# Patient Record
Sex: MALE | Race: WHITE
[De-identification: names, ages, dates, MRNs, and addresses within clinical notes are randomized per-mention and may not be internally consistent; named-entity substitution may affect disease eponyms.]

---

## 2018-02-15 ENCOUNTER — HOSPITAL ENCOUNTER (INPATIENT)
Dept: HOSPITAL 36 - GERO | Age: 78
LOS: 10 days | Discharge: NURSING, REHAB OR LONG-TERM CARE | DRG: 885 | End: 2018-02-25
Attending: PSYCHIATRY & NEUROLOGY | Admitting: PSYCHIATRY & NEUROLOGY
Payer: MEDICARE

## 2018-02-15 VITALS — SYSTOLIC BLOOD PRESSURE: 117 MMHG | DIASTOLIC BLOOD PRESSURE: 66 MMHG

## 2018-02-15 DIAGNOSIS — E87.0: ICD-10-CM

## 2018-02-15 DIAGNOSIS — F03.91: ICD-10-CM

## 2018-02-15 DIAGNOSIS — E11.65: ICD-10-CM

## 2018-02-15 DIAGNOSIS — Z79.4: ICD-10-CM

## 2018-02-15 DIAGNOSIS — F20.9: ICD-10-CM

## 2018-02-15 DIAGNOSIS — F29: Primary | ICD-10-CM

## 2018-02-15 DIAGNOSIS — R79.89: ICD-10-CM

## 2018-02-15 DIAGNOSIS — I10: ICD-10-CM

## 2018-02-15 PROCEDURE — Z7610: HCPCS

## 2018-02-16 LAB
ANION GAP SERPL CALC-SCNC: 13.5 MMOL/L (ref 7–16)
BASOPHILS # BLD AUTO: 0.1 TH/CUMM (ref 0–0.2)
BASOPHILS NFR BLD AUTO: 1.1 % (ref 0–2)
BUN SERPL-MCNC: 18 MG/DL (ref 7–25)
CALCIUM SERPL-MCNC: 9.3 MG/DL (ref 8.6–10.3)
CHLORIDE SERPL-SCNC: 102 MEQ/L (ref 98–107)
CHOLEST SERPL-MCNC: 144 MG/DL (ref ?–200)
CO2 SERPL-SCNC: 23.3 MEQ/L (ref 21–31)
CREAT SERPL-MCNC: 1 MG/DL (ref 0.7–1.3)
EOSINOPHIL # BLD AUTO: 0.3 TH/CMM (ref 0.1–0.4)
EOSINOPHIL NFR BLD AUTO: 4.5 % (ref 0–5)
ERYTHROCYTE [DISTWIDTH] IN BLOOD BY AUTOMATED COUNT: 15.4 % (ref 11.5–20)
GLUCOSE SERPL-MCNC: 224 MG/DL (ref 70–105)
HBA1C MFR BLD: 9.4 % (ref 4–6)
HCT VFR BLD CALC: 42.8 % (ref 41–60)
HDLC SERPL-MCNC: 46 MG/DL (ref 23–92)
HGB BLD-MCNC: 13.8 GM/DL (ref 12–16)
HGB BLD-MCNC: 15 G/DL (ref 4–35)
LYMPHOCYTE AB SER FC-ACNC: 1.5 TH/CMM (ref 1.5–3)
LYMPHOCYTES NFR BLD AUTO: 21.8 % (ref 20–50)
MCH RBC QN AUTO: 28.9 PG (ref 27–31)
MCHC RBC AUTO-ENTMCNC: 32.4 PG (ref 28–36)
MCV RBC AUTO: 89.4 FL (ref 80–99)
MONOCYTES # BLD AUTO: 0.5 TH/CMM (ref 0.3–1)
MONOCYTES NFR BLD AUTO: 7.7 % (ref 2–10)
NEUTROPHILS # BLD: 4.4 TH/CMM (ref 1.8–8)
NEUTROPHILS NFR BLD AUTO: 64.9 % (ref 40–80)
PLATELET # BLD: 223 TH/CMM (ref 150–400)
PMV BLD AUTO: 9.6 FL
POTASSIUM SERPL-SCNC: 3.8 MEQ/L (ref 3.5–5.1)
RBC # BLD AUTO: 4.78 MIL/CMM (ref 3.8–5.8)
SODIUM SERPL-SCNC: 135 MEQ/L (ref 136–145)
TRIGL SERPL-MCNC: 149 MG/DL (ref ?–150)
WBC # BLD AUTO: 6.8 TH/CMM (ref 4.8–10.8)

## 2018-02-16 RX ADMIN — INSULIN DETEMIR SCH UNITS: 100 INJECTION, SOLUTION SUBCUTANEOUS at 09:25

## 2018-02-16 RX ADMIN — INSULIN ASPART SCH UNITS: 100 INJECTION, SOLUTION INTRAVENOUS; SUBCUTANEOUS at 17:09

## 2018-02-16 RX ADMIN — INSULIN ASPART SCH UNITS: 100 INJECTION, SOLUTION INTRAVENOUS; SUBCUTANEOUS at 21:00

## 2018-02-16 RX ADMIN — INSULIN ASPART SCH: 100 INJECTION, SOLUTION INTRAVENOUS; SUBCUTANEOUS at 17:10

## 2018-02-16 NOTE — HISTORY AND PHYSICAL
History of Present Illness





- HPI


Chief Complaint: 





Psychosis


HPI: 





76 y/o male who was transferred from [facility] to [facility] for Psychosis. He was found in his backyard naked and confused. 

patient was BIBA after family notified EMS. Patient had no trauma noted. He 

stopped eating regularly per family. Patient was cooperative but restless. FBS 

was noted to be 426. Patient has a h/o DM on Lantus, h/o alcohol abuse and has 

history of BRYANT and dehydration.





PMH includes progressive dementia, schizophrenia, acute encephalopathy


Vital Signs: 





 Last Vital Signs











Temp  97.9 F   02/16/18 06:33


 


Pulse  90   02/16/18 06:33


 


Resp  19   02/16/18 06:33


 


BP  120/69   02/16/18 06:33


 


Pulse Ox  97   02/16/18 06:33














Past Medical History


Cardiovascular: Report: No Pertinent Hx


Pulmonary: Report: No Pertinent Hx


CNS: Report: Dementia


GI: Report: No Pertinent Hx


Psych: Report: Psychosis, Schizophrenia


Musculoskeletal: Report: No Pertinent Hx


Rheumatologic: Report: No pertinent Hx


Infectious Disease: Report: No Pertinent Hx


Renal/: Report: No Pertinent Hx


Endocrine: Report: Diabetes


Dermatology: Report: No Pertinent Hx





- Past Surgical History


Past Surgical History: No pertinent Hx





Social History


Smoke: No


Alcohol: Other (h/o of acute alcohol withdrawal)


Drugs: None


Lives: With Family





- Medications


Home Medications: 


Home Medication











 Medication  Instructions  Recorded  Type


 


Acetaminophen [Tylenol] 650 mg PO Q6HR PRN 02/15/18 History


 


Heparin Sodium [Heparin*] See Protocol SUBQ Q12HR 02/15/18 History


 


Insulin Glargine, Recombinan 15 unit SQ DAILY 02/15/18 History





[Lantus]   


 


Insulin Lispro [Humalog] See Protocol SUBQ ACHS 02/15/18 History


 


Lorazepam [Ativan] 0.5 mg PO Q6HR PRN 02/15/18 History


 


Zolpidem Tartrate [Ambien] 5 mg PO HS PRN 02/15/18 History














- Allergies


Allergies/Adverse Reactions: 


 Allergies











Allergy/AdvReac Type Severity Reaction Status Date / Time


 


No Known Allergies Allergy   Verified 02/15/18 21:28














Review of Systems





- Review of Systems


Constitutional: Report: No Significant


Eyes: Report: No Significant


ENT: Report: No Significant


Respiratory: Report: No Significant


Cardiovascular: Report: No Significant


Gastrointestinal: Report: No Significant


Genitourinary: Report: No Significant


Musculoskeletal: Report: No Significant


Skin: Report: No Significant


Neurological: Report: Other (psychosis)





Physical Exam





- Physical Exam


HEENT: Report: Ears Nose Throat within normal limits, Pharnyx within normal 

limits


Neck: Report: Within normal limits


Cardiovascular Systems: Report: +s1/s2 noted, Regular, Rate and Rhythm


Respiratory: Report: Breath Sounds are within normal limits


Abdomen: Report: Non-tender to palpation


Back: Report: Inspection of back is within normal limits.


Skin: Report: Color of skin is within normal limits


Neuro/Psych: Report: Mood affect is within normal limits, A+Ox3





- Lab Results


All Lab Results last 24 hours: 





 Laboratory Results - last 24 hr











  02/16/18 02/16/18





  06:10 06:10


 


WBC  6.8 


 


RBC  4.78 


 


Hgb  13.8 


 


Hct  42.8 


 


MCV  89.4 


 


MCH  28.9 


 


MCHC Differential  32.4 


 


RDW  15.4 


 


Plt Count  223 


 


MPV  9.6 


 


Neutrophils %  64.9 


 


Lymphocytes %  21.8 


 


Monocytes %  7.7 


 


Eosinophils %  4.5 


 


Basophils %  1.1 


 


Sodium   135 L


 


Potassium   3.8


 


Chloride   102


 


Carbon Dioxide   23.3


 


Anion Gap   13.5


 


BUN   18


 


Creatinine   1.0


 


Est GFR ( Amer)   TNP


 


Est GFR (Non-Af Amer)   TNP


 


BUN/Creatinine Ratio   18.0


 


Glucose   224 H


 


Calcium   9.3














- Assessment


Assessment: 





psychosis


dementia


schizophrenia


h/o encephalopathy


h/o alcohol withdrawal


diabetes mellitus type 2 on insulin








- Plan


Plan: 





admit to [facility]


will add TSH, Lipid profile, A1c to current labs.


accucheck ac and hs


low dose sliding scale.

## 2018-02-16 NOTE — PSYCHOSOCIAL EVALUATION
DATE OF SERVICE:  



PSYCHIATRIC INITIAL EVALUATION AND MENTAL STATUS EXAM



PATIENT'S AGE:  77.



SEX:  Male.



PHYSICIAN:  Leda Ramirez M.D., M.P.H.



CHIEF COMPLAINT:  Confusion and agitation.



HISTORY OF PRESENT ILLNESS:  The patient is a 77-year-old male who was found in

the backyard confused and naked.  The patient was brought into the hospital. 

The patient was agitated when he came into the hospital.  He is a Nauruan

speaking and I got help with translation from the staff.  The patient is

currently still confused.  He was talking to himself, laughing inappropriately. 

He was not able to tell me where he lives.  Also, when I asked him about if he

have any children, he also was not able to answer.  Also, when asked about his

birth date, he say [birth year], but he was not able to say the year.  Apparently, the

patient has been having dementia.  He also was clubbing his hand and he has been

talking to imaginary objects and also according to , who was not

making any sense with his answers.



PAST PSYCHIATRIC HISTORY:  Not known.



PAST MEDICAL HISTORY:  No known medical problems, apart from reviewing records

from [facility].  The patient was given Haldol and

also was given Pepcid and insulin.



SOCIAL HISTORY:  Not known at this time, but the patient was found in the

backyard naked prior to his admission.  No known history of alcohol or street

drugs.  Also, according to the report has not been eating and may be drinking

alcohol prior to his admission.  He also was hypertensive and tachycardic on the

Emergency Room in the other hospital.



ALLERGIES:  No known allergies.



MENTAL STATUS EXAMINATION:  The patient appears his stated age.  Anxious. 

Speaks Nauruan.  Rambling and _____ according to the  does not make

sense and disorganized and the patient is confused.  The patient did not answer

question regarding hallucinations or delusions, but patient is actively

hallucinating.  The patient did not answer questions regarding suicide or

homicide.  The patient is alert and disoriented to time, place, person and

situation.  Impaired immediate, recent and remote memories and he could not even

remember his birth date.  Poor insight.  Poor judgment.



ASSESSMENT:

PRIMARY DIAGNOSIS:  Unspecified psychosis.



SECONDARY DIAGNOSIS:  Dementia, moderate to severe, with psychotic features.



TREATMENT PLAN:  We will monitor the patient's behavior and condition closely. 

We will start the patient on Seroquel and we will adjust the dose.



ESTIMATED LENGTH OF STAY:  5-7 days.



THE PATIENT'S STRENGTHS AND WEAKNESSES:  The patient's strength is not clear at

this time.  Weakness is ineffective coping and his psychosis.



AFTER DISCHARGE PLAN:  Outpatient treatment and followup will continue as an

outpatient.



CRITERIA FOR DISCHARGE:  The patient will not be psychotic and stabilize

psychotropic medications.  Also, placement if needed.





DD: 02/16/2018 06:30

DT: 02/16/2018 22:25

[facility]# [id number]  [id number]

## 2018-02-17 RX ADMIN — INSULIN ASPART SCH UNITS: 100 INJECTION, SOLUTION INTRAVENOUS; SUBCUTANEOUS at 06:58

## 2018-02-17 RX ADMIN — INSULIN ASPART SCH UNITS: 100 INJECTION, SOLUTION INTRAVENOUS; SUBCUTANEOUS at 21:00

## 2018-02-17 RX ADMIN — INSULIN ASPART SCH: 100 INJECTION, SOLUTION INTRAVENOUS; SUBCUTANEOUS at 12:31

## 2018-02-17 RX ADMIN — INSULIN ASPART SCH: 100 INJECTION, SOLUTION INTRAVENOUS; SUBCUTANEOUS at 16:25

## 2018-02-17 RX ADMIN — INSULIN DETEMIR SCH: 100 INJECTION, SOLUTION SUBCUTANEOUS at 09:07

## 2018-02-17 NOTE — GENERAL PROGRESS NOTE
Subjective





- Review of Systems


Service Date: 02/17/18


Subjective: 





Awake, alert, afebrile. No acute distress





VS T97.8 P95 R20





Objective





- Results


Result Diagrams: 


 02/16/18 06:10





 02/16/18 06:10


Recent Labs: 


 Laboratory Last Values











WBC  6.8 Th/cmm (4.8-10.8)   02/16/18  06:10    


 


RBC  4.78 Mil/cmm (3.80-5.80)   02/16/18  06:10    


 


Hgb  13.8 gm/dL (12-16)   02/16/18  06:10    


 


Hct  42.8 % (41.0-60)   02/16/18  06:10    


 


MCV  89.4 fl (80-99)   02/16/18  06:10    


 


MCH  28.9 pg (27.0-31.0)   02/16/18  06:10    


 


MCHC Differential  32.4 pg (28.0-36.0)   02/16/18  06:10    


 


RDW  15.4 % (11.5-20.0)   02/16/18  06:10    


 


Plt Count  223 Th/cmm (150-400)   02/16/18  06:10    


 


MPV  9.6 fl  02/16/18  06:10    


 


Neutrophils %  64.9 % (40.0-80.0)   02/16/18  06:10    


 


Lymphocytes %  21.8 % (20.0-50.0)   02/16/18  06:10    


 


Monocytes %  7.7 % (2.0-10.0)   02/16/18  06:10    


 


Eosinophils %  4.5 % (0.0-5.0)   02/16/18  06:10    


 


Basophils %  1.1 % (0.0-2.0)   02/16/18  06:10    


 


Sodium  135 mEq/L (136-145)  L  02/16/18  06:10    


 


Potassium  3.8 mEq/L (3.5-5.1)   02/16/18  06:10    


 


Chloride  102 mEq/L ()   02/16/18  06:10    


 


Carbon Dioxide  23.3 mEq/L (21.0-31.0)   02/16/18  06:10    


 


Anion Gap  13.5  (7.0-16.0)   02/16/18  06:10    


 


BUN  18 mg/dL (7-25)   02/16/18  06:10    


 


Creatinine  1.0 mg/dL (0.7-1.3)   02/16/18  06:10    


 


Est GFR ( Amer)  TNP   02/16/18  06:10    


 


Est GFR (Non-Af Amer)  TNP   02/16/18  06:10    


 


BUN/Creatinine Ratio  18.0   02/16/18  06:10    


 


Glucose  224 mg/dL ()  H  02/16/18  06:10    


 


POC Glucose  253 MG/DL (70 - 105)  H  02/16/18  19:53    


 


Hemoglobin A1c %  9.4 % (4.0-6.0)  H  02/16/18  06:10    


 


Calcium  9.3 mg/dL (8.6-10.3)   02/16/18  06:10    


 


Triglycerides  149 mg/dL (<150)   02/16/18  06:10    


 


Cholesterol  144 mg/dL (<200)   02/16/18  06:10    


 


LDL Cholesterol Direct  93 mg/dL ()   02/16/18  06:10    


 


HDL Cholesterol  46 mg/dL (23-92)   02/16/18  06:10    


 


TSH  1.66 uIU/ml (0.34-5.60)   02/16/18  06:10    














- Physical Exam


Vitals and I&O: 


 Vital Signs











Temp  0 F   02/17/18 05:16


 


Pulse  95   02/16/18 21:22


 


Resp  20   02/16/18 21:22


 


BP  132/130   02/16/18 21:22


 


Pulse Ox  95   02/16/18 21:22








 Intake & Output











 02/16/18 02/16/18 02/17/18





 06:59 18:59 06:59


 


Intake Total 120 1200 360


 


Balance 120 1200 360


 


Weight (lbs) 71.033 kg  


 


Intake:   


 


  Oral 120 1200 360


 


Other:   


 


  # Voids 3  3


 


  # Bowel Movements  1 0











Active Medications: 


Current Medications





Acetaminophen (Tylenol)  650 mg PO Q6HR PRN


   PRN Reason: mild to moderate pain


   Stop: 04/16/18 22:18


   Last Admin: 02/16/18 17:08 Dose:  650 mg


Famotidine (Pepcid)  20 mg PO BID RENETTA


   Stop: 04/17/18 08:59


   Last Admin: 02/16/18 17:09 Dose:  20 mg


Heparin Sodium (Porcine) (Heparin)  5,000 units SUBQ Q12HR RENETTA


   PRN Reason: Protocol


   Stop: 04/17/18 08:59


   Last Admin: 02/16/18 21:00 Dose:  5,000 units


Insulin Aspart (Novolog Insulin Sliding Scale)  0 units SUBQ ACHS RENETTA


   PRN Reason: Protocol


   Stop: 04/17/18 11:29


   Last Admin: 02/16/18 21:00 Dose:  4 units


Insulin Detemir (Levemir Insulin)  15 units SUBQ DAILY R[facility]TA


   Stop: 04/17/18 08:59


   Last Admin: 02/16/18 09:25 Dose:  15 units


Lorazepam (Ativan)  0.5 mg PO Q6HR PRN; Protocol


   PRN Reason: agitation 


   Stop: 04/16/18 22:18


   Last Admin: 02/16/18 23:00 Dose:  0.5 mg


Magnesium Hydroxide (Milk Of Magnesia)  30 ml PO HS PRN


   PRN Reason: Constipation


Metformin HCl (Glucophage)  500 mg PO DAILY [facility]


   Stop: 04/18/18 08:59


Quetiapine Fumarate (Seroquel)  25 mg PO DAILY RENETTA


   PRN Reason: Protocol


   Stop: 04/17/18 08:59


   Last Admin: 02/16/18 09:26 Dose:  25 mg


Zolpidem Tartrate (Ambien)  5 mg PO HS PRN


   PRN Reason: insomnia


   Stop: 04/16/18 22:18








General: Alert, Oriented x3, No acute distress


HEENT: Atraumatic, PERRLA, EOMI


Neck: Supple


Cardiovascular: Regular rate, Normal S1, Normal S2


Lungs: Clear to auscultation


Abdomen: Bowel sounds, Soft





Assessment/Plan





- Assessment


Assessment: 





psychosis


dementia


schizophrenia


h/o encephalopathy


h/o alcohol withdrawal


diabetes mellitus type 2 on insulin








- Plan


Plan: 





admit to [facility]


will add TSH, Lipid profile, A1c to current labs.


accucheck ac and hs


low dose sliding scale.

## 2018-02-18 RX ADMIN — INSULIN ASPART SCH UNITS: 100 INJECTION, SOLUTION INTRAVENOUS; SUBCUTANEOUS at 21:52

## 2018-02-18 RX ADMIN — INSULIN DETEMIR SCH: 100 INJECTION, SOLUTION SUBCUTANEOUS at 12:40

## 2018-02-18 RX ADMIN — INSULIN ASPART SCH: 100 INJECTION, SOLUTION INTRAVENOUS; SUBCUTANEOUS at 16:09

## 2018-02-18 RX ADMIN — INSULIN ASPART SCH UNITS: 100 INJECTION, SOLUTION INTRAVENOUS; SUBCUTANEOUS at 06:56

## 2018-02-18 RX ADMIN — INSULIN ASPART SCH: 100 INJECTION, SOLUTION INTRAVENOUS; SUBCUTANEOUS at 12:40

## 2018-02-18 NOTE — PROGRESS NOTES
DATE:  



SUBJECTIVE:  The patient was seen, chart reviewed and discussed with staff.  The

patient seen in day room in a Ashley chair.  Attempts to speak with the patient

with a , but was unsuccessful.  The patient is actively

psychotic, laughing inappropriately and very disorganized.  He has ever been

compliant with his medications.



PLAN:  The patient continues to be actively psychotic, confused, unable to make

a safer realistic discharge so that he will require continued inpatient care

center treatment.  We will monitor patient on a daily basis for response to

medication and titrate meds as needed.





DD: 02/17/2018 20:17

DT: 02/18/2018 11:04

JOB# [id number]  [id number]

## 2018-02-18 NOTE — GENERAL PROGRESS NOTE
Subjective





- Review of Systems


Service Date: 18


Subjective: 





Awake, alert, afebrile. No acute distress





VS T97.7 P93 R18 /76





-->254-->210





Objective





- Results


Result Diagrams: 


 18 06:10





 18 06:10


Recent Labs: 


 Laboratory Last Values











WBC  6.8 Th/cmm (4.8-10.8)   18  06:10    


 


RBC  4.78 Mil/cmm (3.80-5.80)   18  06:10    


 


Hgb  13.8 gm/dL (12-16)   18  06:10    


 


Hct  42.8 % (41.0-60)   18  06:10    


 


MCV  89.4 fl (80-99)   18  06:10    


 


MCH  28.9 pg (27.0-31.0)   18  06:10    


 


MCHC Differential  32.4 pg (28.0-36.0)   18  06:10    


 


RDW  15.4 % (11.5-20.0)   18  06:10    


 


Plt Count  223 Th/cmm (150-400)   18  06:10    


 


MPV  9.6 fl  18  06:10    


 


Neutrophils %  64.9 % (40.0-80.0)   18  06:10    


 


Lymphocytes %  21.8 % (20.0-50.0)   18  06:10    


 


Monocytes %  7.7 % (2.0-10.0)   18  06:10    


 


Eosinophils %  4.5 % (0.0-5.0)   18  06:10    


 


Basophils %  1.1 % (0.0-2.0)   18  06:10    


 


Sodium  135 mEq/L (136-145)  L  18  06:10    


 


Potassium  3.8 mEq/L (3.5-5.1)   18  06:10    


 


Chloride  102 mEq/L ()   18  06:10    


 


Carbon Dioxide  23.3 mEq/L (21.0-31.0)   18  06:10    


 


Anion Gap  13.5  (7.0-16.0)   18  06:10    


 


BUN  18 mg/dL (7-25)   18  06:10    


 


Creatinine  1.0 mg/dL (0.7-1.3)   18  06:10    


 


Est GFR ( Amer)  TNP   18  06:10    


 


Est GFR (Non-Af Amer)  TNP   18  06:10    


 


BUN/Creatinine Ratio  18.0   18  06:10    


 


Glucose  224 mg/dL ()  H  18  06:10    


 


POC Glucose  210 MG/DL (70 - 105)  H  18  05:55    


 


Hemoglobin A1c %  9.4 % (4.0-6.0)  H  18  06:10    


 


Calcium  9.3 mg/dL (8.6-10.3)   18  06:10    


 


Triglycerides  149 mg/dL (<150)   18  06:10    


 


Cholesterol  144 mg/dL (<200)   18  06:10    


 


LDL Cholesterol Direct  93 mg/dL ()   18  06:10    


 


HDL Cholesterol  46 mg/dL (23-92)   18  06:10    


 


TSH  1.66 uIU/ml (0.34-5.60)   18  06:10    














- Physical Exam


Vitals and I&O: 


 Vital Signs











Temp  97.7 F   18 14:38


 


Pulse  93   18 14:38


 


Resp  18   18 14:38


 


BP  140/76   18 14:38


 


Pulse Ox  97   18 14:38








 Intake & Output











 18





 18:59 06:59 18:59


 


Intake Total 500 240 480


 


Output Total   1


 


Balance 500 240 479


 


Intake:   


 


  Oral 500 240 480


 


Output:   


 


  Urine   1


 


Other:   


 


  # Voids 2 1 1


 


  # Bowel Movements   1











Active Medications: 


Current Medications





Acetaminophen (Tylenol)  650 mg PO Q6HR PRN


   PRN Reason: mild to moderate pain


   Stop: 18 22:18


   Last Admin: 18 17:08 Dose:  650 mg


Famotidine (Pepcid)  20 mg PO BID RENETTA


   Stop: 18 08:59


   Last Admin: 18 16:09 Dose:  Not Given


Heparin Sodium (Porcine) (Heparin)  5,000 units SUBQ Q12HR RENETTA


   PRN Reason: Protocol


   Stop: 18 08:59


   Last Admin: 18 12:40 Dose:  Not Given


Insulin Aspart (Novolog Insulin Sliding Scale)  0 units SUBQ ACHS RENETTA


   PRN Reason: Protocol


   Stop: 18 11:29


   Last Admin: 18 16:09 Dose:  Not Given


Insulin Detemir (Levemir Insulin)  15 units SUBQ DAILY [facility]


   Stop: 18 08:59


   Last Admin: 18 12:40 Dose:  Not Given


Lorazepam (Ativan)  0.5 mg PO Q6HR PRN; Protocol


   PRN Reason: agitation 


   Stop: 18 22:18


   Last Admin: 18 20:54 Dose:  0.5 mg


Magnesium Hydroxide (Milk Of Magnesia)  30 ml PO HS PRN


   PRN Reason: Constipation


Metformin HCl (Glucophage)  500 mg PO DAILY [facility]


   Stop: 18 08:59


   Last Admin: 18 12:40 Dose:  Not Given


Quetiapine Fumarate (Seroquel)  25 mg PO DAILY RENETTA


   PRN Reason: Protocol


   Stop: 18 08:59


   Last Admin: 18 12:40 Dose:  Not Given


Zolpidem Tartrate (Ambien)  5 mg PO HS PRN


   PRN Reason: insomnia


   Stop: 18 22:18


   Last Admin: 18 20:55 Dose:  5 mg








General: Alert, Oriented x3, No acute distress


HEENT: Atraumatic, PERRLA, EOMI


Neck: Supple


Cardiovascular: Regular rate, Normal S1, Normal S2


Lungs: Clear to auscultation


Abdomen: Bowel sounds, Soft


Extremities: no Clubbing, no Cyanosis, no Edema





Assessment/Plan





- Assessment


Assessment: 





psychosis


dementia


schizophrenia


h/o encephalopathy


h/o alcohol withdrawal


diabetes mellitus type 2 on insulin elevated


Blood pressure elevated ...








- Plan


[facility]


will add TSH, Lipid profile, A1c to current labs.


accucheck ac and hs


low dose sliding scale.


add Clonidine 0.1mg PO q8


increase Levemir 20mg units SQ





Nutritional Asmnt/Malnutr-PDOC





- Dietary Evaluation


Malnutrition Findings (Please click <Entered> for more info): 








Nutritional Asmnt/Malnutrition                             Start:  18 10:

07


Text:                                                      Status: Complete    

  


Freq:                                                                          

  


 Document     18 10:07  ANNA  (Rec: 18 10:20  ANNA  NAPOLEON-

FNS1)


 Nutritional Asmnt/Malnutrition


     Patient General Information


      Nutritional Screening                      High Risk


      Diagnosis                                  Psychosis NOS


      Pertinent Medical Hx/Surgical Hx           Dementia, Schizophrenia, acute


                                                 encephalopathy, diabetes


      Subjective Information                     Patient was transferred from


                                                 [facility]. Per H&P,


                                                 FBS was 426. Patient is


                                                 primarily Italian speaking.


                                                 Patient was asleep in bed at


                                                 time of RD visit.


      Current Diet Order/ Nutrition Support      60gm CCHO


      Patient / S.O                              Not Indicated


      Pertinent Medications                      Pepcid, Novolog, Levemir, MOM,


                                                 Metformin


      Pertinent Labs                             Glucose 156-253 since


                                                 admission, HGA1C 9.4


     Nutritional Hx/Data


      Height                                     1.78 m


      Height (Calculated Centimeters)            177.8


      Current Weight (lbs)                       70.76 kg


      Weight (Calculated Kilograms)              70.8


      Weight (Calculated Grams)                  28430.4


      Ideal Body Weight                          166


      % Ideal Body Weight                        93


      Body Mass Index (BMI)                      22.4


      Recent Weight Change                       No


      Weight Status                              Approriate


     GI Symptoms


      GI Symptoms                                None


      Last BM                                    2/16 x 1


      Difficult in:                              None


      Food Allergies                             No


      Cultural/Ethnic/Taoist Belief           None noted


      Usual diet at home                         Unknown


      Skin Integrity/Comment:                    [place] 18, dryness, area of


                                                 concern, bruises


      Current %PO                                Good (%)


     Estimated Nutritional Goals


      BEE in Kcals:                              Using Current wt


      Calories/Kcals/Kg                          (based on CBW 70.8kg, 25-30


                                                 kcal/kg)


      Kcals Calculated                           ~3302-7540 kcal/day


      Protein:                                   Using Current wt


      Protein g/k gm/kg


      Protein Calculated                         ~70 gm/day


      Fluid: ml                                  ~3808-9718 ml/day (1 ml/kcal)


     Nutritional Problem


      1. Problem


       Problem                                   Altered nutrition related lab


                                                 values related to


       Etiology                                  uncontrolled hyperglycemia aeb


       Signs/Symptoms:                           Glucose 156-253 since


                                                 admission, HGA1C 9.4


     Intervention/Recommendation


      Comments                                   1. Continue 60 gm CCHO diet as


                                                 tolerated by patient.


                                                 2. MD to modify insulin


                                                 regimen for optimal glycemic


                                                 control.


     Expected Outcomes/Goals


      Expected Outcomes/Goals                    Oral intake >75% of meals,


                                                 nutrition related labs WNL,


                                                 weight stable


                                                 F/U LR

## 2018-02-18 NOTE — PROGRESS NOTES
DATE:  



SUBJECTIVE:  The patient seen, chart reviewed, and discussed with staff.  The

patient continues to be very irritated, confused, today, refusing to have any

solid foods as well as his medications.  He is extremely disorganized, unable to

answer questions in a coherent manner.



PLAN:  The patient continues to be actively psychotic.  It was felt that he is

unable to make realistic discharge plan and will need continued inpatient care. 

We will monitor patient on a daily basis for response to medication and titrate

medications as needed.





DD: 02/18/2018 15:12

DT: 02/18/2018 21:17

[facility]# [id number]  [id number]

## 2018-02-19 RX ADMIN — INSULIN ASPART SCH UNITS: 100 INJECTION, SOLUTION INTRAVENOUS; SUBCUTANEOUS at 22:00

## 2018-02-19 RX ADMIN — INSULIN DETEMIR SCH: 100 INJECTION, SOLUTION SUBCUTANEOUS at 12:26

## 2018-02-19 RX ADMIN — INSULIN ASPART SCH: 100 INJECTION, SOLUTION INTRAVENOUS; SUBCUTANEOUS at 12:27

## 2018-02-19 RX ADMIN — INSULIN ASPART SCH UNITS: 100 INJECTION, SOLUTION INTRAVENOUS; SUBCUTANEOUS at 07:01

## 2018-02-19 NOTE — PROGRESS NOTES
DATE:  02/19/2018



SUBJECTIVE:  Chart reviewed and the patient interviewed.  Also discussed the

patient's condition with the staff and reviewed records and labs.  The patient

is still extremely angry and is in irritable mood.  The patient also is quick to

respond and gets angry and agitated easily.  He also is still uncooperative with

the staff.  The patient refused to take his Accu-Chek  and takes the metformin. 

Also, he is getting aggressive while the staff trying to help him with his ADLs.

 He also is still suspicious and paranoid and in angry mood.



ASSESSMENT:  The patient is still aggressive and paranoid.



TREATMENT PLAN:  We will continue monitoring his behavior and his condition

closely.  Also, encouraged the patient to take his medications including his

metformin because his blood sugar on 02/15/2018 was 483.  Also, we will continue

to work on his irritability and anger and continue to followup closely.





DD: 02/19/2018 12:16

DT: 02/19/2018 23:10

[facility]# [id number]  [id number]

## 2018-02-19 NOTE — GENERAL PROGRESS NOTE
Subjective





- Review of Systems


Service Date: 18


Subjective: 





Awake, alert, afebrile. No acute distress





VS T97.8 P105 R18 /83





-->254-->210-->483-->263





Objective





- Results


Result Diagrams: 


 18 06:10





 18 06:10


Recent Labs: 


 Laboratory Last Values











WBC  6.8 Th/cmm (4.8-10.8)   18  06:10    


 


RBC  4.78 Mil/cmm (3.80-5.80)   18  06:10    


 


Hgb  13.8 gm/dL (12-16)   18  06:10    


 


Hct  42.8 % (41.0-60)   18  06:10    


 


MCV  89.4 fl (80-99)   18  06:10    


 


MCH  28.9 pg (27.0-31.0)   18  06:10    


 


MCHC Differential  32.4 pg (28.0-36.0)   18  06:10    


 


RDW  15.4 % (11.5-20.0)   18  06:10    


 


Plt Count  223 Th/cmm (150-400)   18  06:10    


 


MPV  9.6 fl  18  06:10    


 


Neutrophils %  64.9 % (40.0-80.0)   18  06:10    


 


Lymphocytes %  21.8 % (20.0-50.0)   18  06:10    


 


Monocytes %  7.7 % (2.0-10.0)   18  06:10    


 


Eosinophils %  4.5 % (0.0-5.0)   18  06:10    


 


Basophils %  1.1 % (0.0-2.0)   18  06:10    


 


Sodium  135 mEq/L (136-145)  L  18  06:10    


 


Potassium  3.8 mEq/L (3.5-5.1)   18  06:10    


 


Chloride  102 mEq/L ()   18  06:10    


 


Carbon Dioxide  23.3 mEq/L (21.0-31.0)   18  06:10    


 


Anion Gap  13.5  (7.0-16.0)   18  06:10    


 


BUN  18 mg/dL (7-25)   18  06:10    


 


Creatinine  1.0 mg/dL (0.7-1.3)   18  06:10    


 


Est GFR ( Amer)  TNP   18  06:10    


 


Est GFR (Non-Af Amer)  TNP   18  06:10    


 


BUN/Creatinine Ratio  18.0   18  06:10    


 


Glucose  483 mg/dL ()  H*  18  22:13    


 


POC Glucose  263 MG/DL (70 - 105)  H  18  06:43    


 


Hemoglobin A1c %  9.4 % (4.0-6.0)  H  18  06:10    


 


Calcium  9.3 mg/dL (8.6-10.3)   18  06:10    


 


Triglycerides  149 mg/dL (<150)   18  06:10    


 


Cholesterol  144 mg/dL (<200)   18  06:10    


 


LDL Cholesterol Direct  93 mg/dL ()   18  06:10    


 


HDL Cholesterol  46 mg/dL (23-92)   18  06:10    


 


TSH  1.66 uIU/ml (0.34-5.60)   18  06:10    














- Physical Exam


Vitals and I&O: 


 Vital Signs











Temp  97.8 F   18 06:32


 


Pulse  100   18 06:32


 


Resp  20   18 06:32


 


BP  150/83   18 06:32


 


Pulse Ox  96   18 06:32








 Intake & Output











 18





 18:59 06:59 18:59


 


Intake Total 480 240 


 


Output Total 1  


 


Balance 479 240 


 


Intake:   


 


  Oral 480 240 


 


Output:   


 


  Urine 1  


 


Other:   


 


  # Voids 1 1 


 


  # Bowel Movements 1 1 











Active Medications: 


Current Medications





Acetaminophen (Tylenol)  650 mg PO Q6HR PRN


   PRN Reason: mild to moderate pain


   Stop: 18 22:18


   Last Admin: 18 17:08 Dose:  650 mg


Famotidine (Pepcid)  20 mg PO BID [facility]


   Stop: 18 08:59


   Last Admin: 18 16:09 Dose:  Not Given


Heparin Sodium (Porcine) (Heparin)  5,000 units SUBQ Q12HR R[facility]ETTA


   PRN Reason: Protocol


   Stop: 18 08:59


   Last Admin: 18 21:51 Dose:  5,000 units


Insulin Aspart (Novolog Insulin Sliding Scale)  0 units SUBQ ACHS [facility]TA


   PRN Reason: Protocol


   Stop: 18 11:29


   Last Admin: 18 07:01 Dose:  6 units


Insulin Detemir (Levemir Insulin)  20 units SUBQ DAILY [facility]


   PRN Reason: Protocol


   Stop: 18 08:59


Lorazepam (Ativan)  0.5 mg PO Q6HR PRN; Protocol


   PRN Reason: agitation 


   Stop: 18 22:18


   Last Admin: 18 21:58 Dose:  0.5 mg


Magnesium Hydroxide (Milk Of Magnesia)  30 ml PO HS PRN


   PRN Reason: Constipation


Metformin HCl (Glucophage)  1,000 mg PO DAILY Counts include 234 beds at the [facility]


   Stop: 18 08:59


Metoprolol Succinate (Toprol Xl)  25 mg PO DAILY Counts include 234 beds at the [facility]


   Stop: 18 08:59


Quetiapine Fumarate (Seroquel)  25 mg PO DAILY [facility]


   PRN Reason: Protocol


   Stop: 18 08:59


   Last Admin: 18 12:40 Dose:  Not Given


Zolpidem Tartrate (Ambien)  5 mg PO HS PRN


   PRN Reason: insomnia


   Stop: 18 22:18


   Last Admin: 18 21:14 Dose:  5 mg








General: Alert, Oriented x3, No acute distress


HEENT: Atraumatic, PERRLA, EOMI


Neck: Supple


Cardiovascular: Regular rate, Normal S1, Normal S2


Lungs: Clear to auscultation


Abdomen: Bowel sounds, Soft


Extremities: no Clubbing, no Cyanosis, no Edema





Assessment/Plan





- Assessment


Assessment: 





psychosis


dementia


schizophrenia


h/o encephalopathy


h/o alcohol withdrawal


diabetes mellitus type 2 on insulin elevated


Blood pressure elevated ...








- Plan


Plan: 





admit to [facility]


will add TSH, Lipid profile, A1c to current labs.


accucheck ac and hs


low dose sliding scale.


add Clonidine 0.1mg PO q8


increase Levemir 20mg units SQ


increase metformin to 1000mg daily


add Toprol XL 25mg PO daily





Nutritional Asmnt/Malnutr-PDOC





- Dietary Evaluation


Malnutrition Findings (Please click <Entered> for more info): 








Nutritional Asmnt/Malnutrition                             Start:  18 10:

07


Text:                                                      Status: Complete    

  


Freq:                                                                          

  


 Document     18 10:07  ANNA  (Rec: 18 10:20  ANNA  NAPOLEON-

FNS1)


 Nutritional Asmnt/Malnutrition


     Patient General Information


      Nutritional Screening                      High Risk


      Diagnosis                                  Psychosis NOS


      Pertinent Medical Hx/Surgical Hx           Dementia, Schizophrenia, acute


                                                 encephalopathy, diabetes


      Subjective Information                     Patient was transferred from


                                                 [facility]. Per H&P,


                                                 FBS was 426. Patient is


                                                 primarily Filipino speaking.


                                                 Patient was asleep in bed at


                                                 time of RD visit.


      Current Diet Order/ Nutrition Support      60gm CCHO


      Patient / S.O                              Not Indicated


      Pertinent Medications                      Pepcid, Novolog, Levemir, MOM,


                                                 Metformin


      Pertinent Labs                             Glucose 156-253 since


                                                 admission, HGA1C 9.4


     Nutritional Hx/Data


      Height                                     1.78 m


      Height (Calculated Centimeters)            177.8


      Current Weight (lbs)                       70.76 kg


      Weight (Calculated Kilograms)              70.8


      Weight (Calculated Grams)                  68966.4


      Ideal Body Weight                          166


      % Ideal Body Weight                        93


      Body Mass Index (BMI)                      22.4


      Recent Weight Change                       No


      Weight Status                              Approriate


     GI Symptoms


      GI Symptoms                                None


      Last BM                                    2/16 x 1


      Difficult in:                              None


      Food Allergies                             No


      Cultural/Ethnic/Judaism Belief           None noted


      Usual diet at home                         Unknown


      Skin Integrity/Comment:                    [place] 18, dryness, area of


                                                 concern, bruises


      Current %PO                                Good (%)


     Estimated Nutritional Goals


      BEE in Kcals:                              Using Current wt


      Calories/Kcals/Kg                          (based on CBW 70.8kg, 25-30


                                                 kcal/kg)


      Kcals Calculated                           ~9282-5435 kcal/day


      Protein:                                   Using Current wt


      Protein g/k gm/kg


      Protein Calculated                         ~70 gm/day


      Fluid: ml                                  ~7545-1926 ml/day (1 ml/kcal)


     Nutritional Problem


      1. Problem


       Problem                                   Altered nutrition related lab


                                                 values related to


       Etiology                                  uncontrolled hyperglycemia aeb


       Signs/Symptoms:                           Glucose 156-253 since


                                                 admission, HGA1C 9.4


     Intervention/Recommendation


      Comments                                   1. Continue 60 gm CCHO diet as


                                                 tolerated by patient.


                                                 2. MD to modify insulin


                                                 regimen for optimal glycemic


                                                 control.


     Expected Outcomes/Goals


      Expected Outcomes/Goals                    Oral intake >75% of meals,


                                                 nutrition related labs WNL,


                                                 weight stable


                                                 F/U LR

## 2018-02-20 RX ADMIN — INSULIN ASPART SCH: 100 INJECTION, SOLUTION INTRAVENOUS; SUBCUTANEOUS at 09:20

## 2018-02-20 RX ADMIN — INSULIN DETEMIR SCH: 100 INJECTION, SOLUTION SUBCUTANEOUS at 09:20

## 2018-02-20 RX ADMIN — INSULIN ASPART SCH UNITS: 100 INJECTION, SOLUTION INTRAVENOUS; SUBCUTANEOUS at 06:58

## 2018-02-20 RX ADMIN — INSULIN ASPART SCH: 100 INJECTION, SOLUTION INTRAVENOUS; SUBCUTANEOUS at 21:03

## 2018-02-20 NOTE — GENERAL PROGRESS NOTE
Subjective





- Review of Systems


Service Date: 18


Subjective: 





Awake, alert, afebrile. Patient continues to be irritable and in angry mood 

towards staff which may be the cause of his elevated blood pressure. Patient is 

somewhat suspicious and paranoid. As a result his blood sugars continue to be 

elevated. No new vitals this morning or blood sugars since yesterday. 





VS T97.8 P105 R18 /83





-->254-->210-->483-->263





Objective





- Results


Result Diagrams: 


 18 06:10





 18 06:10


Recent Labs: 


 Laboratory Last Values











WBC  6.8 Th/cmm (4.8-10.8)   18  06:10    


 


RBC  4.78 Mil/cmm (3.80-5.80)   18  06:10    


 


Hgb  13.8 gm/dL (12-16)   18  06:10    


 


Hct  42.8 % (41.0-60)   18  06:10    


 


MCV  89.4 fl (80-99)   18  06:10    


 


MCH  28.9 pg (27.0-31.0)   18  06:10    


 


MCHC Differential  32.4 pg (28.0-36.0)   18  06:10    


 


RDW  15.4 % (11.5-20.0)   18  06:10    


 


Plt Count  223 Th/cmm (150-400)   18  06:10    


 


MPV  9.6 fl  18  06:10    


 


Neutrophils %  64.9 % (40.0-80.0)   18  06:10    


 


Lymphocytes %  21.8 % (20.0-50.0)   18  06:10    


 


Monocytes %  7.7 % (2.0-10.0)   18  06:10    


 


Eosinophils %  4.5 % (0.0-5.0)   18  06:10    


 


Basophils %  1.1 % (0.0-2.0)   18  06:10    


 


Sodium  135 mEq/L (136-145)  L  18  06:10    


 


Potassium  3.8 mEq/L (3.5-5.1)   18  06:10    


 


Chloride  102 mEq/L ()   18  06:10    


 


Carbon Dioxide  23.3 mEq/L (21.0-31.0)   18  06:10    


 


Anion Gap  13.5  (7.0-16.0)   18  06:10    


 


BUN  18 mg/dL (7-25)   18  06:10    


 


Creatinine  1.0 mg/dL (0.7-1.3)   18  06:10    


 


Est GFR ( Amer)  TNP   18  06:10    


 


Est GFR (Non-Af Amer)  TNP   18  06:10    


 


BUN/Creatinine Ratio  18.0   18  06:10    


 


Glucose  483 mg/dL ()  H*  18  22:13    


 


POC Glucose  263 MG/DL (70 - 105)  H  18  06:43    


 


Hemoglobin A1c %  9.4 % (4.0-6.0)  H  18  06:10    


 


Calcium  9.3 mg/dL (8.6-10.3)   18  06:10    


 


Triglycerides  149 mg/dL (<150)   18  06:10    


 


Cholesterol  144 mg/dL (<200)   18  06:10    


 


LDL Cholesterol Direct  93 mg/dL ()   18  06:10    


 


HDL Cholesterol  46 mg/dL (23-92)   18  06:10    


 


TSH  1.66 uIU/ml (0.34-5.60)   18  06:10    














- Physical Exam


Vitals and I&O: 


 Vital Signs











Temp  97.8 F   18 06:32


 


Pulse  100   18 06:32


 


Resp  20   18 06:32


 


BP  150/83   18 06:32


 


Pulse Ox  96   18 06:32








 Intake & Output











 18





 06:59 18:59 06:59


 


Intake Total 240 120 


 


Output Total  3 


 


Balance 240 117 


 


Intake:   


 


  Oral 240 120 


 


Output:   


 


  Urine  3 


 


Other:   


 


  # Voids 1  


 


  # Bowel Movements 1 1 











Active Medications: 


Current Medications





Acetaminophen (Tylenol)  650 mg PO Q6HR PRN


   PRN Reason: mild to moderate pain


   Stop: 18 22:18


   Last Admin: 18 17:08 Dose:  650 mg


Famotidine (Pepcid)  20 mg PO BID [facility]


   Stop: 18 08:59


   Last Admin: 18 12:26 Dose:  Not Given


Heparin Sodium (Porcine) (Heparin)  5,000 units SUBQ Q12HR RENETTA


   PRN Reason: Protocol


   Stop: 18 08:59


   Last Admin: 18 22:00 Dose:  5,000 units


Insulin Aspart (Novolog Insulin Sliding Scale)  0 units SUBQ ACHS RENETTA


   PRN Reason: Protocol


   Stop: 18 11:29


   Last Admin: 18 22:00 Dose:  10 units


Insulin Detemir (Levemir Insulin)  20 units SUBQ DAILY [facility]


   PRN Reason: Protocol


   Stop: 18 08:59


   Last Admin: 18 12:26 Dose:  Not Given


Lorazepam (Ativan)  0.5 mg PO Q6HR PRN; Protocol


   PRN Reason: agitation 


   Stop: 18 22:18


   Last Admin: 18 21:58 Dose:  0.5 mg


Magnesium Hydroxide (Milk Of Magnesia)  30 ml PO HS PRN


   PRN Reason: Constipation


Metformin HCl (Glucophage)  1,000 mg PO DAILY [facility]


   Stop: 18 08:59


   Last Admin: 18 12:27 Dose:  Not Given


Metoprolol Succinate (Toprol Xl)  25 mg PO DAILY [facility]


   Stop: 18 11:29


   Last Admin: 18 12:27 Dose:  Not Given


Quetiapine Fumarate (Seroquel)  25 mg PO DAILY RENETTA


   PRN Reason: Protocol


   Stop: 18 08:59


   Last Admin: 18 12:27 Dose:  Not Given


Zolpidem Tartrate (Ambien)  5 mg PO HS PRN


   PRN Reason: insomnia


   Stop: 18 22:18


   Last Admin: 18 21:14 Dose:  5 mg








General: Alert, Oriented x3, No acute distress


HEENT: Atraumatic, PERRLA, EOMI


Neck: Supple


Cardiovascular: Regular rate, Normal S1, Normal S2


Lungs: Clear to auscultation


Abdomen: Bowel sounds, Soft


Extremities: no Clubbing, no Cyanosis, no Edema





Assessment/Plan





- Assessment


Assessment: 





psychosis


dementia


schizophrenia


h/o encephalopathy


h/o alcohol withdrawal


diabetes mellitus type 2 on insulin elevated


Blood pressure elevated ...








- Plan


Plan: 





ad[facility] to [facility]


will add TSH, Lipid profile, A1c to current labs.


accucheck ac and hs


low dose sliding scale.


add Clonidine 0.1mg PO q8


increase Levemir 20mg units SQ


increase metformin to 1000mg daily


add Toprol XL 25mg PO daily





Nutritional Asmnt/Malnutr-PDOC





- Dietary Evaluation


Malnutrition Findings (Please click <Entered> for more info): 








Nutritional Asmnt/Malnutrition                             Start:  18 10:

07


Text:                                                      Status: Complete    

  


Freq:                                                                          

  


 Document     18 10:07  ANNA  (Rec: 18 10:20  MMGUERO BENDER-

FNS1)


 Nutritional Asmnt/Malnutrition


     Patient General Information


      Nutritional Screening                      High Risk


      Diagnosis                                  Psychosis NOS


      Pertinent Medical Hx/Surgical Hx           Dementia, Schizophrenia, acute


                                                 encephalopathy, diabetes


      Subjective Information                     Patient was transferred from


                                                 [facility]. Per H&P,


                                                 FBS was 426. Patient is


                                                 primarily Polish speaking.


                                                 Patient was asleep in bed at


                                                 time of RD visit.


      [facility]


      Patient / S.O                              Not Indicated


      Pertinent Medications                      Pepcid, Novolog, Levemir, MOM,


                                                 Metformin


      Pertinent Labs                             Glucose 156-253 since


                                                 admission, HGA1C 9.4


     Nutritional Hx/Data


      Height                                     1.78 m


      Height (Calculated Centimeters)            177.8


      Current Weight (lbs)                       70.76 kg


      Weight (Calculated Kilograms)              70.8


      Weight (Calculated Grams)                  62304.4


      Ideal Body Weight                          166


      % Ideal Body Weight                        93


      Body Mass Index (BMI)                      22.4


      Recent Weight Change                       No


      Weight Status                              Approriate


     GI Symptoms


      GI Symptoms                                None


      Last BM                                    2/16 x 1


      Difficult in:                              None


      Food Allergies                             No


      Cultural/Ethnic/Episcopal Belief           None noted


      Usual diet at home                         Unknown


      Skin Integrity/Comment:                    [place] 18, dryness, area of


                                                 concern, bruises


      Current %PO                                Good (%)


     Estimated Nutritional Goals


      BEE in Kcals:                              Using Current wt


      Calories/Kcals/Kg                          (based on CBW 70.8kg, 25-30


                                                 kcal/kg)


      Kcals Calculated                           ~9720-0618 kcal/day


      Protein:                                   Using Current wt


      Protein g/k gm/kg


      Protein Calculated                         ~70 gm/day


      Fluid: ml                                  ~6730-4615 ml/day (1 ml/kcal)


     Nutritional Problem


      1. Problem


       Problem                                   Altered nutrition related lab


                                                 values related to


       Etiology                                  uncontrolled hyperglycemia aeb


       Signs/Symptoms:                           Glucose 156-253 since


                                                 admission, HGA1C 9.4


     Intervention/Recommendation


      Comments                                   1. Continue 60 gm CCHO diet as


                                                 tolerated by patient.


                                                 2. MD to modify insulin


                                                 regimen for optimal glycemic


                                                 control.


     Expected Outcomes/Goals


      Expected Outcomes/Goals                    Oral intake >75% of meals,


                                                 nutrition related labs WNL,


                                                 weight stable


                                                 F/U LR

## 2018-02-20 NOTE — PROGRESS NOTES
DATE:  



SUBJECTIVE:  Chart reviewed and the patient interviewed.  Also discussed the

patient's condition with the staff and reviewed records and labs.  The patient

continued to be severely agitated and in angry and in irritable mood.  The

patient also is restless and he is suspicious and paranoid.  The patient also

has tried to throw a pillow towards myself and he is also throwing objects

towards staff.  The patient also is sexually inappropriate and grabbing female

staff during helping him with his ADLs.  Also, during the interview, the patient

seems to be preoccupied and rambling and unable to answer any of the questions

currently.



ASSESSMENT:  The patient is still psychotic.



TREATMENT PLAN:  Continue to monitor his behavior and his condition closely. 

Also, we will increase Seroquel to 75 mg 3 times a day and will continue to work

on his poor impulse control and his irritability.





DD: 02/20/2018 06:25

DT: 02/20/2018 07:06

JOB# [id number]  [id number]

## 2018-02-21 RX ADMIN — INSULIN ASPART SCH UNITS: 100 INJECTION, SOLUTION INTRAVENOUS; SUBCUTANEOUS at 17:22

## 2018-02-21 RX ADMIN — INSULIN ASPART SCH UNITS: 100 INJECTION, SOLUTION INTRAVENOUS; SUBCUTANEOUS at 11:27

## 2018-02-21 RX ADMIN — INSULIN DETEMIR SCH: 100 INJECTION, SOLUTION SUBCUTANEOUS at 09:26

## 2018-02-21 RX ADMIN — INSULIN ASPART SCH UNITS: 100 INJECTION, SOLUTION INTRAVENOUS; SUBCUTANEOUS at 21:57

## 2018-02-21 RX ADMIN — INSULIN ASPART SCH UNITS: 100 INJECTION, SOLUTION INTRAVENOUS; SUBCUTANEOUS at 06:55

## 2018-02-21 NOTE — PROGRESS NOTES
DATE:  02/21/2018



SUBJECTIVE:  Chart reviewed and the patient interviewed.  Also discussed the

patient's condition with the staff and reviewed records and labs.  The patient

continued to be confused and restless.  The patient also is resisting care.  He

also refuse Accu-Chek.  The patient also states he did have episodes of

irritability and anger for no apparent reason.



ASSESSMENT:  The patient is still agitated and aggressive.



TREATMENT PLAN:  Continue monitoring his behavior and his condition and continue

adjusting psychotropic medications.





DD: 02/21/2018 14:46

DT: 02/21/2018 18:34

JOB# [id number]  [id number]

## 2018-02-21 NOTE — GENERAL PROGRESS NOTE
Subjective





- Review of Systems


Service Date: 18


Subjective: 





Awake, alert, afebrile. Patient continues to be irritable and in angry mood 

towards staff which may be the cause of his elevated blood pressure. Patient is 

somewhat suspicious and paranoid. As a result his blood sugars continue to be 

elevated. No new vitals this morning or blood sugars since yesterday. 





VS T96.5 P112 R20 /80





-->254-->210-->483-->263-->205-->250





Objective





- Results


Result Diagrams: 


 18 06:10





 18 06:10


Recent Labs: 


 Laboratory Last Values











WBC  6.8 Th/cmm (4.8-10.8)   18  06:10    


 


RBC  4.78 Mil/cmm (3.80-5.80)   18  06:10    


 


Hgb  13.8 gm/dL (12-16)   18  06:10    


 


Hct  42.8 % (41.0-60)   18  06:10    


 


MCV  89.4 fl (80-99)   18  06:10    


 


MCH  28.9 pg (27.0-31.0)   18  06:10    


 


MCHC Differential  32.4 pg (28.0-36.0)   18  06:10    


 


RDW  15.4 % (11.5-20.0)   18  06:10    


 


Plt Count  223 Th/cmm (150-400)   18  06:10    


 


MPV  9.6 fl  18  06:10    


 


Neutrophils %  64.9 % (40.0-80.0)   18  06:10    


 


Lymphocytes %  21.8 % (20.0-50.0)   18  06:10    


 


Monocytes %  7.7 % (2.0-10.0)   18  06:10    


 


Eosinophils %  4.5 % (0.0-5.0)   18  06:10    


 


Basophils %  1.1 % (0.0-2.0)   18  06:10    


 


Sodium  135 mEq/L (136-145)  L  18  06:10    


 


Potassium  3.8 mEq/L (3.5-5.1)   18  06:10    


 


Chloride  102 mEq/L ()   18  06:10    


 


Carbon Dioxide  23.3 mEq/L (21.0-31.0)   18  06:10    


 


Anion Gap  13.5  (7.0-16.0)   18  06:10    


 


BUN  18 mg/dL (7-25)   18  06:10    


 


Creatinine  1.0 mg/dL (0.7-1.3)   18  06:10    


 


Est GFR ( Amer)  TNP   18  06:10    


 


Est GFR (Non-Af Amer)  TNP   18  06:10    


 


BUN/Creatinine Ratio  18.0   18  06:10    


 


Glucose  483 mg/dL ()  H*  18  22:13    


 


POC Glucose  250 MG/DL (70 - 105)  H  18  06:43    


 


Hemoglobin A1c %  9.4 % (4.0-6.0)  H  18  06:10    


 


Calcium  9.3 mg/dL (8.6-10.3)   18  06:10    


 


Triglycerides  149 mg/dL (<150)   18  06:10    


 


Cholesterol  144 mg/dL (<200)   18  06:10    


 


LDL Cholesterol Direct  93 mg/dL ()   18  06:10    


 


HDL Cholesterol  46 mg/dL (23-92)   18  06:10    


 


TSH  1.66 uIU/ml (0.34-5.60)   18  06:10    














- Physical Exam


Vitals and I&O: 


 Vital Signs











Temp  96.5 F   18 06:57


 


Pulse  112   18 06:57


 


Resp  20   18 06:57


 


BP  134/80   18 06:57


 


Pulse Ox  94   18 06:57








 Intake & Output











 18





 18:59 06:59 18:59


 


Intake Total  500 


 


Balance  500 


 


Intake:   


 


  Oral  500 


 


Other:   


 


  # Voids  2 


 


  # Bowel Movements  0 











Active Medications: 


Current Medications





Acetaminophen (Tylenol)  650 mg PO Q6HR PRN


   PRN Reason: mild to moderate pain


   Stop: 18 22:18


   Last Admin: 18 17:08 Dose:  650 mg


Famotidine (Pepcid)  20 mg PO BID [facility]


   Stop: 18 08:59


   Last Admin: 18 09:20 Dose:  Not Given


Heparin Sodium (Porcine) (Heparin)  5,000 units SUBQ Q12HR RENETTA


   PRN Reason: Protocol


   Stop: 18 08:59


   Last Admin: 18 21:03 Dose:  Not Given


Insulin Aspart (Novolog Insulin Sliding Scale)  0 units SUBQ ACHS RENETTA


   PRN Reason: Protocol


   Stop: 18 11:29


   Last Admin: 18 06:55 Dose:  4 units


Insulin Detemir (Levemir Insulin)  20 units SUBQ DAILY RENETTA


   PRN Reason: Protocol


   Stop: 18 08:59


   Last Admin: 18 09:20 Dose:  Not Given


Lorazepam (Ativan)  0.5 mg PO Q6HR PRN; Protocol


   PRN Reason: agitation 


   Stop: 18 22:18


   Last Admin: 18 21:58 Dose:  0.5 mg


Magnesium Hydroxide (Milk Of Magnesia)  30 ml PO HS PRN


   PRN Reason: Constipation


Metformin HCl (Glucophage)  1,000 mg PO DAILY [facility]


   Stop: 18 08:59


   Last Admin: 18 09:20 Dose:  Not Given


Metoprolol Succinate (Toprol Xl)  25 mg PO DAILY [facility]


   Stop: 18 11:29


   Last Admin: 18 09:20 Dose:  Not Given


Quetiapine Fumarate (Seroquel)  25 mg PO DAILY RENETTA


   PRN Reason: Protocol


   Stop: 18 08:59


   Last Admin: 18 09:21 Dose:  Not Given


Zolpidem Tartrate (Ambien)  5 mg PO HS PRN


   PRN Reason: insomnia


   Stop: 18 22:18


   Last Admin: 18 21:14 Dose:  5 mg








General: Alert, Oriented x3, No acute distress


HEENT: Atraumatic, PERRLA, EOMI


Neck: Supple


Cardiovascular: Regular rate, Normal S1, Normal S2


Lungs: Clear to auscultation


Abdomen: Bowel sounds, Soft


Extremities: no Clubbing, no Cyanosis, no Edema





Assessment/Plan





- Assessment


Assessment: 





psychosis


dementia


schizophrenia


h/o encephalopathy


h/o alcohol withdrawal


diabetes mellitus type 2 on insulin .. improved.


HTN ... improved








- Plan


[facility] 





[facility]


will add TSH, Lipid profile, A1c to current labs.


accucheck ac and hs


low dose sliding scale.


add Clonidine 0.1mg PO q8


increase Levemir 20mg units SQ


increase metformin to 1000mg daily


add Toprol XL 25mg PO daily





Nutritional Asmnt/Malnutr-PDOC





- Dietary Evaluation


Malnutrition Findings (Please click <Entered> for more info): 








Nutritional Asmnt/Malnutrition                             Start:  18 10:

07


Text:                                                      Status: Complete    

  


Freq:                                                                          

  


 Document     18 10:07  ANNA  (Rec: 18 10:20  ANNA BENDER-

FNS1)


 Nutritional Asmnt/Malnutrition


     Patient General Information


      Nutritional Screening                      High Risk


      Diagnosis                                  Psychosis NOS


      Pertinent Medical Hx/Surgical Hx           Dementia, Schizophrenia, acute


                                                 encephalopathy, diabetes


      Subjective Information                     Patient was transferred from


                                                 [facility]. Per H&P,


                                                 FBS was 426. Patient is


                                                 primarily Palau[place] speaking.


                                                 Patient was asleep in bed at


                                                 time of RD visit.


      [facility]      [facility]


      Patient / S.O                              Not Indicated


      Pertinent Medications                      Pepcid, Novolog, Levemir, MOM,


                                                 Metformin


      Pertinent Labs                             Glucose 156-253 since


                                                 admission, HGA1C 9.4


     Nutritional Hx/Data


      Height                                     1.78 m


      Height (Calculated Centimeters)            177.8


      Current Weight (lbs)                       70.76 kg


      Weight (Calculated Kilograms)              70.8


      Weight (Calculated Grams)                  36124.4


      Ideal Body Weight                          166


      % Ideal Body Weight                        93


      Body Mass Index (BMI)                      22.4


      Recent Weight Change                       No


      Weight Status                              Approriate


     GI Symptoms


      GI Symptoms                                None


      Last BM                                    2/16 x 1


      Difficult in:                              None


      Food Allergies                             No


      Cultural/Ethnic/Episcopalian Belief           None noted


      Usual diet at home                         Unknown


      Skin Integrity/Comment:                    [place] 18, dryness, area of


                                                 concern, bruises


      Current %PO                                Good (%)


     Estimated Nutritional Goals


      BEE in Kcals:                              Using Current wt


      Calories/Kcals/Kg                          (based on CBW 70.8kg, 25-30


                                                 kcal/kg)


      Kcals Calculated                           ~7658-1221 kcal/day


      Protein:                                   Using Current wt


      Protein g/k gm/kg


      Protein Calculated                         ~70 gm/day


      Fluid: ml                                  ~0706-3368 ml/day (1 ml/kcal)


     Nutritional Problem


      1. Problem


       Problem                                   Altered nutrition related lab


                                                 values related to


       Etiology                                  uncontrolled hyperglycemia aeb


       Signs/Symptoms:                           Glucose 156-253 since


                                                 admission, HGA1C 9.4


     Intervention/Recommendation


      Comments                                   1. Continue 60 gm CCHO diet as


                                                 tolerated by patient.


                                                 2. MD to modify insulin


                                                 regimen for optimal glycemic


                                                 control.


     Expected Outcomes/Goals


      Expected Outcomes/Goals                    Oral intake >75% of meals,


                                                 nutrition related labs WNL,


                                                 weight stable


                                                 F/U LR

## 2018-02-22 RX ADMIN — INSULIN ASPART SCH UNITS: 100 INJECTION, SOLUTION INTRAVENOUS; SUBCUTANEOUS at 17:27

## 2018-02-22 RX ADMIN — INSULIN ASPART SCH: 100 INJECTION, SOLUTION INTRAVENOUS; SUBCUTANEOUS at 21:47

## 2018-02-22 RX ADMIN — INSULIN ASPART SCH: 100 INJECTION, SOLUTION INTRAVENOUS; SUBCUTANEOUS at 12:18

## 2018-02-22 RX ADMIN — INSULIN ASPART SCH: 100 INJECTION, SOLUTION INTRAVENOUS; SUBCUTANEOUS at 06:33

## 2018-02-22 RX ADMIN — INSULIN DETEMIR SCH: 100 INJECTION, SOLUTION SUBCUTANEOUS at 10:04

## 2018-02-22 NOTE — PROGRESS NOTES
DATE:  02/22/2018



PSYCHIATRIC PROGRESS NOTE



SUBJECTIVE:  Chart reviewed and the patient interviewed.  Also discussed the

patient's condition with the staff and reviewed records and labs.  The patient

is still confused.  The patient also is still trying to take off his clothes. 

Also when I tried to talk him, he kept waving both hands and yelling "_____." 

He also is still unable to follow directions and he is still easily agitated. 

The patient also gets combative when staff tried to help with his ADLs.  Also is

still paranoid and in irritable mood.  Otherwise, the patient is compliant with

taking his medications with no side effects of medications.  Yesterday, the

patient refused to take his heparin, but he did take rest of medications.



ASSESSMENT:  The patient still can be dangerous to others and considered to be

gravely disabled.



TREATMENT PLAN:  Continue monitoring his behavior and continue adjusting

psychotropic medications and follow up closely.





DD: 02/22/2018 10:48

DT: 02/22/2018 21:55

[facility]# [id number]  [id number]

## 2018-02-22 NOTE — GENERAL PROGRESS NOTE
Subjective





- Review of Systems


Service Date: 18


Subjective: 





Awake, alert, afebrile. Patient BP has improved. Blood sugars improved. 


VS T97.8 P77 R20 /77





-->254-->210-->483-->263-->205-->250





Objective





- Results


Result Diagrams: 


 18 06:10





 18 06:10


Recent Labs: 


 Laboratory Last Values











WBC  6.8 Th/cmm (4.8-10.8)   18  06:10    


 


RBC  4.78 Mil/cmm (3.80-5.80)   18  06:10    


 


Hgb  13.8 gm/dL (12-16)   18  06:10    


 


Hct  42.8 % (41.0-60)   18  06:10    


 


MCV  89.4 fl (80-99)   18  06:10    


 


MCH  28.9 pg (27.0-31.0)   18  06:10    


 


MCHC Differential  32.4 pg (28.0-36.0)   18  06:10    


 


RDW  15.4 % (11.5-20.0)   18  06:10    


 


Plt Count  223 Th/cmm (150-400)   18  06:10    


 


MPV  9.6 fl  18  06:10    


 


Neutrophils %  64.9 % (40.0-80.0)   18  06:10    


 


Lymphocytes %  21.8 % (20.0-50.0)   18  06:10    


 


Monocytes %  7.7 % (2.0-10.0)   18  06:10    


 


Eosinophils %  4.5 % (0.0-5.0)   18  06:10    


 


Basophils %  1.1 % (0.0-2.0)   18  06:10    


 


Sodium  135 mEq/L (136-145)  L  18  06:10    


 


Potassium  3.8 mEq/L (3.5-5.1)   18  06:10    


 


Chloride  102 mEq/L ()   18  06:10    


 


Carbon Dioxide  23.3 mEq/L (21.0-31.0)   18  06:10    


 


Anion Gap  13.5  (7.0-16.0)   18  06:10    


 


BUN  18 mg/dL (7-25)   18  06:10    


 


Creatinine  1.0 mg/dL (0.7-1.3)   18  06:10    


 


Est GFR ( Amer)  TNP   18  06:10    


 


Est GFR (Non-Af Amer)  TNP   18  06:10    


 


BUN/Creatinine Ratio  18.0   18  06:10    


 


Glucose  483 mg/dL ()  H*  18  22:13    


 


POC Glucose  250 MG/DL (70 - 105)  H  18  06:43    


 


Hemoglobin A1c %  9.4 % (4.0-6.0)  H  18  06:10    


 


Calcium  9.3 mg/dL (8.6-10.3)   18  06:10    


 


Triglycerides  149 mg/dL (<150)   18  06:10    


 


Cholesterol  144 mg/dL (<200)   18  06:10    


 


LDL Cholesterol Direct  93 mg/dL ()   18  06:10    


 


HDL Cholesterol  46 mg/dL (23-92)   18  06:10    


 


TSH  1.66 uIU/ml (0.34-5.60)   18  06:10    














- Physical Exam


Vitals and I&O: 


 Vital Signs











Temp  97.8 F   18 06:51


 


Pulse  77   18 06:51


 


Resp  20   18 06:51


 


BP  131/77   18 06:51


 


Pulse Ox  98   18 06:51








 Intake & Output











 18





 18:59 06:59 18:59


 


Intake Total 240 120 


 


Balance 240 120 


 


Intake:   


 


  Oral 240 120 


 


Other:   


 


  # Voids 3 3 











Active Medications: 


Current Medications





Acetaminophen (Tylenol)  650 mg PO Q6HR PRN


   PRN Reason: mild to moderate pain


   Stop: 18 22:18


   Last Admin: 18 13:54 Dose:  650 mg


Famotidine (Pepcid)  20 mg PO BID RENETTA


   Stop: 04/17/18 08:59


   Last Admin: 18 17:23 Dose:  20 mg


Heparin Sodium (Porcine) (Heparin)  5,000 units SUBQ Q12HR RENETTA


   PRN Reason: Protocol


   Stop: 18 08:59


   Last Admin: 18 22:02 Dose:  Not Given


Insulin Aspart (Novolog Insulin Sliding Scale)  0 units SUBQ ACHS RENETTA


   PRN Reason: Protocol


   Stop: 18 11:29


   Last Admin: 18 06:33 Dose:  Not Given


Insulin Detemir (Levemir Insulin)  20 units SUBQ DAILY RENETTA


   PRN Reason: Protocol


   Stop: 18 08:59


   Last Admin: 18 09:26 Dose:  Not Given


Lorazepam (Ativan)  0.5 mg PO Q6HR PRN; Protocol


   PRN Reason: agitation 


   Stop: 18 22:18


   Last Admin: 18 13:55 Dose:  0.5 mg


Magnesium Hydroxide (Milk Of Magnesia)  30 ml PO HS PRN


   PRN Reason: Constipation


Metformin HCl (Glucophage)  1,000 mg PO DAILY RENETTA


   Stop: 18 08:59


   Last Admin: 18 08:33 Dose:  1,000 mg


Metoprolol Succinate (Toprol Xl)  25 mg PO DAILY RENETTA


   Stop: 18 11:29


   Last Admin: 18 08:32 Dose:  25 mg


Quetiapine Fumarate (Seroquel)  25 mg PO DAILY RENETTA


   PRN Reason: Protocol


   Stop: 18 08:59


   Last Admin: 18 08:33 Dose:  25 mg


Zolpidem Tartrate (Ambien)  5 mg PO HS PRN


   PRN Reason: insomnia


   Stop: 18 22:18


   Last Admin: 18 21:14 Dose:  5 mg








General: Alert, Oriented x3, No acute distress


HEENT: Atraumatic, PERRLA, EOMI


Neck: Supple


Cardiovascular: Regular rate, Normal S1, Normal S2


Lungs: Clear to auscultation


Abdomen: Bowel sounds, Soft


Extremities: no Clubbing, no Cyanosis, no Edema





Assessment/Plan





- Assessment


Assessment: 





psychosis


dementia


schizophrenia


h/o encephalopathy


h/o alcohol withdrawal


diabetes mellitus type 2 on insulin .. improved.


HTN ... improved








- Plan


Plan: 





admit to [facility]


will add TSH, Lipid profile, A1c to current labs.


accucheck ac and hs


low dose sliding scale.


add Clonidine 0.1mg PO q8


increase Levemir 20mg units SQ


increase metformin to 1000mg daily


add Toprol XL 25mg PO daily





Nutritional Asmnt/Malnutr-PDOC





- Dietary Evaluation


Malnutrition Findings (Please click <Entered> for more info): 








Nutritional Asmnt/Malnutrition                             Start:  18 10:

07


Text:                                                      Status: Complete    

  


Freq:                                                                          

  


 Document     18 10:07  MMGUERO  (Rec: 18 10:20  MMULMAGO BENDER-

FNS1)


 Nutritional Asmnt/Malnutrition


     Patient General Information


      Nutritional Screening                      High Risk


      Diagnosis                                  Psychosis NOS


      Pertinent Medical Hx/Surgical Hx           Dementia, Schizophrenia, acute


                                                 encephalopathy, diabetes


      Subjective Information                     Patient was transferred from


                                                 [facility]. Per H&P,


                                                 FBS was 426. Patient is


                                                 primarily Maori speaking.


                                                 Patient was asleep in bed at


                                                 time of RD visit.


      Current Diet Order/ Nutrition Support      60gm CCHO


      Patient / S.O                              Not Indicated


      Pertinent Medications                      Pepcid, Novolog, Levemir, MOM,


                                                 Metformin


      Pertinent Labs                             Glucose 156-253 since


                                                 admission, HGA1C 9.4


     Nutritional Hx/Data


      Height                                     1.78 m


      Height (Calculated Centimeters)            177.8


      Current Weight (lbs)                       70.76 kg


      Weight (Calculated Kilograms)              70.8


      Weight (Calculated Grams)                  23327.4


      Ideal Body Weight                          166


      % Ideal Body Weight                        93


      Body Mass Index (BMI)                      22.4


      Recent Weight Change                       No


      Weight Status                              Approriate


     GI Symptoms


      GI Symptoms                                None


      Last BM                                    2/16 x 1


      Difficult in:                              None


      Food Allergies                             No


      Cultural/Ethnic/Worship Belief           None noted


      Usual diet at home                         Unknown


      Skin Integrity/Comment:                    [place] 18, dryness, area of


                                                 concern, bruises


      Current %PO                                Good (%)


     Estimated Nutritional Goals


      BEE in Kcals:                              Using Current wt


      Calories/Kcals/Kg                          (based on CBW 70.8kg, 25-30


                                                 kcal/kg)


      Kcals Calculated                           ~2669-3745 kcal/day


      Protein:                                   Using Current wt


      Protein g/k gm/kg


      Protein Calculated                         ~70 gm/day


      Fluid: ml                                  ~3268-5729 ml/day (1 ml/kcal)


     Nutritional Problem


      1. Problem


       Problem                                   Altered nutrition related lab


                                                 values related to


       Etiology                                  uncontrolled hyperglycemia aeb


       Signs/Symptoms:                           Glucose 156-253 since


                                                 admission, HGA1C 9.4


     Intervention/Recommendation


      Comments                                   1. Continue 60 gm CCHO diet as


                                                 tolerated by patient.


                                                 2. MD to modify insulin


                                                 regimen for optimal glycemic


                                                 control.


     Expected Outcomes/Goals


      Expected Outcomes/Goals                    Oral intake >75% of meals,


                                                 nutrition related labs WNL,


                                                 weight stable


                                                 F/U LR

## 2018-02-23 RX ADMIN — INSULIN ASPART SCH: 100 INJECTION, SOLUTION INTRAVENOUS; SUBCUTANEOUS at 16:22

## 2018-02-23 RX ADMIN — INSULIN DETEMIR SCH: 100 INJECTION, SOLUTION SUBCUTANEOUS at 08:44

## 2018-02-23 RX ADMIN — INSULIN ASPART SCH: 100 INJECTION, SOLUTION INTRAVENOUS; SUBCUTANEOUS at 07:01

## 2018-02-23 RX ADMIN — INSULIN ASPART SCH: 100 INJECTION, SOLUTION INTRAVENOUS; SUBCUTANEOUS at 21:00

## 2018-02-23 RX ADMIN — INSULIN ASPART SCH: 100 INJECTION, SOLUTION INTRAVENOUS; SUBCUTANEOUS at 11:39

## 2018-02-23 RX ADMIN — OLANZAPINE SCH: 5 TABLET, ORALLY DISINTEGRATING ORAL at 08:41

## 2018-02-23 NOTE — PROGRESS NOTES
DATE:  



ADDENDUM



Because the patient is refusing medications we will try to give the patient

Zyprexa Zydis 5 mg twice a day and we will see if this will help the patient's

agitation and confusion, especially that he is also refusing to take insulin and

the family brought the rest of his medications at home including insulin and the

patient needs that because of his elevated blood sugar.





DD: 02/23/2018 07:12

DT: 02/23/2018 08:01

JOB# [id number]  [id number]

## 2018-02-23 NOTE — PROGRESS NOTES
DATE:  



SUBJECTIVE:  Chart reviewed and the patient interviewed.  Also, discussed the

patient's condition with the staff and reviewed records and labs.  The patient

is still in irritable mood.  The patient also is still pounding on the bed rails

and screaming and yelling "[email or web address]."  He also is restless and he is having

difficulty expressing himself or expressing his needs.  Also, is easily agitated

and unable to follow directions and mumbling in Polish all the time and yelling

and screaming.  Also, aggressive with the staff while helping him with his ADLs.



ASSESSMENT:  The patient is still agitated and is still psychotic.



TREATMENT PLAN:  Continue to monitor his behavior and his condition closely. 

Also, continue to work on his poor impulse control.  Also, the patient is

refusing to take medications and we will give the patient with IM if he gets

agitated.  Also, will work on his compliance with medications.



ESTIMATED LENGTH OF STAY:  4-6 days.



LABORATORY DATA:  No new labs available for review.





DD: 02/23/2018 07:07

DT: 02/23/2018 08:00

JOB# [id number]  [id number]

## 2018-02-23 NOTE — GENERAL PROGRESS NOTE
Subjective





- Review of Systems


Service Date: 18


Subjective: 





Awake, alert, afebrile. Patient refusing medications including insulin and as a 

result BP and Blood sugars are elevated this AM. 


VS T98.6 P118 R18 /96





-->254-->210-->483-->263-->205-->250-->384





Objective





- Results


Result Diagrams: 


 18 06:10





 18 06:10


Recent Labs: 


 Laboratory Last Values











WBC  6.8 Th/cmm (4.8-10.8)   18  06:10    


 


RBC  4.78 Mil/cmm (3.80-5.80)   18  06:10    


 


Hgb  13.8 gm/dL (12-16)   18  06:10    


 


Hct  42.8 % (41.0-60)   18  06:10    


 


MCV  89.4 fl (80-99)   18  06:10    


 


MCH  28.9 pg (27.0-31.0)   18  06:10    


 


MCHC Differential  32.4 pg (28.0-36.0)   18  06:10    


 


RDW  15.4 % (11.5-20.0)   18  06:10    


 


Plt Count  223 Th/cmm (150-400)   18  06:10    


 


MPV  9.6 fl  18  06:10    


 


Neutrophils %  64.9 % (40.0-80.0)   18  06:10    


 


Lymphocytes %  21.8 % (20.0-50.0)   18  06:10    


 


Monocytes %  7.7 % (2.0-10.0)   18  06:10    


 


Eosinophils %  4.5 % (0.0-5.0)   18  06:10    


 


Basophils %  1.1 % (0.0-2.0)   18  06:10    


 


Sodium  135 mEq/L (136-145)  L  18  06:10    


 


Potassium  3.8 mEq/L (3.5-5.1)   18  06:10    


 


Chloride  102 mEq/L ()   18  06:10    


 


Carbon Dioxide  23.3 mEq/L (21.0-31.0)   18  06:10    


 


Anion Gap  13.5  (7.0-16.0)   18  06:10    


 


BUN  18 mg/dL (7-25)   18  06:10    


 


Creatinine  1.0 mg/dL (0.7-1.3)   18  06:10    


 


Est GFR ( Amer)  TNP   18  06:10    


 


Est GFR (Non-Af Amer)  TNP   18  06:10    


 


BUN/Creatinine Ratio  18.0   18  06:10    


 


Glucose  483 mg/dL ()  H*  18  22:13    


 


POC Glucose  384 MG/DL (70 - 105)  H  18  17:15    


 


Hemoglobin A1c %  9.4 % (4.0-6.0)  H  18  06:10    


 


Calcium  9.3 mg/dL (8.6-10.3)   18  06:10    


 


Triglycerides  149 mg/dL (<150)   18  06:10    


 


Cholesterol  144 mg/dL (<200)   18  06:10    


 


LDL Cholesterol Direct  93 mg/dL ()   18  06:10    


 


HDL Cholesterol  46 mg/dL (23-92)   18  06:10    


 


TSH  1.66 uIU/ml (0.34-5.60)   18  06:10    














- Physical Exam


Vitals and I&O: 


 Vital Signs











Temp  98.6 F   18 15:49


 


Pulse  118   18 15:49


 


Resp  20   18 20:00


 


BP  158/96   18 15:49


 


Pulse Ox  99   18 15:49








 Intake & Output











 18





 18:59 06:59 18:59


 


Intake Total 240 250 


 


Balance 240 250 


 


Intake:   


 


  Oral 240 250 


 


Other:   


 


  # Voids 2 2 


 


  # Bowel Movements  0 











Active Medications: 


Current Medications





Acetaminophen (Tylenol)  650 mg PO Q6HR PRN


   PRN Reason: mild to moderate pain


   Stop: 18 22:18


   Last Admin: 18 13:54 Dose:  650 mg


Famotidine (Pepcid)  20 mg PO BID RENETTA


   Stop: 18 08:59


   Last Admin: 18 16:06 Dose:  20 mg


Heparin Sodium (Porcine) (Heparin)  5,000 units SUBQ Q12HR RENETTA


   PRN Reason: Protocol


   Stop: 18 08:59


   Last Admin: 18 21:50 Dose:  Not Given


Insulin Aspart (Novolog Insulin Sliding Scale)  0 units SUBQ ACHS RENETTA


   PRN Reason: Protocol


   Stop: 18 11:29


   Last Admin: 18 07:01 Dose:  Not Given


Insulin Detemir (Levemir Insulin)  20 units SUBQ DAILY RENETTA


   PRN Reason: Protocol


   Stop: 18 08:59


   Last Admin: 18 10:04 Dose:  Not Given


Lorazepam (Ativan)  0.5 mg PO Q6HR PRN; Protocol


   PRN Reason: agitation 


   Stop: 18 22:18


   Last Admin: 18 13:55 Dose:  0.5 mg


Magnesium Hydroxide (Milk Of Magnesia)  30 ml PO HS PRN


   PRN Reason: Constipation


Metformin HCl (Glucophage)  1,000 mg PO DAILY RENETTA


   Stop: 18 08:59


   Last Admin: 18 10:10 Dose:  Not Given


Metoprolol Succinate (Toprol Xl)  25 mg PO DAILY RENETTA


   Stop: 18 11:29


   Last Admin: 18 10:11 Dose:  Not Given


Olanzapine (Zyprexa Zydis)  5 mg PO DAILY RENETTA


   PRN Reason: Protocol


   Stop: 18 08:59


Quetiapine Fumarate (Seroquel)  25 mg PO DAILY RENETTA


   PRN Reason: Protocol


   Stop: 18 08:59


   Last Admin: 18 10:11 Dose:  Not Given


Zolpidem Tartrate (Ambien)  5 mg PO HS PRN


   PRN Reason: insomnia


   Stop: 18 22:18


   Last Admin: 18 21:14 Dose:  5 mg








General: Alert, Oriented x3, No acute distress


HEENT: Atraumatic, PERRLA, EOMI


Neck: Supple


Cardiovascular: Regular rate, Normal S1, Normal S2


Lungs: Clear to auscultation


Abdomen: Bowel sounds, Soft


Extremities: no Clubbing, no Cyanosis, no Edema





Assessment/Plan





- Assessment


Assessment: 





psychosis


dementia


schizophrenia


h/o encephalopathy


h/o alcohol withdrawal


diabetes mellitus type 2 on insulin .. elevated d/t noncompliance.


HTN ... elevated.








- Plan


Plan: 





[facility]


will add TSH, Lipid profile, A1c to current labs.


accucheck ac and hs


low dose sliding scale.


add Clonidine 0.1mg PO q8


increase Levemir 20mg units SQ


increase metformin to 1000mg daily


add Toprol XL 25mg PO daily





Nutritional Asmnt/Malnutr-PDOC





- Dietary Evaluation


Malnutrition Findings (Please click <Entered> for more info): 








Nutritional Asmnt/Malnutrition                             Start:  18 10:

07


Text:                                                      Status: Complete    

  


Freq:                                                                          

  


 Document     18 10:07  ANNA  (Rec: 18 10:20  ANNA BENDER-

FNS1)


 Nutritional Asmnt/Malnutrition


     Patient General Information


      Nutritional Screening                      High Risk


      Diagnosis                                  Psychosis NOS


      Pertinent Medical Hx/Surgical Hx           Dementia, Schizophrenia, acute


                                                 encephalopathy, diabetes


      Subjective Information                     Patient was transferred from


                                                 [facility]. Per H&P,


                                                 FBS was 426. Patient is


                                                 primarily Amharic speaking.


                                                 Patient was asleep in bed at


                                                 time of RD visit.


      [facility]/ Nutrition Support      [facility]


      Patient / S.O                              Not Indicated


      Pertinent Medications                      Pepcid, Novolog, Levemir, MOM,


                                                 Metformin


      Pertinent Labs                             Glucose 156-253 since


                                                 admission, HGA1C 9.4


     Nutritional Hx/Data


      Height                                     1.78 m


      Height (Calculated Centimeters)            177.8


      Current Weight (lbs)                       70.76 kg


      Weight (Calculated Kilograms)              70.8


      Weight (Calculated Grams)                  62632.4


      Ideal Body Weight                          166


      % Ideal Body Weight                        93


      Body Mass Index (BMI)                      22.4


      Recent Weight Change                       No


      Weight Status                              Approriate


     GI Symptoms


      GI Symptoms                                None


      Last BM                                    2/16 x 1


      Difficult in:                              None


      Food Allergies                             No


      Cultural/Ethnic/Voodoo Belief           None noted


      Usual diet at home                         Unknown


      Skin Integrity/Comment:                    G[place] 18, dryness, area of


                                                 concern, bruises


      Current %PO                                Good (%)


     Estimated Nutritional Goals


      BEE in Kcals:                              Using Current wt


      Calories/Kcals/Kg                          (based on CBW 70.8kg, 25-30


                                                 kcal/kg)


      Kcals Calculated                           ~8739-1296 kcal/day


      Protein:                                   Using Current wt


      Protein g/k gm/kg


      Protein Calculated                         ~70 gm/day


      Fluid: ml                                  ~0192-2699 ml/day (1 ml/kcal)


     Nutritional Problem


      1. Problem


       Problem                                   Altered nutrition related lab


                                                 values related to


       Etiology                                  uncontrolled hyperglycemia aeb


       Signs/Symptoms:                           Glucose 156-253 since


                                                 admission, HGA1C 9.4


     Intervention/Recommendation


      Comments                                   1. Continue 60 gm CCHO diet as


                                                 tolerated by patient.


                                                 2. MD to modify insulin


                                                 regimen for optimal glycemic


                                                 control.


     Expected Outcomes/Goals


      Expected Outcomes/Goals                    Oral intake >75% of meals,


                                                 nutrition related labs WNL,


                                                 weight stable


                                                 F/U LR

## 2018-02-24 RX ADMIN — INSULIN ASPART SCH: 100 INJECTION, SOLUTION INTRAVENOUS; SUBCUTANEOUS at 16:52

## 2018-02-24 RX ADMIN — OLANZAPINE SCH: 5 TABLET, ORALLY DISINTEGRATING ORAL at 09:45

## 2018-02-24 RX ADMIN — INSULIN ASPART SCH: 100 INJECTION, SOLUTION INTRAVENOUS; SUBCUTANEOUS at 08:00

## 2018-02-24 RX ADMIN — INSULIN DETEMIR SCH: 100 INJECTION, SOLUTION SUBCUTANEOUS at 09:45

## 2018-02-24 RX ADMIN — INSULIN ASPART SCH UNITS: 100 INJECTION, SOLUTION INTRAVENOUS; SUBCUTANEOUS at 21:30

## 2018-02-24 RX ADMIN — INSULIN ASPART SCH: 100 INJECTION, SOLUTION INTRAVENOUS; SUBCUTANEOUS at 11:37

## 2018-02-24 NOTE — GENERAL PROGRESS NOTE
Subjective





- Review of Systems


Service Date: 18


Subjective: 





Awake, alert, afebrile. Patient refusing medications including insulin and as a 

result BP and Blood sugars are elevated this AM. 


VS T98.6 P118 R18 /96





-->254-->210-->483-->263-->205-->250-->384





Objective





- Results


Result Diagrams: 


 18 06:10





 18 06:10


Recent Labs: 


 Laboratory Last Values











WBC  6.8 Th/cmm (4.8-10.8)   18  06:10    


 


RBC  4.78 Mil/cmm (3.80-5.80)   18  06:10    


 


Hgb  13.8 gm/dL (12-16)   18  06:10    


 


Hct  42.8 % (41.0-60)   18  06:10    


 


MCV  89.4 fl (80-99)   18  06:10    


 


MCH  28.9 pg (27.0-31.0)   18  06:10    


 


MCHC Differential  32.4 pg (28.0-36.0)   18  06:10    


 


RDW  15.4 % (11.5-20.0)   18  06:10    


 


Plt Count  223 Th/cmm (150-400)   18  06:10    


 


MPV  9.6 fl  18  06:10    


 


Neutrophils %  64.9 % (40.0-80.0)   18  06:10    


 


Lymphocytes %  21.8 % (20.0-50.0)   18  06:10    


 


Monocytes %  7.7 % (2.0-10.0)   18  06:10    


 


Eosinophils %  4.5 % (0.0-5.0)   18  06:10    


 


Basophils %  1.1 % (0.0-2.0)   18  06:10    


 


Sodium  135 mEq/L (136-145)  L  18  06:10    


 


Potassium  3.8 mEq/L (3.5-5.1)   18  06:10    


 


Chloride  102 mEq/L ()   18  06:10    


 


Carbon Dioxide  23.3 mEq/L (21.0-31.0)   18  06:10    


 


Anion Gap  13.5  (7.0-16.0)   18  06:10    


 


BUN  18 mg/dL (7-25)   18  06:10    


 


Creatinine  1.0 mg/dL (0.7-1.3)   18  06:10    


 


Est GFR ( Amer)  TNP   18  06:10    


 


Est GFR (Non-Af Amer)  TNP   18  06:10    


 


BUN/Creatinine Ratio  18.0   18  06:10    


 


Glucose  483 mg/dL ()  H*  18  22:13    


 


POC Glucose  384 MG/DL (70 - 105)  H  18  17:15    


 


Hemoglobin A1c %  9.4 % (4.0-6.0)  H  18  06:10    


 


Calcium  9.3 mg/dL (8.6-10.3)   18  06:10    


 


Triglycerides  149 mg/dL (<150)   18  06:10    


 


Cholesterol  144 mg/dL (<200)   18  06:10    


 


LDL Cholesterol Direct  93 mg/dL ()   18  06:10    


 


HDL Cholesterol  46 mg/dL (23-92)   18  06:10    


 


TSH  1.66 uIU/ml (0.34-5.60)   18  06:10    














- Physical Exam


Vitals and I&O: 


 Vital Signs











Temp  97.8 F   18 15:49


 


Pulse  115   18 15:49


 


Resp  18   18 15:49


 


BP  163/86   18 15:49


 


Pulse Ox  96   18 15:49








 Intake & Output











 18





 18:59 06:59 18:59


 


Intake Total 500  


 


Balance 500  


 


Intake:   


 


  Oral 500  


 


Other:   


 


  # Voids 3  











Active Medications: 


Current Medications





Acetaminophen (Tylenol)  650 mg PO Q6HR PRN


   PRN Reason: mild to moderate pain


   Stop: 18 22:18


   Last Admin: 18 13:54 Dose:  650 mg


Famotidine (Pepcid)  20 mg PO BID RENETTA


   Stop: 18 08:59


   Last Admin: 18 16:22 Dose:  Not Given


Heparin Sodium (Porcine) (Heparin)  5,000 units SUBQ Q12HR RENETTA


   PRN Reason: Protocol


   Stop: 18 08:59


   Last Admin: 18 21:04 Dose:  Not Given


Insulin Aspart (Novolog Insulin Sliding Scale)  0 units SUBQ ACHS RENETTA


   PRN Reason: Protocol


   Stop: 18 11:29


   Last Admin: 18 21:00 Dose:  Not Given


Insulin Detemir (Levemir Insulin)  20 units SUBQ DAILY RENETTA


   PRN Reason: Protocol


   Stop: 18 08:59


   Last Admin: 18 08:44 Dose:  Not Given


Lorazepam (Ativan)  0.5 mg PO Q6HR PRN; Protocol


   PRN Reason: agitation 


   Stop: 18 22:18


   Last Admin: 18 13:55 Dose:  0.5 mg


Magnesium Hydroxide (Milk Of Magnesia)  30 ml PO HS PRN


   PRN Reason: Constipation


Metformin HCl (Glucophage)  1,000 mg PO DAILY [facility]


   Stop: 18 08:59


   Last Admin: 18 08:41 Dose:  Not Given


Metoprolol Succinate (Toprol Xl)  25 mg PO DAILY [facility]


   Stop: 18 11:29


   Last Admin: 18 08:40 Dose:  Not Given


Olanzapine (Zyprexa Zydis)  5 mg PO DAILY RENETTA


   PRN Reason: Protocol


   Stop: 18 08:59


   Last Admin: 18 08:41 Dose:  Not Given


Quetiapine Fumarate (Seroquel)  25 mg PO DAILY RENETTA


   PRN Reason: Protocol


   Stop: 18 08:59


   Last Admin: 18 08:42 Dose:  Not Given


Zolpidem Tartrate (Ambien)  5 mg PO HS PRN


   PRN Reason: insomnia


   Stop: 18 22:18


   Last Admin: 18 21:14 Dose:  5 mg








General: Alert, Oriented x3, No acute distress


HEENT: Atraumatic, PERRLA, EOMI


Neck: Supple


Cardiovascular: Regular rate, Normal S1, Normal S2


Lungs: Clear to auscultation


Abdomen: Bowel sounds, Soft


Extremities: no Clubbing, no Cyanosis, no Edema





Assessment/Plan





- Assessment


Assessment: 





psychosis


dementia


schizophrenia


h/o encephalopathy


h/o alcohol withdrawal


diabetes mellitus type 2 on insulin .. elevated d/t noncompliance.


HTN ... elevated.








- Plan


Plan: 





[facility]


will add TSH, Lipid profile, A1c to current labs.


accucheck ac and hs


low dose sliding scale.


add Clonidine 0.1mg PO q8


increase Levemir 20mg units SQ


increase metformin to 1000mg daily


add Toprol XL 25mg PO daily





Nutritional Asmnt/Malnutr-PDOC





- Dietary Evaluation


Malnutrition Findings (Please click <Entered> for more info): 








Nutritional Asmnt/Malnutrition                             Start:  18 10:

07


Text:                                                      Status: Complete    

  


Freq:                                                                          

  


 Document     18 10:07  ANNA  (Rec: 18 10:20  ANNA BENDER-

FNS1)


 Nutritional Asmnt/Malnutrition


     Patient General Information


      Nutritional Screening                      High Risk


      Diagnosis                                  Psychosis NOS


      Pertinent Medical Hx/Surgical Hx           Dementia, Schizophrenia, acute


                                                 encephalopathy, diabetes


      Subjective Information                     Patient was transferred from


                                                 [facility]. Per H&P,


                                                 FBS was 426. Patient is


                                                 primarily Russian speaking.


                                                 Patient was asleep in bed at


                                                 time of RD visit.


      [facility]


      Patient / S.O                              Not Indicated


      Pertinent Medications                      Pepcid, Novolog, Levemir, MOM,


                                                 Metformin


      Pertinent Labs                             Glucose 156-253 since


                                                 admission, HGA1C 9.4


     Nutritional Hx/Data


      Height                                     1.78 m


      Height (Calculated Centimeters)            177.8


      Current Weight (lbs)                       70.76 kg


      Weight (Calculated Kilograms)              70.8


      Weight (Calculated Grams)                  07802.4


      Ideal Body Weight                          166


      % Ideal Body Weight                        93


      Body Mass Index (BMI)                      22.4


      Recent Weight Change                       No


      Weight Status                              Approriate


     GI Symptoms


      GI Symptoms                                None


      Last BM                                    2/16 x 1


      Difficult in:                              None


      Food Allergies                             No


      Cultural/Ethnic/Hindu Belief           None noted


      Usual diet at home                         Unknown


      Skin Integrity/Comment:                    G[place] 18, dryness, area of


                                                 concern, bruises


      Current %PO                                Good (%)


     Estimated Nutritional Goals


      BEE in Kcals:                              Using Current wt


      Calories/Kcals/Kg                          (based on CBW 70.8kg, 25-30


                                                 kcal/kg)


      Kcals Calculated                           ~7678-3638 kcal/day


      Protein:                                   Using Current wt


      Protein g/k gm/kg


      Protein Calculated                         ~70 gm/day


      Fluid: ml                                  ~8852-8368 ml/day (1 ml/kcal)


     Nutritional Problem


      1. Problem


       Problem                                   Altered nutrition related lab


                                                 values related to


       Etiology                                  uncontrolled hyperglycemia aeb


       Signs/Symptoms:                           Glucose 156-253 since


                                                 admission, HGA1C 9.4


     Intervention/Recommendation


      Comments                                   1. Continue 60 gm CCHO diet as


                                                 tolerated by patient.


                                                 2. MD to modify insulin


                                                 regimen for optimal glycemic


                                                 control.


     Expected Outcomes/Goals


      Expected Outcomes/Goals                    Oral intake >75% of meals,


                                                 nutrition related labs WNL,


                                                 weight stable


                                                 F/U LR

## 2018-02-24 NOTE — PROGRESS NOTES
DATE:  



The patient was seen and evaluated.  The patient's chart reviewed.



SUBJECTIVE:  He is a 77-year-old male who was initially brought in here for

disorganized and confused and disrobing himself.  Today on face-to-face

evaluation, the patient instead observed to be taken of his shirt, needing a lot

of redirection.  He is very confused, very disorganized and needing a lot of

redirection to maintain simple conversation.



MENTAL STATUS EXAMINATION:  Disorganized, disheveled.



ASSESSMENT AND PLAN:  The patient is a 77-year-old male who presents still very

disorganized, disheveled, needing a lot of redirection to maintain simple

redirections, to continue with the primary psychiatrist's treatment plan and

goals, which include quetiapine at 25 mg every day and also olanzapine 5 mg, it

is unclear why he is in too low dosages of antipsychotics.  We will discontinue

one of them and do one in the psychotic at this time to reduce the risk of med

interactions.





DD: 02/24/2018 14:31

DT: 02/24/2018 19:05

[facility]# [id number]  [id number]

## 2018-02-25 ENCOUNTER — HOSPITAL ENCOUNTER (INPATIENT)
Dept: HOSPITAL 36 - MSI | Age: 78
LOS: 19 days | Discharge: NURSING, REHAB OR LONG-TERM CARE | DRG: 579 | End: 2018-03-16
Attending: FAMILY MEDICINE | Admitting: FAMILY MEDICINE
Payer: MEDICARE

## 2018-02-25 VITALS — SYSTOLIC BLOOD PRESSURE: 147 MMHG | DIASTOLIC BLOOD PRESSURE: 99 MMHG

## 2018-02-25 DIAGNOSIS — E11.21: ICD-10-CM

## 2018-02-25 DIAGNOSIS — E87.5: ICD-10-CM

## 2018-02-25 DIAGNOSIS — N18.9: ICD-10-CM

## 2018-02-25 DIAGNOSIS — Z79.899: ICD-10-CM

## 2018-02-25 DIAGNOSIS — E11.65: ICD-10-CM

## 2018-02-25 DIAGNOSIS — B37.49: ICD-10-CM

## 2018-02-25 DIAGNOSIS — N17.9: ICD-10-CM

## 2018-02-25 DIAGNOSIS — E86.0: ICD-10-CM

## 2018-02-25 DIAGNOSIS — Z23: ICD-10-CM

## 2018-02-25 DIAGNOSIS — E87.0: ICD-10-CM

## 2018-02-25 DIAGNOSIS — B95.61: ICD-10-CM

## 2018-02-25 DIAGNOSIS — E11.22: ICD-10-CM

## 2018-02-25 DIAGNOSIS — L89.154: Primary | ICD-10-CM

## 2018-02-25 DIAGNOSIS — F32.3: ICD-10-CM

## 2018-02-25 DIAGNOSIS — F41.9: ICD-10-CM

## 2018-02-25 DIAGNOSIS — Z79.4: ICD-10-CM

## 2018-02-25 DIAGNOSIS — R31.9: ICD-10-CM

## 2018-02-25 DIAGNOSIS — M10.9: ICD-10-CM

## 2018-02-25 DIAGNOSIS — N61.1: ICD-10-CM

## 2018-02-25 DIAGNOSIS — F32.9: ICD-10-CM

## 2018-02-25 DIAGNOSIS — F03.90: ICD-10-CM

## 2018-02-25 DIAGNOSIS — I12.9: ICD-10-CM

## 2018-02-25 LAB
ALBUMIN SERPL-MCNC: 4.2 GM/DL (ref 4.2–5.5)
ALBUMIN/GLOB SERPL: 0.9 {RATIO} (ref 1–1.8)
ALP SERPL-CCNC: 88 U/L (ref 34–104)
ALT SERPL-CCNC: 21 U/L (ref 7–52)
ANION GAP SERPL CALC-SCNC: 15.9 MMOL/L (ref 7–16)
AST SERPL-CCNC: 14 U/L (ref 13–39)
BASOPHILS # BLD AUTO: 0 TH/CUMM (ref 0–0.2)
BASOPHILS NFR BLD AUTO: 0.1 % (ref 0–2)
BILIRUB SERPL-MCNC: 0.6 MG/DL (ref 0.3–1)
BUN SERPL-MCNC: 44 MG/DL (ref 7–25)
CALCIUM SERPL-MCNC: 10.9 MG/DL (ref 8.6–10.3)
CHLORIDE SERPL-SCNC: 119 MEQ/L (ref 98–107)
CO2 SERPL-SCNC: 29.9 MEQ/L (ref 21–31)
CREAT SERPL-MCNC: 1.5 MG/DL (ref 0.7–1.3)
EOSINOPHIL # BLD AUTO: 0.1 TH/CMM (ref 0.1–0.4)
EOSINOPHIL NFR BLD AUTO: 0.7 % (ref 0–5)
ERYTHROCYTE [DISTWIDTH] IN BLOOD BY AUTOMATED COUNT: 16 % (ref 11.5–20)
GLOBULIN SER-MCNC: 4.5 GM/DL
GLUCOSE SERPL-MCNC: 69 MG/DL (ref 70–105)
HCT VFR BLD CALC: 54.7 % (ref 41–60)
HGB BLD-MCNC: 17.5 GM/DL (ref 12–16)
LYMPHOCYTE AB SER FC-ACNC: 1.3 TH/CMM (ref 1.5–3)
LYMPHOCYTES NFR BLD AUTO: 14.2 % (ref 20–50)
MCH RBC QN AUTO: 29 PG (ref 27–31)
MCHC RBC AUTO-ENTMCNC: 32 PG (ref 28–36)
MCV RBC AUTO: 90.6 FL (ref 80–99)
MONOCYTES # BLD AUTO: 0.4 TH/CMM (ref 0.3–1)
MONOCYTES NFR BLD AUTO: 4.5 % (ref 2–10)
NEUTROPHILS # BLD: 7.5 TH/CMM (ref 1.8–8)
NEUTROPHILS NFR BLD AUTO: 80.5 % (ref 40–80)
PLATELET # BLD: 291 TH/CMM (ref 150–400)
PMV BLD AUTO: 9.4 FL
POTASSIUM SERPL-SCNC: 3.8 MEQ/L (ref 3.5–5.1)
RBC # BLD AUTO: 6.03 MIL/CMM (ref 3.8–5.8)
SODIUM SERPL-SCNC: 161 MEQ/L (ref 136–145)
URATE SERPL-MCNC: 12.4 MG/DL (ref 4.4–7.6)
WBC # BLD AUTO: 9.3 TH/CMM (ref 4.8–10.8)

## 2018-02-25 PROCEDURE — Z7610: HCPCS

## 2018-02-25 PROCEDURE — 3E0234Z INTRODUCTION OF SERUM, TOXOID AND VACCINE INTO MUSCLE, PERCUTANEOUS APPROACH: ICD-10-PCS | Performed by: FAMILY MEDICINE

## 2018-02-25 PROCEDURE — V2790 AMNIOTIC MEMBRANE: HCPCS

## 2018-02-25 PROCEDURE — X7704: HCPCS

## 2018-02-25 RX ADMIN — INSULIN ASPART SCH UNITS: 100 INJECTION, SOLUTION INTRAVENOUS; SUBCUTANEOUS at 18:23

## 2018-02-25 RX ADMIN — INSULIN ASPART SCH: 100 INJECTION, SOLUTION INTRAVENOUS; SUBCUTANEOUS at 11:55

## 2018-02-25 RX ADMIN — INSULIN ASPART SCH UNITS: 100 INJECTION, SOLUTION INTRAVENOUS; SUBCUTANEOUS at 06:32

## 2018-02-25 RX ADMIN — INSULIN ASPART SCH UNITS: 100 INJECTION, SOLUTION INTRAVENOUS; SUBCUTANEOUS at 21:23

## 2018-02-25 RX ADMIN — OLANZAPINE SCH MG: 5 TABLET, ORALLY DISINTEGRATING ORAL at 11:35

## 2018-02-25 RX ADMIN — INSULIN DETEMIR SCH: 100 INJECTION, SOLUTION SUBCUTANEOUS at 10:46

## 2018-02-25 NOTE — HISTORY AND PHYSICAL
History of Present Illness





- HPI


Chief Complaint: 





Hypernatremia, dehydration, hyperglycemia, poor oral intake


HPI: 





76 y/o male who was transferred from [facility] to [facility] for severe depression. Was transferred to [facility] from 

[facility] for poor oral intake, hypernatremia, dehydration and hyperglycemia. 

Patient has a history of Diabetes mellitus on Lantus. He also has a h/o alcohol 

abuse, and history of BRYANT ad dehydration, progressive dementia, schizophrenia, 

and acute encephalopathy. Patient was noted to have Na+ 161, BUN/Cr 44/1.5.


Blood sugar has been fluctuating from 205->250->384->475-432 for the past few 

days. Patient has been refusing oral medication and Lantus and poor oral 

intake. 





Past Medical History


Cardiovascular: Report: No Pertinent Hx


Pulmonary: Report: No Pertinent Hx


CNS: Report: Dementia


GI: Report: No Pertinent Hx


Psych: Report: Depression, Psychosis, Schizophrenia


Musculoskeletal: Report: No Pertinent Hx


Rheumatologic: Report: No pertinent Hx


Infectious Disease: Report: No Pertinent Hx


Renal/: Report: Chronic Renal Insuff


Endocrine: Report: Diabetes


Dermatology: Report: No Pertinent Hx





- Past Surgical History


Past Surgical History: No pertinent Hx





Family Medical History





- Family Member


  ** Mother


History Unknown: Yes





Social History


Smoke: No


Alcohol: Other (h/o of acute alcohol withdrawal)


Drugs: None


Lives: With Family





- Medications


Home Medications: 


Home Medication











 Medication  Instructions  Recorded  Type


 


RX: Acetaminophen [Tylenol] 650 mg PO Q6HR PRN  tab 02/25/18 Rx


 


RX: Famotidine [Pepcid] 20 mg PO BID  tab 02/25/18 Rx


 


RX: Heparin Sodium [Heparin*] 5,000 units SUBQ Q12HR  vial 02/25/18 Rx


 


RX: Insulin Aspart Sliding Scale See Protocol SUBQ ACHS  unit 02/25/18 Rx





[NovoLOG INSULIN SLIDING SCALE]   


 


RX: Insulin Detemir [Levemir 20 units SUBQ DAILY  vial 02/25/18 Rx





Insulin]   


 


RX: Lorazepam [Ativan] 0.5 mg PO Q6HR PRN  tab 02/25/18 Rx


 


RX: Magnesium Hydroxide [Milk of 30 ml PO HS PRN  udc 02/25/18 Rx





Magnesia]   


 


RX: Metoprolol Succinate [Toprol 25 mg PO DAILY  ter 02/25/18 Rx





Xl]   


 


RX: OLANZapine ODT [ZyPREXA ZYDIS] 5 mg PO DAILY  odt 02/25/18 Rx


 


RX: Zolpidem Tartrate [Ambien] 5 mg PO HS PRN  tab 02/25/18 Rx


 


RX: cloNIDine HCl [Catapres] 0.1 mg PO Q8H PRN  tab 02/25/18 Rx


 


RX: metFORMIN [Glucophage] 1,000 mg PO DAILY  tab 02/25/18 Rx














- Allergies


Allergies/Adverse Reactions: 


 Allergies











Allergy/AdvReac Type Severity Reaction Status Date / Time


 


No Known Allergies Allergy   Verified 02/15/18 21:28














Review of Systems





- Review of Systems


Constitutional: Report: Weakness, Malaise


Eyes: Report: No Significant


ENT: Report: No Significant


Respiratory: Report: No Significant


Cardiovascular: Report: No Significant


Gastrointestinal: Report: No Significant


Genitourinary: Report: No Significant


Musculoskeletal: Report: No Significant


Skin: Report: No Significant


Neurological: Report: Confusion





Physical Exam





- Physical Exam


HEENT: Report: Ears Nose Throat within normal limits, Pharnyx within normal 

limits


Neck: Report: Within normal limits


Cardiovascular Systems: Report: +s1/s2 noted, Regular, Rate and Rhythm


Respiratory: Report: Breath Sounds are within normal limits, Clear to 

Auscultation of lung fields


Abdomen: Report: Non-tender to palpation


Back: Report: Inspection of back is within normal limits.


Skin: Report: Other (presence of sacral decubitus ulcer)





- Assessment


Assessment: 





sacral decubitus ulcer


dehydration


hypernatremia


hyperglycemia


dementia


schizophrenia


depression/anxiety


acute renal insufficiency








- Plan


Plan: 





started on gentle fluid hydration


repeat CBC,CMP tomorrow


Nephrology consult with Dr. Bajwa


ID consult with Dr. Srinivas Restrepo


Gen Surgical consult w/ Dr. Jones


start Rocephin 1gm IV daily

## 2018-02-25 NOTE — CONSULTATION
DATE OF CONSULTATION:  02/25/2018



CONSULTING PHYSICIAN:  Dr. Mcnamara.



REFERRING PHYSICIAN:  Rui Hills D.O.



REASON FOR CONSULTATION:  Aggression.



CHIEF COMPLAINT:  "_____ get out of here."



HISTORY OF PRESENT ILLNESS:  The patient is a 77-year-old male who was recently

transferred from [facility] for severe dehydration,

hyperglycemia.  He was recently treated for depression exacerbated to be

aggressive behavior and delirium coming from noted as above.  Today on

face-to-face evaluation, presents on upper restraints, easily irritable,

extremely agitated according to the _____ "leave me alone" and he started

jumping from one topic to another.  He continues to hallucinate.



CURRENT MEDICAL PROBLEMS:  Include hyponatremia, dehydration, hyperglycemia.



CURRENT PSYCHIATRIC HISTORY:  History of depression and dementia.



LEGAL HISTORY:  None.



FAMILY PSYCHIATRIC HISTORY:  None.



LABS:  Reviewed.



ALLERGIES TO MEDICATIONS:  NKDA.



HOME MEDICATIONS:  Include acetaminophen, famotidine, heparin, lorazepam as

needed, olanzapine 5 mg p.o. every day, Ambien 5 mg as needed.  Currently on

metformin.



PHYSICAL EXAMINATION:  Irritable, easily agitated, delirium, fluctuation in

mental status.  Poor insight, judgment and impulse control.  Unable to assess

thought process, thought content, due to the patient's psychotic symptoms.



ASSESSMENT AND PLAN:  The patient with history of dementia with underlying

delirium, coming from dehydration.  At this time, recommend to continue to trend

the underlying medical illness to continue reaching his baseline mental state. 

In the meantime in regards to addressing the patient's aggressive behavior and

agitation, may consider continuing the olanzapine at 5 mg twice a day as

tolerated by p.o.  If he refuses p.o., may consider IM injections

intermittently.  As he becomes aggressive and agitated, may start.  Also can

validate his emotions at least continued basis to reduce the risk of the

delirium.  Will obtain more collateral baseline information.



PRIMARY DIAGNOSIS:  History of unspecified psychosis.



SECONDARY DIAGNOSIS:  Delirium.



MEDICAL DIAGNOSIS:  As noted above.



PLAN:

1.  Continue with under supervision.

2.  The patient may need further treatment psychiatrically once medically

stable.

3.  We will continue monitoring, evaluating in the meantime.

4.  Recommend to continue with olanzapine at 5 mg twice a day to target the

patient's severe aggression behavior, coming from the delirium and also with a

comorbid dementia that has been exacerbated by the underlying delirium at this

point.

5.  We will continue to validate his emotions.



Thank you for consultation.  We will continue to follow along side.





DD: 02/25/2018 17:01

DT: 02/25/2018 20:29

JOB# [id number]  [id number]

## 2018-02-25 NOTE — GENERAL PROGRESS NOTE
Subjective





- Review of Systems


Service Date: 18


Subjective: 





Awake, alert, afebrile. Patient refusing medications including insulin and as a 

result BP and Blood sugars are elevated this AM. Patient having boughts of 

increased Blood sugars last night. Poor PO intake. Patient noted to have 

infected wound. 





VS T98.6 P118 R18 /96...





-->254-->210-->483-->263-->205-->250-->384





Objective





- Results


Result Diagrams: 


 18 07:00





 18 07:00


Recent Labs: 


 Laboratory Last Values











WBC  9.3 Th/cmm (4.8-10.8)  D 18  07:00    


 


RBC  6.03 Mil/cmm (3.80-5.80)  H  18  07:00    


 


Hgb  17.5 gm/dL (12-16)  D 18  07:00    


 


Hct  54.7 % (41.0-60)  D 18  07:00    


 


MCV  90.6 fl (80-99)   18  07:00    


 


MCH  29.0 pg (27.0-31.0)   18  07:00    


 


MCHC Differential  32.0 pg (28.0-36.0)   18  07:00    


 


RDW  16.0 % (11.5-20.0)   18  07:00    


 


Plt Count  291 Th/cmm (150-400)  D 18  07:00    


 


MPV  9.4 fl  18  07:00    


 


Neutrophils %  80.5 % (40.0-80.0)  H  18  07:00    


 


Lymphocytes %  14.2 % (20.0-50.0)  L  18  07:00    


 


Monocytes %  4.5 % (2.0-10.0)   18  07:00    


 


Eosinophils %  0.7 % (0.0-5.0)   18  07:00    


 


Basophils %  0.1 % (0.0-2.0)   18  07:00    


 


Sodium  161 mEq/L (136-145)  H* D 18  07:00    


 


Potassium  3.8 mEq/L (3.5-5.1)   18  07:00    


 


Chloride  119 mEq/L ()  H  18  07:00    


 


Carbon Dioxide  29.9 mEq/L (21.0-31.0)   18  07:00    


 


Anion Gap  15.9  (7.0-16.0)   18  07:00    


 


BUN  44 mg/dL (7-25)  H  18  07:00    


 


Creatinine  1.5 mg/dL (0.7-1.3)  H  18  07:00    


 


Est GFR ( Amer)  TNP   18  07:00    


 


Est GFR (Non-Af Amer)  TNP   18  07:00    


 


BUN/Creatinine Ratio  29.3   18  07:00    


 


Glucose  69 mg/dL ()  L D 18  07:00    


 


POC Glucose  260 MG/DL (70 - 105)  H  18  06:05    


 


Hemoglobin A1c %  9.4 % (4.0-6.0)  H  18  06:10    


 


Uric Acid  12.4 mg/dL (4.4-7.6)  H  18  07:00    


 


Calcium  10.9 mg/dL (8.6-10.3)  H  18  07:00    


 


Total Bilirubin  0.6 mg/dL (0.3-1.0)   18  07:00    


 


AST  14 U/L (13-39)   18  07:00    


 


ALT  21 U/L (7-52)   18  07:00    


 


Alkaline Phosphatase  88 U/L ()   18  07:00    


 


Total Protein  8.7 gm/dL (6.0-8.3)  H  18  07:00    


 


Albumin  4.2 gm/dL (4.2-5.5)   18  07:00    


 


Globulin  4.5 gm/dL  18  07:00    


 


Albumin/Globulin Ratio  0.9  (1.0-1.8)  L  18  07:00    


 


Triglycerides  149 mg/dL (<150)   18  06:10    


 


Cholesterol  144 mg/dL (<200)   18  06:10    


 


LDL Cholesterol Direct  93 mg/dL ()   18  06:10    


 


HDL Cholesterol  46 mg/dL (23-92)   18  06:10    


 


TSH  1.66 uIU/ml (0.34-5.60)   18  06:10    














- Physical Exam


Vitals and I&O: 


 Vital Signs











Temp  98.0 F   18 16:22


 


Pulse  120   18 16:22


 


Resp  18   18 20:00


 


BP  159/119   18 16:22


 


Pulse Ox  97   18 16:22








 Intake & Output











 18





 18:59 06:59 18:59


 


Intake Total 500  


 


Balance 500  


 


Intake:   


 


  Oral 500  


 


Other:   


 


  # Voids 3  











Active Medications: 


Current Medications





Acetaminophen (Tylenol)  650 mg PO Q6HR PRN


   PRN Reason: mild to moderate pain


   Stop: 18 22:18


   Last Admin: 18 21:30 Dose:  650 mg


Famotidine (Pepcid)  20 mg PO BID [facility]


   Stop: 18 08:59


   Last Admin: 18 09:45 Dose:  Not Given


Heparin Sodium (Porcine) (Heparin)  5,000 units SUBQ Q12HR [facility]


   PRN Reason: Protocol


   Stop: 18 08:59


   Last Admin: 18 21:30 Dose:  5,000 units


Potassium Chloride/Dextrose/Sod Cl (D5-0.45ns W/20 Meq Kcl)  1,000 mls @ 50 mls/

hr IV .Q20H [facility]


   Stop: 18 10:14


Insulin Aspart (Novolog Insulin Sliding Scale)  0 units SUBQ ACHS [facility]


   PRN Reason: Protocol


   Stop: 18 11:29


   Last Admin: 18 06:32 Dose:  6 units


Insulin Detemir (Levemir Insulin)  20 units SUBQ DAILY [facility]


   PRN Reason: Protocol


   Stop: 18 08:59


   Last Admin: 18 09:45 Dose:  Not Given


Lorazepam (Ativan)  0.5 mg PO Q6HR PRN; Protocol


   PRN Reason: agitation 


   Stop: 18 22:18


   Last Admin: 18 15:14 Dose:  0.5 mg


Magnesium Hydroxide (Milk Of Magnesia)  30 ml PO HS PRN


   PRN Reason: Constipation


Metformin HCl (Glucophage)  1,000 mg PO DAILY [facility]


   Stop: 18 08:59


   Last Admin: 18 09:45 Dose:  Not Given


Metoprolol Succinate (Toprol Xl)  25 mg PO DAILY [facility]


   Stop: 18 11:29


   Last Admin: 18 09:45 Dose:  Not Given


Olanzapine (Zyprexa Zydis)  5 mg PO DAILY [facility]


   PRN Reason: Protocol


   Stop: 18 08:59


   Last Admin: 18 09:45 Dose:  Not Given


Zolpidem Tartrate (Ambien)  5 mg PO HS PRN


   PRN Reason: insomnia


   Stop: 18 22:18


   Last Admin: 18 21:14 Dose:  5 mg








General: Alert, Oriented x3, No acute distress


HEENT: Atraumatic, PERRLA, EOMI


Neck: Supple


Cardiovascular: Regular rate, Normal S1, Normal S2


Lungs: Clear to auscultation


Abdomen: Bowel sounds, Soft


Extremities: no Clubbing, no Cyanosis, no Edema





Assessment/Plan





- Assessment


Assessment: 





psychosis


dementia


schizophrenia


h/o encephalopathy


h/o alcohol withdrawal


diabetes mellitus type 2 on insulin .. elevated d/t noncompliance.


HTN ... elevated.


prerenal azotemia


hypernatremia


poor oral intake


hyperglycemia





- Plan


Plan: 





admit to [facility]


will add TSH, Lipid profile, A1c to current labs.


accucheck ac and hs


low dose sliding scale.


add Clonidine 0.1mg PO q8


increase Levemir 20mg units SQ


increase metformin to 1000mg daily


add Toprol XL 25mg PO daily


Started D5 1/2NS w/ 20meq KCl@50cc/hr


to be transferred to med surg for closure observation and treatment of 

hyperglycemia and dehydration. 





Nutritional Asmnt/Malnutr-PDOC





- Dietary Evaluation


Malnutrition Findings (Please click <Entered> for more info): 








Nutritional Asmnt/Malnutrition                             Start:  18 10:

07


Text:                                                      Status: Complete    

  


Freq:                                                                          

  


 Document     18 10:07  MMGUERO  (Rec: 18 10:20  MMGUERO BENDER-

FNS1)


 Nutritional Asmnt/Malnutrition


     Patient General Information


      Nutritional Screening                      High Risk


      Diagnosis                                  Psychosis NOS


      Pertinent Medical Hx/Surgical Hx           Dementia, Schizophrenia, acute


                                                 encephalopathy, diabetes


      Subjective Information                     Patient was transferred from


                                                 [facility]. Per H&P,


                                                 FBS was 426. Patient is


                                                 primarily Thai speaking.


                                                 Patient was asleep in bed at


                                                 time of RD visit.


      Current Diet Order/ Nutrition Support      60gm CCHO


      Patient / S.O                              Not Indicated


      Pertinent Medications                      Pepcid, Novolog, Levemir, MOM,


                                                 Metformin


      Pertinent Labs                             Glucose 156-253 since


                                                 admission, HGA1C 9.4


     Nutritional Hx/Data


      Height                                     1.78 m


      Height (Calculated Centimeters)            177.8


      Current Weight (lbs)                       70.76 kg


      Weight (Calculated Kilograms)              70.8


      Weight (Calculated Grams)                  11910.4


      Ideal Body Weight                          166


      % Ideal Body Weight                        93


      Body Mass Index (BMI)                      22.4


      Recent Weight Change                       No


      Weight Status                              Approriate


     GI Symptoms


      GI Symptoms                                None


      Last BM                                    2/16 x 1


      Difficult in:                              None


      Food Allergies                             No


      Cultural/Ethnic/Catholic Belief           None noted


      Usual diet at home                         Unknown


      Skin Integrity/Comment:                    [place] 18, dryness, area of


                                                 concern, bruises


      Current %PO                                Good (%)


     Estimated Nutritional Goals


      BEE in Kcals:                              Using Current wt


      Calories/Kcals/Kg                          (based on CBW 70.8kg, 25-30


                                                 kcal/kg)


      Kcals Calculated                           ~8198-9799 kcal/day


      Protein:                                   Using Current wt


      Protein g/k gm/kg


      Protein Calculated                         ~70 gm/day


      Fluid: ml                                  ~9372-1621 ml/day (1 ml/kcal)


     Nutritional Problem


      1. Problem


       Problem                                   Altered nutrition related lab


                                                 values related to


       Etiology                                  uncontrolled hyperglycemia aeb


       Signs/Symptoms:                           Glucose 156-253 since


                                                 admission, HGA1C 9.4


     Intervention/Recommendation


      Comments                                   1. Continue 60 gm CCHO diet as


                                                 tolerated by patient.


                                                 2. MD to modify insulin


                                                 regimen for optimal glycemic


                                                 control.


     Expected Outcomes/Goals


      Expected Outcomes/Goals                    Oral intake >75% of meals,


                                                 nutrition related labs WNL,


                                                 weight stable


                                                 F/U LR

## 2018-02-26 LAB
ALBUMIN SERPL-MCNC: 3.9 GM/DL (ref 4.2–5.5)
ALBUMIN/GLOB SERPL: 0.9 {RATIO} (ref 1–1.8)
ALP SERPL-CCNC: 86 U/L (ref 34–104)
ALT SERPL-CCNC: 19 U/L (ref 7–52)
ANION GAP SERPL CALC-SCNC: 21.3 MMOL/L (ref 7–16)
AST SERPL-CCNC: 16 U/L (ref 13–39)
BASOPHILS # BLD AUTO: 0 TH/CUMM (ref 0–0.2)
BASOPHILS NFR BLD AUTO: 0.3 % (ref 0–2)
BILIRUB SERPL-MCNC: 0.8 MG/DL (ref 0.3–1)
BUN SERPL-MCNC: 56 MG/DL (ref 7–25)
CALCIUM SERPL-MCNC: 10.3 MG/DL (ref 8.6–10.3)
CHLORIDE SERPL-SCNC: 115 MEQ/L (ref 98–107)
CO2 SERPL-SCNC: 25 MEQ/L (ref 21–31)
CREAT SERPL-MCNC: 2.2 MG/DL (ref 0.7–1.3)
EOSINOPHIL # BLD AUTO: 0 TH/CMM (ref 0.1–0.4)
EOSINOPHIL NFR BLD AUTO: 0.4 % (ref 0–5)
ERYTHROCYTE [DISTWIDTH] IN BLOOD BY AUTOMATED COUNT: 16.8 % (ref 11.5–20)
GLOBULIN SER-MCNC: 4.2 GM/DL
GLUCOSE SERPL-MCNC: 215 MG/DL (ref 70–105)
HCT VFR BLD CALC: 52.6 % (ref 41–60)
HGB BLD-MCNC: 17 GM/DL (ref 12–16)
LYMPHOCYTE AB SER FC-ACNC: 1.9 TH/CMM (ref 1.5–3)
LYMPHOCYTES NFR BLD AUTO: 20.5 % (ref 20–50)
MCH RBC QN AUTO: 29.4 PG (ref 27–31)
MCHC RBC AUTO-ENTMCNC: 32.4 PG (ref 28–36)
MCV RBC AUTO: 90.8 FL (ref 80–99)
MONOCYTES # BLD AUTO: 0.5 TH/CMM (ref 0.3–1)
MONOCYTES NFR BLD AUTO: 5 % (ref 2–10)
NEUTROPHILS # BLD: 6.7 TH/CMM (ref 1.8–8)
NEUTROPHILS NFR BLD AUTO: 73.8 % (ref 40–80)
PLATELET # BLD: 237 TH/CMM (ref 150–400)
PMV BLD AUTO: 9.9 FL
POTASSIUM SERPL-SCNC: 4.3 MEQ/L (ref 3.5–5.1)
RBC # BLD AUTO: 5.79 MIL/CMM (ref 3.8–5.8)
SODIUM SERPL-SCNC: 157 MEQ/L (ref 136–145)
WBC # BLD AUTO: 9.1 TH/CMM (ref 4.8–10.8)

## 2018-02-26 RX ADMIN — INSULIN ASPART SCH: 100 INJECTION, SOLUTION INTRAVENOUS; SUBCUTANEOUS at 07:30

## 2018-02-26 RX ADMIN — CASTOR OIL AND BALSAM, PERU SCH APPL: 788; 87 OINTMENT TOPICAL at 17:52

## 2018-02-26 RX ADMIN — INSULIN ASPART SCH UNITS: 100 INJECTION, SOLUTION INTRAVENOUS; SUBCUTANEOUS at 17:51

## 2018-02-26 RX ADMIN — INSULIN ASPART SCH UNITS: 100 INJECTION, SOLUTION INTRAVENOUS; SUBCUTANEOUS at 16:39

## 2018-02-26 RX ADMIN — INSULIN ASPART SCH UNITS: 100 INJECTION, SOLUTION INTRAVENOUS; SUBCUTANEOUS at 12:54

## 2018-02-26 RX ADMIN — INSULIN ASPART SCH: 100 INJECTION, SOLUTION INTRAVENOUS; SUBCUTANEOUS at 12:56

## 2018-02-26 RX ADMIN — INSULIN ASPART SCH: 100 INJECTION, SOLUTION INTRAVENOUS; SUBCUTANEOUS at 20:39

## 2018-02-26 NOTE — CONSULTATION
DATE OF CONSULTATION:     



SURGICAL CONSULT



REFERRING PHYSICIAN:  Dr. Hills.



REASON FOR CONSULTATION:  Sacral decubitus ulcer.



Thank you for referring this patient to me.



HISTORY OF PRESENT ILLNESS:  This is a 77-year-old male with dementia, admitted

with aggressive behavior.  The patient has had some restraints.  The family was

called over the phone and the daughter speaks English and informed me that the

ulcer has been getting worse for the last 1 week.  He has had 3 strokes, last

one being in 2014 with weakness of one side, but apparently he was able to

ambulate.



He has in addition gout, diabetes mellitus and hypertension.  His diabetes is 
under

poor control.



LABORATORY STUDIES:  Showed the CBC to be within normal limits.  The BUN is high

at 56, creatinine of 2.2.  Admission, blood sugar was 256.



PHYSICAL EXAMINATION:

GENERAL:  Shows a large ulcer about 10 cm across in the sacrum, foul smelling

with drainage.  The patient is incontinent.



RECOMMENDATIONS:  For excisional debridement with wound VAC application.  If the

patient continues to have incontinence, might need to do diverting colostomy in

the future.





DD: 02/26/2018 09:12

DT: 02/26/2018 09:45

JOB# [id number]  [id number]

NATA

## 2018-02-26 NOTE — CONSULTATION
DATE OF CONSULTATION:  02/26/2018



REASON FOR CONSULTATION:  Worsening kidney function, electrolyte imbalance, and

fluid management.



HISTORY OF PRESENT ILLNESS:  This is a 77-year-old  male with past

medical history of chronic kidney disease, who was transferred from to [facility]

to [facility] due to dehydration.



The patient was admitted at [facility] because of psychosis and dementia. 

However, his oral intake started to decline.



Labs drawn revealed a sodium of 161 with a BUN/creatinine of 44/1.5.  Blood

sugar were persistently elevated.  Thus, he was then transferred to [facility].



He was initiated immediately on IV fluids upon transfer.



There was no history of nausea and vomiting as well as diarrhea.



PAST MEDICAL HISTORY:

1.  Psychosis.

2.  Depression.

3.  Chronic kidney disease.

4.  Type 2 diabetes mellitus.

5.  Gout.



CURRENT MEDICATIONS:  Allopurinol, ceftriaxone, clonidine, Pepcid, lorazepam,

magnesium hydroxide, metoprolol, olanzapine, zolpidem.



ALLERGIES:  No known drug allergies.



SOCIAL AND FAMILY HISTORY:  I was not able to obtain directly from the patient

because of his dementia and confusion.



REVIEW OF SYSTEMS:  Again, I was not able to decipher directly from the patient

because of the mentioned reasons.



PHYSICAL EXAMINATION:

GENERAL:  The patient is awake and verbal, but quite confused, disoriented.

VITAL SIGNS:  Temperature 97.6, pulse 78, and blood pressure 145/89.

SKIN:  Poor turgor, warm.  No rash, no jaundice appreciated.

HEENT:  Head normocephalic, atraumatic.  Eyes:  Extraocular muscles are intact. 

Pupils equal, round, reactive to light and accommodates.  Anicteric sclerae. 

Pale conjunctivae.  Nose, midline nasal septum.  Mouth:  Dry mucosa, poor

dentition.

NECK:  Supple, no adenopathy, no thyromegaly, no bruits.  Trachea palpated in

the midline.

CHEST AND CARDIOVASCULAR:  S1, S2.  No rub, murmur, nor gallop appreciated. 

Point of maximal impulse fifth intercostal space, left midclavicular line.  No

abdominal or femoral bruits appreciated.

LUNGS:  Equal expansion.  No use of accessory muscles.  No supraclavicular

retractions.  Decreased breath sounds, few rhonchi, but no rales nor wheezes

appreciated.

ABDOMEN:  Mildly globular, soft.  Positive for bowel sounds.  No bruits either

diastolic or systolic.

RECTAL:  Lax sphincter tone.

GENITOURINARY:  Normal appearing male genitalia.

MUSCULOSKELETAL:  No effusions present in his joints with adequate range of

motion.

EXTREMITIES:  No evidence of any edema, cyanosis, nor clubbing with palpable

femoral, but unable to fully appreciate popliteal and dorsalis pedis pulses.

NEUROLOGIC:  The patient is awake, verbal; however, due to his confusion and

dementia, was not able to follow my neuro commands, so I was not able to pursue

further my neuro exam.



LABORATORY DATA:  Did reveal sodium 157, potassium 4.3, chloride 115,

bicarbonate 25, BUN 56, creatinine 2.2, glucose 364, albumin 3.1.  White count

9.6, hemoglobin 7, hematocrit 52.6, platelets 237, and polys 73.8%.



IMPRESSION:

1.  Acute kidney injury on chronic kidney disease.



Chronic kidney disease is likely due to diabetic nephropathy because of

longstanding history of diabetes.  He may have some underlying hypertensive

nephrosclerosis.



Acute kidney injury is initially prerenal in nature due to severe decrease in

his oral intake.  This was supported by poor skin turgor and dry oral mucosa. 

His prerenal eventually progressed to acute tubular injury.

2.  Stage IV sacral decubitus ulcer.

3.  Severe dehydration.

4.  Essential hypertension with chronic kidney disease.

5.  Type 2 diabetes mellitus with chronic kidney disease.

6.  Psychosis.

7.  Depression.

8.  Gout.



PLAN:

1.  Continue with IV fluids.

2.  Renal ultrasound.

3.  Urinalysis.

4.  Urine sodium, eosinophils, and creatinine.

5.  Renal ultrasound.

6.  Electrolytes, hemoglobin A1c, TSH.

7.  Increase metoprolol due to elevated blood pressure.





DD: 02/26/2018 15:27

DT: 02/26/2018 20:24

JOB# [id number]  [id number]

## 2018-02-26 NOTE — GENERAL PROGRESS NOTE
Subjective





- Review of Systems


Service Date: 02/16/18


Events since last encounter: 





consult dictated


called family, daughter speaks English


informed consent discussed about debridement of ulcer and wound vac application





Objective





- Results


Result Diagrams: 


 02/26/18 06:05





 02/26/18 06:05


Recent Labs: 


 Laboratory Last Values











WBC  9.1 Th/cmm (4.8-10.8)   02/26/18  06:05    


 


RBC  5.79 Mil/cmm (3.80-5.80)   02/26/18  06:05    


 


Hgb  17.0 gm/dL (12-16)   02/26/18  06:05    


 


Hct  52.6 % (41.0-60)   02/26/18  06:05    


 


MCV  90.8 fl (80-99)   02/26/18  06:05    


 


MCH  29.4 pg (27.0-31.0)   02/26/18  06:05    


 


MCHC Differential  32.4 pg (28.0-36.0)   02/26/18  06:05    


 


RDW  16.8 % (11.5-20.0)   02/26/18  06:05    


 


Plt Count  237 Th/cmm (150-400)   02/26/18  06:05    


 


MPV  9.9 fl  02/26/18  06:05    


 


Neutrophils %  73.8 % (40.0-80.0)   02/26/18  06:05    


 


Lymphocytes %  20.5 % (20.0-50.0)   02/26/18  06:05    


 


Monocytes %  5.0 % (2.0-10.0)   02/26/18  06:05    


 


Eosinophils %  0.4 % (0.0-5.0)   02/26/18  06:05    


 


Basophils %  0.3 % (0.0-2.0)   02/26/18  06:05    


 


Sodium  157 mEq/L (136-145)  H  02/26/18  06:05    


 


Potassium  4.3 mEq/L (3.5-5.1)   02/26/18  06:05    


 


Chloride  115 mEq/L ()  H  02/26/18  06:05    


 


Carbon Dioxide  25.0 mEq/L (21.0-31.0)   02/26/18  06:05    


 


Anion Gap  21.3  (7.0-16.0)  H  02/26/18  06:05    


 


BUN  56 mg/dL (7-25)  H  02/26/18  06:05    


 


Creatinine  2.2 mg/dL (0.7-1.3)  H  02/26/18  06:05    


 


Est GFR ( Amer)  TNP   02/26/18  06:05    


 


Est GFR (Non-Af Amer)  TNP   02/26/18  06:05    


 


BUN/Creatinine Ratio  25.5   02/26/18  06:05    


 


Glucose  215 mg/dL ()  H D 02/26/18  06:05    


 


POC Glucose  197 MG/DL (70 - 105)  H  02/26/18  05:35    


 


Calcium  10.3 mg/dL (8.6-10.3)   02/26/18  06:05    


 


Total Bilirubin  0.8 mg/dL (0.3-1.0)   02/26/18  06:05    


 


AST  16 U/L (13-39)   02/26/18  06:05    


 


ALT  19 U/L (7-52)   02/26/18  06:05    


 


Alkaline Phosphatase  86 U/L ()   02/26/18  06:05    


 


Total Protein  8.1 gm/dL (6.0-8.3)   02/26/18  06:05    


 


Albumin  3.9 gm/dL (4.2-5.5)  L  02/26/18  06:05    


 


Globulin  4.2 gm/dL  02/26/18  06:05    


 


Albumin/Globulin Ratio  0.9  (1.0-1.8)  L  02/26/18  06:05    














- Physical Exam


Vitals and I&O: 


 Vital Signs











Temp  97.6 F   02/26/18 05:00


 


Pulse  110   02/26/18 05:00


 


Resp  18   02/26/18 05:00


 


BP  145/89   02/26/18 05:00


 


Pulse Ox  96   02/26/18 05:00








 Intake & Output











 02/25/18 02/26/18 02/26/18





 18:59 06:59 18:59


 


Intake Total 150 390 


 


Balance 150 390 


 


Weight (lbs) 71.441 kg 70.76 kg 


 


Intake:   


 


  Intake, IV Amount  190 


 


    Sodium Chloride 0.45% 1,  140 





    000 ml @ 75 mls/hr IV .   





    [id number] [facility] Rx#:[id number]   


 


    cefTRIAXone 1 gm In  50 





    Sodium Chloride 0.9% 50   





    ml @ 100 mls/hr IV Q24HR   





    [facility] Rx#:[id number]   


 


  Oral 150 200 


 


Other:   


 


  # Voids 1 3 


 


  # Bowel Movements 0 0 











Active Medications: 


Current Medications





Acetaminophen (Tylenol)  650 mg PO Q6HR PRN


   PRN Reason: mild to moderate pain


   Stop: 04/26/18 19:27


Allopurinol (Zyloprim)  100 mg PO DAILY [facility]


   Stop: 04/27/18 08:59


Famotidine (Pepcid)  20 mg PO BID [facility]


   Stop: 04/27/18 08:59


Heparin Sodium (Porcine) (Heparin)  5,000 units SUBQ Q12HR [facility]


   PRN Reason: Protocol


   Stop: 04/26/18 20:59


   Last Admin: 02/25/18 21:20 Dose:  5,000 units


Ceftriaxone Sodium 1 gm/ (Sodium Chloride)  50 mls @ 100 mls/hr IV Q24HR [facility]


   Stop: 04/26/18 16:44


   Last Infusion: 02/25/18 20:16 Dose:  Infused


Sodium Chloride (Nacl 0.45%)  1,000 mls @ 75 mls/hr IV .[id number] [facility]


   Stop: 04/26/18 16:59


   Last Infusion: 02/25/18 20:15 Dose:  75 mls/hr


Insulin Aspart (Novolog Insulin Sliding Scale)  0 units SUBQ ACHS [facility]


   PRN Reason: Protocol


   Stop: 04/26/18 16:29


   Last Admin: 02/25/18 21:23 Dose:  2 units


Insulin Aspart (Novolog Insulin Sliding Scale)  0 units SUBQ ACHS [facility]


   PRN Reason: Protocol


   Stop: 04/26/18 20:59


Lorazepam (Ativan)  0.5 mg PO Q6HR PRN; Protocol


   PRN Reason: agitation 


   Stop: 04/26/18 19:27


Magnesium Hydroxide (Milk Of Magnesia)  30 ml PO HS PRN


   PRN Reason: Constipation


   Stop: 04/26/18 19:27


Metoprolol Succinate (Toprol Xl)  25 mg PO DAILY [facility]


   Stop: 04/27/18 08:59


Olanzapine (Zyprexa Zydis)  5 mg PO DAILY [facility]


   PRN Reason: Protocol


   Stop: 04/27/18 08:59


Zolpidem Tartrate (Ambien)  5 mg PO HS PRN


   PRN Reason: insomnia


   Stop: 04/26/18 19:27








General: Alert, Oriented x3


HEENT: Atraumatic, PERRLA


Neck: Supple


Cardiovascular: Regular rate, Normal S1, Normal S2


Lungs: Clear to auscultation


Abdomen: Bowel sounds, Soft


Extremities: no Clubbing, no Cyanosis, no Edema

## 2018-02-26 NOTE — GENERAL PROGRESS NOTE
Subjective





- Review of Systems


Service Date: 02/26/18


Subjective: 





Patient was seen and examined. Oral Intake has improved. Appears more awake and 

alert today. 





Objective





- Results


Result Diagrams: 


 02/26/18 06:05





 02/26/18 06:05


Recent Labs: 


 Laboratory Last Values











WBC  9.1 Th/cmm (4.8-10.8)   02/26/18  06:05    


 


RBC  5.79 Mil/cmm (3.80-5.80)   02/26/18  06:05    


 


Hgb  17.0 gm/dL (12-16)   02/26/18  06:05    


 


Hct  52.6 % (41.0-60)   02/26/18  06:05    


 


MCV  90.8 fl (80-99)   02/26/18  06:05    


 


MCH  29.4 pg (27.0-31.0)   02/26/18  06:05    


 


MCHC Differential  32.4 pg (28.0-36.0)   02/26/18  06:05    


 


RDW  16.8 % (11.5-20.0)   02/26/18  06:05    


 


Plt Count  237 Th/cmm (150-400)   02/26/18  06:05    


 


MPV  9.9 fl  02/26/18  06:05    


 


Neutrophils %  73.8 % (40.0-80.0)   02/26/18  06:05    


 


Lymphocytes %  20.5 % (20.0-50.0)   02/26/18  06:05    


 


Monocytes %  5.0 % (2.0-10.0)   02/26/18  06:05    


 


Eosinophils %  0.4 % (0.0-5.0)   02/26/18  06:05    


 


Basophils %  0.3 % (0.0-2.0)   02/26/18  06:05    


 


Sodium  157 mEq/L (136-145)  H  02/26/18  06:05    


 


Potassium  4.3 mEq/L (3.5-5.1)   02/26/18  06:05    


 


Chloride  115 mEq/L ()  H  02/26/18  06:05    


 


Carbon Dioxide  25.0 mEq/L (21.0-31.0)   02/26/18  06:05    


 


Anion Gap  21.3  (7.0-16.0)  H  02/26/18  06:05    


 


BUN  56 mg/dL (7-25)  H  02/26/18  06:05    


 


Creatinine  2.2 mg/dL (0.7-1.3)  H  02/26/18  06:05    


 


Est GFR ( Amer)  TNP   02/26/18  06:05    


 


Est GFR (Non-Af Amer)  TNP   02/26/18  06:05    


 


BUN/Creatinine Ratio  25.5   02/26/18  06:05    


 


Glucose  215 mg/dL ()  H D 02/26/18  06:05    


 


POC Glucose  197 MG/DL (70 - 105)  H  02/26/18  05:35    


 


Calcium  10.3 mg/dL (8.6-10.3)   02/26/18  06:05    


 


Total Bilirubin  0.8 mg/dL (0.3-1.0)   02/26/18  06:05    


 


AST  16 U/L (13-39)   02/26/18  06:05    


 


ALT  19 U/L (7-52)   02/26/18  06:05    


 


Alkaline Phosphatase  86 U/L ()   02/26/18  06:05    


 


Total Protein  8.1 gm/dL (6.0-8.3)   02/26/18  06:05    


 


Albumin  3.9 gm/dL (4.2-5.5)  L  02/26/18  06:05    


 


Globulin  4.2 gm/dL  02/26/18  06:05    


 


Albumin/Globulin Ratio  0.9  (1.0-1.8)  L  02/26/18  06:05    














- Physical Exam


Vitals and I&O: 


 Vital Signs











Temp  97.6 F   02/26/18 05:00


 


Pulse  110   02/26/18 05:00


 


Resp  18   02/26/18 05:00


 


BP  145/89   02/26/18 05:00


 


Pulse Ox  96   02/26/18 05:00








 Intake & Output











 02/25/18 02/26/18 02/26/18





 18:59 06:59 18:59


 


Intake Total 150 390 


 


Balance 150 390 


 


Weight (lbs) 71.441 kg 70.76 kg 


 


Intake:   


 


  Intake, IV Amount  190 


 


    Sodium Chloride 0.45% 1,  140 





    000 ml @ 75 mls/hr IV .   





    [id number] RENETTA Rx#:[id number]   


 


    cefTRIAXone 1 gm In  50 





    Sodium Chloride 0.9% 50   





    ml @ 100 mls/hr IV Q24HR   





    RENETTA Rx#:[id number]   


 


  Oral 150 200 


 


Other:   


 


  # Voids 1 3 


 


  # Bowel Movements 0 0 











Active Medications: 


Current Medications





Acetaminophen (Tylenol)  650 mg PO Q6HR PRN


   PRN Reason: mild to moderate pain


   Stop: 04/26/18 19:27


Allopurinol (Zyloprim)  100 mg PO DAILY [facility]


   Stop: 04/27/18 08:59


Famotidine (Pepcid)  20 mg PO BID [facility]


   Stop: 04/27/18 08:59


Heparin Sodium (Porcine) (Heparin)  5,000 units SUBQ Q12HR RENETTA


   PRN Reason: Protocol


   Stop: 04/26/18 20:59


   Last Admin: 02/25/18 21:20 Dose:  5,000 units


Ceftriaxone Sodium 1 gm/ (Sodium Chloride)  50 mls @ 100 mls/hr IV Q24HR [facility]


   Stop: 04/26/18 16:44


   Last Infusion: 02/25/18 20:16 Dose:  Infused


Sodium Chloride (Nacl 0.45%)  1,000 mls @ 75 mls/hr IV .[id number] [facility]


   Stop: 04/26/18 16:59


   Last Infusion: 02/25/18 20:15 Dose:  75 mls/hr


Insulin Aspart (Novolog Insulin Sliding Scale)  0 units SUBQ ACHS RENETTA


   PRN Reason: Protocol


   Stop: 04/26/18 16:29


   Last Admin: 02/25/18 21:23 Dose:  2 units


Insulin Aspart (Novolog Insulin Sliding Scale)  0 units SUBQ ACHS RENETTA


   PRN Reason: Protocol


   Stop: 04/26/18 20:59


Lorazepam (Ativan)  0.5 mg PO Q6HR PRN; Protocol


   PRN Reason: agitation 


   Stop: 04/26/18 19:27


Magnesium Hydroxide (Milk Of Magnesia)  30 ml PO HS PRN


   PRN Reason: Constipation


   Stop: 04/26/18 19:27


Metoprolol Succinate (Toprol Xl)  25 mg PO DAILY [facility]


   Stop: 04/27/18 08:59


Olanzapine (Zyprexa Zydis)  5 mg PO DAILY RENETTA


   PRN Reason: Protocol


   Stop: 04/27/18 08:59


Zolpidem Tartrate (Ambien)  5 mg PO HS PRN


   PRN Reason: insomnia


   Stop: 04/26/18 19:27








General: Alert, Oriented x3


HEENT: Atraumatic, PERRLA


Neck: Supple


Cardiovascular: Regular rate, Normal S1, Normal S2


Lungs: Clear to auscultation


Abdomen: Bowel sounds, Soft


Extremities: no Clubbing, no Cyanosis, no Edema





Assessment/Plan





- Assessment


Assessment: 





hypernatremia


prerenal azotemia


BRYANT with chronic renal failure


depression/anxiety


gout


dementia








- Plan


Plan: 





will order repeat cmp,cbc


renal consult -- Dr. Bajwa





sacral decubitus ... will order Gen Surgical consult -- / Karen for possible 

wound debridement





Gout ... continue allopurinol 100mg PO daily

## 2018-02-27 LAB
ALBUMIN SERPL-MCNC: 3.7 GM/DL (ref 4.2–5.5)
ALBUMIN/GLOB SERPL: 0.9 {RATIO} (ref 1–1.8)
ALP SERPL-CCNC: 84 U/L (ref 34–104)
ALT SERPL-CCNC: 17 U/L (ref 7–52)
ANION GAP SERPL CALC-SCNC: 13.8 MMOL/L (ref 7–16)
AST SERPL-CCNC: 15 U/L (ref 13–39)
BASOPHILS # BLD AUTO: 0 TH/CUMM (ref 0–0.2)
BASOPHILS NFR BLD AUTO: 0.1 % (ref 0–2)
BILIRUB SERPL-MCNC: 0.9 MG/DL (ref 0.3–1)
BUN SERPL-MCNC: 56 MG/DL (ref 7–25)
CALCIUM SERPL-MCNC: 9.9 MG/DL (ref 8.6–10.3)
CHLORIDE SERPL-SCNC: 117 MEQ/L (ref 98–107)
CO2 SERPL-SCNC: 25.8 MEQ/L (ref 21–31)
CREAT SERPL-MCNC: 1.8 MG/DL (ref 0.7–1.3)
EOSINOPHIL # BLD AUTO: 0.1 TH/CMM (ref 0.1–0.4)
EOSINOPHIL NFR BLD AUTO: 1.6 % (ref 0–5)
ERYTHROCYTE [DISTWIDTH] IN BLOOD BY AUTOMATED COUNT: 17.2 % (ref 11.5–20)
GLOBULIN SER-MCNC: 4 GM/DL
GLUCOSE SERPL-MCNC: 262 MG/DL (ref 70–105)
HBA1C MFR BLD: 10.5 % (ref 4–6)
HCT VFR BLD CALC: 47.8 % (ref 41–60)
HGB BLD-MCNC: 15.6 GM/DL (ref 12–16)
HGB BLD-MCNC: 17 G/DL (ref 4–35)
INR PPP: 1.01 (ref 0.5–1.4)
LYMPHOCYTE AB SER FC-ACNC: 2 TH/CMM (ref 1.5–3)
LYMPHOCYTES NFR BLD AUTO: 25.3 % (ref 20–50)
MAGNESIUM SERPL-MCNC: 2.9 MG/DL (ref 1.9–2.7)
MCH RBC QN AUTO: 29.3 PG (ref 27–31)
MCHC RBC AUTO-ENTMCNC: 32.7 PG (ref 28–36)
MCV RBC AUTO: 89.6 FL (ref 80–99)
MONOCYTES # BLD AUTO: 0.4 TH/CMM (ref 0.3–1)
MONOCYTES NFR BLD AUTO: 5.4 % (ref 2–10)
NEUTROPHILS # BLD: 5.5 TH/CMM (ref 1.8–8)
NEUTROPHILS NFR BLD AUTO: 67.6 % (ref 40–80)
PHOSPHATE SERPL-MCNC: 4.1 MG/DL (ref 2.5–5)
PLATELET # BLD: 198 TH/CMM (ref 150–400)
PMV BLD AUTO: 9.7 FL
POTASSIUM SERPL-SCNC: 4.6 MEQ/L (ref 3.5–5.1)
PROTHROMBIN TIME: 10.5 SECONDS (ref 9.5–11.5)
RBC # BLD AUTO: 5.33 MIL/CMM (ref 3.8–5.8)
SODIUM SERPL-SCNC: 152 MEQ/L (ref 136–145)
URATE SERPL-MCNC: 11.9 MG/DL (ref 4.4–7.6)
WBC # BLD AUTO: 8 TH/CMM (ref 4.8–10.8)

## 2018-02-27 PROCEDURE — 0KBN0ZZ EXCISION OF RIGHT HIP MUSCLE, OPEN APPROACH: ICD-10-PCS

## 2018-02-27 RX ADMIN — INSULIN ASPART SCH UNITS: 100 INJECTION, SOLUTION INTRAVENOUS; SUBCUTANEOUS at 20:38

## 2018-02-27 RX ADMIN — CASTOR OIL AND BALSAM, PERU SCH APPL: 788; 87 OINTMENT TOPICAL at 10:00

## 2018-02-27 RX ADMIN — INSULIN ASPART SCH UNITS: 100 INJECTION, SOLUTION INTRAVENOUS; SUBCUTANEOUS at 13:58

## 2018-02-27 RX ADMIN — INSULIN ASPART SCH UNITS: 100 INJECTION, SOLUTION INTRAVENOUS; SUBCUTANEOUS at 18:23

## 2018-02-27 RX ADMIN — INSULIN ASPART SCH: 100 INJECTION, SOLUTION INTRAVENOUS; SUBCUTANEOUS at 06:32

## 2018-02-27 NOTE — OPERATIVE REPORT
DATE OF SURGERY:  02/27/2018



PREOPERATIVE DIAGNOSES:

1.  Stage 4 sacral decubitus ulcer.

2.  Diabetes mellitus with poor control.

3.  Dementia.



POSTOPERATIVE DIAGNOSIS:  Stage 3 sacral decubitus ulcer.



OPERATION DONE:  Excisional debridement, application of wound VAC.



SURGEON:  Norma Jones MD.



ANESTHESIA:  MAC.



ANESTHESIOLOGIST:  XAVIER Waller.



ESTIMATED BLOOD LOSS:  5 mL.



OPERATIVE FINDINGS:  Sacral decubitus ulcer, stage 3, not clinically involving

the bone.



DESCRIPTION OF PROCEDURE:  The patient was given IV sedation.



He was placed in the left lateral decubitus position.  The area was prepped with

Betadine and draped.  A 1% lidocaine was used to infiltrate the area around the

ulcer, which measured 7 x 10 cm.  Therefore the ulcer; however, did not involve

the bone down to subcutaneous tissues and muscle.  It was sharply debrided with

a knife and cautery was used for hemostasis.



Venelex was applied, silver foam and then the wound VAC.  The patient tolerated

the procedure well.





DD: 02/27/2018 08:32

DT: 02/27/2018 09:01

JOB# [id number]  [id number]

## 2018-02-27 NOTE — DIAGNOSTIC IMAGING REPORT
Portable chest x-ray

Time: 0432 hours



History: Preop



Allowing for portable technique the heart size is normal. No focal

pulmonary parenchymal processes. No hilar or mediastinal abnormalities.



Impression: No acute abnormalities.

## 2018-02-27 NOTE — PROGRESS NOTES
DATE:  02/26/2018



Covering for Dr. Ramirez.



Case was discussed with staff of the patient, reviewed records.  This is a

77-year-old male who transferred to Med/Surg from the [facility].  He was

dehydrated, hyperglycemic, treated for depression, aggressive behavior,

delirium, and coming from his medical condition.  The patient today is

cooperative, calm, and responding well to redirection.  He is sleeping well and

eating well.  He was asking the staff to hug him.  He is compliant with the

medication with no side effects, no sedation, no nausea, and no extrapyramidal

symptoms.  He is on Zyprexa 5 mg at bedtime.



Thank you very much for allowing me to participate in the care of this most

interesting gentleman.





DD: 02/26/2018 12:41

DT: 02/27/2018 00:14

JOB# [id number]  [id number]

## 2018-02-27 NOTE — CONSULTATION
Consult Note





- Consult Note


Service Date: 02/27/18


Referring Physician: Rui Hills


Consult Note: 


PHYSICIAN Consultation Note:





Date of Admission: 02/25/18





Purpose of Consultation: Infected sacral wound.





Chief Complaint: 


Patient DIDI AYON was admitted to [facility] I 

with DEHYDRATION.





History of Present Illness:


77 mL with a past medical history of diabetes mellitus type 2, dementia, 

depression, psychosis, schizophrenia initially admitted to geropsychiatric unit 

for severe depression.  As he had very poor oral intake, hypernatremia,  

dehydration, hyperglycemia, he was transferred to acute care unit.  Patient was 

also found to have cyclical breast ulcer stage III.  It was debrided today by 

Dr. Jones.  The wound was badly infected.  Infectious disease consultation was 

called for antibiotic management.





Past Medical History: diabetes mellitus type 2, dementia, depression, psychosis

, schizophrenia





Diagnoses





TYPE 2 DIABETES MELLITUS WITH HYPERGLYCEMIA (02/25/18)


DEHYDRATION (02/25/18)


HYPEROSMOLALITY AND HYPERNATREMIA (02/25/18)


UNSPECIFIED DEMENTIA WITHOUT BEHAVIORAL DISTURBANCE (02/25/18)


SCHIZOPHRENIA, UNSPECIFIED (02/25/18)


UNSP PSYCHOSIS NOT DUE TO A SUBSTANCE OR KNOWN PHYSIOL COND (02/25/18)


DISORIENTATION, UNSPECIFIED (02/25/18)





Allergies











Allergy/AdvReac Type Severity Reaction Status Date / Time


 


No Known Allergies Allergy   Verified 02/15/18 21:28








 Vital Signs











Temp  98.4 F   02/27/18 04:00


 


Pulse  91   02/27/18 10:00


 


Resp  18   02/27/18 04:00


 


BP  137/87   02/27/18 10:00


 


Pulse Ox  99   02/27/18 04:00








 Intake & Output











 02/26/18 02/27/18 02/27/18





 18:59 06:59 18:59


 


Intake Total 1210  


 


Balance 1210  


 


Weight (lbs) 70.76 kg 70.76 kg 


 


Intake:   


 


  Intake, IV Amount 860  


 


    Sodium Chloride 0.45% 1, 860  





    000 ml @ 75 mls/hr IV .   





    [id number] RENETTA Rx#:[id number]   


 


  Oral 350  


 


Other:   


 


  # Voids 3 4 


 


  # Bowel Movements 1 0 








 Laboratory Results - last 24 hr











  02/26/18 02/26/18 02/26/18





  12:39 16:30 20:33


 


WBC   


 


RBC   


 


Hgb   


 


Hct   


 


MCV   


 


MCH   


 


MCHC Differential   


 


RDW   


 


Plt Count   


 


MPV   


 


Neutrophils %   


 


Lymphocytes %   


 


Monocytes %   


 


Eosinophils %   


 


Basophils %   


 


PT   


 


INR   


 


PTT (Actin FS)   


 


Sodium   


 


Potassium   


 


Chloride   


 


Carbon Dioxide   


 


Anion Gap   


 


BUN   


 


Creatinine   


 


Est GFR ( Amer)   


 


Est GFR (Non-Af Amer)   


 


BUN/Creatinine Ratio   


 


Glucose   


 


POC Glucose  364 H  235 H  180 H


 


Uric Acid   


 


Calcium   


 


Phosphorus   


 


Magnesium   


 


Total Bilirubin   


 


AST   


 


ALT   


 


Alkaline Phosphatase   


 


Total Protein   


 


Albumin   


 


Globulin   


 


Albumin/Globulin Ratio   


 


TSH   














  02/27/18 02/27/18 02/27/18





  05:57 06:00 06:00


 


WBC   8.0 


 


RBC   5.33 


 


Hgb   15.6 


 


Hct   47.8 


 


MCV   89.6 


 


MCH   29.3 


 


MCHC Differential   32.7 


 


RDW   17.2 


 


Plt Count   198 


 


MPV   9.7 


 


Neutrophils %   67.6 


 


Lymphocytes %   25.3 


 


Monocytes %   5.4 


 


Eosinophils %   1.6 


 


Basophils %   0.1 


 


PT   


 


INR   


 


PTT (Actin FS)   


 


Sodium    152 H


 


Potassium    4.6


 


Chloride    117 H


 


Carbon Dioxide    25.8


 


Anion Gap    13.8


 


BUN    56 H


 


Creatinine    1.8 H


 


Est GFR ( Amer)    TNP


 


Est GFR (Non-Af Amer)    TNP


 


BUN/Creatinine Ratio    31.1


 


Glucose    262 H


 


POC Glucose  246 H  


 


Uric Acid    11.9 H


 


Calcium    9.9


 


Phosphorus    4.1


 


Magnesium    2.9 H


 


Total Bilirubin    0.9


 


AST    15


 


ALT    17


 


Alkaline Phosphatase    84


 


Total Protein    7.7


 


Albumin    3.7 L


 


Globulin    4.0


 


Albumin/Globulin Ratio    0.9 L


 


TSH   














  02/27/18 02/27/18





  06:00 06:00


 


WBC  


 


RBC  


 


Hgb  


 


Hct  


 


MCV  


 


MCH  


 


MCHC Differential  


 


RDW  


 


Plt Count  


 


MPV  


 


Neutrophils %  


 


Lymphocytes %  


 


Monocytes %  


 


Eosinophils %  


 


Basophils %  


 


PT   10.5


 


INR   1.01


 


PTT (Actin FS)   25.3 L


 


Sodium  


 


Potassium  


 


Chloride  


 


Carbon Dioxide  


 


Anion Gap  


 


BUN  


 


Creatinine  


 


Est GFR ( Amer)  


 


Est GFR (Non-Af Amer)  


 


BUN/Creatinine Ratio  


 


Glucose  


 


POC Glucose  


 


Uric Acid  


 


Calcium  


 


Phosphorus  


 


Magnesium  


 


Total Bilirubin  


 


AST  


 


ALT  


 


Alkaline Phosphatase  


 


Total Protein  


 


Albumin  


 


Globulin  


 


Albumin/Globulin Ratio  


 


TSH  0.28 L 








Home Medication











 Medication  Instructions  Recorded  Type


 


Acetaminophen [Tylenol] 650 mg PO Q6HR PRN  tab 02/25/18 Rx


 


Famotidine [Pepcid] 20 mg PO BID  tab 02/25/18 Rx


 


Heparin Sodium [Heparin*] 5,000 units SUBQ Q12HR  vial 02/25/18 Rx


 


Insulin Aspart Sliding Scale See Protocol SUBQ ACHS  unit 02/25/18 Rx





[NovoLOG INSULIN SLIDING SCALE]   


 


Insulin Detemir [Levemir Insulin] 20 units SUBQ DAILY  vial 02/25/18 Rx


 


Lorazepam [Ativan] 0.5 mg PO Q6HR PRN  tab 02/25/18 Rx


 


Magnesium Hydroxide [Milk of 30 ml PO HS PRN  udc 02/25/18 Rx





Magnesia]   


 


Metoprolol Succinate [Toprol Xl] 25 mg PO DAILY  ter 02/25/18 Rx


 


OLANZapine ODT [ZyPREXA ZYDIS] 5 mg PO DAILY  odt 02/25/18 Rx


 


Zolpidem Tartrate [Ambien] 5 mg PO HS PRN  tab 02/25/18 Rx


 


cloNIDine HCl [Catapres] 0.1 mg PO Q8H PRN  tab 02/25/18 Rx


 


metFORMIN [Glucophage] 1,000 mg PO DAILY  tab 02/25/18 Rx








Current Medications











Generic Name Dose Route Start Last Admin





  Trade Name Freq  PRN Reason Stop Dose Admin


 


Acetaminophen  650 mg  02/25/18 19:28  





  Tylenol  PO  04/26/18 19:27  





  Q6HR PRN   





  mild to moderate pain   


 


Allopurinol  100 mg  02/26/18 09:00  02/27/18 10:00





  Zyloprim  PO  04/27/18 08:59  Not Given





  DAILY RENETTA   


 


Castor Oil/Canadian Balsam/Trypsin  1 appl  02/26/18 16:00  02/27/18 10:00





  Venelex  TP  04/27/18 15:59  1 appl





  DAILY RENETTA   Administration


 


Famotidine  20 mg  02/26/18 09:00  02/27/18 10:00





  Pepcid  PO  04/27/18 08:59  Not Given





  BID RENETTA   


 


Heparin Sodium (Porcine)  5,000 units  02/25/18 21:00  02/26/18 20:34





  Heparin  SUBQ  04/26/18 20:59  5,000 units





  Q12HR RENETTA   Administration





  Protocol   


 


Ceftriaxone Sodium 1 gm/  50 mls @ 100 mls/hr  02/25/18 16:45  02/26/18 16:24





  Sodium Chloride  IV  04/26/18 16:44  100 mls/hr





  Q24HR RENETTA   Administration


 


Insulin Aspart  0 units  02/25/18 21:00  02/27/18 06:32





  Novolog Insulin Sliding Scale  SUBQ  04/26/18 20:59  Not Given





  [facility] RENETTA   





  Protocol   


 


Lorazepam  0.5 mg  02/25/18 19:28  





  Ativan  PO  04/26/18 19:27  





  Q6HR PRN   





  agitation    





  Protocol   


 


Magnesium Hydroxide  30 ml  02/25/18 19:28  





  Milk Of Magnesia  PO  04/26/18 19:27  





  HS PRN   





  Constipation   


 


Metoprolol Succinate  25 mg  02/26/18 17:00  02/27/18 10:00





  Toprol Xl  PO  04/27/18 16:59  Not Given





  BID [facility]   


 


Miscellaneous  1 ea  02/26/18 10:34  





  Clinical Monitoring    04/27/18 10:33  





  PRN PRN   





  RENAL   


 


Olanzapine  5 mg  02/27/18 17:00  





  Zyprexa Zydis  PO  04/28/18 16:59  





  BID RENETTA   





  Protocol   


 


Zolpidem Tartrate  5 mg  02/25/18 19:28  





  Ambien  PO  04/26/18 19:27  





  HS PRN   





  insomnia   








Review of Systems:


A 12 point ROS was reviewed with the pertinent positive and negatives noted in 

the HPI.


Conscious stop: No fever, no chills.  No drowsiness.  Patient is bedridden.


HEENT: Patient denies any headache, diplopia, photophobia also felt.


Respiratory system: Patient denies any cough or shortness of breath.


CVS: Patient denies any chest pain or palpitation.


GI: Patient initially nausea, vomiting, diarrhea, abdominal pain, constipation.


: Patient denies any dysuria, hematuria.


Skin: Patient has large decubitus ulcer in the sacrococcygeal area.


CNS: Denies any headache dizziness or focal








Social History


Lives at nursing facility.


Smoking Status                   Never smoker


Drug Use                         No


Alcohol Use                      No





Family Medical History





Unknown.





Physical Exam:





General: Well-nourished.  Well-nourished.





HEENT:  Head: Normocephalic, atraumatic.  Oral cavity: Moist pink tongue.  Eyes

: The pupils PERRLA.  EOMI.  No pallor.  No icterus.





Neck: Supple, no JVD no use of accessory neck muscles.





Cardio: S1 and S2 within normal limits regular rhythm.  





Respiratory: Vesicular sounds, no crackles no wheezing.





Abdominal: Soft, Nontender, nondistended, bowel sounds present.





Genital/Urinary: Deferred.





Extremities: No cyanosis, no clubbing, no edema.





Neurological: Alert, awake,.  Communicates well.





Skin: Patient has a stage III decubitus ulcer in sacral area 





Assessment: 


1.  Sacral stage III decubitus ulcer, infected.


2.  Status post I&D.


3.  Diabetes mellitus type .


4.  Dementia.  


5.   Depression.


6.  psychosis.  


7.  Schizophrenia





Plan: 





Continue wound care.


Continue Rocephin for a few more days.





Thank you Dr. Hills, for involving me in taking care of this patient





Signed,





Srinivas Restrepo M.D.


02/27/[id number]

## 2018-02-27 NOTE — GENERAL PROGRESS NOTE
Subjective





- Review of Systems


Service Date: 18


Subjective: 





awake, verbal, still confused





Objective





- Results


Result Diagrams: 


 18 06:00





 18 06:00


Recent Labs: 


 Laboratory Last Values











WBC  8.0 Th/cmm (4.8-10.8)   18  06:00    


 


RBC  5.33 Mil/cmm (3.80-5.80)   18  06:00    


 


Hgb  15.6 gm/dL (12-16)   18  06:00    


 


Hct  47.8 % (41.0-60)   18  06:00    


 


MCV  89.6 fl (80-99)   18  06:00    


 


MCH  29.3 pg (27.0-31.0)   18  06:00    


 


MCHC Differential  32.7 pg (28.0-36.0)   18  06:00    


 


RDW  17.2 % (11.5-20.0)   18  06:00    


 


Plt Count  198 Th/cmm (150-400)   18  06:00    


 


MPV  9.7 fl  18  06:00    


 


Neutrophils %  67.6 % (40.0-80.0)   18  06:00    


 


Lymphocytes %  25.3 % (20.0-50.0)   18  06:00    


 


Monocytes %  5.4 % (2.0-10.0)   18  06:00    


 


Eosinophils %  1.6 % (0.0-5.0)   18  06:00    


 


Basophils %  0.1 % (0.0-2.0)   18  06:00    


 


PT  10.5 SECONDS (9.5-11.5)   18  06:00    


 


INR  1.01  (0.5-1.4)   18  06:00    


 


PTT (Actin FS)  25.3 SECONDS (26.0-38.0)  L  18  06:00    


 


Sodium  152 mEq/L (136-145)  H  18  06:00    


 


Potassium  4.6 mEq/L (3.5-5.1)   18  06:00    


 


Chloride  117 mEq/L ()  H  18  06:00    


 


Carbon Dioxide  25.8 mEq/L (21.0-31.0)   18  06:00    


 


Anion Gap  13.8  (7.0-16.0)   18  06:00    


 


BUN  56 mg/dL (7-25)  H  18  06:00    


 


Creatinine  1.8 mg/dL (0.7-1.3)  H  18  06:00    


 


Est GFR ( Amer)  TNP   18  06:00    


 


Est GFR (Non-Af Amer)  TNP   18  06:00    


 


BUN/Creatinine Ratio  31.1   18  06:00    


 


Glucose  262 mg/dL ()  H  18  06:00    


 


POC Glucose  236 MG/DL (70 - 105)  H  18  13:31    


 


Uric Acid  11.9 mg/dL (4.4-7.6)  H  18  06:00    


 


Calcium  9.9 mg/dL (8.6-10.3)   18  06:00    


 


Phosphorus  4.1 mg/dL (2.5-5.0)   18  06:00    


 


Magnesium  2.9 mg/dL (1.9-2.7)  H  18  06:00    


 


Total Bilirubin  0.9 mg/dL (0.3-1.0)   18  06:00    


 


AST  15 U/L (13-39)   18  06:00    


 


ALT  17 U/L (7-52)   18  06:00    


 


Alkaline Phosphatase  84 U/L ()   18  06:00    


 


Total Protein  7.7 gm/dL (6.0-8.3)   18  06:00    


 


Albumin  3.7 gm/dL (4.2-5.5)  L  18  06:00    


 


Globulin  4.0 gm/dL  18  06:00    


 


Albumin/Globulin Ratio  0.9  (1.0-1.8)  L  18  06:00    


 


TSH  0.28 uIU/ml (0.34-5.60)  L  18  06:00    














- Physical Exam


Vitals and I&O: 


 Vital Signs











Temp  98.2 F   18 16:00


 


Pulse  99   18 18:23


 


Resp  20   18 16:00


 


BP  141/83   18 18:23


 


Pulse Ox  97   18 16:00








 Intake & Output











 18





 18:59 06:59 18:59


 


Intake Total 1260  720


 


Output Total   0


 


Balance 1260  720


 


Weight (lbs) 70.76 kg 70.76 kg 70.76 kg


 


Intake:   


 


  Intake, IV Amount 910  


 


    Sodium Chloride 0.45% 1, 860  





    000 ml @ 75 mls/hr IV .   





    [id number] [facility] Rx#:[id number]   


 


    cefTRIAXone 1 gm In 50  





    Sodium Chloride 0.9% 50   





    ml @ 100 mls/hr IV Q24HR   





    [facility] Rx#:[id number]   


 


  Oral 350  720


 


Output:   


 


  Stool   0


 


Other:   


 


  # Voids 3 4 4


 


  # Bowel Movements 1 0 0











Active Medications: 


Current Medications





Acetaminophen (Tylenol)  650 mg PO Q6HR PRN


   PRN Reason: mild to moderate pain


   Stop: 18 19:27


Allopurinol (Zyloprim)  100 mg PO DAILY [facility]


   Stop: 18 08:59


   Last Admin: 18 10:00 Dose:  Not Given


Castor Oil/[place]ian Balsam/Trypsin (Venelex)  1 appl TP DAILY [facility]


   Stop: 18 15:59


   Last Admin: 18 10:00 Dose:  1 appl


Famotidine (Pepcid)  20 mg PO BID [facility]


   Stop: 18 08:59


   Last Admin: 18 18:22 Dose:  20 mg


Heparin Sodium (Porcine) (Heparin)  5,000 units SUBQ Q12HR [facility]


   PRN Reason: Protocol


   Stop: 18 20:59


   Last Admin: 18 07:50 Dose:  Not Given


Ceftriaxone Sodium 1 gm/ (Sodium Chloride)  50 mls @ 100 mls/hr IV Q24HR [facility]


   Stop: 18 16:44


   Last Admin: 18 17:45 Dose:  100 mls/hr


Insulin Aspart (Novolog Insulin Sliding Scale)  0 units SUBQ ACHS [facility]


   PRN Reason: Protocol


   Stop: 18 20:59


   Last Admin: 18 18:23 Dose:  6 units


Lorazepam (Ativan)  0.5 mg PO Q6HR PRN; Protocol


   PRN Reason: agitation 


   Stop: 18 19:27


Magnesium Hydroxide (Milk Of Magnesia)  30 ml PO HS PRN


   PRN Reason: Constipation


   Stop: 18 19:27


Metoprolol Succinate (Toprol Xl)  25 mg PO BID [facility]


   Stop: 18 16:59


   Last Admin: 18 18:23 Dose:  25 mg


Miscellaneous (Clinical Monitoring)  1 ea MC PRN PRN


   PRN Reason: RENAL


   Stop: 18 10:33


Olanzapine (Zyprexa Zydis)  5 mg PO BID [facility]


   PRN Reason: Protocol


   Stop: 18 16:59


Zolpidem Tartrate (Ambien)  5 mg PO HS PRN


   PRN Reason: insomnia


   Stop: 18 19:27








General: Alert, Oriented x3


HEENT: Atraumatic, PERRLA


Neck: Supple


Cardiovascular: Regular rate, Normal S1, Normal S2


Lungs: Clear to auscultation


Abdomen: Bowel sounds, Soft


Extremities: no Clubbing, no Cyanosis, no Edema


Neurological: Sensation intact


Skin: Rash


Psych/Mental Status: Mood NL





- Procedures


Procedures: 


 Procedures











Procedure Code Date


 


KEV MUSC/FASCIA 20 SQ CM/< 07489 18


 


EXCISION OF RIGHT HIP MUSCLE, OPEN APPROACH [id number]














Assessment/Plan





- Assessment


Assessment: 





BRYANT on CKD


Stage 4 decub ulcer


Severe dehydration


Ess HTN


Type 2 DM


Psychosis








- Plan


Plan: 


Lab - Result Diagrams





 18 06:00 





 18 06:00 





Current Medications





Acetaminophen (Tylenol)  650 mg PO Q6HR PRN


   PRN Reason: mild to moderate pain


   Stop: 18 19:27


Allopurinol (Zyloprim)  100 mg PO DAILY [facility]


   Stop: 18 08:59


   Last Admin: 18 10:00 Dose:  Not Given


Castor Oil/Albanian Balsam/Trypsin (Venelex)  1 appl TP DAILY [facility]


   Stop: 18 15:59


   Last Admin: 18 10:00 Dose:  1 appl


Famotidine (Pepcid)  20 mg PO BID [facility]


   Stop: 18 08:59


   Last Admin: 18 18:22 Dose:  20 mg


Heparin Sodium (Porcine) (Heparin)  5,000 units SUBQ Q12HR [facility]


   PRN Reason: Protocol


   Stop: 18 20:59


   Last Admin: 18 07:50 Dose:  Not Given


Ceftriaxone Sodium 1 gm/ (Sodium Chloride)  50 mls @ 100 mls/hr IV Q24HR [facility]


   Stop: 18 16:44


   Last Admin: 18 17:45 Dose:  100 mls/hr


Insulin Aspart (Novolog Insulin Sliding Scale)  0 units SUBQ ACHS [facility]


   PRN Reason: Protocol


   Stop: 18 20:59


   Last Admin: 18 18:23 Dose:  6 units


Lorazepam (Ativan)  0.5 mg PO Q6HR PRN; Protocol


   PRN Reason: agitation 


   Stop: 18 19:27


Magnesium Hydroxide (Milk Of Magnesia)  30 ml PO HS PRN


   PRN Reason: Constipation


   Stop: 18 19:27


Metoprolol Succinate (Toprol Xl)  25 mg PO BID [facility]


   Stop: 18 16:59


   Last Admin: 18 18:23 Dose:  25 mg


Miscellaneous (Clinical Monitoring)  1 ea MC PRN PRN


   PRN Reason: RENAL


   Stop: 18 10:33


Olanzapine (Zyprexa Zydis)  5 mg PO BID [facility]


   PRN Reason: Protocol


   Stop: 18 16:59


Zolpidem Tartrate (Ambien)  5 mg PO HS PRN


   PRN Reason: insomnia


   Stop: 18 19:27





Lab - Result Diagrams





 18 06:00 





 18 06:00 





Na down to 152


Kidney fnc gradually improving


agree w/ Allopurinol


f/u electrolytes


continue hydration





Nutritional Asmnt/Malnutr-PDOC





- Dietary Evaluation


Malnutrition Findings (Please click <Entered> for more info): 








Nutritional Asmnt/Malnutrition                             Start:  18 17:

10


Text:                                                      Status: Complete    

  


Freq:                                                                          

  


 Document     18 17:13  MAGEN  (Rec: 18 17:25  MAGEN  [email or web address])


 Nutritional Asmnt/Malnutrition


     Patient General Information


      Nutritional Screening                      High Risk


                                                 Consult


      Diagnosis                                  pressure ulcer, hyperglycemia,


                                                 dehydration


      Pertinent Medical Hx/Surgical Hx           DM, dementia, depression,


                                                 psychosis, schizophrenia,


                                                 chronic renal insuff


      Subjective Information                     Consult received for


                                                 unstageble pressure ulcer on , high BG on . Per


                                                 records, PO intake 50-75%. Pt


                                                 was on NPO today for surgery-


                                                 excisional debridement,


                                                 application of wound VAC


      Current Diet Order/ Nutrition Support      pureed, CCHO 60gm


      Pertinent Medications                      novolog


      Pertinent Labs                              Na 152, K 4.6, BUN 56, Cr


                                                 1.8, Glucose 262, -246


                                                 , Mg 2.9


     Nutritional Hx/Data


      Height                                     1.8 m


      Height (Calculated Centimeters)            180.3


      Current Weight (lbs)                       70.76 kg


      Weight (Calculated Kilograms)              70.8


      Weight (Calculated Grams)                  77932.4


      Ideal Body Weight                          172


      % Ideal Body Weight                        91


      Body Mass Index (BMI)                      21.7


      Weight Status                              Approriate


     GI Symptoms


      GI Symptoms                                None


      Last BM                                    


      Difficult in:                              None


      Skin Integrity/Comment:                    scar to right upper back,


                                                 decubitus ulcer to buttocks


      Current %PO                                Fair (50-74%)


     Estimated Nutritional Goals


      BEE in Kcals:                              Using Current wt


      Calories/Kcals/Kg                          27-32


      Kcals Calculated                           5920-3409


      Protein:                                   Using Current wt


      Protein g/k-1.2


      Protein Calculated                         71-85


      Fluid: ml                                  1917-2130ml (1ml/kcal)


     Nutritional Problem


      2. Problem


       Problem                                   altered nutrition related lab


                                                 values


       Etiology                                  hx of DM


       Signs/Symptoms:                           Glucose 262, -246


      1. Problem


       Problem                                   increased nutrition needs (


                                                 calorie and protein)


       Etiology                                  increased metabolic demand for


                                                 wound healing


       Signs/Symptoms:                           decubitus ulcer and surgery


     Intervention/Recommendation


      Comments                                   1. Continue with current diet


                                                 as ordered. If PO intake low,


                                                 will consider nutrition


                                                 supplements


                                                 2. MD to consider vit C and


                                                 zinc for wound healing


                                                 3. Monitor PO intake, wt, labs


                                                 and skin integrity


                                                 4. F/U as moderate risk in 3-5


                                                 days, 3/2-3/4, PO check 3/2


     Expected Outcomes/Goals


      Expected Outcomes/Goals                    1. PO intake to meet at least


                                                 75% of nutritional needs.


                                                 2. Wt stability, skin to


                                                 remain intact, labs to


                                                 approach WNL.

## 2018-02-27 NOTE — GENERAL PROGRESS NOTE
Subjective





- Review of Systems


Service Date: 02/27/18


Subjective: 





Patient was seen and examined. Oral Intake has improved. Appears more awake and 

alert today. more awake, more alert. For possible debridement of sacral 

decubitus ulcer. 





Objective





- Results


Result Diagrams: 


 02/27/18 06:00





 02/27/18 06:00


Recent Labs: 


 Laboratory Last Values











WBC  8.0 Th/cmm (4.8-10.8)   02/27/18  06:00    


 


RBC  5.33 Mil/cmm (3.80-5.80)   02/27/18  06:00    


 


Hgb  15.6 gm/dL (12-16)   02/27/18  06:00    


 


Hct  47.8 % (41.0-60)   02/27/18  06:00    


 


MCV  89.6 fl (80-99)   02/27/18  06:00    


 


MCH  29.3 pg (27.0-31.0)   02/27/18  06:00    


 


MCHC Differential  32.7 pg (28.0-36.0)   02/27/18  06:00    


 


RDW  17.2 % (11.5-20.0)   02/27/18  06:00    


 


Plt Count  198 Th/cmm (150-400)   02/27/18  06:00    


 


MPV  9.7 fl  02/27/18  06:00    


 


Neutrophils %  67.6 % (40.0-80.0)   02/27/18  06:00    


 


Lymphocytes %  25.3 % (20.0-50.0)   02/27/18  06:00    


 


Monocytes %  5.4 % (2.0-10.0)   02/27/18  06:00    


 


Eosinophils %  1.6 % (0.0-5.0)   02/27/18  06:00    


 


Basophils %  0.1 % (0.0-2.0)   02/27/18  06:00    


 


PT  10.5 SECONDS (9.5-11.5)   02/27/18  06:00    


 


INR  1.01  (0.5-1.4)   02/27/18  06:00    


 


PTT (Actin FS)  25.3 SECONDS (26.0-38.0)  L  02/27/18  06:00    


 


Sodium  152 mEq/L (136-145)  H  02/27/18  06:00    


 


Potassium  4.6 mEq/L (3.5-5.1)   02/27/18  06:00    


 


Chloride  117 mEq/L ()  H  02/27/18  06:00    


 


Carbon Dioxide  25.8 mEq/L (21.0-31.0)   02/27/18  06:00    


 


Anion Gap  13.8  (7.0-16.0)   02/27/18  06:00    


 


BUN  56 mg/dL (7-25)  H  02/27/18  06:00    


 


Creatinine  1.8 mg/dL (0.7-1.3)  H  02/27/18  06:00    


 


Est GFR ( Amer)  TNP   02/27/18  06:00    


 


Est GFR (Non-Af Amer)  TNP   02/27/18  06:00    


 


BUN/Creatinine Ratio  31.1   02/27/18  06:00    


 


Glucose  262 mg/dL ()  H  02/27/18  06:00    


 


POC Glucose  246 MG/DL (70 - 105)  H  02/27/18  05:57    


 


Uric Acid  11.9 mg/dL (4.4-7.6)  H  02/27/18  06:00    


 


Calcium  9.9 mg/dL (8.6-10.3)   02/27/18  06:00    


 


Phosphorus  4.1 mg/dL (2.5-5.0)   02/27/18  06:00    


 


Magnesium  2.9 mg/dL (1.9-2.7)  H  02/27/18  06:00    


 


Total Bilirubin  0.9 mg/dL (0.3-1.0)   02/27/18  06:00    


 


AST  15 U/L (13-39)   02/27/18  06:00    


 


ALT  17 U/L (7-52)   02/27/18  06:00    


 


Alkaline Phosphatase  84 U/L ()   02/27/18  06:00    


 


Total Protein  7.7 gm/dL (6.0-8.3)   02/27/18  06:00    


 


Albumin  3.7 gm/dL (4.2-5.5)  L  02/27/18  06:00    


 


Globulin  4.0 gm/dL  02/27/18  06:00    


 


Albumin/Globulin Ratio  0.9  (1.0-1.8)  L  02/27/18  06:00    


 


TSH  0.28 uIU/ml (0.34-5.60)  L  02/27/18  06:00    














- Physical Exam


Vitals and I&O: 


 Vital Signs











Temp  98.4 F   02/27/18 04:00


 


Pulse  89   02/27/18 04:00


 


Resp  18   02/27/18 04:00


 


BP  132/82   02/27/18 04:00


 


Pulse Ox  99   02/27/18 04:00








 Intake & Output











 02/26/18 02/27/18 02/27/18





 18:59 06:59 18:59


 


Intake Total 1210  


 


Balance 1210  


 


Weight (lbs) 70.76 kg 70.76 kg 


 


Intake:   


 


  Intake, IV Amount 860  


 


    Sodium Chloride 0.45% 1, 860  





    000 ml @ 75 mls/hr IV .   





    [id number][id number]   


 


  Oral 350  


 


Other:   


 


  # Voids 3 4 


 


  # Bowel Movements 1 0 











Active Medications: 


Current Medications





Acetaminophen (Tylenol)  650 mg PO Q6HR PRN


   PRN Reason: mild to moderate pain


   Stop: 04/26/18 19:27


Allopurinol (Zyloprim)  100 mg PO DAILY [facility]


   Stop: 04/27/18 08:59


   Last Admin: 02/26/18 10:24 Dose:  100 mg


Castor Oil/[place] Balsam/Trypsin (Venelex)  1 appl TP DAILY [facility]


   Stop: 04/27/18 15:59


   Last Admin: 02/26/18 17:52 Dose:  1 appl


Famotidine (Pepcid)  20 mg PO BID [facility]


   Stop: 04/27/18 08:59


   Last Admin: 02/26/18 17:52 Dose:  20 mg


Heparin Sodium (Porcine) (Heparin)  5,000 units SUBQ Q12HR [facility]


   PRN Reason: Protocol


   Stop: 04/26/18 20:59


   Last Admin: 02/26/18 20:34 Dose:  5,000 units


Ceftriaxone Sodium 1 gm/ (Sodium Chloride)  50 mls @ 100 mls/hr IV Q24HR [facility]


   Stop: 04/26/18 16:44


   Last Admin: 02/26/18 16:24 Dose:  100 mls/hr


Sodium Chloride (Nacl 0.45%)  1,000 mls @ 75 mls/hr IV [id number] [facility]


   Stop: 04/26/18 16:59


   Last Admin: 02/27/18 00:19 Dose:  75 mls/hr


Insulin Aspart (Novolog Insulin Sliding Scale)  0 units SUBQ ACHS [facility]


   PRN Reason: Protocol


   Stop: 04/26/18 20:59


   Last Admin: 02/27/18 06:32 Dose:  Not Given


Lorazepam (Ativan)  0.5 mg PO Q6HR PRN; Protocol


   PRN Reason: agitation 


   Stop: 04/26/18 19:27


Magnesium Hydroxide (Milk Of Magnesia)  30 ml PO HS PRN


   PRN Reason: Constipation


   Stop: 04/26/18 19:27


Metoprolol Succinate (Toprol Xl)  25 mg PO BID [facility]


   Stop: 04/27/18 16:59


   Last Admin: 02/26/18 17:53 Dose:  25 mg


Miscellaneous (Clinical Monitoring)  1 ea MC PRN PRN


   PRN Reason: RENAL


   Stop: 04/27/18 10:33


Olanzapine (Zyprexa Zydis)  5 mg PO DAILY [facility]


   PRN Reason: Protocol


   Stop: 04/27/18 08:59


Zolpidem Tartrate (Ambien)  5 mg PO HS PRN


   PRN Reason: insomnia


   Stop: 04/26/18 19:27








General: Alert, Oriented x3


HEENT: Atraumatic, PERRLA


Neck: Supple


Cardiovascular: Regular rate, Normal S1, Normal S2


Lungs: Clear to auscultation


Abdomen: Bowel sounds, Soft


Extremities: no Clubbing, no Cyanosis, no Edema





Assessment/Plan





- Assessment


Assessment: 





hypernatremia


prerenal azotemia


hyperglycemia


depression/anxiety


gout


dementia


schizophrenia











- Plan


Plan: 





will order repeat cmp,cbc tomorrow AM





renal consult -- Dr. Bajwa





sacral decubitus ... will order Gen Surgical consult -- Dr. Jones for possible 

wound debridement





Gout ... continue allopurinol 100mg PO daily

## 2018-02-27 NOTE — PROGRESS NOTES
DATE:  02/27/2018



Covering for Dr. Ramirez.



SUBJECTIVE:  Case was discussed with staff of the patient and reviewed records. 

The patient has been having episodes of kicking.  He continues to be

unpredictable, impulsive, needing redirection, labile.  He is sleeping well,

eating well.  No side effects with the medication, no sedation, no nausea, no

extrapyramidal symptoms.  He is compliant with the medication with no side

effects, no sedation, no nausea, and no extrapyramidal symptoms.  The patient is

on Zyprexa 5 mg daily, I will be increasing the dose to 5 mg twice a day and so

far no side effects, no sedation, no nausea, no extrapyramidal symptoms and we

will continue to work with the patient in group therapy, milieu therapy, adjust

medication as needed.



Thank you very much for allowing me to participate in the care of this most

interesting gentleman.





DD: 02/27/2018 11:00

DT: 02/27/2018 21:38

JOB# [id number]  [id number]

## 2018-02-27 NOTE — DIAGNOSTIC IMAGING REPORT
Renal ultrasound



HISTORY: Acute renal failure.



COMPARISON: None



Technique: Sonography of the kidneys and urinary bladder was performed

in multiple planes.



FINDINGS:



The right kidney measures 9.2 x 5.2 x 4.1 cm demonstrating a sonolucent

lesion of the superior pole measuring 3.3 x 3.2 cm.  No hydronephrosis. 

The left kidney measures 10.2 x 5.4 cm.  No evidence of focal lesions or

hydronephrosis.



Enlarged prostate gland is seen in mass effect upon the base of the

urinary bladder.  The prostate gland measures 3.5 x 4.8 cm.  Mild

urinary bladder wall prominence is noted.  Note, Patient was

incontinent.



IMPRESSION:



No evidence of hydronephrosis.



Right 3.3 x 3.2 cm cyst.



Mildly prominent prostate gland with mass effect upon the base of the

urinary bladder.  Please correlate with clinical findings and PSA levels



Mild urinary bladder wall thickening.  Infectious or inflammatory

processes cannot be excluded.

## 2018-02-28 LAB
ANION GAP SERPL CALC-SCNC: 13.6 MMOL/L (ref 7–16)
APPEARANCE UR: (no result)
BACTERIA #/AREA URNS HPF: (no result) /HPF
BASOPHILS # BLD AUTO: 0 TH/CUMM (ref 0–0.2)
BASOPHILS NFR BLD AUTO: 0.3 % (ref 0–2)
BILIRUB UR QL CFM: NEGATIVE
BILIRUB UR-MCNC: (no result) MG/DL
BUN SERPL-MCNC: 41 MG/DL (ref 7–25)
CALCIUM SERPL-MCNC: 9.3 MG/DL (ref 8.6–10.3)
CHLORIDE SERPL-SCNC: 115 MEQ/L (ref 98–107)
CO2 SERPL-SCNC: 25.4 MEQ/L (ref 21–31)
COLOR UR: (no result)
CREAT SERPL-MCNC: 1.4 MG/DL (ref 0.7–1.3)
EOSINOPHIL # BLD AUTO: 0.1 TH/CMM (ref 0.1–0.4)
EOSINOPHIL # BLD MANUAL: (no result) 10*3/UL
EOSINOPHIL BLD QL WRIGHT STN: (no result)
EOSINOPHIL NFR BLD AUTO: 1.7 % (ref 0–5)
EPI CELLS URNS QL MICRO: (no result) /LPF
ERYTHROCYTE [DISTWIDTH] IN BLOOD BY AUTOMATED COUNT: 16 % (ref 11.5–20)
GLUCOSE SERPL-MCNC: 203 MG/DL (ref 70–105)
GLUCOSE UR STRIP-MCNC: >=1000 MG/DL
HCT VFR BLD CALC: 45.1 % (ref 41–60)
HGB BLD-MCNC: 14.6 GM/DL (ref 12–16)
KETONES UR STRIP-MCNC: (no result) MG/DL
LEUKOCYTE ESTERASE UR-ACNC: (no result)
LYMPHOCYTE AB SER FC-ACNC: 1.2 TH/CMM (ref 1.5–3)
LYMPHOCYTES NFR BLD AUTO: 14.8 % (ref 20–50)
MCH RBC QN AUTO: 29 PG (ref 27–31)
MCHC RBC AUTO-ENTMCNC: 32.3 PG (ref 28–36)
MCV RBC AUTO: 89.7 FL (ref 80–99)
MICRO URNS: YES
MONOCYTES # BLD AUTO: 0.4 TH/CMM (ref 0.3–1)
MONOCYTES NFR BLD AUTO: 4.8 % (ref 2–10)
NEUTROPHILS # BLD: 6.7 TH/CMM (ref 1.8–8)
NEUTROPHILS NFR BLD AUTO: 78.4 % (ref 40–80)
NITRITE UR QL STRIP: NEGATIVE
PH UR STRIP: 5.5 [PH] (ref 4.6–8)
PLATELET # BLD: 192 TH/CMM (ref 150–400)
PMV BLD AUTO: 10.1 FL
POTASSIUM SERPL-SCNC: 4 MEQ/L (ref 3.5–5.1)
PROT UR STRIP-MCNC: 100 MG/DL
RBC # BLD AUTO: 5.03 MIL/CMM (ref 3.8–5.8)
RBC # UR STRIP: (no result) /UL
RBC #/AREA URNS HPF: >100 /HPF (ref 0–5)
SODIUM SERPL-SCNC: 150 MEQ/L (ref 136–145)
SP GR UR STRIP: 1.02 (ref 1–1.03)
URINALYSIS COMPLETE PNL UR: (no result)
UROBILINOGEN UR STRIP-ACNC: 0.2 E.U./DL (ref 0.2–1)
WBC # BLD AUTO: 8.4 TH/CMM (ref 4.8–10.8)
WBC #/AREA URNS HPF: >100 /HPF (ref 0–5)

## 2018-02-28 RX ADMIN — INSULIN ASPART SCH UNITS: 100 INJECTION, SOLUTION INTRAVENOUS; SUBCUTANEOUS at 12:42

## 2018-02-28 RX ADMIN — CASTOR OIL AND BALSAM, PERU SCH APPL: 788; 87 OINTMENT TOPICAL at 09:09

## 2018-02-28 RX ADMIN — INSULIN DETEMIR SCH UNITS: 100 INJECTION, SOLUTION SUBCUTANEOUS at 08:12

## 2018-02-28 RX ADMIN — INSULIN ASPART SCH: 100 INJECTION, SOLUTION INTRAVENOUS; SUBCUTANEOUS at 20:47

## 2018-02-28 RX ADMIN — LEVOFLOXACIN SCH MLS/HR: 5 INJECTION INTRAVENOUS at 12:46

## 2018-02-28 RX ADMIN — INSULIN ASPART SCH UNITS: 100 INJECTION, SOLUTION INTRAVENOUS; SUBCUTANEOUS at 08:14

## 2018-02-28 RX ADMIN — INSULIN ASPART SCH: 100 INJECTION, SOLUTION INTRAVENOUS; SUBCUTANEOUS at 18:21

## 2018-02-28 NOTE — GENERAL PROGRESS NOTE
Subjective





- Review of Systems


Service Date: 18


Subjective: 





awake, verbal, still confused





Objective





- Results


Result Diagrams: 


 18 06:15





 18 06:15


Recent Labs: 


 Laboratory Last Values











WBC  8.4 Th/cmm (4.8-10.8)   18  06:15    


 


RBC  5.03 Mil/cmm (3.80-5.80)   18  06:15    


 


Hgb  14.6 gm/dL (12-16)   18  06:15    


 


Hct  45.1 % (41.0-60)   18  06:15    


 


MCV  89.7 fl (80-99)   18  06:15    


 


MCH  29.0 pg (27.0-31.0)   18  06:15    


 


MCHC Differential  32.3 pg (28.0-36.0)   18  06:15    


 


RDW  16.0 % (11.5-20.0)   18  06:15    


 


Plt Count  192 Th/cmm (150-400)   18  06:15    


 


MPV  10.1 fl  18  06:15    


 


Neutrophils %  78.4 % (40.0-80.0)   18  06:15    


 


Lymphocytes %  14.8 % (20.0-50.0)  L  18  06:15    


 


Monocytes %  4.8 % (2.0-10.0)   18  06:15    


 


Eosinophils %  1.7 % (0.0-5.0)   18  06:15    


 


Basophils %  0.3 % (0.0-2.0)   18  06:15    


 


Eos Smear Source  URINE   18  03:00    


 


Eos Smear Total Cells  FEW EOSINOPHILS SEEN  (NONE SEEN)   18  03:00    


 


PT  10.5 SECONDS (9.5-11.5)   18  06:00    


 


INR  1.01  (0.5-1.4)   18  06:00    


 


PTT (Actin FS)  25.3 SECONDS (26.0-38.0)  L  18  06:00    


 


Sodium  150 mEq/L (136-145)  H  18  06:15    


 


Potassium  4.0 mEq/L (3.5-5.1)   18  06:15    


 


Chloride  115 mEq/L ()  H  18  06:15    


 


Carbon Dioxide  25.4 mEq/L (21.0-31.0)   18  06:15    


 


Anion Gap  13.6  (7.0-16.0)   18  06:15    


 


BUN  41 mg/dL (7-25)  H  18  06:15    


 


Creatinine  1.4 mg/dL (0.7-1.3)  H  18  06:15    


 


Est GFR ( Amer)  TNP   18  06:15    


 


Est GFR (Non-Af Amer)  TNP   18  06:15    


 


BUN/Creatinine Ratio  29.3   18  06:15    


 


Glucose  203 mg/dL ()  H  18  06:15    


 


POC Glucose  260 MG/DL (70 - 105)  H  18  12:01    


 


Hemoglobin A1c %  10.5 % (4.0-6.0)  H  18  05:26    


 


Uric Acid  11.9 mg/dL (4.4-7.6)  H  18  06:00    


 


Calcium  9.3 mg/dL (8.6-10.3)   18  06:15    


 


Phosphorus  4.1 mg/dL (2.5-5.0)   18  06:00    


 


Magnesium  2.9 mg/dL (1.9-2.7)  H  18  06:00    


 


Total Bilirubin  0.9 mg/dL (0.3-1.0)   18  06:00    


 


AST  15 U/L (13-39)   18  06:00    


 


ALT  17 U/L (7-52)   18  06:00    


 


Alkaline Phosphatase  84 U/L ()   18  06:00    


 


Total Protein  7.7 gm/dL (6.0-8.3)   18  06:00    


 


Albumin  3.7 gm/dL (4.2-5.5)  L  18  06:00    


 


Globulin  4.0 gm/dL  18  06:00    


 


Albumin/Globulin Ratio  0.9  (1.0-1.8)  L  18  06:00    


 


TSH  0.28 uIU/ml (0.34-5.60)  L  18  06:00    


 


Urine Source  CATH   18  03:00    


 


Urine Color  BROWN   18  03:00    


 


Urine Clarity  CLOUDY  (CLEAR)   18  03:00    


 


Urine pH  5.5  (4.6 - 8.0)   18  03:00    


 


Ur Specific Gravity  1.020  (1.005-1.030)   18  03:00    


 


Urine Protein  100 mg/dL (NEGATIVE)  H  18  03:00    


 


Urine Glucose (UA)  >=1000 mg/dL (NEGATIVE)  H  18  03:00    


 


Urine Ketones  TRACE mg/dL (NEGATIVE)   18  03:00    


 


Urine Blood  LARGE  (NEGATIVE)  H  18  03:00    


 


Urine Nitrate  NEGATIVE  (NEGATIVE)   18  03:00    


 


Urine Bilirubin  SMALL  (NEGATIVE)  H  18  03:00    


 


Urine Ictotest  NEGATIVE  (NEGATIVE)   18  03:00    


 


Urine Urobilinogen  0.2 E.U./dL (0.2 - 1.0)   18  03:00    


 


Ur Leukocyte Esterase  TRACE  (NEGATIVE)  H  18  03:00    


 


Urine RBC  >100 /hpf (0-5)  H  18  03:00    


 


Urine WBC  >100 /hpf (0-5)  H  18  03:00    


 


Ur Epithelial Cells  OCCASIONAL /lpf (FEW)   18  03:00    


 


Urine Bacteria  MODERATE /hpf (NONE SEEN)  H  18  03:00    


 


Ur Random Sodium  39 mmol/L  18  03:00    


 


Urine Creatinine  136.0 mg/dl (39.0-259.0)   18  03:00    














- Physical Exam


Vitals and I&O: 


 Vital Signs











Temp  98.2 F   18 04:00


 


Pulse  86   18 09:06


 


Resp  18   18 04:00


 


BP  163/96   18 09:06


 


Pulse Ox  99   18 04:00








 Intake & Output











 18





 18:59 06:59 18:59


 


Intake Total 720 150 480


 


Output Total 0 0 300


 


Balance 720 150 180


 


Weight (lbs) 70.76 kg 70.76 kg 78.018 kg


 


Intake:   


 


  Intake, IV Amount  50 


 


    cefTRIAXone 1 gm In  50 





    Sodium Chloride 0.9% 50   





    ml @ 100 mls/hr IV Q24HR   





    [id number]   


 


  Oral 720 100 480


 


Output:   


 


  Urine   300


 


  Stool 0 0 


 


Other:   


 


  # Voids 4 1 3


 


  # Bowel Movements 0 0 1











Active Medications: 


Current Medications





Acetaminophen (Tylenol)  650 mg PO Q6HR PRN


   PRN Reason: mild to moderate pain


   Stop: 18 19:27


   Last Admin: 18 22:43 Dose:  650 mg


Allopurinol (Zyloprim)  100 mg PO DAILY RENETTA


   Stop: 18 08:59


   Last Admin: 18 09:06 Dose:  100 mg


Castor Oil/[place]ian Balsam/Trypsin (Venelex)  1 appl TP DAILY [facility]


   Stop: 18 15:59


   Last Admin: 18 09:09 Dose:  1 appl


Famotidine (Pepcid)  20 mg PO BID RENETTA


   Stop: 18 08:59


   Last Admin: 18 09:08 Dose:  20 mg


Heparin Sodium (Porcine) (Heparin)  5,000 units SUBQ Q12HR RENETTA


   PRN Reason: Protocol


   Stop: 18 20:59


   Last Admin: 18 09:09 Dose:  5,000 units


Levofloxacin (Levaquin Pb)  500 mg in 100 mls @ 100 mls/hr IV Q24HR R[facility]ETTA


   Stop: 18 05:59


   Last Admin: 18 12:46 Dose:  100 mls/hr


Insulin Aspart (Novolog Insulin Sliding Scale)  0 units SUBQ ACHS RENETTA


   PRN Reason: Protocol


   Stop: 18 20:59


   Last Admin: 18 12:42 Dose:  6 units


Insulin Detemir (Levemir Insulin)  10 units SUBQ DAILY RENETTA


   PRN Reason: Protocol


   Stop: 18 08:59


   Last Admin: 18 08:12 Dose:  10 units


Lorazepam (Ativan)  0.5 mg PO Q6HR PRN; Protocol


   PRN Reason: agitation 


   Stop: 18 19:27


Magnesium Hydroxide (Milk Of Magnesia)  30 ml PO HS PRN


   PRN Reason: Constipation


   Stop: 18 19:27


Metoprolol Succinate (Toprol Xl)  25 mg PO BID RENETTA


   Stop: 18 16:59


   Last Admin: 18 09:06 Dose:  25 mg


Miscellaneous (Clinical Monitoring)  1 ea MC PRN PRN


   PRN Reason: RENAL


   Stop: 18 10:33


Olanzapine (Zyprexa Zydis)  5 mg PO BID RENETTA


   PRN Reason: Protocol


   Stop: 18 16:59


Zolpidem Tartrate (Ambien)  5 mg PO HS PRN


   PRN Reason: insomnia


   Stop: 18 19:27


   Last Admin: 18 00:10 Dose:  5 mg








General: Alert, Oriented x3


HEENT: Atraumatic, PERRLA


Neck: Supple


Cardiovascular: Regular rate, Normal S1, Normal S2


Lungs: Clear to auscultation


Abdomen: Bowel sounds, Soft


Extremities: no Clubbing, no Cyanosis, no Edema


Neurological: Sensation intact


Skin: Rash


Psych/Mental Status: Mood NL





- Procedures


Procedures: 


 Procedures











Procedure Code Date


 


KEV MUSC/FASCIA 20 SQ CM/< 22414 18


 


EXCISION OF RIGHT HIP MUSCLE, OPEN APPROACH [id number]














Assessment/Plan





- Assessment


Assessment: 





BRYANT on CKD


Stage 4 decub ulcer


Severe dehydration


Ess HTN


Type 2 DM


Psychosis








- Plan


Plan: 


Lab - Result Diagrams





 18 06:00 





 18 06:00 





Current Medications





Acetaminophen (Tylenol)  650 mg PO Q6HR PRN


   PRN Reason: mild to moderate pain


   Stop: 18 19:27


Allopurinol (Zyloprim)  100 mg PO DAILY RENETTA


   Stop: 18 08:59


   Last Admin: 18 10:00 Dose:  Not Given


Castor Oil/[place]ian Balsam/Trypsin (Venelex)  1 appl TP DAILY [facility]


   Stop: 18 15:59


   Last Admin: 18 10:00 Dose:  1 appl


Famotidine (Pepcid)  20 mg PO BID RENETTA


   Stop: 18 08:59


   Last Admin: 18 18:22 Dose:  20 mg


Heparin Sodium (Porcine) (Heparin)  5,000 units SUBQ Q12HR RENETTA


   PRN Reason: Protocol


   Stop: 18 20:59


   Last Admin: 18 07:50 Dose:  Not Given


Ceftriaxone Sodium 1 gm/ (Sodium Chloride)  50 mls @ 100 mls/hr IV Q24HR RENETTA


   Stop: 18 16:44


   Last Admin: 18 17:45 Dose:  100 mls/hr


Insulin Aspart (Novolog Insulin Sliding Scale)  0 units SUBQ ACHS RENETTA


   PRN Reason: Protocol


   Stop: 18 20:59


   Last Admin: 18 18:23 Dose:  6 units


Lorazepam (Ativan)  0.5 mg PO Q6HR PRN; Protocol


   PRN Reason: agitation 


   Stop: 18 19:27


Magnesium Hydroxide (Milk Of Magnesia)  30 ml PO HS PRN


   PRN Reason: Constipation


   Stop: 18 19:27


Metoprolol Succinate (Toprol Xl)  25 mg PO BID RENETTA


   Stop: 18 16:59


   Last Admin: 18 18:23 Dose:  25 mg


Miscellaneous (Clinical Monitoring)  1 ea MC PRN PRN


   PRN Reason: RENAL


   Stop: 18 10:33


Olanzapine (Zyprexa Zydis)  5 mg PO BID RENETTA


   PRN Reason: Protocol


   Stop: 18 16:59


Zolpidem Tartrate (Ambien)  5 mg PO HS PRN


   PRN Reason: insomnia


   Stop: 18 19:27








Lab - Result Diagrams





 18 06:15 





 18 06:15 

















Na down to 150


Kidney fnc gradually improving


agree w/ Allopurinol


f/u electrolytes, agree w/ Levaquin


continue hydration





Nutritional Asmnt/Malnutr-PDOC





- Dietary Evaluation


Malnutrition Findings (Please click <Entered> for more info): 








Nutritional Asmnt/Malnutrition                             Start:  18 17:

10


Text:                                                      Status: Complete    

  


Freq:                                                                          

  


 Document     18 17:13  MAGEN  (Rec: 18 17:25  MAGEN  [id number])


 Nutritional Asmnt/Malnutrition


     Patient General Information


      Nutritional Screening                      High Risk


                                                 Consult


      Diagnosis                                  pressure ulcer, hyperglycemia,


                                                 dehydration


      Pertinent Medical Hx/Surgical Hx           DM, dementia, depression,


                                                 psychosis, schizophrenia,


                                                 chronic renal insuff


      Subjective Information                     Consult received for


                                                 unstageble pressure ulcer on , high BG on . Per


                                                 records, PO intake 50-75%. Pt


                                                 was on NPO today for surgery-


                                                 excisional debridement,


                                                 application of wound VAC


      Current Diet Order/ Nutrition Support      pureed, CCHO 60gm


      Pertinent Medications                      novolog


      Pertinent Labs                              Na 152, K 4.6, BUN 56, Cr


                                                 1.8, Glucose 262, -246


                                                 , Mg 2.9


     Nutritional Hx/Data


      Height                                     1.8 m


      Height (Calculated Centimeters)            180.3


      Current Weight (lbs)                       70.76 kg


      Weight (Calculated Kilograms)              70.8


      Weight (Calculated Grams)                  43647.4


      Ideal Body Weight                          172


      % Ideal Body Weight                        91


      Body Mass Index (BMI)                      21.7


      Weight Status                              Approriate


     GI Symptoms


      GI Symptoms                                None


      Last BM                                    


      Difficult in:                              None


      Skin Integrity/Comment:                    scar to right upper back,


                                                 decubitus ulcer to buttocks


      Current %PO                                Fair (50-74%)


     Estimated Nutritional Goals


      BEE in Kcals:                              Using Current wt


      Calories/Kcals/Kg                          27-32


      Kcals Calculated                           2147-9714


      Protein:                                   Using Current wt


      Protein g/k-1.2


      Protein Calculated                         71-85


      Fluid: ml                                  1917-2130ml (1ml/kcal)


     Nutritional Problem


      2. Problem


       Problem                                   altered nutrition related lab


                                                 values


       Etiology                                  hx of DM


       Signs/Symptoms:                           Glucose 262, -246


      1. Problem


       Problem                                   increased nutrition needs (


                                                 calorie and protein)


       Etiology                                  increased metabolic demand for


                                                 wound healing


       Signs/Symptoms:                           decubitus ulcer and surgery


     Intervention/Recommendation


      Comments                                   1. Continue with current diet


                                                 as ordered. If PO intake low,


                                                 will consider nutrition


                                                 supplements


                                                 2. MD to consider vit C and


                                                 zinc for wound healing


                                                 3. Monitor PO intake, wt, labs


                                                 and skin integrity


                                                 4. F/U as moderate risk in 3-5


                                                 days, 3/2-3, PO check 3/2


     Expected Outcomes/Goals


      Expected Outcomes/Goals                    1. PO intake to meet at least


                                                 75% of nutritional needs.


                                                 2. Wt stability, skin to


                                                 remain intact, labs to


                                                 approach WNL.

## 2018-02-28 NOTE — PROGRESS NOTES
DATE:  



SUBJECTIVE:  Chart reviewed and the patient interviewed.  Also discussed the

patient's condition with the staff and reviewed records and labs.  The patient

is still having episodes of agitation and irritability, but seems to be less

than before.  Also, still at times gets agitated, but easier to redirect him. 

The patient is compliant with taking his medications and no side effects of

medications.



ASSESSMENT:  The patient is still agitated, but less than before.



TREATMENT PLAN:  Continue current medications.  Also, continue to work on his

poor impulse control and followup.





DD: 02/28/2018 09:52

DT: 02/28/2018 20:14

JOB# [id number]  [id number]

## 2018-02-28 NOTE — GENERAL PROGRESS NOTE
Subjective





- Review of Systems


Service Date: 18


Subjective: 





Patient was seen and examined. Oral Intake has improved. Appears more awake and 

alert today. more awake, more alert. s/p wound debridement of sacral decubitus 

ulcer. UA+. 





Objective





- Results


Result Diagrams: 


 18 06:15





 18 06:15


Recent Labs: 


 Laboratory Last Values











WBC  8.0 Th/cmm (4.8-10.8)   18  06:00    


 


RBC  5.33 Mil/cmm (3.80-5.80)   18  06:00    


 


Hgb  15.6 gm/dL (12-16)   18  06:00    


 


Hct  47.8 % (41.0-60)   18  06:00    


 


MCV  89.6 fl (80-99)   18  06:00    


 


MCH  29.3 pg (27.0-31.0)   18  06:00    


 


MCHC Differential  32.7 pg (28.0-36.0)   18  06:00    


 


RDW  17.2 % (11.5-20.0)   18  06:00    


 


Plt Count  198 Th/cmm (150-400)   18  06:00    


 


MPV  9.7 fl  18  06:00    


 


Neutrophils %  67.6 % (40.0-80.0)   18  06:00    


 


Lymphocytes %  25.3 % (20.0-50.0)   18  06:00    


 


Monocytes %  5.4 % (2.0-10.0)   18  06:00    


 


Eosinophils %  1.6 % (0.0-5.0)   18  06:00    


 


Basophils %  0.1 % (0.0-2.0)   18  06:00    


 


PT  10.5 SECONDS (9.5-11.5)   18  06:00    


 


INR  1.01  (0.5-1.4)   18  06:00    


 


PTT (Actin FS)  25.3 SECONDS (26.0-38.0)  L  18  06:00    


 


Sodium  152 mEq/L (136-145)  H  18  06:00    


 


Potassium  4.6 mEq/L (3.5-5.1)   18  06:00    


 


Chloride  117 mEq/L ()  H  18  06:00    


 


Carbon Dioxide  25.8 mEq/L (21.0-31.0)   18  06:00    


 


Anion Gap  13.8  (7.0-16.0)   18  06:00    


 


BUN  56 mg/dL (7-25)  H  18  06:00    


 


Creatinine  1.8 mg/dL (0.7-1.3)  H  18  06:00    


 


Est GFR ( Amer)  TNP   18  06:00    


 


Est GFR (Non-Af Amer)  TNP   18  06:00    


 


BUN/Creatinine Ratio  31.1   18  06:00    


 


Glucose  262 mg/dL ()  H  18  06:00    


 


POC Glucose  293 MG/DL (70 - 105)  H  18  20:16    


 


Hemoglobin A1c %  10.5 % (4.0-6.0)  H  18  05:26    


 


Uric Acid  11.9 mg/dL (4.4-7.6)  H  18  06:00    


 


Calcium  9.9 mg/dL (8.6-10.3)   18  06:00    


 


Phosphorus  4.1 mg/dL (2.5-5.0)   18  06:00    


 


Magnesium  2.9 mg/dL (1.9-2.7)  H  18  06:00    


 


Total Bilirubin  0.9 mg/dL (0.3-1.0)   18  06:00    


 


AST  15 U/L (13-39)   18  06:00    


 


ALT  17 U/L (7-52)   18  06:00    


 


Alkaline Phosphatase  84 U/L ()   18  06:00    


 


Total Protein  7.7 gm/dL (6.0-8.3)   18  06:00    


 


Albumin  3.7 gm/dL (4.2-5.5)  L  18  06:00    


 


Globulin  4.0 gm/dL  18  06:00    


 


Albumin/Globulin Ratio  0.9  (1.0-1.8)  L  18  06:00    


 


TSH  0.28 uIU/ml (0.34-5.60)  L  18  06:00    


 


Urine Source  CATH   18  03:00    


 


Urine Color  BROWN   18  03:00    


 


Urine Clarity  CLOUDY  (CLEAR)   18  03:00    


 


Urine pH  5.5  (4.6 - 8.0)   18  03:00    


 


Ur Specific Gravity  1.020  (1.005-1.030)   18  03:00    


 


Urine Protein  100 mg/dL (NEGATIVE)  H  18  03:00    


 


Urine Glucose (UA)  >=1000 mg/dL (NEGATIVE)  H  18  03:00    


 


Urine Ketones  TRACE mg/dL (NEGATIVE)   18  03:00    


 


Urine Blood  LARGE  (NEGATIVE)  H  18  03:00    


 


Urine Nitrate  NEGATIVE  (NEGATIVE)   18  03:00    


 


Urine Bilirubin  SMALL  (NEGATIVE)  H  18  03:00    


 


Urine Ictotest  NEGATIVE  (NEGATIVE)   18  03:00    


 


Urine Urobilinogen  0.2 E.U./dL (0.2 - 1.0)   18  03:00    


 


Ur Leukocyte Esterase  TRACE  (NEGATIVE)  H  18  03:00    


 


Urine RBC  >100 /hpf (0-5)  H  18  03:00    


 


Urine WBC  >100 /hpf (0-5)  H  18  03:00    


 


Ur Epithelial Cells  OCCASIONAL /lpf (FEW)   18  03:00    


 


Urine Bacteria  MODERATE /hpf (NONE SEEN)  H  18  03:00    














- Physical Exam


Vitals and I&O: 


 Vital Signs











Temp  98.2 F   18 04:00


 


Pulse  93   18 04:00


 


Resp  18   18 04:00


 


BP  130/83   18 04:00


 


Pulse Ox  99   18 04:00








 Intake & Output











 18





 06:59 18:59 06:59


 


Intake Total  720 150


 


Output Total  0 0


 


Balance  720 150


 


Weight (lbs) 70.76 kg 70.76 kg 70.76 kg


 


Intake:   


 


  Intake, IV Amount   50


 


    cefTRIAXone 1 gm In   50





    Sodium Chloride 0.9% 50   





    ml @ 100 mls/hr IV Q24HR   





    [facility] Rx#:[id number]   


 


  Oral  720 100


 


Output:   


 


  Stool  0 0


 


Other:   


 


  # Voids 4 4 1


 


  # Bowel Movements 0 0 0











Active Medications: 


Current Medications





Acetaminophen (Tylenol)  650 mg PO Q6HR PRN


   PRN Reason: mild to moderate pain


   Stop: 18 19:27


   Last Admin: 18 22:43 Dose:  650 mg


Allopurinol (Zyloprim)  100 mg PO DAILY [facility]


   Stop: 18 08:59


   Last Admin: 18 10:00 Dose:  Not Given


Castor Oil/Bahamian Balsam/Trypsin (Venelex)  1 appl TP DAILY [facility]


   Stop: 18 15:59


   Last Admin: 18 10:00 Dose:  1 appl


Famotidine (Pepcid)  20 mg PO BID [facility]


   Stop: 18 08:59


   Last Admin: 18 18:22 Dose:  20 mg


Heparin Sodium (Porcine) (Heparin)  5,000 units SUBQ Q12HR [facility]


   PRN Reason: Protocol


   Stop: 18 20:59


   Last Admin: 18 20:37 Dose:  5,000 units


Insulin Aspart (Novolog Insulin Sliding Scale)  0 units SUBQ ACHS [facility]


   PRN Reason: Protocol


   Stop: 18 20:59


   Last Admin: 18 20:38 Dose:  6 units


Lorazepam (Ativan)  0.5 mg PO Q6HR PRN; Protocol


   PRN Reason: agitation 


   Stop: 18 19:27


Magnesium Hydroxide (Milk Of Magnesia)  30 ml PO HS PRN


   PRN Reason: Constipation


   Stop: 18 19:27


Metoprolol Succinate (Toprol Xl)  25 mg PO BID [facility]


   Stop: 18 16:59


   Last Admin: 18 18:23 Dose:  25 mg


Miscellaneous (Clinical Monitoring)  1 ea MC PRN PRN


   PRN Reason: RENAL


   Stop: 18 10:33


Olanzapine (Zyprexa Zydis)  5 mg PO BID [facility]


   PRN Reason: Protocol


   Stop: 18 16:59


Zolpidem Tartrate (Ambien)  5 mg PO HS PRN


   PRN Reason: insomnia


   Stop: 18 19:27


   Last Admin: 18 00:10 Dose:  5 mg








General: Alert, Oriented x3


HEENT: Atraumatic, PERRLA


Neck: Supple


Cardiovascular: Regular rate, Normal S1, Normal S2


Lungs: Clear to auscultation


Abdomen: Bowel sounds, Soft


Extremities: no Clubbing, no Cyanosis, no Edema


Neurological: Sensation intact


Skin: Rash


Psych/Mental Status: Mood NL





- Procedures


Procedures: 


 Procedures











Procedure Code Date


 


KEV MUSC/FASCIA 20 SQ CM/< 47350 18


 


EXCISION OF RIGHT HIP MUSCLE, OPEN APPROACH [id number] 18














Assessment/Plan





- Assessment


Assessment: 





hypernatremia


prerenal azotemia


hyperglycemia


depression/anxiety


gout


dementia


schizophrenia


S/P wound debridement of sacral decubitus


UTI


discharge planning tomorrow.








- Plan


Plan: 





will order repeat cmp,cbc tomorrow AM





renal consult -- Dr. Bajwa





sacral decubitus ... will order Gen Surgical consult -- Dr. Jones for possible 

wound debridement





Gout ... continue allopurinol 100mg PO daily














Nutritional Asmnt/Malnutr-PDOC





- Dietary Evaluation


Malnutrition Findings (Please click <Entered> for more info): 








Nutritional Asmnt/Malnutrition                             Start:  18 17:

10


Text:                                                      Status: Complete    

  


Freq:                                                                          

  


 Document     18 17:13  [id number]  (Rec: 18 17:25  LCHENG  NAPOLEON-[email or web address])


 Nutritional Asmnt/Malnutrition


     Patient General Information


      Nutritional Screening                      High Risk


                                                 Consult


      Diagnosis                                  pressure ulcer, hyperglycemia,


                                                 dehydration


      Pertinent Medical Hx/Surgical Hx           DM, dementia, depression,


                                                 psychosis, schizophrenia,


                                                 chronic renal insuff


      Subjective Information                     Consult received for


                                                 unstageble pressure ulcer on , high BG on . Per


                                                 records, PO intake 50-75%. Pt


                                                 was on NPO today for surgery-


                                                 excisional debridement,


                                                 application of wound VAC


      Current Diet Order/ Nutrition Support      pureed, CCHO 60gm


      Pertinent Medications                      novolog


      Pertinent Labs                              Na 152, K 4.6, BUN 56, Cr


                                                 1.8, Glucose 262, -246


                                                 , Mg 2.9


     Nutritional Hx/Data


      Height                                     1.8 m


      Height (Calculated Centimeters)            180.3


      Current Weight (lbs)                       70.76 kg


      Weight (Calculated Kilograms)              70.8


      Weight (Calculated Grams)                  77071.4


      Ideal Body Weight                          172


      % Ideal Body Weight                        91


      Body Mass Index (BMI)                      21.7


      Weight Status                              Approriate


     GI Symptoms


      GI Symptoms                                None


      Last BM                                    


      Difficult in:                              None


      Skin Integrity/Comment:                    scar to right upper back,


                                                 decubitus ulcer to buttocks


      Current %PO                                Fair (50-74%)


     Estimated Nutritional Goals


      BEE in Kcals:                              Using Current wt


      Calories/Kcals/Kg                          27-32


      Kcals Calculated                           8254-9436


      Protein:                                   Using Current wt


      Protein g/k-1.2


      Protein Calculated                         71-85


      Fluid: ml                                  -ml (1ml/kcal)


     Nutritional Problem


      2. Problem


       Problem                                   altered nutrition related lab


                                                 values


       Etiology                                  hx of DM


       Signs/Symptoms:                           Glucose 262, -246


      1. Problem


       Problem                                   increased nutrition needs (


                                                 calorie and protein)


       Etiology                                  increased metabolic demand for


                                                 wound healing


       Signs/Symptoms:                           decubitus ulcer and surgery


     Intervention/Recommendation


      Comments                                   1. Continue with current diet


                                                 as ordered. If PO intake low,


                                                 will consider nutrition


                                                 supplements


                                                 2. MD to consider vit C and


                                                 zinc for wound healing


                                                 3. Monitor PO intake, wt, labs


                                                 and skin integrity


                                                 4. F/U as moderate risk in 3-5


                                                 days, 3/2-3/4, PO check 3/2


     Expected Outcomes/Goals


      Expected Outcomes/Goals                    1. PO intake to meet at least


                                                 75% of nutritional needs.


                                                 2. Wt stability, skin to


                                                 remain intact, labs to


                                                 approach WNL.

## 2018-02-28 NOTE — GENERAL PROGRESS NOTE
Subjective





- Review of Systems


Service Date: 18


Events since last encounter: 





labs noted


may DC and continue wound vac





Objective





- Results


Result Diagrams: 


 18 06:15





 18 06:15


Recent Labs: 


 Laboratory Last Values











WBC  8.4 Th/cmm (4.8-10.8)   18  06:15    


 


RBC  5.03 Mil/cmm (3.80-5.80)   18  06:15    


 


Hgb  14.6 gm/dL (12-16)   18  06:15    


 


Hct  45.1 % (41.0-60)   18  06:15    


 


MCV  89.7 fl (80-99)   18  06:15    


 


MCH  29.0 pg (27.0-31.0)   18  06:15    


 


MCHC Differential  32.3 pg (28.0-36.0)   18  06:15    


 


RDW  16.0 % (11.5-20.0)   18  06:15    


 


Plt Count  192 Th/cmm (150-400)   18  06:15    


 


MPV  10.1 fl  18  06:15    


 


Neutrophils %  78.4 % (40.0-80.0)   18  06:15    


 


Lymphocytes %  14.8 % (20.0-50.0)  L  18  06:15    


 


Monocytes %  4.8 % (2.0-10.0)   18  06:15    


 


Eosinophils %  1.7 % (0.0-5.0)   18  06:15    


 


Basophils %  0.3 % (0.0-2.0)   18  06:15    


 


Eos Smear Source  URINE   18  03:00    


 


Eos Smear Total Cells  FEW EOSINOPHILS SEEN  (NONE SEEN)   18  03:00    


 


PT  10.5 SECONDS (9.5-11.5)   18  06:00    


 


INR  1.01  (0.5-1.4)   18  06:00    


 


PTT (Actin FS)  25.3 SECONDS (26.0-38.0)  L  18  06:00    


 


Sodium  150 mEq/L (136-145)  H  18  06:15    


 


Potassium  4.0 mEq/L (3.5-5.1)   18  06:15    


 


Chloride  115 mEq/L ()  H  18  06:15    


 


Carbon Dioxide  25.4 mEq/L (21.0-31.0)   18  06:15    


 


Anion Gap  13.6  (7.0-16.0)   18  06:15    


 


BUN  41 mg/dL (7-25)  H  18  06:15    


 


Creatinine  1.4 mg/dL (0.7-1.3)  H  18  06:15    


 


Est GFR ( Amer)  TNP   18  06:15    


 


Est GFR (Non-Af Amer)  TNP   18  06:15    


 


BUN/Creatinine Ratio  29.3   18  06:15    


 


Glucose  203 mg/dL ()  H  18  06:15    


 


POC Glucose  203 MG/DL (70 - 105)  H  18  06:17    


 


Hemoglobin A1c %  10.5 % (4.0-6.0)  H  18  05:26    


 


Uric Acid  11.9 mg/dL (4.4-7.6)  H  18  06:00    


 


Calcium  9.3 mg/dL (8.6-10.3)   18  06:15    


 


Phosphorus  4.1 mg/dL (2.5-5.0)   18  06:00    


 


Magnesium  2.9 mg/dL (1.9-2.7)  H  18  06:00    


 


Total Bilirubin  0.9 mg/dL (0.3-1.0)   18  06:00    


 


AST  15 U/L (13-39)   18  06:00    


 


ALT  17 U/L (7-52)   18  06:00    


 


Alkaline Phosphatase  84 U/L ()   18  06:00    


 


Total Protein  7.7 gm/dL (6.0-8.3)   18  06:00    


 


Albumin  3.7 gm/dL (4.2-5.5)  L  18  06:00    


 


Globulin  4.0 gm/dL  18  06:00    


 


Albumin/Globulin Ratio  0.9  (1.0-1.8)  L  18  06:00    


 


TSH  0.28 uIU/ml (0.34-5.60)  L  18  06:00    


 


Urine Source  CATH   18  03:00    


 


Urine Color  BROWN   18  03:00    


 


Urine Clarity  CLOUDY  (CLEAR)   18  03:00    


 


Urine pH  5.5  (4.6 - 8.0)   18  03:00    


 


Ur Specific Gravity  1.020  (1.005-1.030)   18  03:00    


 


Urine Protein  100 mg/dL (NEGATIVE)  H  18  03:00    


 


Urine Glucose (UA)  >=1000 mg/dL (NEGATIVE)  H  18  03:00    


 


Urine Ketones  TRACE mg/dL (NEGATIVE)   18  03:00    


 


Urine Blood  LARGE  (NEGATIVE)  H  18  03:00    


 


Urine Nitrate  NEGATIVE  (NEGATIVE)   18  03:00    


 


Urine Bilirubin  SMALL  (NEGATIVE)  H  18  03:00    


 


Urine Ictotest  NEGATIVE  (NEGATIVE)   18  03:00    


 


Urine Urobilinogen  0.2 E.U./dL (0.2 - 1.0)   18  03:00    


 


Ur Leukocyte Esterase  TRACE  (NEGATIVE)  H  18  03:00    


 


Urine RBC  >100 /hpf (0-5)  H  18  03:00    


 


Urine WBC  >100 /hpf (0-5)  H  18  03:00    


 


Ur Epithelial Cells  OCCASIONAL /lpf (FEW)   18  03:00    


 


Urine Bacteria  MODERATE /hpf (NONE SEEN)  H  18  03:00    


 


Ur Random Sodium  39 mmol/L  18  03:00    


 


Urine Creatinine  136.0 mg/dl (39.0-259.0)   18  03:00    














- Physical Exam


Vitals and I&O: 


 Vital Signs











Temp  98.2 F   18 04:00


 


Pulse  86   18 09:06


 


Resp  18   18 04:00


 


BP  163/96   18 09:06


 


Pulse Ox  99   18 04:00








 Intake & Output











 18





 18:59 06:59 18:59


 


Intake Total 720 150 480


 


Output Total 0 0 300


 


Balance 720 150 180


 


Weight (lbs) 70.76 kg 70.76 kg 78.018 kg


 


Intake:   


 


  Intake, IV Amount  50 


 


    cefTRIAXone 1 gm In  50 





    Sodium Chloride 0.9% 50   





    ml @ 100 mls/hr IV Q24HR   





    [facility] Rx#:[id number]   


 


  Oral 720 100 480


 


Output:   


 


  Urine   300


 


  Stool 0 0 


 


Other:   


 


  # Voids 4 1 3


 


  # Bowel Movements 0 0 1











Active Medications: 


Current Medications





Acetaminophen (Tylenol)  650 mg PO Q6HR PRN


   PRN Reason: mild to moderate pain


   Stop: 18 19:27


   Last Admin: 18 22:43 Dose:  650 mg


Allopurinol (Zyloprim)  100 mg PO DAILY [facility]


   Stop: 18 08:59


   Last Admin: 18 09:06 Dose:  100 mg


Castor Oil/Malagasy Balsam/Trypsin (Venelex)  1 appl TP DAILY [facility]


   Stop: 18 15:59


   Last Admin: 18 09:09 Dose:  1 appl


Famotidine (Pepcid)  20 mg PO BID [facility]


   Stop: 18 08:59


   Last Admin: 18 09:08 Dose:  20 mg


Heparin Sodium (Porcine) (Heparin)  5,000 units SUBQ Q12HR [facility]


   PRN Reason: Protocol


   Stop: 18 20:59


   Last Admin: 18 09:09 Dose:  5,000 units


Levofloxacin (Levaquin Pb)  500 mg in 100 mls @ 100 mls/hr IV Q24HR [facility]


   Stop: 18 05:59


Insulin Aspart (Novolog Insulin Sliding Scale)  0 units SUBQ ACHS [facility]


   PRN Reason: Protocol


   Stop: 18 20:59


   Last Admin: 18 08:14 Dose:  4 units


Insulin Detemir (Levemir Insulin)  10 units SUBQ DAILY [facility]


   PRN Reason: Protocol


   Stop: 18 08:59


   Last Admin: 18 08:12 Dose:  10 units


Lorazepam (Ativan)  0.5 mg PO Q6HR PRN; Protocol


   PRN Reason: agitation 


   Stop: 18 19:27


Magnesium Hydroxide (Milk Of Magnesia)  30 ml PO HS PRN


   PRN Reason: Constipation


   Stop: 18 19:27


Metoprolol Succinate (Toprol Xl)  25 mg PO BID [facility]


   Stop: 18 16:59


   Last Admin: 18 09:06 Dose:  25 mg


Miscellaneous (Clinical Monitoring)  1 ea MC PRN PRN


   PRN Reason: RENAL


   Stop: 18 10:33


Olanzapine (Zyprexa Zydis)  5 mg PO BID [facility]


   PRN Reason: Protocol


   Stop: 18 16:59


Zolpidem Tartrate (Ambien)  5 mg PO HS PRN


   PRN Reason: insomnia


   Stop: 18 19:27


   Last Admin: 18 00:10 Dose:  5 mg








General: Alert, Oriented x3


HEENT: Atraumatic, PERRLA


Neck: Supple


Cardiovascular: Regular rate, Normal S1, Normal S2


Lungs: Clear to auscultation


Abdomen: Bowel sounds, Soft


Extremities: no Clubbing, no Cyanosis, no Edema


Neurological: Sensation intact


Skin: Rash


Psych/Mental Status: Mood NL





- Procedures


Procedures: 


 Procedures











Procedure Code Date


 


KEV MUSC/FASCIA 20 SQ CM/< 07968 18


 


EXCISION OF RIGHT HIP MUSCLE, OPEN APPROACH [id number] 18














Nutritional Asmnt/Malnutr-PDOC





- Dietary Evaluation


Malnutrition Findings (Please click <Entered> for more info): 








Nutritional Asmnt/Malnutrition                             Start:  18 17:

10


Text:                                                      Status: Complete    

  


Freq:                                                                          

  


 Document     18 17:13  LCHENG  (Rec: 18 17:25  HENG  NAPOLEON-FNS1)


 Nutritional Asmnt/Malnutrition


     Patient General Information


      Nutritional Screening                      High Risk


                                                 Consult


      Diagnosis                                  pressure ulcer, hyperglycemia,


                                                 dehydration


      Pertinent Medical Hx/Surgical Hx           DM, dementia, depression,


                                                 psychosis, schizophrenia,


                                                 chronic renal insuff


      Subjective Information                     Consult received for


                                                 unstageble pressure ulcer on , high BG on . Per


                                                 records, PO intake 50-75%. Pt


                                                 was on NPO today for surgery-


                                                 excisional debridement,


                                                 application of wound VAC


      Current Diet Order/ Nutrition Support      pureed, CCHO 60gm


      Pertinent Medications                      novolog


      Pertinent Labs                              Na 152, K 4.6, BUN 56, Cr


                                                 1.8, Glucose 262, -246


                                                 , Mg 2.9


     Nutritional Hx/Data


      Height                                     1.8 m


      Height (Calculated Centimeters)            180.3


      Current Weight (lbs)                       70.76 kg


      Weight (Calculated Kilograms)              70.8


      Weight (Calculated Grams)                  95355.4


      Ideal Body Weight                          172


      % Ideal Body Weight                        91


      Body Mass Index (BMI)                      21.7


      Weight Status                              Approriate


     GI Symptoms


      GI Symptoms                                None


      Last BM                                    


      Difficult in:                              None


      Skin Integrity/Comment:                    scar to right upper back,


                                                 decubitus ulcer to buttocks


      Current %PO                                Fair (50-74%)


     Estimated Nutritional Goals


      BEE in Kcals:                              Using Current wt


      Calories/Kcals/Kg                          27-32


      Kcals Calculated                           6351-7849


      Protein:                                   Using Current wt


      Protein g/k-1.2


      Protein Calculated                         71-85


      Fluid: ml                                  -ml (1ml/kcal)


     Nutritional Problem


      2. Problem


       Problem                                   altered nutrition related lab


                                                 values


       Etiology                                  hx of DM


       Signs/Symptoms:                           Glucose 262, -246


      1. Problem


       Problem                                   increased nutrition needs (


                                                 calorie and protein)


       Etiology                                  increased metabolic demand for


                                                 wound healing


       Signs/Symptoms:                           decubitus ulcer and surgery


     Intervention/Recommendation


      Comments                                   1. Continue with current diet


                                                 as ordered. If PO intake low,


                                                 will consider nutrition


                                                 supplements


                                                 2. MD to consider vit C and


                                                 zinc for wound healing


                                                 3. Monitor PO intake, wt, labs


                                                 and skin integrity


                                                 4. F/U as moderate risk in 3-5


                                                 days, 3/2-3/4, PO check 3/2


     Expected Outcomes/Goals


      Expected Outcomes/Goals                    1. PO intake to meet at least


                                                 75% of nutritional needs.


                                                 2. Wt stability, skin to


                                                 remain intact, labs to


                                                 approach WNL.

## 2018-02-28 NOTE — INFECTIOUS DISEASE PROG NOTE
Infectious Disease Subjective





- Review of Systems


Service Date: 18


Subjective: 





no new change.





Infectious Disease Objective





- Results


Result Diagrams: 


 18 06:15





 18 06:15


Recent Labs: 


 Laboratory Last Values











WBC  8.4 Th/cmm (4.8-10.8)   18  06:15    


 


RBC  5.03 Mil/cmm (3.80-5.80)   18  06:15    


 


Hgb  14.6 gm/dL (12-16)   18  06:15    


 


Hct  45.1 % (41.0-60)   18  06:15    


 


MCV  89.7 fl (80-99)   18  06:15    


 


MCH  29.0 pg (27.0-31.0)   18  06:15    


 


MCHC Differential  32.3 pg (28.0-36.0)   18  06:15    


 


RDW  16.0 % (11.5-20.0)   18  06:15    


 


Plt Count  192 Th/cmm (150-400)   18  06:15    


 


MPV  10.1 fl  18  06:15    


 


Neutrophils %  78.4 % (40.0-80.0)   18  06:15    


 


Lymphocytes %  14.8 % (20.0-50.0)  L  18  06:15    


 


Monocytes %  4.8 % (2.0-10.0)   18  06:15    


 


Eosinophils %  1.7 % (0.0-5.0)   18  06:15    


 


Basophils %  0.3 % (0.0-2.0)   18  06:15    


 


Eos Smear Source  URINE   18  03:00    


 


Eos Smear Total Cells  FEW EOSINOPHILS SEEN  (NONE SEEN)   18  03:00    


 


PT  10.5 SECONDS (9.5-11.5)   18  06:00    


 


INR  1.01  (0.5-1.4)   18  06:00    


 


PTT (Actin FS)  25.3 SECONDS (26.0-38.0)  L  18  06:00    


 


Sodium  150 mEq/L (136-145)  H  18  06:15    


 


Potassium  4.0 mEq/L (3.5-5.1)   18  06:15    


 


Chloride  115 mEq/L ()  H  18  06:15    


 


Carbon Dioxide  25.4 mEq/L (21.0-31.0)   18  06:15    


 


Anion Gap  13.6  (7.0-16.0)   18  06:15    


 


BUN  41 mg/dL (7-25)  H  18  06:15    


 


Creatinine  1.4 mg/dL (0.7-1.3)  H  18  06:15    


 


Est GFR ( Amer)  TNP   18  06:15    


 


Est GFR (Non-Af Amer)  TNP   18  06:15    


 


BUN/Creatinine Ratio  29.3   18  06:15    


 


Glucose  203 mg/dL ()  H  18  06:15    


 


POC Glucose  260 MG/DL (70 - 105)  H  18  12:01    


 


Hemoglobin A1c %  10.5 % (4.0-6.0)  H  18  05:26    


 


Uric Acid  11.9 mg/dL (4.4-7.6)  H  18  06:00    


 


Calcium  9.3 mg/dL (8.6-10.3)   18  06:15    


 


Phosphorus  4.1 mg/dL (2.5-5.0)   18  06:00    


 


Magnesium  2.9 mg/dL (1.9-2.7)  H  18  06:00    


 


Total Bilirubin  0.9 mg/dL (0.3-1.0)   18  06:00    


 


AST  15 U/L (13-39)   18  06:00    


 


ALT  17 U/L (7-52)   18  06:00    


 


Alkaline Phosphatase  84 U/L ()   18  06:00    


 


Total Protein  7.7 gm/dL (6.0-8.3)   18  06:00    


 


Albumin  3.7 gm/dL (4.2-5.5)  L  18  06:00    


 


Globulin  4.0 gm/dL  18  06:00    


 


Albumin/Globulin Ratio  0.9  (1.0-1.8)  L  18  06:00    


 


TSH  0.28 uIU/ml (0.34-5.60)  L  18  06:00    


 


Urine Source  CATH   18  03:00    


 


Urine Color  BROWN   18  03:00    


 


Urine Clarity  CLOUDY  (CLEAR)   18  03:00    


 


Urine pH  5.5  (4.6 - 8.0)   18  03:00    


 


Ur Specific Gravity  1.020  (1.005-1.030)   18  03:00    


 


Urine Protein  100 mg/dL (NEGATIVE)  H  18  03:00    


 


Urine Glucose (UA)  >=1000 mg/dL (NEGATIVE)  H  18  03:00    


 


Urine Ketones  TRACE mg/dL (NEGATIVE)   18  03:00    


 


Urine Blood  LARGE  (NEGATIVE)  H  18  03:00    


 


Urine Nitrate  NEGATIVE  (NEGATIVE)   18  03:00    


 


Urine Bilirubin  SMALL  (NEGATIVE)  H  18  03:00    


 


Urine Ictotest  NEGATIVE  (NEGATIVE)   18  03:00    


 


Urine Urobilinogen  0.2 E.U./dL (0.2 - 1.0)   18  03:00    


 


Ur Leukocyte Esterase  TRACE  (NEGATIVE)  H  18  03:00    


 


Urine RBC  >100 /hpf (0-5)  H  18  03:00    


 


Urine WBC  >100 /hpf (0-5)  H  18  03:00    


 


Ur Epithelial Cells  OCCASIONAL /lpf (FEW)   18  03:00    


 


Urine Bacteria  MODERATE /hpf (NONE SEEN)  H  18  03:00    


 


Ur Random Sodium  39 mmol/L  18  03:00    


 


Urine Creatinine  136.0 mg/dl (39.0-259.0)   18  03:00    














- Physical Exam


Vitals and I&O: 


 Vital Signs











Temp  98.2 F   18 04:00


 


Pulse  86   18 09:06


 


Resp  18   18 04:00


 


BP  163/96   18 09:06


 


Pulse Ox  99   18 04:00








 Intake & Output











 18





 18:59 06:59 18:59


 


Intake Total 720 150 480


 


Output Total 0 0 300


 


Balance 720 150 180


 


Weight (lbs) 70.76 kg 70.76 kg 78.018 kg


 


Intake:   


 


  Intake, IV Amount  50 


 


    cefTRIAXone 1 gm In  50 





    Sodium Chloride 0.9% 50   





    ml @ 100 mls/hr IV Q24HR   





    [facility] Rx#:[id number]   


 


  Oral 720 100 480


 


Output:   


 


  Urine   300


 


  Stool 0 0 


 


Other:   


 


  # Voids 4 1 3


 


  # Bowel Movements 0 0 1











Active Medications: 


Current Medications





Acetaminophen (Tylenol)  650 mg PO Q6HR PRN


   PRN Reason: mild to moderate pain


   Stop: 18 19:27


   Last Admin: 18 22:43 Dose:  650 mg


Allopurinol (Zyloprim)  100 mg PO DAILY RENETTA


   Stop: 18 08:59


   Last Admin: 18 09:06 Dose:  100 mg


Castor Oil/[place] Balsam/Trypsin (Venelex)  1 appl TP DAILY RENETTA


   Stop: 18 15:59


   Last Admin: 18 09:09 Dose:  1 appl


Famotidine (Pepcid)  20 mg PO BID RENETTA


   Stop: 18 08:59


   Last Admin: 18 09:08 Dose:  20 mg


Heparin Sodium (Porcine) (Heparin)  5,000 units SUBQ Q12HR RENETTA


   PRN Reason: Protocol


   Stop: 18 20:59


   Last Admin: 18 09:09 Dose:  5,000 units


Levofloxacin (Levaquin Pb)  500 mg in 100 mls @ 100 mls/hr IV Q24HR RENETTA


   Stop: 18 05:59


   Last Admin: 18 12:46 Dose:  100 mls/hr


Insulin Aspart (Novolog Insulin Sliding Scale)  0 units SUBQ ACHS RENETTA


   PRN Reason: Protocol


   Stop: 18 20:59


   Last Admin: 18 12:42 Dose:  6 units


Insulin Detemir (Levemir Insulin)  10 units SUBQ DAILY RENETTA


   PRN Reason: Protocol


   Stop: 18 08:59


   Last Admin: 18 08:12 Dose:  10 units


Lorazepam (Ativan)  0.5 mg PO Q6HR PRN; Protocol


   PRN Reason: agitation 


   Stop: 18 19:27


Magnesium Hydroxide (Milk Of Magnesia)  30 ml PO HS PRN


   PRN Reason: Constipation


   Stop: 18 19:27


Metoprolol Succinate (Toprol Xl)  25 mg PO BID RENETTA


   Stop: 18 16:59


   Last Admin: 18 09:06 Dose:  25 mg


Miscellaneous (Clinical Monitoring)  1 ea MC PRN PRN


   PRN Reason: RENAL


   Stop: 18 10:33


Olanzapine (Zyprexa Zydis)  5 mg PO BID RENETTA


   PRN Reason: Protocol


   Stop: 18 16:59


Zolpidem Tartrate (Ambien)  5 mg PO HS PRN


   PRN Reason: insomnia


   Stop: 18 19:27


   Last Admin: 18 00:10 Dose:  5 mg








General: no acute distress, well developed, well nourished


HEENT: atraumatic, normocephalic, PERRLA


Neck: supple, no thyromegaly


Cardiovascular: S1S2, regular


Lungs: clear to auscultation bilaterally, clear to percussion


Abdomen: soft, catheter (burgos: hematuria.), no tender, no distended


Extremities: no cyanosis, no clubbing, no edema


Neurological: awake, alert


Skin: other (sacral; stage 3 ulcer.)





- Procedures


Procedures: 


 Procedures











Procedure Code Date


 


KEV MUSC/FASCIA 20 SQ CM/< 80379 18


 


EXCISION OF RIGHT HIP MUSCLE, OPEN APPROACH [id number] 18














Infectious Disease Assmt/Plan





- Assessment


Assessment: 


1.  Sacral stage III decubitus ulcer, infected.


2.  Status post I&D.


3.  Diabetes mellitus type .


4.  Dementia.  


5.   Depression.


6.  psychosis.  


7.  Schizophrenia.


8. Hematuria.














- Plan


Plan: 





CPM. antibiotics, rocephin.


wound care.





Nutritional Asmnt/Malnutr-PDOC





- Dietary Evaluation


Malnutrition Findings (Please click <Entered> for more info): 








Nutritional Asmnt/Malnutrition                             Start:  18 17:

10


Text:                                                      Status: Complete    

  


Freq:                                                                          

  


 Document     18 17:13  LCHENG  (Rec: 18 17:25  LCHENG  [id number])


 Nutritional Asmnt/Malnutrition


     Patient General Information


      Nutritional Screening                      High Risk


                                                 Consult


      Diagnosis                                  pressure ulcer, hyperglycemia,


                                                 dehydration


      Pertinent Medical Hx/Surgical Hx           DM, dementia, depression,


                                                 psychosis, schizophrenia,


                                                 chronic renal insuff


      Subjective Information                     Consult received for


                                                 unstageble pressure ulcer on , high BG on . Per


                                                 records, PO intake 50-75%. Pt


                                                 was on NPO today for surgery-


                                                 excisional debridement,


                                                 application of wound VAC


      Current Diet Order/ Nutrition Support      pureed, CCHO 60gm


      Pertinent Medications                      novolog


      Pertinent Labs                              Na 152, K 4.6, BUN 56, Cr


                                                 1.8, Glucose 262, -246


                                                 , Mg 2.9


     Nutritional Hx/Data


      Height                                     1.8 m


      Height (Calculated Centimeters)            180.3


      Current Weight (lbs)                       70.76 kg


      Weight (Calculated Kilograms)              70.8


      Weight (Calculated Grams)                  09795.4


      Ideal Body Weight                          172


      % Ideal Body Weight                        91


      Body Mass Index (BMI)                      21.7


      Weight Status                              Approriate


     GI Symptoms


      GI Symptoms                                None


      Last BM                                    


      Difficult in:                              None


      Skin Integrity/Comment:                    scar to right upper back,


                                                 decubitus ulcer to buttocks


      Current %PO                                Fair (50-74%)


     Estimated Nutritional Goals


      BEE in Kcals:                              Using Current wt


      Calories/Kcals/Kg                          27-32


      Kcals Calculated                           4085-7781


      Protein:                                   Using Current wt


      Protein g/k-1.2


      Protein Calculated                         71-85


      Fluid: ml                                  1917-2130ml (1ml/kcal)


     Nutritional Problem


      2. Problem


       Problem                                   altered nutrition related lab


                                                 values


       Etiology                                  hx of DM


       Signs/Symptoms:                           Glucose 262, -246


      1. Problem


       Problem                                   increased nutrition needs (


                                                 calorie and protein)


       Etiology                                  increased metabolic demand for


                                                 wound healing


       Signs/Symptoms:                           decubitus ulcer and surgery


     Intervention/Recommendation


      Comments                                   1. Continue with current diet


                                                 as ordered. If PO intake low,


                                                 will consider nutrition


                                                 supplements


                                                 2. MD to consider vit C and


                                                 zinc for wound healing


                                                 3. Monitor PO intake, wt, labs


                                                 and skin integrity


                                                 4. F/U as moderate risk in 3-5


                                                 days, 32-3/, PO check 3/2


     Expected Outcomes/Goals


      Expected Outcomes/Goals                    1. PO intake to meet at least


                                                 75% of nutritional needs.


                                                 2. Wt stability, skin to


                                                 remain intact, labs to


                                                 approach WNL.

## 2018-03-01 LAB
ALBUMIN SERPL-MCNC: 3 GM/DL (ref 4.2–5.5)
ALBUMIN/GLOB SERPL: 0.9 {RATIO} (ref 1–1.8)
ALP SERPL-CCNC: 81 U/L (ref 34–104)
ALT SERPL-CCNC: 13 U/L (ref 7–52)
ANION GAP SERPL CALC-SCNC: 7.6 MMOL/L (ref 7–16)
AST SERPL-CCNC: 17 U/L (ref 13–39)
BASOPHILS # BLD AUTO: 0.1 TH/CUMM (ref 0–0.2)
BASOPHILS NFR BLD AUTO: 1.2 % (ref 0–2)
BILIRUB SERPL-MCNC: 0.6 MG/DL (ref 0.3–1)
BUN SERPL-MCNC: 27 MG/DL (ref 7–25)
CALCIUM SERPL-MCNC: 9.1 MG/DL (ref 8.6–10.3)
CHLORIDE SERPL-SCNC: 113 MEQ/L (ref 98–107)
CO2 SERPL-SCNC: 28.4 MEQ/L (ref 21–31)
CREAT SERPL-MCNC: 1.3 MG/DL (ref 0.7–1.3)
EOSINOPHIL # BLD AUTO: 0.1 TH/CMM (ref 0.1–0.4)
EOSINOPHIL NFR BLD AUTO: 1.5 % (ref 0–5)
ERYTHROCYTE [DISTWIDTH] IN BLOOD BY AUTOMATED COUNT: 16.2 % (ref 11.5–20)
GLOBULIN SER-MCNC: 3.4 GM/DL
GLUCOSE SERPL-MCNC: 298 MG/DL (ref 70–105)
HCT VFR BLD CALC: 42.8 % (ref 41–60)
HGB BLD-MCNC: 13.7 GM/DL (ref 12–16)
LYMPHOCYTE AB SER FC-ACNC: 1.3 TH/CMM (ref 1.5–3)
LYMPHOCYTES NFR BLD AUTO: 19.2 % (ref 20–50)
MCH RBC QN AUTO: 28.9 PG (ref 27–31)
MCHC RBC AUTO-ENTMCNC: 32 PG (ref 28–36)
MCV RBC AUTO: 90.5 FL (ref 80–99)
MONOCYTES # BLD AUTO: 0.4 TH/CMM (ref 0.3–1)
MONOCYTES NFR BLD AUTO: 5.9 % (ref 2–10)
NEUTROPHILS # BLD: 4.8 TH/CMM (ref 1.8–8)
NEUTROPHILS NFR BLD AUTO: 72.2 % (ref 40–80)
PLATELET # BLD: 162 TH/CMM (ref 150–400)
PMV BLD AUTO: 9.7 FL
POTASSIUM SERPL-SCNC: 4 MEQ/L (ref 3.5–5.1)
RBC # BLD AUTO: 4.73 MIL/CMM (ref 3.8–5.8)
SODIUM SERPL-SCNC: 145 MEQ/L (ref 136–145)
WBC # BLD AUTO: 6.7 TH/CMM (ref 4.8–10.8)

## 2018-03-01 RX ADMIN — INSULIN ASPART SCH UNITS: 100 INJECTION, SOLUTION INTRAVENOUS; SUBCUTANEOUS at 12:46

## 2018-03-01 RX ADMIN — LEVOFLOXACIN SCH MLS/HR: 5 INJECTION INTRAVENOUS at 05:03

## 2018-03-01 RX ADMIN — INSULIN ASPART SCH UNITS: 100 INJECTION, SOLUTION INTRAVENOUS; SUBCUTANEOUS at 18:40

## 2018-03-01 RX ADMIN — OLANZAPINE SCH: 5 TABLET, ORALLY DISINTEGRATING ORAL at 09:56

## 2018-03-01 RX ADMIN — CASTOR OIL AND BALSAM, PERU SCH: 788; 87 OINTMENT TOPICAL at 09:55

## 2018-03-01 RX ADMIN — INSULIN ASPART SCH UNITS: 100 INJECTION, SOLUTION INTRAVENOUS; SUBCUTANEOUS at 08:13

## 2018-03-01 RX ADMIN — INSULIN ASPART SCH: 100 INJECTION, SOLUTION INTRAVENOUS; SUBCUTANEOUS at 22:23

## 2018-03-01 RX ADMIN — OLANZAPINE SCH: 5 TABLET, ORALLY DISINTEGRATING ORAL at 18:53

## 2018-03-01 RX ADMIN — INSULIN DETEMIR SCH: 100 INJECTION, SOLUTION SUBCUTANEOUS at 09:56

## 2018-03-01 NOTE — GENERAL PROGRESS NOTE
Subjective





- Review of Systems


Service Date: 18


Events since last encounter: 





labs noted


minimal woundd vac drainage





Objective





- Results


Result Diagrams: 


 18 09:11





 18 09:11


Recent Labs: 


 Laboratory Last Values











WBC  6.7 Th/cmm (4.8-10.8)  D 18  09:11    


 


RBC  4.73 Mil/cmm (3.80-5.80)   18  09:11    


 


Hgb  13.7 gm/dL (12-16)   18  09:11    


 


Hct  42.8 % (41.0-60)   18  09:11    


 


MCV  90.5 fl (80-99)   18  09:11    


 


MCH  28.9 pg (27.0-31.0)   18  09:11    


 


MCHC Differential  32.0 pg (28.0-36.0)   18  09:11    


 


RDW  16.2 % (11.5-20.0)   18  09:11    


 


Plt Count  162 Th/cmm (150-400)   18  09:11    


 


MPV  9.7 fl  18  09:11    


 


Neutrophils %  72.2 % (40.0-80.0)   18  09:11    


 


Lymphocytes %  19.2 % (20.0-50.0)  L  18  09:11    


 


Monocytes %  5.9 % (2.0-10.0)   18  09:11    


 


Eosinophils %  1.5 % (0.0-5.0)   18  09:11    


 


Basophils %  1.2 % (0.0-2.0)   18  09:11    


 


Eos Smear Source  URINE   18  03:00    


 


Eos Smear Total Cells  FEW EOSINOPHILS SEEN  (NONE SEEN)   18  03:00    


 


PT  10.5 SECONDS (9.5-11.5)   18  06:00    


 


INR  1.01  (0.5-1.4)   18  06:00    


 


PTT (Actin FS)  25.3 SECONDS (26.0-38.0)  L  18  06:00    


 


Sodium  145 mEq/L (136-145)   18  09:11    


 


Potassium  4.0 mEq/L (3.5-5.1)   18  09:11    


 


Chloride  113 mEq/L ()  H  18  09:11    


 


Carbon Dioxide  28.4 mEq/L (21.0-31.0)   18  09:11    


 


Anion Gap  7.6  (7.0-16.0)   18  09:11    


 


BUN  27 mg/dL (7-25)  H  18  09:11    


 


Creatinine  1.3 mg/dL (0.7-1.3)   18  09:11    


 


Est GFR ( Amer)  TNP   18  09:11    


 


Est GFR (Non-Af Amer)  TNP   18  09:11    


 


BUN/Creatinine Ratio  20.8   18  09:11    


 


Glucose  298 mg/dL ()  H  18  09:11    


 


POC Glucose  269 MG/DL (70 - 105)  H  18  05:18    


 


Hemoglobin A1c %  10.5 % (4.0-6.0)  H  18  05:26    


 


Uric Acid  11.9 mg/dL (4.4-7.6)  H  18  06:00    


 


Calcium  9.1 mg/dL (8.6-10.3)   18  09:11    


 


Phosphorus  4.1 mg/dL (2.5-5.0)   18  06:00    


 


Magnesium  2.9 mg/dL (1.9-2.7)  H  18  06:00    


 


Total Bilirubin  0.6 mg/dL (0.3-1.0)   18  09:11    


 


AST  17 U/L (13-39)   18  09:11    


 


ALT  13 U/L (7-52)   18  09:11    


 


Alkaline Phosphatase  81 U/L ()   18  09:11    


 


Total Protein  6.4 gm/dL (6.0-8.3)   18  09:11    


 


Albumin  3.0 gm/dL (4.2-5.5)  L  18  09:11    


 


Globulin  3.4 gm/dL  18  09:11    


 


Albumin/Globulin Ratio  0.9  (1.0-1.8)  L  18  09:11    


 


TSH  0.28 uIU/ml (0.34-5.60)  L  18  06:00    


 


Urine Source  CATH   18  03:00    


 


Urine Color  BROWN   18  03:00    


 


Urine Clarity  CLOUDY  (CLEAR)   18  03:00    


 


Urine pH  5.5  (4.6 - 8.0)   18  03:00    


 


Ur Specific Gravity  1.020  (1.005-1.030)   18  03:00    


 


Urine Protein  100 mg/dL (NEGATIVE)  H  18  03:00    


 


Urine Glucose (UA)  >=1000 mg/dL (NEGATIVE)  H  18  03:00    


 


Urine Ketones  TRACE mg/dL (NEGATIVE)   18  03:00    


 


Urine Blood  LARGE  (NEGATIVE)  H  18  03:00    


 


Urine Nitrate  NEGATIVE  (NEGATIVE)   18  03:00    


 


Urine Bilirubin  SMALL  (NEGATIVE)  H  18  03:00    


 


Urine Ictotest  NEGATIVE  (NEGATIVE)   18  03:00    


 


Urine Urobilinogen  0.2 E.U./dL (0.2 - 1.0)   18  03:00    


 


Ur Leukocyte Esterase  TRACE  (NEGATIVE)  H  18  03:00    


 


Urine RBC  >100 /hpf (0-5)  H  18  03:00    


 


Urine WBC  >100 /hpf (0-5)  H  18  03:00    


 


Ur Epithelial Cells  OCCASIONAL /lpf (FEW)   18  03:00    


 


Urine Bacteria  MODERATE /hpf (NONE SEEN)  H  18  03:00    


 


Ur Random Sodium  39 mmol/L  18  03:00    


 


Urine Creatinine  136.0 mg/dl (39.0-259.0)   18  03:00    














- Physical Exam


Vitals and I&O: 


 Vital Signs











Temp  97.4 F   18 04:00


 


Pulse  97   18 04:00


 


Resp  18   18 04:00


 


BP  147/85   18 04:00


 


Pulse Ox  97   18 04:00








 Intake & Output











 18





 18:59 06:59 18:59


 


Intake Total 1080 580 


 


Output Total 925  


 


Balance 155 580 


 


Weight (lbs) 78.018 kg 78.018 kg 


 


Intake:   


 


  Intake, IV Amount 100 100 


 


    Levofloxacin 500mg/100mL 100 100 





    500 mg In 100 ml @ 100   





    mls/hr IV Q24HR [facility] Rx#:   





    [id number]   


 


  Oral 980 480 


 


Output:   


 


  Urine 900  


 


  Other 25  


 


Other:   


 


  # Voids 3 4 


 


  # Bowel Movements 1  











Active Medications: 


Current Medications





Acetaminophen (Tylenol)  650 mg PO Q6HR PRN


   PRN Reason: mild to moderate pain


   Stop: 18 19:27


   Last Admin: 18 03:51 Dose:  650 mg


Acetaminophen/Hydrocodone Bitart (Norco 5mg/325mg)  1 tab PO Q6H PRN


   PRN Reason: Pain (Moderate)


   Stop: 18 08:21


Allopurinol (Zyloprim)  100 mg PO DAILY [facility]


   Stop: 18 08:59


   Last Admin: 18 09:55 Dose:  Not Given


Castor Oil/Kittitian Balsam/Trypsin (Venelex)  1 appl TP DAILY [facility]


   Stop: 18 15:59


   Last Admin: 18 09:55 Dose:  Not Given


Clonazepam (Klonopin)  1 mg PO BID [facility]


   PRN Reason: Protocol


   Stop: 18 08:59


   Last Admin: 18 09:55 Dose:  Not Given


Famotidine (Pepcid)  20 mg PO BID [facility]


   Stop: 18 08:59


   Last Admin: 18 09:55 Dose:  Not Given


Heparin Sodium (Porcine) (Heparin)  5,000 units SUBQ Q12HR [facility]


   PRN Reason: Protocol


   Stop: 18 20:59


   Last Admin: 18 09:56 Dose:  Not Given


Levofloxacin (Levaquin Pb)  500 mg in 100 mls @ 100 mls/hr IV Q24HR [facility]


   Stop: 18 05:59


   Last Infusion: 18 06:03 Dose:  Infused


Insulin Aspart (Novolog Insulin Sliding Scale)  0 units SUBQ ACHS [facility]


   PRN Reason: Protocol


   Stop: 18 20:59


   Last Admin: 18 08:13 Dose:  6 units


Insulin Detemir (Levemir Insulin)  10 units SUBQ DAILY [facility]


   PRN Reason: Protocol


   Stop: 18 08:59


   Last Admin: 18 09:56 Dose:  Not Given


Lorazepam (Ativan)  0.5 mg PO Q6HR PRN; Protocol


   PRN Reason: agitation 


   Stop: 18 19:27


   Last Admin: 18 05:15 Dose:  0.5 mg


Magnesium Hydroxide (Milk Of Magnesia)  30 ml PO HS PRN


   PRN Reason: Constipation


   Stop: 18 19:27


Metoprolol Succinate (Toprol Xl)  25 mg PO BID [facility]


   Stop: 18 16:59


   Last Admin: 18 09:56 Dose:  Not Given


Miscellaneous (Clinical Monitoring)  1 ea MC PRN PRN


   PRN Reason: RENAL


   Stop: 18 10:33


Olanzapine (Zyprexa Zydis)  10 mg PO BID [facility]


   PRN Reason: Protocol


   Stop: 18 06:50


   Last Admin: 18 09:56 Dose:  Not Given


Zolpidem Tartrate (Ambien)  5 mg PO HS PRN


   PRN Reason: insomnia


   Stop: 18 19:27


   Last Admin: 18 00:10 Dose:  5 mg








General: Alert, Oriented x3


HEENT: Atraumatic, PERRLA


Neck: Supple


Cardiovascular: Regular rate, Normal S1, Normal S2


Lungs: Clear to auscultation


Abdomen: Bowel sounds, Soft


Extremities: no Clubbing, no Cyanosis, no Edema


Neurological: Sensation intact


Skin: Rash


Psych/Mental Status: Mood NL





- Procedures


Procedures: 


 Procedures











Procedure Code Date


 


KEV MUSC/FASCIA 20 SQ CM/< 70664 18


 


EXCISION OF RIGHT HIP MUSCLE, OPEN APPROACH [id number] 18














Nutritional Asmnt/Malnutr-PDOC





- Dietary Evaluation


Malnutrition Findings (Please click <Entered> for more info): 








Nutritional Asmnt/Malnutrition                             Start:  18 17:

10


Text:                                                      Status: Complete    

  


Freq:                                                                          

  


 Document     18 17:13  [id number]  (Rec: 18 17:25  LCHENG  [id number])


 Nutritional Asmnt/Malnutrition


     Patient General Information


      Nutritional Screening                      High Risk


                                                 Consult


      Diagnosis                                  pressure ulcer, hyperglycemia,


                                                 dehydration


      Pertinent Medical Hx/Surgical Hx           DM, dementia, depression,


                                                 psychosis, schizophrenia,


                                                 chronic renal insuff


      Subjective Information                     Consult received for


                                                 unstageble pressure ulcer on , high BG on . Per


                                                 records, PO intake 50-75%. Pt


                                                 was on NPO today for surgery-


                                                 excisional debridement,


                                                 application of wound VAC


      Current Diet Order/ Nutrition Support      pureed, CCHO 60gm


      Pertinent Medications                      novolog


      Pertinent Labs                              Na 152, K 4.6, BUN 56, Cr


                                                 1.8, Glucose 262, -246


                                                 , Mg 2.9


     Nutritional Hx/Data


      Height                                     1.8 m


      Height (Calculated Centimeters)            180.3


      Current Weight (lbs)                       70.76 kg


      Weight (Calculated Kilograms)              70.8


      Weight (Calculated Grams)                  47357.4


      Ideal Body Weight                          172


      % Ideal Body Weight                        91


      Body Mass Index (BMI)                      21.7


      Weight Status                              Approriate


     GI Symptoms


      GI Symptoms                                None


      Last BM                                    


      Difficult in:                              None


      Skin Integrity/Comment:                    scar to right upper back,


                                                 decubitus ulcer to buttocks


      Current %PO                                Fair (50-74%)


     Estimated Nutritional Goals


      BEE in Kcals:                              Using Current wt


      Calories/Kcals/Kg                          27-32


      Kcals Calculated                           9112-7097


      Protein:                                   Using Current wt


      Protein g/k-1.2


      Protein Calculated                         71-85


      Fluid: ml                                  1917-2130ml (1ml/kcal)


     Nutritional Problem


      2. Problem


       Problem                                   altered nutrition related lab


                                                 values


       Etiology                                  hx of DM


       Signs/Symptoms:                           Glucose 262, -246


      1. Problem


       Problem                                   increased nutrition needs (


                                                 calorie and protein)


       Etiology                                  increased metabolic demand for


                                                 wound healing


       Signs/Symptoms:                           decubitus ulcer and surgery


     Intervention/Recommendation


      Comments                                   1. Continue with current diet


                                                 as ordered. If PO intake low,


                                                 will consider nutrition


                                                 supplements


                                                 2. MD to consider vit C and


                                                 zinc for wound healing


                                                 3. Monitor PO intake, wt, labs


                                                 and skin integrity


                                                 4. F/U as moderate risk in 3-5


                                                 days, 32-3/4, PO check 3/2


     Expected Outcomes/Goals


      Expected Outcomes/Goals                    1. PO intake to meet at least


                                                 75% of nutritional needs.


                                                 2. Wt stability, skin to


                                                 remain intact, labs to


                                                 approach WNL.

## 2018-03-01 NOTE — PROGRESS NOTES
DATE:  



SUBJECTIVE:  Chart reviewed and the patient interviewed.  Also, discussed the

patient condition and reviewed records and labs.  The patient is still extremely

agitated and restless.  The patient also is still unable to follow any

directions and he has been extremely agitated.  The patient also still has

severe mood swings and he is still aggressive with the staff, especially when

they try to help him with his ADLs.  Otherwise, the patient is taking his

medications, but with prompt from staff.



ASSESSMENT:  The patient is still severely agitated and is still in irritable

mood.



TREATMENT PLAN:  We will continue monitoring his behavior and his condition

closely.  Also, staff asked if the patient can be on 1:1 observation.  We will

do so.  Also will add Klonopin in a dose of 1 mg twice a day and we will

continue to follow up closely.





DD: 03/01/2018 06:52

DT: 03/01/2018 10:59

[facility]# [id number]  [id number]

## 2018-03-01 NOTE — GENERAL PROGRESS NOTE
Subjective





- Review of Systems


Service Date: 18


Subjective: 





awake, verbal, still confused





Objective





- Results


Result Diagrams: 


 18 09:11





 18 09:11


Recent Labs: 


 Laboratory Last Values











WBC  6.7 Th/cmm (4.8-10.8)  D 18  09:11    


 


RBC  4.73 Mil/cmm (3.80-5.80)   18  09:11    


 


Hgb  13.7 gm/dL (12-16)   18  09:11    


 


Hct  42.8 % (41.0-60)   18  09:11    


 


MCV  90.5 fl (80-99)   18  09:11    


 


MCH  28.9 pg (27.0-31.0)   18  09:11    


 


MCHC Differential  32.0 pg (28.0-36.0)   18  09:11    


 


RDW  16.2 % (11.5-20.0)   18  09:11    


 


Plt Count  162 Th/cmm (150-400)   18  09:11    


 


MPV  9.7 fl  18  09:11    


 


Neutrophils %  72.2 % (40.0-80.0)   18  09:11    


 


Lymphocytes %  19.2 % (20.0-50.0)  L  18  09:11    


 


Monocytes %  5.9 % (2.0-10.0)   18  09:11    


 


Eosinophils %  1.5 % (0.0-5.0)   18  09:11    


 


Basophils %  1.2 % (0.0-2.0)   18  09:11    


 


Eos Smear Source  URINE   18  03:00    


 


Eos Smear Total Cells  FEW EOSINOPHILS SEEN  (NONE SEEN)   18  03:00    


 


PT  10.5 SECONDS (9.5-11.5)   18  06:00    


 


INR  1.01  (0.5-1.4)   18  06:00    


 


PTT (Actin FS)  25.3 SECONDS (26.0-38.0)  L  18  06:00    


 


Sodium  145 mEq/L (136-145)   18  09:11    


 


Potassium  4.0 mEq/L (3.5-5.1)   18  09:11    


 


Chloride  113 mEq/L ()  H  18  09:11    


 


Carbon Dioxide  28.4 mEq/L (21.0-31.0)   18  09:11    


 


Anion Gap  7.6  (7.0-16.0)   18  09:11    


 


BUN  27 mg/dL (7-25)  H  18  09:11    


 


Creatinine  1.3 mg/dL (0.7-1.3)   18  09:11    


 


Est GFR ( Amer)  TNP   18  09:11    


 


Est GFR (Non-Af Amer)  TNP   18  09:11    


 


BUN/Creatinine Ratio  20.8   18  09:11    


 


Glucose  298 mg/dL ()  H  18  09:11    


 


POC Glucose  306 MG/DL (70 - 105)  H  18  18:35    


 


Hemoglobin A1c %  10.5 % (4.0-6.0)  H  18  05:26    


 


Uric Acid  11.9 mg/dL (4.4-7.6)  H  18  06:00    


 


Calcium  9.1 mg/dL (8.6-10.3)   18  09:11    


 


Phosphorus  4.1 mg/dL (2.5-5.0)   18  06:00    


 


Magnesium  2.9 mg/dL (1.9-2.7)  H  18  06:00    


 


Total Bilirubin  0.6 mg/dL (0.3-1.0)   18  09:11    


 


AST  17 U/L (13-39)   18  09:11    


 


ALT  13 U/L (7-52)   18  09:11    


 


Alkaline Phosphatase  81 U/L ()   18  09:11    


 


Total Protein  6.4 gm/dL (6.0-8.3)   18  09:11    


 


Albumin  3.0 gm/dL (4.2-5.5)  L  18  09:11    


 


Globulin  3.4 gm/dL  18  09:11    


 


Albumin/Globulin Ratio  0.9  (1.0-1.8)  L  18  09:11    


 


TSH  0.28 uIU/ml (0.34-5.60)  L  18  06:00    


 


Urine Source  CATH   18  03:00    


 


Urine Color  BROWN   18  03:00    


 


Urine Clarity  CLOUDY  (CLEAR)   18  03:00    


 


Urine pH  5.5  (4.6 - 8.0)   18  03:00    


 


Ur Specific Gravity  1.020  (1.005-1.030)   18  03:00    


 


Urine Protein  100 mg/dL (NEGATIVE)  H  18  03:00    


 


Urine Glucose (UA)  >=1000 mg/dL (NEGATIVE)  H  18  03:00    


 


Urine Ketones  TRACE mg/dL (NEGATIVE)   18  03:00    


 


Urine Blood  LARGE  (NEGATIVE)  H  18  03:00    


 


Urine Nitrate  NEGATIVE  (NEGATIVE)   18  03:00    


 


Urine Bilirubin  SMALL  (NEGATIVE)  H  18  03:00    


 


Urine Ictotest  NEGATIVE  (NEGATIVE)   18  03:00    


 


Urine Urobilinogen  0.2 E.U./dL (0.2 - 1.0)   18  03:00    


 


Ur Leukocyte Esterase  TRACE  (NEGATIVE)  H  18  03:00    


 


Urine RBC  >100 /hpf (0-5)  H  18  03:00    


 


Urine WBC  >100 /hpf (0-5)  H  18  03:00    


 


Ur Epithelial Cells  OCCASIONAL /lpf (FEW)   18  03:00    


 


Urine Bacteria  MODERATE /hpf (NONE SEEN)  H  18  03:00    


 


Ur Random Sodium  39 mmol/L  18  03:00    


 


Urine Creatinine  136.0 mg/dl (39.0-259.0)   18  03:00    














- Physical Exam


Vitals and I&O: 


 Vital Signs











Temp  97.4 F   18 08:00


 


Pulse  97   18 08:00


 


Resp  18   18 08:00


 


BP  157/89   18 08:00


 


Pulse Ox  98   18 08:00








 Intake & Output











 18





 06:59 18:59 06:59


 


Intake Total 580 500 


 


Output Total  400 


 


Balance 580 100 


 


Weight (lbs) 78.018 kg 78.018 kg 


 


Intake:   


 


  Intake, IV Amount 100  


 


    Levofloxacin 500mg/100mL 100  





    500 mg In 100 ml @ 100   





    mls/hr IV Q24HR [facility] Rx#:   





    [id number]   


 


  Oral 480 500 


 


Output:   


 


  Urine  400 


 


Other:   


 


  # Voids 4  


 


  # Bowel Movements  1 











Active Medications: 


Current Medications





Acetaminophen (Tylenol)  650 mg PO Q6HR PRN


   PRN Reason: mild to moderate pain


   Stop: 18 19:27


   Last Admin: 18 03:51 Dose:  650 mg


Acetaminophen/Hydrocodone Bitart (Norco 5mg/325mg)  1 tab PO Q6H PRN


   PRN Reason: Pain (Moderate)


   Stop: 18 08:21


Allopurinol (Zyloprim)  100 mg PO DAILY [facility]


   Stop: 18 08:59


   Last Admin: 18 09:55 Dose:  Not Given


Castor Oil/Canadian Balsam/Trypsin (Venelex)  1 appl TP DAILY [facility]


   Stop: 18 15:59


   Last Admin: 18 09:55 Dose:  Not Given


Clonazepam (Klonopin)  1 mg PO BID [facility]


   PRN Reason: Protocol


   Stop: 18 08:59


   Last Admin: 18 18:53 Dose:  Not Given


Famotidine (Pepcid)  20 mg PO BID [facility]


   Stop: 18 08:59


   Last Admin: 18 18:53 Dose:  Not Given


Heparin Sodium (Porcine) (Heparin)  5,000 units SUBQ Q12HR [facility]


   PRN Reason: Protocol


   Stop: 18 20:59


   Last Admin: 18 09:56 Dose:  Not Given


Levofloxacin (Levaquin Pb)  500 mg in 100 mls @ 100 mls/hr IV Q24HR [facility]


   Stop: 18 05:59


   Last Infusion: 18 06:03 Dose:  Infused


Insulin Aspart (Novolog Insulin Sliding Scale)  0 units SUBQ ACHS [facility]


   PRN Reason: Protocol


   Stop: 18 20:59


   Last Admin: 18 18:40 Dose:  8 units


Insulin Detemir (Levemir Insulin)  10 units SUBQ DAILY [facility]


   PRN Reason: Protocol


   Stop: 18 08:59


   Last Admin: 18 09:56 Dose:  Not Given


Lorazepam (Ativan)  0.5 mg PO Q6HR PRN; Protocol


   PRN Reason: agitation 


   Stop: 18 19:27


   Last Admin: 18 05:15 Dose:  0.5 mg


Magnesium Hydroxide (Milk Of Magnesia)  30 ml PO HS PRN


   PRN Reason: Constipation


   Stop: 18 19:27


Metoprolol Succinate (Toprol Xl)  25 mg PO BID [facility]


   Stop: 18 16:59


   Last Admin: 18 18:53 Dose:  Not Given


Miscellaneous (Clinical Monitoring)  1 ea MC PRN PRN


   PRN Reason: RENAL


   Stop: 18 10:33


Olanzapine (Zyprexa Zydis)  10 mg PO BID [facility]


   PRN Reason: Protocol


   Stop: 18 06:50


   Last Admin: 18 18:53 Dose:  Not Given


Zolpidem Tartrate (Ambien)  5 mg PO HS PRN


   PRN Reason: insomnia


   Stop: 18 19:27


   Last Admin: 18 00:10 Dose:  5 mg








General: Alert, Oriented x3


HEENT: Atraumatic, PERRLA


Neck: Supple


Cardiovascular: Regular rate, Normal S1, Normal S2


Lungs: Clear to auscultation


Abdomen: Bowel sounds, Soft


Extremities: no Clubbing, no Cyanosis, no Edema


Neurological: Sensation intact


Skin: Rash


Psych/Mental Status: Mood NL





- Procedures


Procedures: 


 Procedures











Procedure Code Date


 


KEV MUSC/FASCIA 20 SQ CM/< 73507 18


 


EXCISION OF RIGHT HIP MUSCLE, OPEN APPROACH [id number]














Assessment/Plan





- Assessment


Assessment: 





BRYANT on CKD


Stage 4 decub ulcer


Severe dehydration


Ess HTN


Type 2 DM


Psychosis








- Plan


Plan: 


Lab - Result Diagrams





 18 06:00 





 18 06:00 





Current Medications





Acetaminophen (Tylenol)  650 mg PO Q6HR PRN


   PRN Reason: mild to moderate pain


   Stop: 18 19:27


Allopurinol (Zyloprim)  100 mg PO DAILY [facility]


   Stop: 18 08:59


   Last Admin: 18 10:00 Dose:  Not Given


Castor Oil/Canadian Balsam/Trypsin (Venelex)  1 appl TP DAILY [facility]


   Stop: 18 15:59


   Last Admin: 18 10:00 Dose:  1 appl


Famotidine (Pepcid)  20 mg PO BID [facility]


   Stop: 18 08:59


   Last Admin: 18 18:22 Dose:  20 mg


Heparin Sodium (Porcine) (Heparin)  5,000 units SUBQ Q12HR [facility]


   PRN Reason: Protocol


   Stop: 18 20:59


   Last Admin: 18 07:50 Dose:  Not Given


Ceftriaxone Sodium 1 gm/ (Sodium Chloride)  50 mls @ 100 mls/hr IV Q24HR [facility]


   Stop: 18 16:44


   Last Admin: 18 17:45 Dose:  100 mls/hr


Insulin Aspart (Novolog Insulin Sliding Scale)  0 units SUBQ ACHS [facility]


   PRN Reason: Protocol


   Stop: 18 20:59


   Last Admin: 18 18:23 Dose:  6 units


Lorazepam (Ativan)  0.5 mg PO Q6HR PRN; Protocol


   PRN Reason: agitation 


   Stop: 18 19:27


Magnesium Hydroxide (Milk Of Magnesia)  30 ml PO HS PRN


   PRN Reason: Constipation


   Stop: 18 19:27


Metoprolol Succinate (Toprol Xl)  25 mg PO BID [facility]


   Stop: 18 16:59


   Last Admin: 18 18:23 Dose:  25 mg


Miscellaneous (Clinical Monitoring)  1 ea MC PRN PRN


   PRN Reason: RENAL


   Stop: 18 10:33


Olanzapine (Zyprexa Zydis)  5 mg PO BID [facility]


   PRN Reason: Protocol


   Stop: 18 16:59


Zolpidem Tartrate (Ambien)  5 mg PO HS PRN


   PRN Reason: insomnia


   Stop: 18 19:27








Lab - Result Diagrams





 18 09:11 





 18 09:11 


























Na down to 145


Kidney fnc gradually improving


agree w/ Allopurinol


f/u electrolytes, agree w/ Levaquin


continue hydration


BS still OOC


increase Detemir





Nutritional Asmnt/Malnutr-PDOC





- Dietary Evaluation


Malnutrition Findings (Please click <Entered> for more info): 








Nutritional Asmnt/Malnutrition                             Start:  18 17:

10


Text:                                                      Status: Complete    

  


Freq:                                                                          

  


 Document     18 17:13  [id number]  (Rec: 18 17:25  LCHENG  [id number])


 Nutritional Asmnt/Malnutrition


     Patient General Information


      Nutritional Screening                      High Risk


                                                 Consult


      Diagnosis                                  pressure ulcer, hyperglycemia,


                                                 dehydration


      Pertinent Medical Hx/Surgical Hx           DM, dementia, depression,


                                                 psychosis, schizophrenia,


                                                 chronic renal insuff


      Subjective Information                     Consult received for


                                                 unstageble pressure ulcer on , high BG on . Per


                                                 records, PO intake 50-75%. Pt


                                                 was on NPO today for surgery-


                                                 excisional debridement,


                                                 application of wound VAC


      Current Diet Order/ Nutrition Support      pureed, CCHO 60gm


      Pertinent Medications                      novolog


      Pertinent Labs                              Na 152, K 4.6, BUN 56, Cr


                                                 1.8, Glucose 262, -246


                                                 , Mg 2.9


     Nutritional Hx/Data


      Height                                     1.8 m


      Height (Calculated Centimeters)            180.3


      Current Weight (lbs)                       70.76 kg


      Weight (Calculated Kilograms)              70.8


      Weight (Calculated Grams)                  08529.4


      Ideal Body Weight                          172


      % Ideal Body Weight                        91


      Body Mass Index (BMI)                      21.7


      Weight Status                              Approriate


     GI Symptoms


      GI Symptoms                                None


      Last BM                                    


      Difficult in:                              None


      Skin Integrity/Comment:                    scar to right upper back,


                                                 decubitus ulcer to buttocks


      Current %PO                                Fair (50-74%)


     Estimated Nutritional Goals


      BEE in Kcals:                              Using Current wt


      Calories/Kcals/Kg                          27-32


      Kcals Calculated                           2343-1801


      Protein:                                   Using Current wt


      Protein g/k-1.2


      Protein Calculated                         71-85


      Fluid: ml                                  1917-2130ml (1ml/kcal)


     Nutritional Problem


      2. Problem


       Problem                                   altered nutrition related lab


                                                 values


       Etiology                                  hx of DM


       Signs/Symptoms:                           Glucose 262, -246


      1. Problem


       Problem                                   increased nutrition needs (


                                                 calorie and protein)


       Etiology                                  increased metabolic demand for


                                                 wound healing


       Signs/Symptoms:                           decubitus ulcer and surgery


     Intervention/Recommendation


      Comments                                   1. Continue with current diet


                                                 as ordered. If PO intake low,


                                                 will consider nutrition


                                                 supplements


                                                 2. MD to consider vit C and


                                                 zinc for wound healing


                                                 3. Monitor PO intake, wt, labs


                                                 and skin integrity


                                                 4. F/U as moderate risk in 3-5


                                                 days, 3/2-3/4, PO check 3/2


     Expected Outcomes/Goals


      Expected Outcomes/Goals                    1. PO intake to meet at least


                                                 75% of nutritional needs.


                                                 2. Wt stability, skin to


                                                 remain intact, labs to


                                                 approach WNL.

## 2018-03-01 NOTE — GENERAL PROGRESS NOTE
Subjective





- Review of Systems


Service Date: 18


Subjective: 





Patient was seen and examined. Oral Intake has improved. agitated this morning. 

Patient recently underwent Wound debridement of sacral decubitus. 





Objective





- Results


Result Diagrams: 


 18 09:11





 18 09:11


Recent Labs: 


 Laboratory Last Values











WBC  8.4 Th/cmm (4.8-10.8)   18  06:15    


 


RBC  5.03 Mil/cmm (3.80-5.80)   18  06:15    


 


Hgb  14.6 gm/dL (12-16)   18  06:15    


 


Hct  45.1 % (41.0-60)   18  06:15    


 


MCV  89.7 fl (80-99)   18  06:15    


 


MCH  29.0 pg (27.0-31.0)   18  06:15    


 


MCHC Differential  32.3 pg (28.0-36.0)   18  06:15    


 


RDW  16.0 % (11.5-20.0)   18  06:15    


 


Plt Count  192 Th/cmm (150-400)   18  06:15    


 


MPV  10.1 fl  18  06:15    


 


Neutrophils %  78.4 % (40.0-80.0)   18  06:15    


 


Lymphocytes %  14.8 % (20.0-50.0)  L  18  06:15    


 


Monocytes %  4.8 % (2.0-10.0)   18  06:15    


 


Eosinophils %  1.7 % (0.0-5.0)   18  06:15    


 


Basophils %  0.3 % (0.0-2.0)   18  06:15    


 


Eos Smear Source  URINE   18  03:00    


 


Eos Smear Total Cells  FEW EOSINOPHILS SEEN  (NONE SEEN)   18  03:00    


 


PT  10.5 SECONDS (9.5-11.5)   18  06:00    


 


INR  1.01  (0.5-1.4)   18  06:00    


 


PTT (Actin FS)  25.3 SECONDS (26.0-38.0)  L  18  06:00    


 


Sodium  150 mEq/L (136-145)  H  18  06:15    


 


Potassium  4.0 mEq/L (3.5-5.1)   18  06:15    


 


Chloride  115 mEq/L ()  H  18  06:15    


 


Carbon Dioxide  25.4 mEq/L (21.0-31.0)   18  06:15    


 


Anion Gap  13.6  (7.0-16.0)   18  06:15    


 


BUN  41 mg/dL (7-25)  H  18  06:15    


 


Creatinine  1.4 mg/dL (0.7-1.3)  H  18  06:15    


 


Est GFR ( Amer)  TNP   18  06:15    


 


Est GFR (Non-Af Amer)  TNP   18  06:15    


 


BUN/Creatinine Ratio  29.3   18  06:15    


 


Glucose  203 mg/dL ()  H  18  06:15    


 


POC Glucose  269 MG/DL (70 - 105)  H  18  05:18    


 


Hemoglobin A1c %  10.5 % (4.0-6.0)  H  18  05:26    


 


Uric Acid  11.9 mg/dL (4.4-7.6)  H  18  06:00    


 


Calcium  9.3 mg/dL (8.6-10.3)   18  06:15    


 


Phosphorus  4.1 mg/dL (2.5-5.0)   18  06:00    


 


Magnesium  2.9 mg/dL (1.9-2.7)  H  18  06:00    


 


Total Bilirubin  0.9 mg/dL (0.3-1.0)   18  06:00    


 


AST  15 U/L (13-39)   18  06:00    


 


ALT  17 U/L (7-52)   18  06:00    


 


Alkaline Phosphatase  84 U/L ()   18  06:00    


 


Total Protein  7.7 gm/dL (6.0-8.3)   18  06:00    


 


Albumin  3.7 gm/dL (4.2-5.5)  L  18  06:00    


 


Globulin  4.0 gm/dL  18  06:00    


 


Albumin/Globulin Ratio  0.9  (1.0-1.8)  L  18  06:00    


 


TSH  0.28 uIU/ml (0.34-5.60)  L  18  06:00    


 


Urine Source  CATH   18  03:00    


 


Urine Color  BROWN   18  03:00    


 


Urine Clarity  CLOUDY  (CLEAR)   18  03:00    


 


Urine pH  5.5  (4.6 - 8.0)   18  03:00    


 


Ur Specific Gravity  1.020  (1.005-1.030)   18  03:00    


 


Urine Protein  100 mg/dL (NEGATIVE)  H  18  03:00    


 


Urine Glucose (UA)  >=1000 mg/dL (NEGATIVE)  H  18  03:00    


 


Urine Ketones  TRACE mg/dL (NEGATIVE)   18  03:00    


 


Urine Blood  LARGE  (NEGATIVE)  H  18  03:00    


 


Urine Nitrate  NEGATIVE  (NEGATIVE)   18  03:00    


 


Urine Bilirubin  SMALL  (NEGATIVE)  H  18  03:00    


 


Urine Ictotest  NEGATIVE  (NEGATIVE)   18  03:00    


 


Urine Urobilinogen  0.2 E.U./dL (0.2 - 1.0)   18  03:00    


 


Ur Leukocyte Esterase  TRACE  (NEGATIVE)  H  18  03:00    


 


Urine RBC  >100 /hpf (0-5)  H  18  03:00    


 


Urine WBC  >100 /hpf (0-5)  H  18  03:00    


 


Ur Epithelial Cells  OCCASIONAL /lpf (FEW)   18  03:00    


 


Urine Bacteria  MODERATE /hpf (NONE SEEN)  H  18  03:00    


 


Ur Random Sodium  39 mmol/L  18  03:00    


 


Urine Creatinine  136.0 mg/dl (39.0-259.0)   18  03:00    














- Physical Exam


Vitals and I&O: 


 Vital Signs











Temp  97.4 F   18 04:00


 


Pulse  97   18 04:00


 


Resp  18   18 04:00


 


BP  147/85   18 04:00


 


Pulse Ox  97   18 04:00








 Intake & Output











 02/18





 18:59 06:59 18:59


 


Intake Total 1080 580 


 


Output Total 925  


 


Balance 155 580 


 


Weight (lbs) 78.018 kg 78.018 kg 


 


Intake:   


 


  Intake, IV Amount 100 100 


 


    Levofloxacin 500mg/100mL 100 100 





    500 mg In 100 ml @ 100   





    mls/hr IV Q24HR [facility] Rx#:   





    [id number]   


 


  Oral 980 480 


 


Output:   


 


  Urine 900  


 


  Other 25  


 


Other:   


 


  # Voids 3 4 


 


  # Bowel Movements 1  











Active Medications: 


Current Medications





Acetaminophen (Tylenol)  650 mg PO Q6HR PRN


   PRN Reason: mild to moderate pain


   Stop: 18 19:27


   Last Admin: 18 03:51 Dose:  650 mg


Acetaminophen/Hydrocodone Bitart (Norco 5mg/325mg)  1 tab PO Q6H PRN


   PRN Reason: Pain (Moderate)


   Stop: 18 08:21


Allopurinol (Zyloprim)  100 mg PO DAILY [facility]


   Stop: 18 08:59


   Last Admin: 18 09:06 Dose:  100 mg


Castor Oil/Sen[place]e Balsam/Trypsin (Venelex)  1 appl TP DAILY [facility]


   Stop: 18 15:59


   Last Admin: 18 09:09 Dose:  1 appl


Clonazepam (Klonopin)  1 mg PO BID [facility]


   PRN Reason: Protocol


   Stop: 18 08:59


Famotidine (Pepcid)  20 mg PO BID [facility]


   Stop: 18 08:59


   Last Admin: 18 17:33 Dose:  20 mg


Heparin Sodium (Porcine) (Heparin)  5,000 units SUBQ Q12HR [facility]


   PRN Reason: Protocol


   Stop: 18 20:59


   Last Admin: 18 20:46 Dose:  5,000 units


Levofloxacin (Levaquin Pb)  500 mg in 100 mls @ 100 mls/hr IV Q24HR [facility]


   Stop: 18 05:59


   Last Infusion: 18 06:03 Dose:  Infused


Insulin Aspart (Novolog Insulin Sliding Scale)  0 units SUBQ ACHS [facility]


   PRN Reason: Protocol


   Stop: 18 20:59


   Last Admin: 18 08:13 Dose:  6 units


Insulin Detemir (Levemir Insulin)  10 units SUBQ DAILY [facility]


   PRN Reason: Protocol


   Stop: 18 08:59


   Last Admin: 18 08:12 Dose:  10 units


Lorazepam (Ativan)  0.5 mg PO Q6HR PRN; Protocol


   PRN Reason: agitation 


   Stop: 18 19:27


   Last Admin: 18 05:15 Dose:  0.5 mg


Magnesium Hydroxide (Milk Of Magnesia)  30 ml PO HS PRN


   PRN Reason: Constipation


   Stop: 18 19:27


Metoprolol Succinate (Toprol Xl)  25 mg PO BID [facility]


   Stop: 18 16:59


   Last Admin: 18 17:33 Dose:  25 mg


Miscellaneous (Clinical Monitoring)  1 ea MC PRN PRN


   PRN Reason: RENAL


   Stop: 18 10:33


Olanzapine (Zyprexa Zydis)  10 mg PO BID [facility]


   PRN Reason: Protocol


   Stop: 18 06:50


Zolpidem Tartrate (Ambien)  5 mg PO HS PRN


   PRN Reason: insomnia


   Stop: 18 19:27


   Last Admin: 18 00:10 Dose:  5 mg








General: Alert, Oriented x3


HEENT: Atraumatic, PERRLA


Neck: Supple


Cardiovascular: Regular rate, Normal S1, Normal S2


Lungs: Clear to auscultation


Abdomen: Bowel sounds, Soft


Extremities: no Clubbing, no Cyanosis, no Edema


Neurological: Sensation intact


Skin: Rash


Psych/Mental Status: Mood NL





- Procedures


Procedures: 


 Procedures











Procedure Code Date


 


KEV MUSC/FASCIA 20 SQ CM/< 12349 18


 


EXCISION OF RIGHT HIP MUSCLE, OPEN APPROACH [id number]














Assessment/Plan





- Assessment


Assessment: 





hypernatremia


prerenal azotemia


hyperglycemia


depression/anxiety


gout


dementia


schizophrenia


S/P wound debridement of sacral decubitus


UTI


discharge planning.





- Plan


Plan: 





will order repeat cmp,cbc tomorrow AM





renal consult -- Dr. Bajwa





sacral decubitus ... will order Gen Surgical consult -- Dr. Jones for possible 

wound debridement





Gout ... continue allopurinol 100mg PO daily





continue wound vac. 





continue hydrocodone PO for pain control. 





Nutritional Asmnt/Malnutr-PDOC





- Dietary Evaluation


Malnutrition Findings (Please click <Entered> for more info): 








Nutritional Asmnt/Malnutrition                             Start:  18 17:

10


Text:                                                      Status: Complete    

  


Freq:                                                                          

  


 Document     18 17:13  MAGEN  (Rec: 18 17:25  MAGEN  [id number])


 Nutritional Asmnt/Malnutrition


     Patient General Information


      Nutritional Screening                      High Risk


                                                 Consult


      Diagnosis                                  pressure ulcer, hyperglycemia,


                                                 dehydration


      Pertinent Medical Hx/Surgical Hx           DM, dementia, depression,


                                                 psychosis, schizophrenia,


                                                 chronic renal insuff


      Subjective Information                     Consult received for


                                                 unstageble pressure ulcer on , high BG on . Per


                                                 records, PO intake 50-75%. Pt


                                                 was on NPO today for surgery-


                                                 excisional debridement,


                                                 application of wound VAC


      Current Diet Order/ Nutrition Support      pureed, CCHO 60gm


      Pertinent Medications                      novolog


      Pertinent Labs                              Na 152, K 4.6, BUN 56, Cr


                                                 1.8, Glucose 262, -246


                                                 , Mg 2.9


     Nutritional Hx/Data


      Height                                     1.8 m


      Height (Calculated Centimeters)            180.3


      Current Weight (lbs)                       70.76 kg


      Weight (Calculated Kilograms)              70.8


      Weight (Calculated Grams)                  47449.4


      Ideal Body Weight                          172


      % Ideal Body Weight                        91


      Body Mass Index (BMI)                      21.7


      Weight Status                              Approriate


     GI Symptoms


      GI Symptoms                                None


      Last BM                                    


      Difficult in:                              None


      Skin Integrity/Comment:                    scar to right upper back,


                                                 decubitus ulcer to buttocks


      Current %PO                                Fair (50-74%)


     Estimated Nutritional Goals


      BEE in Kcals:                              Using Current wt


      Calories/Kcals/Kg                          27-32


      Kcals Calculated                           2903-7894


      Protein:                                   Using Current wt


      Protein g/k-1.2


      Protein Calculated                         71-85


      Fluid: ml                                  1917-2130ml (1ml/kcal)


     Nutritional Problem


      2. Problem


       Problem                                   altered nutrition related lab


                                                 values


       Etiology                                  hx of DM


       Signs/Symptoms:                           Glucose 262, -246


      1. Problem


       Problem                                   increased nutrition needs (


                                                 calorie and protein)


       Etiology                                  increased metabolic demand for


                                                 wound healing


       Signs/Symptoms:                           decubitus ulcer and surgery


     Intervention/Recommendation


      Comments                                   1. Continue with current diet


                                                 as ordered. If PO intake low,


                                                 will consider nutrition


                                                 supplements


                                                 2. MD to consider vit C and


                                                 zinc for wound healing


                                                 3. Monitor PO intake, wt, labs


                                                 and skin integrity


                                                 4. F/U as moderate risk in 3-5


                                                 days, 3/2-3/4, PO check 3/2


     Expected Outcomes/Goals


      Expected Outcomes/Goals                    1. PO intake to meet at least


                                                 75% of nutritional needs.


                                                 2. Wt stability, skin to


                                                 remain intact, labs to


                                                 approach WNL.

## 2018-03-02 LAB
ANION GAP SERPL CALC-SCNC: 9.1 MMOL/L (ref 7–16)
BUN SERPL-MCNC: 21 MG/DL (ref 7–25)
CALCIUM SERPL-MCNC: 9.2 MG/DL (ref 8.6–10.3)
CHLORIDE SERPL-SCNC: 114 MEQ/L (ref 98–107)
CO2 SERPL-SCNC: 28.1 MEQ/L (ref 21–31)
CREAT SERPL-MCNC: 1.2 MG/DL (ref 0.7–1.3)
GLUCOSE SERPL-MCNC: 293 MG/DL (ref 70–105)
POTASSIUM SERPL-SCNC: 5.2 MEQ/L (ref 3.5–5.1)
SODIUM SERPL-SCNC: 146 MEQ/L (ref 136–145)

## 2018-03-02 RX ADMIN — INSULIN ASPART SCH UNITS: 100 INJECTION, SOLUTION INTRAVENOUS; SUBCUTANEOUS at 18:39

## 2018-03-02 RX ADMIN — INSULIN ASPART SCH UNITS: 100 INJECTION, SOLUTION INTRAVENOUS; SUBCUTANEOUS at 20:19

## 2018-03-02 RX ADMIN — CASTOR OIL AND BALSAM, PERU SCH APPL: 788; 87 OINTMENT TOPICAL at 08:32

## 2018-03-02 RX ADMIN — INSULIN ASPART SCH UNITS: 100 INJECTION, SOLUTION INTRAVENOUS; SUBCUTANEOUS at 08:33

## 2018-03-02 RX ADMIN — OLANZAPINE SCH MG: 5 TABLET, ORALLY DISINTEGRATING ORAL at 16:45

## 2018-03-02 RX ADMIN — LEVOFLOXACIN SCH MLS/HR: 5 INJECTION INTRAVENOUS at 05:46

## 2018-03-02 RX ADMIN — OLANZAPINE SCH MG: 5 TABLET, ORALLY DISINTEGRATING ORAL at 08:26

## 2018-03-02 RX ADMIN — INSULIN ASPART SCH UNITS: 100 INJECTION, SOLUTION INTRAVENOUS; SUBCUTANEOUS at 12:37

## 2018-03-02 NOTE — GENERAL PROGRESS NOTE
Subjective





- Review of Systems


Service Date: 18


Subjective: 





awake, verbal, still confused





Objective





- Results


Result Diagrams: 


 18 09:11





 18 06:06


Recent Labs: 


 Laboratory Last Values











WBC  6.7 Th/cmm (4.8-10.8)  D 18  09:11    


 


RBC  4.73 Mil/cmm (3.80-5.80)   18  09:11    


 


Hgb  13.7 gm/dL (12-16)   18  09:11    


 


Hct  42.8 % (41.0-60)   18  09:11    


 


MCV  90.5 fl (80-99)   18  09:11    


 


MCH  28.9 pg (27.0-31.0)   18  09:11    


 


MCHC Differential  32.0 pg (28.0-36.0)   18  09:11    


 


RDW  16.2 % (11.5-20.0)   18  09:11    


 


Plt Count  162 Th/cmm (150-400)   18  09:11    


 


MPV  9.7 fl  18  09:11    


 


Neutrophils %  72.2 % (40.0-80.0)   18  09:11    


 


Lymphocytes %  19.2 % (20.0-50.0)  L  18  09:11    


 


Monocytes %  5.9 % (2.0-10.0)   18  09:11    


 


Eosinophils %  1.5 % (0.0-5.0)   18  09:11    


 


Basophils %  1.2 % (0.0-2.0)   18  09:11    


 


Eos Smear Source  URINE   18  03:00    


 


Eos Smear Total Cells  FEW EOSINOPHILS SEEN  (NONE SEEN)   18  03:00    


 


PT  10.5 SECONDS (9.5-11.5)   18  06:00    


 


INR  1.01  (0.5-1.4)   18  06:00    


 


PTT (Actin FS)  25.3 SECONDS (26.0-38.0)  L  18  06:00    


 


Sodium  146 mEq/L (136-145)  H  18  06:06    


 


Potassium  5.2 mEq/L (3.5-5.1)  H  18  06:06    


 


Chloride  114 mEq/L ()  H  18  06:06    


 


Carbon Dioxide  28.1 mEq/L (21.0-31.0)   18  06:06    


 


Anion Gap  9.1  (7.0-16.0)   18  06:06    


 


BUN  21 mg/dL (7-25)   18  06:06    


 


Creatinine  1.2 mg/dL (0.7-1.3)   18  06:06    


 


Est GFR ( Amer)  TNP   18  06:06    


 


Est GFR (Non-Af Amer)  TNP   18  06:06    


 


BUN/Creatinine Ratio  17.5   18  06:06    


 


Glucose  293 mg/dL ()  H  18  06:06    


 


POC Glucose  219 MG/DL (70 - 105)  H  18  12:03    


 


Hemoglobin A1c %  10.5 % (4.0-6.0)  H  18  05:26    


 


Uric Acid  11.9 mg/dL (4.4-7.6)  H  18  06:00    


 


Calcium  9.2 mg/dL (8.6-10.3)   18  06:06    


 


Phosphorus  4.1 mg/dL (2.5-5.0)   18  06:00    


 


Magnesium  2.9 mg/dL (1.9-2.7)  H  18  06:00    


 


Total Bilirubin  0.6 mg/dL (0.3-1.0)   18  09:11    


 


AST  17 U/L (13-39)   18  09:11    


 


ALT  13 U/L (7-52)   18  09:11    


 


Alkaline Phosphatase  81 U/L ()   18  09:11    


 


Total Protein  6.4 gm/dL (6.0-8.3)   18  09:11    


 


Albumin  3.0 gm/dL (4.2-5.5)  L  18  09:11    


 


Globulin  3.4 gm/dL  18  09:11    


 


Albumin/Globulin Ratio  0.9  (1.0-1.8)  L  18  09:11    


 


TSH  0.28 uIU/ml (0.34-5.60)  L  18  06:00    


 


Urine Source  CATH   18  03:00    


 


Urine Color  BROWN   18  03:00    


 


Urine Clarity  CLOUDY  (CLEAR)   18  03:00    


 


Urine pH  5.5  (4.6 - 8.0)   18  03:00    


 


Ur Specific Gravity  1.020  (1.005-1.030)   18  03:00    


 


Urine Protein  100 mg/dL (NEGATIVE)  H  18  03:00    


 


Urine Glucose (UA)  >=1000 mg/dL (NEGATIVE)  H  18  03:00    


 


Urine Ketones  TRACE mg/dL (NEGATIVE)   18  03:00    


 


Urine Blood  LARGE  (NEGATIVE)  H  18  03:00    


 


Urine Nitrate  NEGATIVE  (NEGATIVE)   18  03:00    


 


Urine Bilirubin  SMALL  (NEGATIVE)  H  18  03:00    


 


Urine Ictotest  NEGATIVE  (NEGATIVE)   18  03:00    


 


Urine Urobilinogen  0.2 E.U./dL (0.2 - 1.0)   18  03:00    


 


Ur Leukocyte Esterase  TRACE  (NEGATIVE)  H  18  03:00    


 


Urine RBC  >100 /hpf (0-5)  H  18  03:00    


 


Urine WBC  >100 /hpf (0-5)  H  18  03:00    


 


Ur Epithelial Cells  OCCASIONAL /lpf (FEW)   18  03:00    


 


Urine Bacteria  MODERATE /hpf (NONE SEEN)  H  18  03:00    


 


Urine Osmolality  749 mOsmol/kg  18  03:00    


 


Ur Random Sodium  39 mmol/L  18  03:00    


 


Urine Creatinine  136.0 mg/dl (39.0-259.0)   18  03:00    














- Physical Exam


Vitals and I&O: 


 Vital Signs











Temp  98.2 F   18 12:00


 


Pulse  96   18 12:00


 


Resp  20   18 12:00


 


BP  137/80   18 12:00


 


Pulse Ox  98   18 12:00








 Intake & Output











 18





 18:59 06:59 18:59


 


Intake Total 500 850 


 


Output Total 400  


 


Balance 100 850 


 


Weight (lbs) 78.018 kg 78.018 kg 


 


Intake:   


 


  Intake, IV Amount  100 


 


    Levofloxacin 500mg/100mL  100 





    500 mg In 100 ml @ 100   





    mls/hr IV Q24HR [facility] Rx#:   





    [id number]   


 


  Oral 500 750 


 


Output:   


 


  Urine 400  


 


Other:   


 


  # Voids  2 


 


  # Bowel Movements 1 1 











Active Medications: 


Current Medications





Acetaminophen (Tylenol)  650 mg PO Q6HR PRN


   PRN Reason: mild to moderate pain


   Stop: 18 19:27


   Last Admin: 18 03:51 Dose:  650 mg


Acetaminophen/Hydrocodone Bitart (Norco 5mg/325mg)  1 tab PO Q6H PRN


   PRN Reason: Pain (Moderate)


   Stop: 18 08:21


Allopurinol (Zyloprim)  100 mg PO DAILY [facility]


   Stop: 18 08:59


   Last Admin: 18 08:25 Dose:  100 mg


Castor Oil/Taiwanese Balsam/Trypsin (Venelex)  1 appl TP DAILY [facility]


   Stop: 18 15:59


   Last Admin: 18 08:32 Dose:  1 appl


Clonazepam (Klonopin)  1 mg PO BID RENETTA


   PRN Reason: Protocol


   Stop: 18 08:59


   Last Admin: 18 08:25 Dose:  1 mg


Famotidine (Pepcid)  20 mg PO BID [facility]


   Stop: 18 08:59


   Last Admin: 18 08:26 Dose:  20 mg


Heparin Sodium (Porcine) (Heparin)  5,000 units SUBQ Q12HR RENETTA


   PRN Reason: Protocol


   Stop: 18 20:59


   Last Admin: 18 08:26 Dose:  5,000 units


Levofloxacin (Levaquin Pb)  500 mg in 100 mls @ 100 mls/hr IV Q24HR [facility]ETTA


   Stop: 18 05:59


   Last Infusion: 18 06:56 Dose:  Infused


Insulin Aspart (Novolog Insulin Sliding Scale)  0 units SUBQ ACHS RENETTA


   PRN Reason: Protocol


   Stop: 18 20:59


   Last Admin: 18 12:37 Dose:  4 units


Insulin Detemir (Levemir Insulin)  14 units SUBQ DAILY R[facility]ETTA


   PRN Reason: Protocol


   Stop: 18 19:58


   Last Admin: 18 08:36 Dose:  14 unit


Lorazepam (Ativan)  0.5 mg PO Q6HR PRN; Protocol


   PRN Reason: agitation 


   Stop: 18 19:27


   Last Admin: 18 04:38 Dose:  0.5 mg


Magnesium Hydroxide (Milk Of Magnesia)  30 ml PO HS PRN


   PRN Reason: Constipation


   Stop: 18 19:27


Metoprolol Succinate (Toprol Xl)  25 mg PO BID RENETTA


   Stop: 18 16:59


   Last Admin: 18 08:25 Dose:  25 mg


Miscellaneous (Clinical Monitoring)  1 ea MC PRN PRN


   PRN Reason: RENAL


   Stop: 18 10:33


Olanzapine (Zyprexa Zydis)  10 mg PO BID RENETTA


   PRN Reason: Protocol


   Stop: 18 06:50


   Last Admin: 18 08:26 Dose:  10 mg


Sodium Polystyrene Sulfonate (Kayexalate)  30 gm PO X1 ONE


   Stop: 18 15:37


Zolpidem Tartrate (Ambien)  5 mg PO HS PRN


   PRN Reason: insomnia


   Stop: 18 19:27


   Last Admin: 18 00:10 Dose:  5 mg








General: Alert, Oriented x3


HEENT: Atraumatic, PERRLA


Neck: Supple


Cardiovascular: Regular rate, Normal S1, Normal S2


Lungs: Clear to auscultation


Abdomen: Bowel sounds, Soft


Extremities: no Clubbing, no Cyanosis, no Edema


Neurological: Sensation intact


Skin: Rash


Psych/Mental Status: Mood NL





- Procedures


Procedures: 


 Procedures











Procedure Code Date


 


KEV MUSC/FASCIA 20 SQ CM/< 30016 18


 


EXCISION OF RIGHT HIP MUSCLE, OPEN APPROACH [id number] 18














Assessment/Plan





- Assessment


Assessment: 





BRYANT on CKD


Stage 4 decub ulcer S/P debridement


Severe dehydration


Ess HTN


Type 2 DM


Psychosis








- Plan


Plan: 


Lab - Result Diagrams





 18 06:00 





 18 06:00 





Current Medications





Acetaminophen (Tylenol)  650 mg PO Q6HR PRN


   PRN Reason: mild to moderate pain


   Stop: 18 19:27


Allopurinol (Zyloprim)  100 mg PO DAILY [facility]


   Stop: 18 08:59


   Last Admin: 18 10:00 Dose:  Not Given


Castor Oil/Taiwanese Balsam/Trypsin (Venelex)  1 appl TP DAILY [facility]TA


   Stop: 18 15:59


   Last Admin: 18 10:00 Dose:  1 appl


Famotidine (Pepcid)  20 mg PO BID RENETTA


   Stop: 18 08:59


   Last Admin: 18 18:22 Dose:  20 mg


Heparin Sodium (Porcine) (Heparin)  5,000 units SUBQ Q12HR RENETTA


   PRN Reason: Protocol


   Stop: 18 20:59


   Last Admin: 18 07:50 Dose:  Not Given


Ceftriaxone Sodium 1 gm/ (Sodium Chloride)  50 mls @ 100 mls/hr IV Q24HR RENETTA


   Stop: 18 16:44


   Last Admin: 18 17:45 Dose:  100 mls/hr


Insulin Aspart (Novolog Insulin Sliding Scale)  0 units SUBQ ACHS RENETTA


   PRN Reason: Protocol


   Stop: 18 20:59


   Last Admin: 18 18:23 Dose:  6 units


Lorazepam (Ativan)  0.5 mg PO Q6HR PRN; Protocol


   PRN Reason: agitation 


   Stop: 18 19:27


Magnesium Hydroxide (Milk Of Magnesia)  30 ml PO HS PRN


   PRN Reason: Constipation


   Stop: 18 19:27


Metoprolol Succinate (Toprol Xl)  25 mg PO BID [facility]


   Stop: 18 16:59


   Last Admin: 18 18:23 Dose:  25 mg


Miscellaneous (Clinical Monitoring)  1 ea MC PRN PRN


   PRN Reason: RENAL


   Stop: 18 10:33


Olanzapine (Zyprexa Zydis)  5 mg PO BID RENETTA


   PRN Reason: Protocol


   Stop: 18 16:59


Zolpidem Tartrate (Ambien)  5 mg PO HS PRN


   PRN Reason: insomnia


   Stop: 18 19:27








Lab - Result Diagrams





 18 09:11 





 18 06:06 
































Na up to 146


Kidney fnc gradually improving


agree w/ Allopurinol


f/u electrolytes, agree w/ Levaquin


continue hydration


BS still OOC


increase Detemir


kayexalate for hyperkalemia





Nutritional Asmnt/Malnutr-PDOC





- Dietary Evaluation


Malnutrition Findings (Please click <Entered> for more info): 








Nutritional Asmnt/Malnutrition                             Start:  18 17:

10


Text:                                                      Status: Complete    

  


Freq:                                                                          

  


 Document     18 17:13  MAGEN  (Rec: 18 17:25  MAGEN  [id number])


 Nutritional Asmnt/Malnutrition


     Patient General Information


      Nutritional Screening                      High Risk


                                                 Consult


      Diagnosis                                  pressure ulcer, hyperglycemia,


                                                 dehydration


      Pertinent Medical Hx/Surgical Hx           DM, dementia, depression,


                                                 psychosis, schizophrenia,


                                                 chronic renal insuff


      Subjective Information                     Consult received for


                                                 unstageble pressure ulcer on , high BG on . Per


                                                 records, PO intake 50-75%. Pt


                                                 was on NPO today for surgery-


                                                 excisional debridement,


                                                 application of wound VAC


      Current Diet Order/ Nutrition Support      pureed, CCHO 60gm


      Pertinent Medications                      novolog


      Pertinent Labs                              Na 152, K 4.6, BUN 56, Cr


                                                 1.8, Glucose 262, -246


                                                 , Mg 2.9


     Nutritional Hx/Data


      Height                                     1.8 m


      Height (Calculated Centimeters)            180.3


      Current Weight (lbs)                       70.76 kg


      Weight (Calculated Kilograms)              70.8


      Weight (Calculated Grams)                  40866.4


      Ideal Body Weight                          172


      % Ideal Body Weight                        91


      Body Mass Index (BMI)                      21.7


      Weight Status                              Approriate


     GI Symptoms


      GI Symptoms                                None


      Last BM                                    


      Difficult in:                              None


      Skin Integrity/Comment:                    scar to right upper back,


                                                 decubitus ulcer to buttocks


      Current %PO                                Fair (50-74%)


     Estimated Nutritional Goals


      BEE in Kcals:                              Using Current wt


      Calories/Kcals/Kg                          27-32


      Kcals Calculated                           7052-7535


      Protein:                                   Using Current wt


      Protein g/k-1.2


      Protein Calculated                         71-85


      Fluid: ml                                  1917-2130ml (1ml/kcal)


     Nutritional Problem


      2. Problem


       Problem                                   altered nutrition related lab


                                                 values


       Etiology                                  hx of DM


       Signs/Symptoms:                           Glucose 262, -246


      1. Problem


       Problem                                   increased nutrition needs (


                                                 calorie and protein)


       Etiology                                  increased metabolic demand for


                                                 wound healing


       Signs/Symptoms:                           decubitus ulcer and surgery


     Intervention/Recommendation


      Comments                                   1. Continue with current diet


                                                 as ordered. If PO intake low,


                                                 will consider nutrition


                                                 supplements


                                                 2. MD to consider vit C and


                                                 zinc for wound healing


                                                 3. Monitor PO intake, wt, labs


                                                 and skin integrity


                                                 4. F/U as moderate risk in 3-5


                                                 days, 3/2-3/, PO check 3/


     Expected Outcomes/Goals


      Expected Outcomes/Goals                    1. PO intake to meet at least


                                                 75% of nutritional needs.


                                                 2. Wt stability, skin to


                                                 remain intact, labs to


                                                 approach WNL.

## 2018-03-02 NOTE — PATHOLOGY REPORT
Collection Date:  2/27/2018



Surgeon:  Dr. PATRIC Jones



Specimen Description:  Sacral decubitus ulcer



Gross Description:  Received in formalin is a 3.8 x 1.2 x 1.1 cm portion of

tan-gray, necrotic-appearing skin and subcutaneous tissue.  Sectioning shows

degenerative changes and necrosis.  Representative sections are submitted in one

cassette.



Microscopic Description:  The histologic sections show skin with extensive

suppurative inflammation consisting of large collections of neutrophils with

areas of necrosis.  



Diagnosis:  Necrotic skin consistent with debridement, sacral decubitus ulcer.





DD: 03/02/2018 14:19

DT: 03/02/2018 15:43

[facility]# [id number]  [id number]

## 2018-03-02 NOTE — GENERAL PROGRESS NOTE
Subjective





- Review of Systems


Service Date: 18


Events since last encounter: 





patient scratches frequently, very restless, unable to keep wound vac in place


DC wound vac and use Venelex for local wound care





Objective





- Results


Result Diagrams: 


 18 09:11





 18 06:06


Recent Labs: 


 Laboratory Last Values











WBC  6.7 Th/cmm (4.8-10.8)  D 18  09:11    


 


RBC  4.73 Mil/cmm (3.80-5.80)   18  09:11    


 


Hgb  13.7 gm/dL (12-16)   18  09:11    


 


Hct  42.8 % (41.0-60)   18  09:11    


 


MCV  90.5 fl (80-99)   18  09:11    


 


MCH  28.9 pg (27.0-31.0)   18  09:11    


 


MCHC Differential  32.0 pg (28.0-36.0)   18  09:11    


 


RDW  16.2 % (11.5-20.0)   18  09:11    


 


Plt Count  162 Th/cmm (150-400)   18  09:11    


 


MPV  9.7 fl  18  09:11    


 


Neutrophils %  72.2 % (40.0-80.0)   18  09:11    


 


Lymphocytes %  19.2 % (20.0-50.0)  L  18  09:11    


 


Monocytes %  5.9 % (2.0-10.0)   18  09:11    


 


Eosinophils %  1.5 % (0.0-5.0)   18  09:11    


 


Basophils %  1.2 % (0.0-2.0)   18  09:11    


 


Eos Smear Source  URINE   18  03:00    


 


Eos Smear Total Cells  FEW EOSINOPHILS SEEN  (NONE SEEN)   18  03:00    


 


PT  10.5 SECONDS (9.5-11.5)   18  06:00    


 


INR  1.01  (0.5-1.4)   18  06:00    


 


PTT (Actin FS)  25.3 SECONDS (26.0-38.0)  L  18  06:00    


 


Sodium  146 mEq/L (136-145)  H  18  06:06    


 


Potassium  5.2 mEq/L (3.5-5.1)  H  18  06:06    


 


Chloride  114 mEq/L ()  H  18  06:06    


 


Carbon Dioxide  28.1 mEq/L (21.0-31.0)   18  06:06    


 


Anion Gap  9.1  (7.0-16.0)   18  06:06    


 


BUN  21 mg/dL (7-25)   18  06:06    


 


Creatinine  1.2 mg/dL (0.7-1.3)   18  06:06    


 


Est GFR ( Amer)  TNP   18  06:06    


 


Est GFR (Non-Af Amer)  TNP   18  06:06    


 


BUN/Creatinine Ratio  17.5   18  06:06    


 


Glucose  293 mg/dL ()  H  18  06:06    


 


POC Glucose  219 MG/DL (70 - 105)  H  18  12:03    


 


Hemoglobin A1c %  10.5 % (4.0-6.0)  H  18  05:26    


 


Uric Acid  11.9 mg/dL (4.4-7.6)  H  18  06:00    


 


Calcium  9.2 mg/dL (8.6-10.3)   18  06:06    


 


Phosphorus  4.1 mg/dL (2.5-5.0)   18  06:00    


 


Magnesium  2.9 mg/dL (1.9-2.7)  H  18  06:00    


 


Total Bilirubin  0.6 mg/dL (0.3-1.0)   18  09:11    


 


AST  17 U/L (13-39)   18  09:11    


 


ALT  13 U/L (7-52)   18  09:11    


 


Alkaline Phosphatase  81 U/L ()   18  09:11    


 


Total Protein  6.4 gm/dL (6.0-8.3)   18  09:11    


 


Albumin  3.0 gm/dL (4.2-5.5)  L  18  09:11    


 


Globulin  3.4 gm/dL  18  09:11    


 


Albumin/Globulin Ratio  0.9  (1.0-1.8)  L  18  09:11    


 


TSH  0.28 uIU/ml (0.34-5.60)  L  18  06:00    


 


Urine Source  CATH   18  03:00    


 


Urine Color  BROWN   18  03:00    


 


Urine Clarity  CLOUDY  (CLEAR)   18  03:00    


 


Urine pH  5.5  (4.6 - 8.0)   18  03:00    


 


Ur Specific Gravity  1.020  (1.005-1.030)   18  03:00    


 


Urine Protein  100 mg/dL (NEGATIVE)  H  18  03:00    


 


Urine Glucose (UA)  >=1000 mg/dL (NEGATIVE)  H  18  03:00    


 


Urine Ketones  TRACE mg/dL (NEGATIVE)   18  03:00    


 


Urine Blood  LARGE  (NEGATIVE)  H  18  03:00    


 


Urine Nitrate  NEGATIVE  (NEGATIVE)   18  03:00    


 


Urine Bilirubin  SMALL  (NEGATIVE)  H  18  03:00    


 


Urine Ictotest  NEGATIVE  (NEGATIVE)   18  03:00    


 


Urine Urobilinogen  0.2 E.U./dL (0.2 - 1.0)   18  03:00    


 


Ur Leukocyte Esterase  TRACE  (NEGATIVE)  H  18  03:00    


 


Urine RBC  >100 /hpf (0-5)  H  18  03:00    


 


Urine WBC  >100 /hpf (0-5)  H  18  03:00    


 


Ur Epithelial Cells  OCCASIONAL /lpf (FEW)   18  03:00    


 


Urine Bacteria  MODERATE /hpf (NONE SEEN)  H  18  03:00    


 


Urine Osmolality  749 mOsmol/kg  18  03:00    


 


Ur Random Sodium  39 mmol/L  18  03:00    


 


Urine Creatinine  136.0 mg/dl (39.0-259.0)   18  03:00    














- Physical Exam


Vitals and I&O: 


 Vital Signs











Temp  97.6 F   18 04:00


 


Pulse  98   18 08:25


 


Resp  18   18 08:00


 


BP  153/100   18 08:25


 


Pulse Ox  98   18 04:00








 Intake & Output











 18





 18:59 06:59 18:59


 


Intake Total 500 850 


 


Output Total 400  


 


Balance 100 850 


 


Weight (lbs) 78.018 kg 78.018 kg 


 


Intake:   


 


  Intake, IV Amount  100 


 


    Levofloxacin 500mg/100mL  100 





    500 mg In 100 ml @ 100   





    mls/hr IV Q24HR [facility] Rx#:   





    [id number]   


 


  Oral 500 750 


 


Output:   


 


  Urine 400  


 


Other:   


 


  # Voids  2 


 


  # Bowel Movements 1 1 











Active Medications: 


Current Medications





Acetaminophen (Tylenol)  650 mg PO Q6HR PRN


   PRN Reason: mild to moderate pain


   Stop: 18 19:27


   Last Admin: 18 03:51 Dose:  650 mg


Acetaminophen/Hydrocodone Bitart (Norco 5mg/325mg)  1 tab PO Q6H PRN


   PRN Reason: Pain (Moderate)


   Stop: 18 08:21


Allopurinol (Zyloprim)  100 mg PO DAILY RENETTA


   Stop: 18 08:59


   Last Admin: 18 08:25 Dose:  100 mg


Castor Oil/Bermudian Balsam/Trypsin (Venelex)  1 appl TP DAILY RENETTA


   Stop: 18 15:59


   Last Admin: 18 08:32 Dose:  1 appl


Clonazepam (Klonopin)  1 mg PO BID RENETTA


   PRN Reason: Protocol


   Stop: 18 08:59


   Last Admin: 18 08:25 Dose:  1 mg


Famotidine (Pepcid)  20 mg PO BID RENETTA


   Stop: 18 08:59


   Last Admin: 18 08:26 Dose:  20 mg


Heparin Sodium (Porcine) (Heparin)  5,000 units SUBQ Q12HR RENETTA


   PRN Reason: Protocol


   Stop: 18 20:59


   Last Admin: 18 08:26 Dose:  5,000 units


Levofloxacin (Levaquin Pb)  500 mg in 100 mls @ 100 mls/hr IV Q24HR RENETTA


   Stop: 18 05:59


   Last Infusion: 18 06:56 Dose:  Infused


Insulin Aspart (Novolog Insulin Sliding Scale)  0 units SUBQ ACHS RENETTA


   PRN Reason: Protocol


   Stop: 18 20:59


   Last Admin: 18 12:37 Dose:  4 units


Insulin Detemir (Levemir Insulin)  14 units SUBQ DAILY RENETTA


   PRN Reason: Protocol


   Stop: 18 19:58


   Last Admin: 18 08:36 Dose:  14 unit


Lorazepam (Ativan)  0.5 mg PO Q6HR PRN; Protocol


   PRN Reason: agitation 


   Stop: 18 19:27


   Last Admin: 18 04:38 Dose:  0.5 mg


Magnesium Hydroxide (Milk Of Magnesia)  30 ml PO HS PRN


   PRN Reason: Constipation


   Stop: 18 19:27


Metoprolol Succinate (Toprol Xl)  25 mg PO BID RENETTA


   Stop: 18 16:59


   Last Admin: 18 08:25 Dose:  25 mg


Miscellaneous (Clinical Monitoring)  1 ea MC PRN PRN


   PRN Reason: RENAL


   Stop: 18 10:33


Olanzapine (Zyprexa Zydis)  10 mg PO BID RENETTA


   PRN Reason: Protocol


   Stop: 18 06:50


   Last Admin: 18 08:26 Dose:  10 mg


Zolpidem Tartrate (Ambien)  5 mg PO HS PRN


   PRN Reason: insomnia


   Stop: 18 19:27


   Last Admin: 18 00:10 Dose:  5 mg








General: Alert, Oriented x3


HEENT: Atraumatic, PERRLA


Neck: Supple


Cardiovascular: Regular rate, Normal S1, Normal S2


Lungs: Clear to auscultation


Abdomen: Bowel sounds, Soft


Extremities: no Clubbing, no Cyanosis, no Edema


Neurological: Sensation intact


Skin: Rash


Psych/Mental Status: Mood NL





- Procedures


Procedures: 


 Procedures











Procedure Code Date


 


KEV MUSC/FASCIA 20 SQ CM/< 06901 18


 


EXCISION OF RIGHT HIP MUSCLE, OPEN APPROACH [id number]














Nutritional Asmnt/Malnutr-PDOC





- Dietary Evaluation


Malnutrition Findings (Please click <Entered> for more info): 








Nutritional Asmnt/Malnutrition                             Start:  18 17:

10


Text:                                                      Status: Complete    

  


Freq:                                                                          

  


 Document     18 17:13  MAGEN  (Rec: 18 17:25  [facility]


 Nutritional Asmnt/Malnutrition


     Patient General Information


      Nutritional Screening                      High Risk


                                                 Consult


      Diagnosis                                  pressure ulcer, hyperglycemia,


                                                 dehydration


      Pertinent Medical Hx/Surgical Hx           DM, dementia, depression,


                                                 psychosis, schizophrenia,


                                                 chronic renal insuff


      Subjective Information                     Consult received for


                                                 unstageble pressure ulcer on , high BG on . Per


                                                 records, PO intake 50-75%. Pt


                                                 was on NPO today for surgery-


                                                 excisional debridement,


                                                 application of wound VAC


      Current Diet Order/ Nutrition Support      pureed, CCHO 60gm


      Pertinent Medications                      novolog


      Pertinent Labs                              Na 152, K 4.6, BUN 56, Cr


                                                 1.8, Glucose 262, -246


                                                 , Mg 2.9


     Nutritional Hx/Data


      Height                                     1.8 m


      Height (Calculated Centimeters)            180.3


      Current Weight (lbs)                       70.76 kg


      Weight (Calculated Kilograms)              70.8


      Weight (Calculated Grams)                  12560.4


      Ideal Body Weight                          172


      % Ideal Body Weight                        91


      Body Mass Index (BMI)                      21.7


      Weight Status                              Approriate


     GI Symptoms


      GI Symptoms                                None


      Last BM                                    


      Difficult in:                              None


      Skin Integrity/Comment:                    scar to right upper back,


                                                 decubitus ulcer to buttocks


      Current %PO                                Fair (50-74%)


     Estimated Nutritional Goals


      BEE in Kcals:                              Using Current wt


      Calories/Kcals/Kg                          27-32


      Kcals Calculated                           2787-3721


      Protein:                                   Using Current wt


      Protein g/k-1.2


      Protein Calculated                         71-85


      Fluid: ml                                  1917-2130ml (1ml/kcal)


     Nutritional Problem


      2. Problem


       Problem                                   altered nutrition related lab


                                                 values


       Etiology                                  hx of DM


       Signs/Symptoms:                           Glucose 262, -246


      1. Problem


       Problem                                   increased nutrition needs (


                                                 calorie and protein)


       Etiology                                  increased metabolic demand for


                                                 wound healing


       Signs/Symptoms:                           decubitus ulcer and surgery


     Intervention/Recommendation


      Comments                                   1. Continue with current diet


                                                 as ordered. If PO intake low,


                                                 will consider nutrition


                                                 supplements


                                                 2. MD to consider vit C and


                                                 zinc for wound healing


                                                 3. Monitor PO intake, wt, labs


                                                 and skin integrity


                                                 4. F/U as moderate risk in 3-5


                                                 days, 3/2-3/4, PO check 3/


     Expected Outcomes/Goals


      Expected Outcomes/Goals                    1. PO intake to meet at least


                                                 75% of nutritional needs.


                                                 2. Wt stability, skin to


                                                 remain intact, labs to


                                                 approach WNL.

## 2018-03-02 NOTE — GENERAL PROGRESS NOTE
Subjective





- Review of Systems


Service Date: 18


Subjective: 





Patient was seen and examined. Oral Intake has improved. still confused. no 

acute distress.





Objective





- Results


Result Diagrams: 


 18 09:11





 18 06:06


Recent Labs: 


 Laboratory Last Values











WBC  6.7 Th/cmm (4.8-10.8)  D 18  09:11    


 


RBC  4.73 Mil/cmm (3.80-5.80)   18  09:11    


 


Hgb  13.7 gm/dL (12-16)   18  09:11    


 


Hct  42.8 % (41.0-60)   18  09:11    


 


MCV  90.5 fl (80-99)   18  09:11    


 


MCH  28.9 pg (27.0-31.0)   18  09:11    


 


MCHC Differential  32.0 pg (28.0-36.0)   18  09:11    


 


RDW  16.2 % (11.5-20.0)   18  09:11    


 


Plt Count  162 Th/cmm (150-400)   18  09:11    


 


MPV  9.7 fl  18  09:11    


 


Neutrophils %  72.2 % (40.0-80.0)   18  09:11    


 


Lymphocytes %  19.2 % (20.0-50.0)  L  18  09:11    


 


Monocytes %  5.9 % (2.0-10.0)   18  09:11    


 


Eosinophils %  1.5 % (0.0-5.0)   18  09:11    


 


Basophils %  1.2 % (0.0-2.0)   18  09:11    


 


Eos Smear Source  URINE   18  03:00    


 


Eos Smear Total Cells  FEW EOSINOPHILS SEEN  (NONE SEEN)   18  03:00    


 


PT  10.5 SECONDS (9.5-11.5)   18  06:00    


 


INR  1.01  (0.5-1.4)   18  06:00    


 


PTT (Actin FS)  25.3 SECONDS (26.0-38.0)  L  18  06:00    


 


Sodium  146 mEq/L (136-145)  H  18  06:06    


 


Potassium  5.2 mEq/L (3.5-5.1)  H  18  06:06    


 


Chloride  114 mEq/L ()  H  18  06:06    


 


Carbon Dioxide  28.1 mEq/L (21.0-31.0)   18  06:06    


 


Anion Gap  9.1  (7.0-16.0)   18  06:06    


 


BUN  21 mg/dL (7-25)   18  06:06    


 


Creatinine  1.2 mg/dL (0.7-1.3)   18  06:06    


 


Est GFR ( Amer)  TNP   18  06:06    


 


Est GFR (Non-Af Amer)  TNP   18  06:06    


 


BUN/Creatinine Ratio  17.5   18  06:06    


 


Glucose  293 mg/dL ()  H  18  06:06    


 


POC Glucose  251 MG/DL (70 - 105)  H  18  07:04    


 


Hemoglobin A1c %  10.5 % (4.0-6.0)  H  18  05:26    


 


Uric Acid  11.9 mg/dL (4.4-7.6)  H  18  06:00    


 


Calcium  9.2 mg/dL (8.6-10.3)   18  06:06    


 


Phosphorus  4.1 mg/dL (2.5-5.0)   18  06:00    


 


Magnesium  2.9 mg/dL (1.9-2.7)  H  18  06:00    


 


Total Bilirubin  0.6 mg/dL (0.3-1.0)   18  09:11    


 


AST  17 U/L (13-39)   18  09:11    


 


ALT  13 U/L (7-52)   18  09:11    


 


Alkaline Phosphatase  81 U/L ()   18  09:11    


 


Total Protein  6.4 gm/dL (6.0-8.3)   18  09:11    


 


Albumin  3.0 gm/dL (4.2-5.5)  L  18  09:11    


 


Globulin  3.4 gm/dL  18  09:11    


 


Albumin/Globulin Ratio  0.9  (1.0-1.8)  L  18  09:11    


 


TSH  0.28 uIU/ml (0.34-5.60)  L  18  06:00    


 


Urine Source  CATH   18  03:00    


 


Urine Color  BROWN   18  03:00    


 


Urine Clarity  CLOUDY  (CLEAR)   18  03:00    


 


Urine pH  5.5  (4.6 - 8.0)   18  03:00    


 


Ur Specific Gravity  1.020  (1.005-1.030)   18  03:00    


 


Urine Protein  100 mg/dL (NEGATIVE)  H  18  03:00    


 


Urine Glucose (UA)  >=1000 mg/dL (NEGATIVE)  H  18  03:00    


 


Urine Ketones  TRACE mg/dL (NEGATIVE)   18  03:00    


 


Urine Blood  LARGE  (NEGATIVE)  H  18  03:00    


 


Urine Nitrate  NEGATIVE  (NEGATIVE)   18  03:00    


 


Urine Bilirubin  SMALL  (NEGATIVE)  H  18  03:00    


 


Urine Ictotest  NEGATIVE  (NEGATIVE)   18  03:00    


 


Urine Urobilinogen  0.2 E.U./dL (0.2 - 1.0)   18  03:00    


 


Ur Leukocyte Esterase  TRACE  (NEGATIVE)  H  18  03:00    


 


Urine RBC  >100 /hpf (0-5)  H  18  03:00    


 


Urine WBC  >100 /hpf (0-5)  H  18  03:00    


 


Ur Epithelial Cells  OCCASIONAL /lpf (FEW)   18  03:00    


 


Urine Bacteria  MODERATE /hpf (NONE SEEN)  H  18  03:00    


 


Ur Random Sodium  39 mmol/L  18  03:00    


 


Urine Creatinine  136.0 mg/dl (39.0-259.0)   18  03:00    














- Physical Exam


Vitals and I&O: 


 Vital Signs











Temp  97.6 F   18 04:00


 


Pulse  98   18 08:25


 


Resp  18   18 04:00


 


BP  153/100   18 08:25


 


Pulse Ox  98   18 04:00








 Intake & Output











 18





 18:59 06:59 18:59


 


Intake Total 500 850 


 


Output Total 400  


 


Balance 100 850 


 


Weight (lbs) 78.018 kg 78.018 kg 


 


Intake:   


 


  Intake, IV Amount  100 


 


    Levofloxacin 500mg/100mL  100 





    500 mg In 100 ml @ 100   





    mls/hr IV Q24HR [facility] Rx#:   





    [id number]   


 


  Oral 500 750 


 


Output:   


 


  Urine 400  


 


Other:   


 


  # Voids  2 


 


  # Bowel Movements 1 1 











Active Medications: 


Current Medications





Acetaminophen (Tylenol)  650 mg PO Q6HR PRN


   PRN Reason: mild to moderate pain


   Stop: 18 19:27


   Last Admin: 18 03:51 Dose:  650 mg


Acetaminophen/Hydrocodone Bitart (Norco 5mg/325mg)  1 tab PO Q6H PRN


   PRN Reason: Pain (Moderate)


   Stop: 18 08:21


Allopurinol (Zyloprim)  100 mg PO DAILY [facility]


   Stop: 18 08:59


   Last Admin: 18 08:25 Dose:  100 mg


Castor Oil/Burkinan Balsam/Trypsin (Venelex)  1 appl TP DAILY [facility]


   Stop: 18 15:59


   Last Admin: 18 09:55 Dose:  Not Given


Clonazepam (Klonopin)  1 mg PO BID [facility]


   PRN Reason: Protocol


   Stop: 18 08:59


   Last Admin: 18 08:25 Dose:  1 mg


Famotidine (Pepcid)  20 mg PO BID [facility]


   Stop: 18 08:59


   Last Admin: 18 08:26 Dose:  20 mg


Heparin Sodium (Porcine) (Heparin)  5,000 units SUBQ Q12HR [facility]


   PRN Reason: Protocol


   Stop: 18 20:59


   Last Admin: 18 22:24 Dose:  5,000 units


Levofloxacin (Levaquin Pb)  500 mg in 100 mls @ 100 mls/hr IV Q24HR [facility]


   Stop: 18 05:59


   Last Infusion: 18 06:56 Dose:  Infused


Insulin Aspart (Novolog Insulin Sliding Scale)  0 units SUBQ ACHS [facility]


   PRN Reason: Protocol


   Stop: 18 20:59


   Last Admin: 18 22:23 Dose:  Not Given


Insulin Detemir (Levemir Insulin)  14 units SUBQ DAILY [facility]


   PRN Reason: Protocol


   Stop: 18 19:58


Lorazepam (Ativan)  0.5 mg PO Q6HR PRN; Protocol


   PRN Reason: agitation 


   Stop: 18 19:27


   Last Admin: 18 04:38 Dose:  0.5 mg


Magnesium Hydroxide (Milk Of Magnesia)  30 ml PO HS PRN


   PRN Reason: Constipation


   Stop: 18 19:27


Metoprolol Succinate (Toprol Xl)  25 mg PO BID [facility]


   Stop: 18 16:59


   Last Admin: 18 08:25 Dose:  25 mg


Miscellaneous (Clinical Monitoring)  1 ea MC PRN PRN


   PRN Reason: RENAL


   Stop: 18 10:33


Olanzapine (Zyprexa Zydis)  10 mg PO BID [facility]TA


   PRN Reason: Protocol


   Stop: 18 06:50


   Last Admin: 18 18:53 Dose:  Not Given


Zolpidem Tartrate (Ambien)  5 mg PO HS PRN


   PRN Reason: insomnia


   Stop: 18 19:27


   Last Admin: 18 00:10 Dose:  5 mg








General: Alert, Oriented x3


HEENT: Atraumatic, PERRLA


Neck: Supple


Cardiovascular: Regular rate, Normal S1, Normal S2


Lungs: Clear to auscultation


Abdomen: Bowel sounds, Soft


Extremities: no Clubbing, no Cyanosis, no Edema


Neurological: Sensation intact


Skin: Rash


Psych/Mental Status: Mood NL





- Procedures


Procedures: 


 Procedures











Procedure Code Date


 


KEV MUSC/FASCIA 20 SQ CM/< 34522 18


 


EXCISION OF RIGHT HIP MUSCLE, OPEN APPROACH [id number]














Assessment/Plan





- Assessment


Assessment: 





hypernatremia


prerenal azotemia


hyperglycemia


depression/anxiety


gout


dementia


schizophrenia


S/P wound debridement of sacral decubitus


UTI


discharge planning.


BRYANT on CKD


hyperkalemia 








- Plan


Plan: 





will order repeat cmp,cbc tomorrow AM





renal consult -- Dr. Bajwa





sacral decubitus ... will order Gen Surgical consult -- Dr. Jones for possible 

wound debridement





Gout ... continue allopurinol 100mg PO daily





continue wound vac. 





continue hydrocodone PO for pain control. 





Nutritional Asmnt/Malnutr-PDOC





- Dietary Evaluation


Malnutrition Findings (Please click <Entered> for more info): 








Nutritional Asmnt/Malnutrition                             Start:  18 17:

10


Text:                                                      Status: Complete    

  


Freq:                                                                          

  


 Document     18 17:13  MAGEN  (Rec: 18 17:25  MAGEN  [id number])


 Nutritional Asmnt/Malnutrition


     Patient General Information


      Nutritional Screening                      High Risk


                                                 Consult


      Diagnosis                                  pressure ulcer, hyperglycemia,


                                                 dehydration


      Pertinent Medical Hx/Surgical Hx           DM, dementia, depression,


                                                 psychosis, schizophrenia,


                                                 chronic renal insuff


      Subjective Information                     Consult received for


                                                 unstageble pressure ulcer on , high BG on . Per


                                                 records, PO intake 50-75%. Pt


                                                 was on NPO today for surgery-


                                                 excisional debridement,


                                                 application of wound VAC


      Current Diet Order/ Nutrition Support      pureed, CCHO 60gm


      Pertinent Medications                      novolog


      Pertinent Labs                              Na 152, K 4.6, BUN 56, Cr


                                                 1.8, Glucose 262, -246


                                                 , Mg 2.9


     Nutritional Hx/Data


      Height                                     1.8 m


      Height (Calculated Centimeters)            180.3


      Current Weight (lbs)                       70.76 kg


      Weight (Calculated Kilograms)              70.8


      Weight (Calculated Grams)                  23097.4


      Ideal Body Weight                          172


      % Ideal Body Weight                        91


      Body Mass Index (BMI)                      21.7


      Weight Status                              Approriate


     GI Symptoms


      GI Symptoms                                None


      Last BM                                    


      Difficult in:                              None


      Skin Integrity/Comment:                    scar to right upper back,


                                                 decubitus ulcer to buttocks


      Current %PO                                Fair (50-74%)


     Estimated Nutritional Goals


      BEE in Kcals:                              Using Current wt


      Calories/Kcals/Kg                          27-32


      Kcals Calculated                           9756-6132


      Protein:                                   Using Current wt


      Protein g/k-1.2


      Protein Calculated                         71-85


      Fluid: ml                                  1917-2130ml (1ml/kcal)


     Nutritional Problem


      2. Problem


       Problem                                   altered nutrition related lab


                                                 values


       Etiology                                  hx of DM


       Signs/Symptoms:                           Glucose 262, -246


      1. Problem


       Problem                                   increased nutrition needs (


                                                 calorie and protein)


       Etiology                                  increased metabolic demand for


                                                 wound healing


       Signs/Symptoms:                           decubitus ulcer and surgery


     Intervention/Recommendation


      Comments                                   1. Continue with current diet


                                                 as ordered. If PO intake low,


                                                 will consider nutrition


                                                 supplements


                                                 2. MD to consider vit C and


                                                 zinc for wound healing


                                                 3. Monitor PO intake, wt, labs


                                                 and skin integrity


                                                 4. F/U as moderate risk in 3-5


                                                 days, 3/-3/4, PO check 3/2


     Expected Outcomes/Goals


      Expected Outcomes/Goals                    1. PO intake to meet at least


                                                 75% of nutritional needs.


                                                 2. Wt stability, skin to


                                                 remain intact, labs to


                                                 approach WNL.

## 2018-03-03 LAB
ANION GAP SERPL CALC-SCNC: 8.5 MMOL/L (ref 7–16)
BUN SERPL-MCNC: 20 MG/DL (ref 7–25)
CALCIUM SERPL-MCNC: 8.8 MG/DL (ref 8.6–10.3)
CHLORIDE SERPL-SCNC: 112 MEQ/L (ref 98–107)
CO2 SERPL-SCNC: 29.4 MEQ/L (ref 21–31)
CREAT SERPL-MCNC: 1.2 MG/DL (ref 0.7–1.3)
GLUCOSE SERPL-MCNC: 191 MG/DL (ref 70–105)
POTASSIUM SERPL-SCNC: 3.9 MEQ/L (ref 3.5–5.1)
SODIUM SERPL-SCNC: 146 MEQ/L (ref 136–145)

## 2018-03-03 RX ADMIN — INSULIN ASPART SCH UNITS: 100 INJECTION, SOLUTION INTRAVENOUS; SUBCUTANEOUS at 08:42

## 2018-03-03 RX ADMIN — INSULIN ASPART SCH UNITS: 100 INJECTION, SOLUTION INTRAVENOUS; SUBCUTANEOUS at 13:15

## 2018-03-03 RX ADMIN — OLANZAPINE SCH MG: 5 TABLET, ORALLY DISINTEGRATING ORAL at 08:45

## 2018-03-03 RX ADMIN — INSULIN ASPART SCH: 100 INJECTION, SOLUTION INTRAVENOUS; SUBCUTANEOUS at 17:31

## 2018-03-03 RX ADMIN — OLANZAPINE SCH MG: 5 TABLET, ORALLY DISINTEGRATING ORAL at 17:31

## 2018-03-03 RX ADMIN — LEVOFLOXACIN SCH MLS/HR: 5 INJECTION INTRAVENOUS at 05:04

## 2018-03-03 RX ADMIN — INSULIN DETEMIR SCH UNITS: 100 INJECTION, SOLUTION SUBCUTANEOUS at 08:40

## 2018-03-03 RX ADMIN — INSULIN ASPART SCH: 100 INJECTION, SOLUTION INTRAVENOUS; SUBCUTANEOUS at 20:37

## 2018-03-03 RX ADMIN — CASTOR OIL AND BALSAM, PERU SCH APPL: 788; 87 OINTMENT TOPICAL at 08:46

## 2018-03-03 NOTE — GENERAL PROGRESS NOTE
Subjective





- Review of Systems


Service Date: 18


Subjective: 





awake, verbal, still confused





Objective





- Results


Result Diagrams: 


 18 09:11





 18 05:54


Recent Labs: 


 Laboratory Last Values











WBC  6.7 Th/cmm (4.8-10.8)  D 18  09:11    


 


RBC  4.73 Mil/cmm (3.80-5.80)   18  09:11    


 


Hgb  13.7 gm/dL (12-16)   18  09:11    


 


Hct  42.8 % (41.0-60)   18  09:11    


 


MCV  90.5 fl (80-99)   18  09:11    


 


MCH  28.9 pg (27.0-31.0)   18  09:11    


 


MCHC Differential  32.0 pg (28.0-36.0)   18  09:11    


 


RDW  16.2 % (11.5-20.0)   18  09:11    


 


Plt Count  162 Th/cmm (150-400)   18  09:11    


 


MPV  9.7 fl  18  09:11    


 


Neutrophils %  72.2 % (40.0-80.0)   18  09:11    


 


Lymphocytes %  19.2 % (20.0-50.0)  L  18  09:11    


 


Monocytes %  5.9 % (2.0-10.0)   18  09:11    


 


Eosinophils %  1.5 % (0.0-5.0)   18  09:11    


 


Basophils %  1.2 % (0.0-2.0)   18  09:11    


 


Eos Smear Source  URINE   18  03:00    


 


Eos Smear Total Cells  FEW EOSINOPHILS SEEN  (NONE SEEN)   18  03:00    


 


PT  10.5 SECONDS (9.5-11.5)   18  06:00    


 


INR  1.01  (0.5-1.4)   18  06:00    


 


PTT (Actin FS)  25.3 SECONDS (26.0-38.0)  L  18  06:00    


 


Sodium  146 mEq/L (136-145)  H  18  05:54    


 


Potassium  3.9 mEq/L (3.5-5.1)   18  05:54    


 


Chloride  112 mEq/L ()  H  18  05:54    


 


Carbon Dioxide  29.4 mEq/L (21.0-31.0)   18  05:54    


 


Anion Gap  8.5  (7.0-16.0)   18  05:54    


 


BUN  20 mg/dL (7-25)   18  05:54    


 


Creatinine  1.2 mg/dL (0.7-1.3)   18  05:54    


 


Est GFR ( Amer)  TNP   18  05:54    


 


Est GFR (Non-Af Amer)  TNP   18  05:54    


 


BUN/Creatinine Ratio  16.7   18  05:54    


 


Glucose  191 mg/dL ()  H D 18  05:54    


 


POC Glucose  173 MG/DL (70 - 105)  H  18  12:10    


 


Hemoglobin A1c %  10.5 % (4.0-6.0)  H  18  05:26    


 


Uric Acid  11.9 mg/dL (4.4-7.6)  H  18  06:00    


 


Calcium  8.8 mg/dL (8.6-10.3)   18  05:54    


 


Phosphorus  4.1 mg/dL (2.5-5.0)   18  06:00    


 


Magnesium  2.9 mg/dL (1.9-2.7)  H  18  06:00    


 


Total Bilirubin  0.6 mg/dL (0.3-1.0)   18  09:11    


 


AST  17 U/L (13-39)   18  09:11    


 


ALT  13 U/L (7-52)   18  09:11    


 


Alkaline Phosphatase  81 U/L ()   18  09:11    


 


Total Protein  6.4 gm/dL (6.0-8.3)   18  09:11    


 


Albumin  3.0 gm/dL (4.2-5.5)  L  18  09:11    


 


Globulin  3.4 gm/dL  18  09:11    


 


Albumin/Globulin Ratio  0.9  (1.0-1.8)  L  18  09:11    


 


TSH  0.28 uIU/ml (0.34-5.60)  L  18  06:00    


 


Urine Source  CATH   18  03:00    


 


Urine Color  BROWN   18  03:00    


 


Urine Clarity  CLOUDY  (CLEAR)   18  03:00    


 


Urine pH  5.5  (4.6 - 8.0)   18  03:00    


 


Ur Specific Gravity  1.020  (1.005-1.030)   18  03:00    


 


Urine Protein  100 mg/dL (NEGATIVE)  H  18  03:00    


 


Urine Glucose (UA)  >=1000 mg/dL (NEGATIVE)  H  18  03:00    


 


Urine Ketones  TRACE mg/dL (NEGATIVE)   18  03:00    


 


Urine Blood  LARGE  (NEGATIVE)  H  18  03:00    


 


Urine Nitrate  NEGATIVE  (NEGATIVE)   18  03:00    


 


Urine Bilirubin  SMALL  (NEGATIVE)  H  18  03:00    


 


Urine Ictotest  NEGATIVE  (NEGATIVE)   18  03:00    


 


Urine Urobilinogen  0.2 E.U./dL (0.2 - 1.0)   18  03:00    


 


Ur Leukocyte Esterase  TRACE  (NEGATIVE)  H  18  03:00    


 


Urine RBC  >100 /hpf (0-5)  H  18  03:00    


 


Urine WBC  >100 /hpf (0-5)  H  18  03:00    


 


Ur Epithelial Cells  OCCASIONAL /lpf (FEW)   18  03:00    


 


Urine Bacteria  MODERATE /hpf (NONE SEEN)  H  18  03:00    


 


Urine Osmolality  749 mOsmol/kg  18  03:00    


 


Ur Random Sodium  39 mmol/L  18  03:00    


 


Urine Creatinine  136.0 mg/dl (39.0-259.0)   18  03:00    














- Physical Exam


Vitals and I&O: 


 Vital Signs











Temp  96.4 F   18 08:00


 


Pulse  108   18 08:44


 


Resp  18   18 08:00


 


BP  164/99   18 08:44


 


Pulse Ox  98   18 08:00








 Intake & Output











 18





 18:59 06:59 18:59


 


Intake Total  1300 


 


Output Total  500 


 


Balance  800 


 


Weight (lbs)  78.018 kg 


 


Intake:   


 


  Intake, IV Amount  100 


 


    Levofloxacin 500mg/100mL  100 





    500 mg In 100 ml @ 100   





    mls/hr IV Q24HR [facility]   





    [id number]   


 


  Oral  1200 


 


Output:   


 


  Urine  500 


 


Other:   


 


  # Bowel Movements  0 











Active Medications: 


Current Medications





Acetaminophen (Tylenol)  650 mg PO Q6HR PRN


   PRN Reason: mild to moderate pain


   Stop: 18 19:27


   Last Admin: 18 03:51 Dose:  650 mg


Acetaminophen/Hydrocodone Bitart (Norco 5mg/325mg)  1 tab PO Q6H PRN


   PRN Reason: Pain (Moderate)


   Stop: 18 08:21


Allopurinol (Zyloprim)  100 mg PO DAILY [facility]


   Stop: 18 08:59


   Last Admin: 18 08:45 Dose:  100 mg


Castor Oil/Andorran Balsam/Trypsin (Venelex)  1 appl TP DAILY [facility]


   Stop: 18 15:59


   Last Admin: 18 08:46 Dose:  1 appl


Clonazepam (Klonopin)  1 mg PO BID [facility]TA


   PRN Reason: Protocol


   Stop: 18 08:59


   Last Admin: 18 08:45 Dose:  1 mg


Famotidine (Pepcid)  20 mg PO BID [facility]


   Stop: 18 08:59


   Last Admin: 18 08:44 Dose:  20 mg


Heparin Sodium (Porcine) (Heparin)  5,000 units SUBQ Q12HR R[facility]ETTA


   PRN Reason: Protocol


   Stop: 18 20:59


   Last Admin: 18 08:44 Dose:  5,000 units


Levofloxacin (Levaquin Pb)  500 mg in 100 mls @ 100 mls/hr IV Q24HR [facility]


   Stop: 18 05:59


   Last Infusion: 18 06:59 Dose:  Infused


Insulin Aspart (Novolog Insulin Sliding Scale)  0 units SUBQ ACHS R[facility]ETTA


   PRN Reason: Protocol


   Stop: 18 20:59


   Last Admin: 18 13:15 Dose:  2 units


Insulin Detemir (Levemir Insulin)  16 units SUBQ DAILY [facility]


   PRN Reason: Protocol


   Stop: 18 08:59


   Last Admin: 18 08:40 Dose:  16 units


Lorazepam (Ativan)  0.5 mg PO Q6HR PRN; Protocol


   PRN Reason: agitation 


   Stop: 18 19:27


   Last Admin: 18 04:38 Dose:  0.5 mg


Magnesium Hydroxide (Milk Of Magnesia)  30 ml PO HS PRN


   PRN Reason: Constipation


   Stop: 18 19:27


Metoprolol Succinate (Toprol Xl)  25 mg PO BID [facility]


   Stop: 18 16:59


   Last Admin: 18 08:44 Dose:  25 mg


Miscellaneous (Clinical Monitoring)  1 ea MC PRN PRN


   PRN Reason: RENAL


   Stop: 18 10:33


Olanzapine (Zyprexa Zydis)  12.5 mg PO BID R[facility]TA


   PRN Reason: Protocol


   Stop: 18 12:28


Zolpidem Tartrate (Ambien)  5 mg PO HS PRN


   PRN Reason: insomnia


   Stop: 18 19:27


   Last Admin: 18 00:10 Dose:  5 mg








General: Alert, Oriented x3


HEENT: Atraumatic, PERRLA


Neck: Supple


Cardiovascular: Regular rate, Normal S1, Normal S2


Lungs: Clear to auscultation


Abdomen: Bowel sounds, Soft


Extremities: no Clubbing, no Cyanosis, no Edema


Neurological: Sensation intact


Skin: Rash


Psych/Mental Status: Mood NL





- Procedures


Procedures: 


 Procedures











Procedure Code Date


 


KEV MUSC/FASCIA 20 SQ CM/< 26029 18


 


EXCISION OF RIGHT HIP MUSCLE, OPEN APPROACH [id number]














Assessment/Plan





- Assessment


Assessment: 





BRYANT on CKD


Stage 4 decub ulcer S/P debridement


Severe dehydration


Ess HTN


Type 2 DM


Psychosis








- Plan


Plan: 


Lab - Result Diagrams





 18 06:00 





 18 06:00 





Current Medications





Acetaminophen (Tylenol)  650 mg PO Q6HR PRN


   PRN Reason: mild to moderate pain


   Stop: 18 19:27


Allopurinol (Zyloprim)  100 mg PO DAILY [facility]


   Stop: 18 08:59


   Last Admin: 18 10:00 Dose:  Not Given


Castor Oil/Andorran Balsam/Trypsin (Venelex)  1 appl TP DAILY [facility]


   Stop: 18 15:59


   Last Admin: 18 10:00 Dose:  1 appl


Famotidine (Pepcid)  20 mg PO BID [facility]


   Stop: 18 08:59


   Last Admin: 18 18:22 Dose:  20 mg


Heparin Sodium (Porcine) (Heparin)  5,000 units SUBQ Q12HR R[facility]ETTA


   PRN Reason: Protocol


   Stop: 18 20:59


   Last Admin: 18 07:50 Dose:  Not Given


Ceftriaxone Sodium 1 gm/ (Sodium Chloride)  50 mls @ 100 mls/hr IV Q24HR [facility]


   Stop: 18 16:44


   Last Admin: 18 17:45 Dose:  100 mls/hr


Insulin Aspart (Novolog Insulin Sliding Scale)  0 units SUBQ ACHS RENETTA


   PRN Reason: Protocol


   Stop: 18 20:59


   Last Admin: 18 18:23 Dose:  6 units


Lorazepam (Ativan)  0.5 mg PO Q6HR PRN; Protocol


   PRN Reason: agitation 


   Stop: 18 19:27


Magnesium Hydroxide (Milk Of Magnesia)  30 ml PO HS PRN


   PRN Reason: Constipation


   Stop: 18 19:27


Metoprolol Succinate (Toprol Xl)  25 mg PO BID [facility]


   Stop: 18 16:59


   Last Admin: 18 18:23 Dose:  25 mg


Miscellaneous (Clinical Monitoring)  1 ea MC PRN PRN


   PRN Reason: RENAL


   Stop: 18 10:33


Olanzapine (Zyprexa Zydis)  5 mg PO BID R[facility]ETTA


   PRN Reason: Protocol


   Stop: 18 16:59


Zolpidem Tartrate (Ambien)  5 mg PO HS PRN


   PRN Reason: insomnia


   Stop: 18 19:27


Lab - Result Diagrams





 18 09:11 





 18 05:54 





 



































Na up to 146


Kidney fnc gradually improving


agree w/ Allopurinol


f/u electrolytes, agree w/ Levaquin


continue hydration


BS better control


increase Detemir


kayexalate for hyperkalemia





Nutritional Asmnt/Malnutr-PDOC





- Dietary Evaluation


Malnutrition Findings (Please click <Entered> for more info): 








Nutritional Asmnt/Malnutrition                             Start:  18 17:

10


Text:                                                      Status: Complete    

  


Freq:                                                                          

  


 Document     18 17:13  MAGEN  (Rec: 18 17:25  MAGEN  [id number])


 Nutritional Asmnt/Malnutrition


     Patient General Information


      Nutritional Screening                      High Risk


                                                 Consult


      Diagnosis                                  pressure ulcer, hyperglycemia,


                                                 dehydration


      Pertinent Medical Hx/Surgical Hx           DM, dementia, depression,


                                                 psychosis, schizophrenia,


                                                 chronic renal insuff


      Subjective Information                     Consult received for


                                                 unstageble pressure ulcer on , high BG on . Per


                                                 records, PO intake 50-75%. Pt


                                                 was on NPO today for surgery-


                                                 excisional debridement,


                                                 application of wound VAC


      Current Diet Order/ Nutrition Support      pureed, CCHO 60gm


      Pertinent Medications                      novolog


      Pertinent Labs                              Na 152, K 4.6, BUN 56, Cr


                                                 1.8, Glucose 262, -246


                                                 , Mg 2.9


     Nutritional Hx/Data


      Height                                     1.8 m


      Height (Calculated Centimeters)            180.3


      Current Weight (lbs)                       70.76 kg


      Weight (Calculated Kilograms)              70.8


      Weight (Calculated Grams)                  53081.4


      Ideal Body Weight                          172


      % Ideal Body Weight                        91


      Body Mass Index (BMI)                      21.7


      Weight Status                              Approriate


     GI Symptoms


      GI Symptoms                                None


      Last BM                                    


      Difficult in:                              None


      Skin Integrity/Comment:                    scar to right upper back,


                                                 decubitus ulcer to buttocks


      Current %PO                                Fair (50-74%)


     Estimated Nutritional Goals


      BEE in Kcals:                              Using Current wt


      Calories/Kcals/Kg                          27-32


      Kcals Calculated                           4680-2361


      Protein:                                   Using Current wt


      Protein g/k-1.2


      Protein Calculated                         71-85


      Fluid: ml                                  1917-2130ml (1ml/kcal)


     Nutritional Problem


      2. Problem


       Problem                                   altered nutrition related lab


                                                 values


       Etiology                                  hx of DM


       Signs/Symptoms:                           Glucose 262, -246


      1. Problem


       Problem                                   increased nutrition needs (


                                                 calorie and protein)


       Etiology                                  increased metabolic demand for


                                                 wound healing


       Signs/Symptoms:                           decubitus ulcer and surgery


     Intervention/Recommendation


      Comments                                   1. Continue with current diet


                                                 as ordered. If PO intake low,


                                                 will consider nutrition


                                                 supplements


                                                 2. MD to consider vit C and


                                                 zinc for wound healing


                                                 3. Monitor PO intake, wt, labs


                                                 and skin integrity


                                                 4. F/U as moderate risk in 3-5


                                                 days, 3/2-3/4, PO check 3/2


     Expected Outcomes/Goals


      Expected Outcomes/Goals                    1. PO intake to meet at least


                                                 75% of nutritional needs.


                                                 2. Wt stability, skin to


                                                 remain intact, labs to


                                                 approach WNL.

## 2018-03-03 NOTE — GENERAL PROGRESS NOTE
Subjective





- Review of Systems


Service Date: 18


Subjective: 





Patient still aggressive to staff. Unable to keep wound vac on. 





Objective





- Results


Result Diagrams: 


 18 09:11





 18 06:06


Recent Labs: 


 Laboratory Last Values











WBC  6.7 Th/cmm (4.8-10.8)  D 18  09:11    


 


RBC  4.73 Mil/cmm (3.80-5.80)   18  09:11    


 


Hgb  13.7 gm/dL (12-16)   18  09:11    


 


Hct  42.8 % (41.0-60)   18  09:11    


 


MCV  90.5 fl (80-99)   18  09:11    


 


MCH  28.9 pg (27.0-31.0)   18  09:11    


 


MCHC Differential  32.0 pg (28.0-36.0)   18  09:11    


 


RDW  16.2 % (11.5-20.0)   18  09:11    


 


Plt Count  162 Th/cmm (150-400)   18  09:11    


 


MPV  9.7 fl  18  09:11    


 


Neutrophils %  72.2 % (40.0-80.0)   18  09:11    


 


Lymphocytes %  19.2 % (20.0-50.0)  L  18  09:11    


 


Monocytes %  5.9 % (2.0-10.0)   18  09:11    


 


Eosinophils %  1.5 % (0.0-5.0)   18  09:11    


 


Basophils %  1.2 % (0.0-2.0)   18  09:11    


 


Eos Smear Source  URINE   18  03:00    


 


Eos Smear Total Cells  FEW EOSINOPHILS SEEN  (NONE SEEN)   18  03:00    


 


PT  10.5 SECONDS (9.5-11.5)   18  06:00    


 


INR  1.01  (0.5-1.4)   18  06:00    


 


PTT (Actin FS)  25.3 SECONDS (26.0-38.0)  L  18  06:00    


 


Sodium  146 mEq/L (136-145)  H  18  06:06    


 


Potassium  5.2 mEq/L (3.5-5.1)  H  18  06:06    


 


Chloride  114 mEq/L ()  H  18  06:06    


 


Carbon Dioxide  28.1 mEq/L (21.0-31.0)   18  06:06    


 


Anion Gap  9.1  (7.0-16.0)   18  06:06    


 


BUN  21 mg/dL (7-25)   18  06:06    


 


Creatinine  1.2 mg/dL (0.7-1.3)   18  06:06    


 


Est GFR ( Amer)  TNP   18  06:06    


 


Est GFR (Non-Af Amer)  TNP   18  06:06    


 


BUN/Creatinine Ratio  17.5   18  06:06    


 


Glucose  293 mg/dL ()  H  18  06:06    


 


POC Glucose  240 MG/DL (70 - 105)  H  18  06:09    


 


Hemoglobin A1c %  10.5 % (4.0-6.0)  H  18  05:26    


 


Uric Acid  11.9 mg/dL (4.4-7.6)  H  18  06:00    


 


Calcium  9.2 mg/dL (8.6-10.3)   18  06:06    


 


Phosphorus  4.1 mg/dL (2.5-5.0)   18  06:00    


 


Magnesium  2.9 mg/dL (1.9-2.7)  H  18  06:00    


 


Total Bilirubin  0.6 mg/dL (0.3-1.0)   18  09:11    


 


AST  17 U/L (13-39)   18  09:11    


 


ALT  13 U/L (7-52)   18  09:11    


 


Alkaline Phosphatase  81 U/L ()   18  09:11    


 


Total Protein  6.4 gm/dL (6.0-8.3)   18  09:11    


 


Albumin  3.0 gm/dL (4.2-5.5)  L  18  09:11    


 


Globulin  3.4 gm/dL  18  09:11    


 


Albumin/Globulin Ratio  0.9  (1.0-1.8)  L  18  09:11    


 


TSH  0.28 uIU/ml (0.34-5.60)  L  18  06:00    


 


Urine Source  CATH   18  03:00    


 


Urine Color  BROWN   18  03:00    


 


Urine Clarity  CLOUDY  (CLEAR)   18  03:00    


 


Urine pH  5.5  (4.6 - 8.0)   18  03:00    


 


Ur Specific Gravity  1.020  (1.005-1.030)   18  03:00    


 


Urine Protein  100 mg/dL (NEGATIVE)  H  18  03:00    


 


Urine Glucose (UA)  >=1000 mg/dL (NEGATIVE)  H  18  03:00    


 


Urine Ketones  TRACE mg/dL (NEGATIVE)   18  03:00    


 


Urine Blood  LARGE  (NEGATIVE)  H  18  03:00    


 


Urine Nitrate  NEGATIVE  (NEGATIVE)   18  03:00    


 


Urine Bilirubin  SMALL  (NEGATIVE)  H  18  03:00    


 


Urine Ictotest  NEGATIVE  (NEGATIVE)   18  03:00    


 


Urine Urobilinogen  0.2 E.U./dL (0.2 - 1.0)   18  03:00    


 


Ur Leukocyte Esterase  TRACE  (NEGATIVE)  H  18  03:00    


 


Urine RBC  >100 /hpf (0-5)  H  18  03:00    


 


Urine WBC  >100 /hpf (0-5)  H  18  03:00    


 


Ur Epithelial Cells  OCCASIONAL /lpf (FEW)   18  03:00    


 


Urine Bacteria  MODERATE /hpf (NONE SEEN)  H  18  03:00    


 


Urine Osmolality  749 mOsmol/kg  18  03:00    


 


Ur Random Sodium  39 mmol/L  18  03:00    


 


Urine Creatinine  136.0 mg/dl (39.0-259.0)   18  03:00    














- Physical Exam


Vitals and I&O: 


 Vital Signs











Temp  97.9 F   18 04:00


 


Pulse  96   18 04:00


 


Resp  17   18 04:00


 


BP  142/83   18 04:00


 


Pulse Ox  100   18 04:00








 Intake & Output











 18





 06:59 18:59 06:59


 


Intake Total 850  1200


 


Output Total   500


 


Balance 850  700


 


Weight (lbs) 78.018 kg  78.018 kg


 


Intake:   


 


  Intake, IV Amount 100  


 


    Levofloxacin 500mg/100mL 100  





    500 mg In 100 ml @ 100   





    mls/hr IV Q24HR [facility] Rx#:   





    [id number]   


 


  Oral 750  1200


 


Output:   


 


  Urine   500


 


Other:   


 


  # Voids 2  


 


  # Bowel Movements 1  0











Active Medications: 


Current Medications





Acetaminophen (Tylenol)  650 mg PO Q6HR PRN


   PRN Reason: mild to moderate pain


   Stop: 18 19:27


   Last Admin: 18 03:51 Dose:  650 mg


Acetaminophen/Hydrocodone Bitart (Norco 5mg/325mg)  1 tab PO Q6H PRN


   PRN Reason: Pain (Moderate)


   Stop: 18 08:21


Allopurinol (Zyloprim)  100 mg PO DAILY [facility]


   Stop: 18 08:59


   Last Admin: 18 08:25 Dose:  100 mg


Castor Oil/Jordanian Balsam/Trypsin (Venelex)  1 appl TP DAILY [facility]


   Stop: 18 15:59


   Last Admin: 18 08:32 Dose:  1 appl


Clonazepam (Klonopin)  1 mg PO BID [facility]


   PRN Reason: Protocol


   Stop: 18 08:59


   Last Admin: 18 16:45 Dose:  1 mg


Famotidine (Pepcid)  20 mg PO BID [facility]


   Stop: 18 08:59


   Last Admin: 18 16:44 Dose:  20 mg


Heparin Sodium (Porcine) (Heparin)  5,000 units SUBQ Q12HR [facility]


   PRN Reason: Protocol


   Stop: 18 20:59


   Last Admin: 18 20:19 Dose:  5,000 units


Levofloxacin (Levaquin Pb)  500 mg in 100 mls @ 100 mls/hr IV Q24HR [facility]


   Stop: 18 05:59


   Last Admin: 18 05:04 Dose:  100 mls/hr


Insulin Aspart (Novolog Insulin Sliding Scale)  0 units SUBQ ACHS [facility]


   PRN Reason: Protocol


   Stop: 18 20:59


   Last Admin: 18 20:19 Dose:  2 units


Insulin Detemir (Levemir Insulin)  14 units SUBQ DAILY [facility]


   PRN Reason: Protocol


   Stop: 18 19:58


   Last Admin: 18 08:36 Dose:  14 unit


Lorazepam (Ativan)  0.5 mg PO Q6HR PRN; Protocol


   PRN Reason: agitation 


   Stop: 18 19:27


   Last Admin: 18 04:38 Dose:  0.5 mg


Magnesium Hydroxide (Milk Of Magnesia)  30 ml PO HS PRN


   PRN Reason: Constipation


   Stop: 18 19:27


Metoprolol Succinate (Toprol Xl)  25 mg PO BID [facility]


   Stop: 18 16:59


   Last Admin: 18 16:44 Dose:  25 mg


Miscellaneous (Clinical Monitoring)  1 ea MC PRN PRN


   PRN Reason: RENAL


   Stop: 18 10:33


Olanzapine (Zyprexa Zydis)  10 mg PO BID [facility]


   PRN Reason: Protocol


   Stop: 18 06:50


   Last Admin: 18 16:45 Dose:  10 mg


Zolpidem Tartrate (Ambien)  5 mg PO HS PRN


   PRN Reason: insomnia


   Stop: 18 19:27


   Last Admin: 18 00:10 Dose:  5 mg








General: Alert, Oriented x3


HEENT: Atraumatic, PERRLA


Neck: Supple


Cardiovascular: Regular rate, Normal S1, Normal S2


Lungs: Clear to auscultation


Abdomen: Bowel sounds, Soft


Extremities: no Clubbing, no Cyanosis, no Edema


Neurological: Sensation intact


Skin: Rash


Psych/Mental Status: Mood NL





- Procedures


Procedures: 


 Procedures











Procedure Code Date


 


KEV MUSC/FASCIA 20 SQ CM/< 69156 18


 


EXCISION OF RIGHT HIP MUSCLE, OPEN APPROACH [id number]














Assessment/Plan





- Assessment


Assessment: 





hypernatremia


prerenal azotemia


hyperglycemia


depression/anxiety


gout


dementia


schizophrenia


S/P wound debridement of sacral decubitus


UTI


discharge planning.


BRYANT on CKD


hyperkalemia 








- Plan


Plan: 





will order repeat cmp,cbc tomorrow AM





renal consult -- Dr. Bajwa





sacral decubitus ... will order Gen Surgical consult -- Dr. Jones for possible 

wound debridement





Gout ... continue allopurinol 100mg PO daily





continue wound vac. 





continue hydrocodone PO for pain control. 





Nutritional Asmnt/Malnutr-PDOC





- Dietary Evaluation


Malnutrition Findings (Please click <Entered> for more info): 








Nutritional Asmnt/Malnutrition                             Start:  18 17:

10


Text:                                                      Status: Complete    

  


Freq:                                                                          

  


 Document     18 17:13  MAGEN  (Rec: 18 17:25  LCHENG  [id number])


 Nutritional Asmnt/Malnutrition


     Patient General Information


      Nutritional Screening                      High Risk


                                                 Consult


      Diagnosis                                  pressure ulcer, hyperglycemia,


                                                 dehydration


      Pertinent Medical Hx/Surgical Hx           DM, dementia, depression,


                                                 psychosis, schizophrenia,


                                                 chronic renal insuff


      Subjective Information                     Consult received for


                                                 unstageble pressure ulcer on , high BG on . Per


                                                 records, PO intake 50-75%. Pt


                                                 was on NPO today for surgery-


                                                 excisional debridement,


                                                 application of wound VAC


      Current Diet Order/ Nutrition Support      pureed, CCHO 60gm


      Pertinent Medications                      novolog


      Pertinent Labs                              Na 152, K 4.6, BUN 56, Cr


                                                 1.8, Glucose 262, -246


                                                 , Mg 2.9


     Nutritional Hx/Data


      Height                                     1.8 m


      Height (Calculated Centimeters)            180.3


      Current Weight (lbs)                       70.76 kg


      Weight (Calculated Kilograms)              70.8


      Weight (Calculated Grams)                  70498.4


      Ideal Body Weight                          172


      % Ideal Body Weight                        91


      Body Mass Index (BMI)                      21.7


      Weight Status                              Approriate


     GI Symptoms


      GI Symptoms                                None


      Last BM                                    


      Difficult in:                              None


      Skin Integrity/Comment:                    scar to right upper back,


                                                 decubitus ulcer to buttocks


      Current %PO                                Fair (50-74%)


     Estimated Nutritional Goals


      BEE in Kcals:                              Using Current wt


      Calories/Kcals/Kg                          27-32


      Kcals Calculated                           2638-1304


      Protein:                                   Using Current wt


      Protein g/k-1.2


      Protein Calculated                         71-85


      Fluid: ml                                  1917-2130ml (1ml/kcal)


     Nutritional Problem


      2. Problem


       Problem                                   altered nutrition related lab


                                                 values


       Etiology                                  hx of DM


       Signs/Symptoms:                           Glucose 262, -246


      1. Problem


       Problem                                   increased nutrition needs (


                                                 calorie and protein)


       Etiology                                  increased metabolic demand for


                                                 wound healing


       Signs/Symptoms:                           decubitus ulcer and surgery


     Intervention/Recommendation


      Comments                                   1. Continue with current diet


                                                 as ordered. If PO intake low,


                                                 will consider nutrition


                                                 supplements


                                                 2. MD to consider vit C and


                                                 zinc for wound healing


                                                 3. Monitor PO intake, wt, labs


                                                 and skin integrity


                                                 4. F/U as moderate risk in 3-5


                                                 days, 3/2-3/, PO check 3/


     Expected Outcomes/Goals


      Expected Outcomes/Goals                    1. PO intake to meet at least


                                                 75% of nutritional needs.


                                                 2. Wt stability, skin to


                                                 remain intact, labs to


                                                 approach WNL.

## 2018-03-03 NOTE — PROGRESS NOTES
DATE:  03/03/2018



Covering for Dr. Ramirez.



Case was discussed with staff of the patient, reviewed records.  The patient

continues to be restless, agitated, unable to follow staff, direction, tried to

hit staff this morning.  Continues to have severe mood swings, aggressive

behavior, unpredictable, impulsive, needing redirection.  He is sleeping well,

eating well.  He is alert, very poor insight.  He is on Zyprexa 10 mg twice a

day.  I will be increasing the dose to 12.5 mg twice a day and so far no side

effects, no sedation, no nausea, no extrapyramidal symptoms.



Thank you very much for allowing me to participate in the care of this most

interesting gentleman.





DD: 03/03/2018 12:29

DT: 03/03/2018 17:33

JOB# [id number]  [id number]

## 2018-03-03 NOTE — PROGRESS NOTES
DATE:  03/02/2018



SUBJECTIVE:  Case was discussed with staff of the patient and reviewed records. 

The patient continues to be agitated, restless, and unable to follow direction. 

He continues to have severe mood swings, aggressive as the staff were just

trying to help him.  The patient needs to continue on 1:1.  Dr. Ramirez has had

added Klonopin 1 mg twice a day and so far no side effects of the medication, no

sedation, no nausea, no extrapyramidal symptoms.



Thank you very much for allowing me to participate in the care of this most

interesting gentleman.





DD: 03/02/2018 13:11

DT: 03/03/2018 01:29

JOB# [id number]  [id number]

## 2018-03-04 RX ADMIN — LEVOFLOXACIN SCH MLS/HR: 5 INJECTION INTRAVENOUS at 05:36

## 2018-03-04 RX ADMIN — CASTOR OIL AND BALSAM, PERU SCH APPL: 788; 87 OINTMENT TOPICAL at 09:23

## 2018-03-04 RX ADMIN — INSULIN ASPART SCH: 100 INJECTION, SOLUTION INTRAVENOUS; SUBCUTANEOUS at 22:35

## 2018-03-04 RX ADMIN — INSULIN ASPART SCH UNITS: 100 INJECTION, SOLUTION INTRAVENOUS; SUBCUTANEOUS at 12:43

## 2018-03-04 RX ADMIN — OLANZAPINE SCH MG: 5 TABLET, ORALLY DISINTEGRATING ORAL at 17:51

## 2018-03-04 RX ADMIN — OLANZAPINE SCH MG: 5 TABLET, ORALLY DISINTEGRATING ORAL at 09:21

## 2018-03-04 RX ADMIN — INSULIN ASPART SCH UNITS: 100 INJECTION, SOLUTION INTRAVENOUS; SUBCUTANEOUS at 09:10

## 2018-03-04 RX ADMIN — INSULIN DETEMIR SCH UNITS: 100 INJECTION, SOLUTION SUBCUTANEOUS at 09:12

## 2018-03-04 RX ADMIN — INSULIN ASPART SCH UNITS: 100 INJECTION, SOLUTION INTRAVENOUS; SUBCUTANEOUS at 17:53

## 2018-03-04 NOTE — INFECTIOUS DISEASE PROG NOTE
Infectious Disease Subjective





- Review of Systems


Service Date: 18


Subjective: 





no new change.





Infectious Disease Objective





- Results


Result Diagrams: 


 18 09:11





 18 05:54


Recent Labs: 


 Laboratory Last Values











WBC  6.7 Th/cmm (4.8-10.8)  D 18  09:11    


 


RBC  4.73 Mil/cmm (3.80-5.80)   18  09:11    


 


Hgb  13.7 gm/dL (12-16)   18  09:11    


 


Hct  42.8 % (41.0-60)   18  09:11    


 


MCV  90.5 fl (80-99)   18  09:11    


 


MCH  28.9 pg (27.0-31.0)   18  09:11    


 


MCHC Differential  32.0 pg (28.0-36.0)   18  09:11    


 


RDW  16.2 % (11.5-20.0)   18  09:11    


 


Plt Count  162 Th/cmm (150-400)   18  09:11    


 


MPV  9.7 fl  18  09:11    


 


Neutrophils %  72.2 % (40.0-80.0)   18  09:11    


 


Lymphocytes %  19.2 % (20.0-50.0)  L  18  09:11    


 


Monocytes %  5.9 % (2.0-10.0)   18  09:11    


 


Eosinophils %  1.5 % (0.0-5.0)   18  09:11    


 


Basophils %  1.2 % (0.0-2.0)   18  09:11    


 


Eos Smear Source  URINE   18  03:00    


 


Eos Smear Total Cells  FEW EOSINOPHILS SEEN  (NONE SEEN)   18  03:00    


 


PT  10.5 SECONDS (9.5-11.5)   18  06:00    


 


INR  1.01  (0.5-1.4)   18  06:00    


 


PTT (Actin FS)  25.3 SECONDS (26.0-38.0)  L  18  06:00    


 


Sodium  146 mEq/L (136-145)  H  18  05:54    


 


Potassium  3.9 mEq/L (3.5-5.1)   18  05:54    


 


Chloride  112 mEq/L ()  H  18  05:54    


 


Carbon Dioxide  29.4 mEq/L (21.0-31.0)   18  05:54    


 


Anion Gap  8.5  (7.0-16.0)   18  05:54    


 


BUN  20 mg/dL (7-25)   18  05:54    


 


Creatinine  1.2 mg/dL (0.7-1.3)   18  05:54    


 


Est GFR ( Amer)  TNP   18  05:54    


 


Est GFR (Non-Af Amer)  TNP   18  05:54    


 


BUN/Creatinine Ratio  16.7   18  05:54    


 


Glucose  191 mg/dL ()  H D 18  05:54    


 


POC Glucose  83 MG/DL (70 - 105)   18  19:46    


 


Hemoglobin A1c %  10.5 % (4.0-6.0)  H  18  05:26    


 


Uric Acid  11.9 mg/dL (4.4-7.6)  H  18  06:00    


 


Calcium  8.8 mg/dL (8.6-10.3)   18  05:54    


 


Phosphorus  4.1 mg/dL (2.5-5.0)   18  06:00    


 


Magnesium  2.9 mg/dL (1.9-2.7)  H  18  06:00    


 


Total Bilirubin  0.6 mg/dL (0.3-1.0)   18  09:11    


 


AST  17 U/L (13-39)   18  09:11    


 


ALT  13 U/L (7-52)   18  09:11    


 


Alkaline Phosphatase  81 U/L ()   18  09:11    


 


Total Protein  6.4 gm/dL (6.0-8.3)   18  09:11    


 


Albumin  3.0 gm/dL (4.2-5.5)  L  18  09:11    


 


Globulin  3.4 gm/dL  18  09:11    


 


Albumin/Globulin Ratio  0.9  (1.0-1.8)  L  18  09:11    


 


TSH  0.28 uIU/ml (0.34-5.60)  L  18  06:00    


 


Urine Source  CATH   18  03:00    


 


Urine Color  BROWN   18  03:00    


 


Urine Clarity  CLOUDY  (CLEAR)   18  03:00    


 


Urine pH  5.5  (4.6 - 8.0)   18  03:00    


 


Ur Specific Gravity  1.020  (1.005-1.030)   18  03:00    


 


Urine Protein  100 mg/dL (NEGATIVE)  H  18  03:00    


 


Urine Glucose (UA)  >=1000 mg/dL (NEGATIVE)  H  18  03:00    


 


Urine Ketones  TRACE mg/dL (NEGATIVE)   18  03:00    


 


Urine Blood  LARGE  (NEGATIVE)  H  18  03:00    


 


Urine Nitrate  NEGATIVE  (NEGATIVE)   18  03:00    


 


Urine Bilirubin  SMALL  (NEGATIVE)  H  18  03:00    


 


Urine Ictotest  NEGATIVE  (NEGATIVE)   18  03:00    


 


Urine Urobilinogen  0.2 E.U./dL (0.2 - 1.0)   18  03:00    


 


Ur Leukocyte Esterase  TRACE  (NEGATIVE)  H  18  03:00    


 


Urine RBC  >100 /hpf (0-5)  H  18  03:00    


 


Urine WBC  >100 /hpf (0-5)  H  18  03:00    


 


Ur Epithelial Cells  OCCASIONAL /lpf (FEW)   18  03:00    


 


Urine Bacteria  MODERATE /hpf (NONE SEEN)  H  18  03:00    


 


Urine Osmolality  749 mOsmol/kg  18  03:00    


 


Ur Random Sodium  39 mmol/L  18  03:00    


 


Urine Creatinine  136.0 mg/dl (39.0-259.0)   18  03:00    














- Physical Exam


Vitals and I&O: 


 Vital Signs











Temp  97.4 F   18 16:00


 


Pulse  109   18 17:29


 


Resp  18   18 16:00


 


BP  147/87   18 17:29


 


Pulse Ox  100   18 16:00








 Intake & Output











 18





 06:59 18:59 06:59


 


Intake Total 1300 600 


 


Output Total 500 300 


 


Balance 800 300 


 


Weight (lbs) 78.018 kg 78.018 kg 


 


Intake:   


 


  Intake, IV Amount 100  


 


    Levofloxacin 500mg/100mL 100  





    500 mg In 100 ml @ 100   





    mls/hr IV Q24HR [facility] Rx#:   





    [id number]   


 


  Oral 1200 600 


 


Output:   


 


  Urine 500 300 


 


Other:   


 


  # Bowel Movements 0 0 











Active Medications: 


Current Medications





Acetaminophen (Tylenol)  650 mg PO Q6HR PRN


   PRN Reason: mild to moderate pain


   Stop: 18 19:27


   Last Admin: 18 03:51 Dose:  650 mg


Acetaminophen/Hydrocodone Bitart (Norco 5mg/325mg)  1 tab PO Q6H PRN


   PRN Reason: Pain (Moderate)


   Stop: 18 08:21


Allopurinol (Zyloprim)  100 mg PO DAILY [facility]


   Stop: 18 08:59


   Last Admin: 18 08:45 Dose:  100 mg


Castor Oil/Sudanese Balsam/Trypsin (Venelex)  1 appl TP DAILY [facility]


   Stop: 18 15:59


   Last Admin: 18 08:46 Dose:  1 appl


Clonazepam (Klonopin)  1 mg PO BID [facility]


   PRN Reason: Protocol


   Stop: 18 08:59


   Last Admin: 18 17:30 Dose:  1 mg


Famotidine (Pepcid)  20 mg PO BID [facility]


   Stop: 18 08:59


   Last Admin: 18 17:31 Dose:  20 mg


Heparin Sodium (Porcine) (Heparin)  5,000 units SUBQ Q12HR [facility]


   PRN Reason: Protocol


   Stop: 18 20:59


   Last Admin: 18 20:36 Dose:  5,000 units


Levofloxacin (Levaquin Pb)  500 mg in 100 mls @ 100 mls/hr IV Q24HR [facility]


   Stop: 18 05:59


   Last Infusion: 18 06:59 Dose:  Infused


Insulin Aspart (Novolog Insulin Sliding Scale)  0 units SUBQ ACHS [facility]


   PRN Reason: Protocol


   Stop: 18 20:59


   Last Admin: 18 20:37 Dose:  Not Given


Insulin Detemir (Levemir Insulin)  16 units SUBQ DAILY [facility]


   PRN Reason: Protocol


   Stop: 18 08:59


   Last Admin: 18 08:40 Dose:  16 units


Lorazepam (Ativan)  0.5 mg PO Q6HR PRN; Protocol


   PRN Reason: agitation 


   Stop: 18 19:27


   Last Admin: 18 04:38 Dose:  0.5 mg


Magnesium Hydroxide (Milk Of Magnesia)  30 ml PO HS PRN


   PRN Reason: Constipation


   Stop: 18 19:27


Metoprolol Succinate (Toprol Xl)  25 mg PO BID [facility]


   Stop: 18 16:59


   Last Admin: 18 17:29 Dose:  25 mg


Miscellaneous (Clinical Monitoring)  1 ea MC PRN PRN


   PRN Reason: RENAL


   Stop: 18 10:33


Olanzapine (Zyprexa Zydis)  12.5 mg PO BID [facility]TA


   PRN Reason: Protocol


   Stop: 18 12:28


   Last Admin: 18 17:31 Dose:  12.5 mg


Zolpidem Tartrate (Ambien)  5 mg PO HS PRN


   PRN Reason: insomnia


   Stop: 18 19:27


   Last Admin: 18 20:36 Dose:  5 mg








General: no acute distress, well developed, well nourished


HEENT: atraumatic, normocephalic, PERRLA, EOMI


Neck: supple, no thyromegaly


Cardiovascular: S1S2, regular


Lungs: no clear to auscultation bilaterally, no clear to percussion


Abdomen: soft, no tender, no distended


Extremities: no cyanosis, no clubbing, no edema


Neurological: awake, alert


Skin: other (sacral wound)





- Procedures


Procedures: 


 Procedures











Procedure Code Date


 


KEV MUSC/FASCIA 20 SQ CM/< 21423 18


 


EXCISION OF RIGHT HIP MUSCLE, OPEN APPROACH [id number] 18














Infectious Disease Assmt/Plan





- Assessment


Assessment: 


1.  Sacral stage III decubitus ulcer, infected.


2.  Status post I&D.


3.  Diabetes mellitus type .


4.  Dementia.  


5.   Depression.


6.  psychosis.  


7.  Schizophrenia.


8.  Hematuria.














- Plan


Plan: 





CPM.  


wound care.





Nutritional Asmnt/Malnutr-PDOC





- Dietary Evaluation


Malnutrition Findings (Please click <Entered> for more info): 








Nutritional Asmnt/Malnutrition                             Start:  18 17:

10


Text:                                                      Status: Complete    

  


Freq:                                                                          

  


 Document     18 17:13  MAGEN  (Rec: 18 17:25  MAGEN  [id number])


 Nutritional Asmnt/Malnutrition


     Patient General Information


      Nutritional Screening                      High Risk


                                                 Consult


      Diagnosis                                  pressure ulcer, hyperglycemia,


                                                 dehydration


      Pertinent Medical Hx/Surgical Hx           DM, dementia, depression,


                                                 psychosis, schizophrenia,


                                                 chronic renal insuff


      Subjective Information                     Consult received for


                                                 unstageble pressure ulcer on , high BG on . Per


                                                 records, PO intake 50-75%. Pt


                                                 was on NPO today for surgery-


                                                 excisional debridement,


                                                 application of wound VAC


      Current Diet Order/ Nutrition Support      pureed, CCHO 60gm


      Pertinent Medications                      novolog


      Pertinent Labs                              Na 152, K 4.6, BUN 56, Cr


                                                 1.8, Glucose 262, -246


                                                 , Mg 2.9


     Nutritional Hx/Data


      Height                                     1.8 m


      Height (Calculated Centimeters)            180.3


      Current Weight (lbs)                       70.76 kg


      Weight (Calculated Kilograms)              70.8


      Weight (Calculated Grams)                  97047.4


      Ideal Body Weight                          172


      % Ideal Body Weight                        91


      Body Mass Index (BMI)                      21.7


      Weight Status                              Approriate


     GI Symptoms


      GI Symptoms                                None


      Last BM                                    


      Difficult in:                              None


      Skin Integrity/Comment:                    scar to right upper back,


                                                 decubitus ulcer to buttocks


      Current %PO                                Fair (50-74%)


     Estimated Nutritional Goals


      BEE in Kcals:                              Using Current wt


      Calories/Kcals/Kg                          27-32


      Kcals Calculated                           3010-8434


      Protein:                                   Using Current wt


      Protein g/k-1.2


      Protein Calculated                         71-85


      Fluid: ml                                  1917-2130ml (1ml/kcal)


     Nutritional Problem


      2. Problem


       Problem                                   altered nutrition related lab


                                                 values


       Etiology                                  hx of DM


       Signs/Symptoms:                           Glucose 262, -246


      1. Problem


       Problem                                   increased nutrition needs (


                                                 calorie and protein)


       Etiology                                  increased metabolic demand for


                                                 wound healing


       Signs/Symptoms:                           decubitus ulcer and surgery


     Intervention/Recommendation


      Comments                                   1. Continue with current diet


                                                 as ordered. If PO intake low,


                                                 will consider nutrition


                                                 supplements


                                                 2. MD to consider vit C and


                                                 zinc for wound healing


                                                 3. Monitor PO intake, wt, labs


                                                 and skin integrity


                                                 4. F/U as moderate risk in 3-5


                                                 days, 3/2-3/4, PO check 3/2


     Expected Outcomes/Goals


      Expected Outcomes/Goals                    1. PO intake to meet at least


                                                 75% of nutritional needs.


                                                 2. Wt stability, skin to


                                                 remain intact, labs to


                                                 approach WNL.

## 2018-03-04 NOTE — INFECTIOUS DISEASE PROG NOTE
Infectious Disease Subjective





- Review of Systems


Service Date: 18


Subjective: 





no new change.





Infectious Disease Objective





- Results


Result Diagrams: 


 18 09:11





 18 05:54


Recent Labs: 


 Laboratory Last Values











WBC  6.7 Th/cmm (4.8-10.8)  D 18  09:11    


 


RBC  4.73 Mil/cmm (3.80-5.80)   18  09:11    


 


Hgb  13.7 gm/dL (12-16)   18  09:11    


 


Hct  42.8 % (41.0-60)   18  09:11    


 


MCV  90.5 fl (80-99)   18  09:11    


 


MCH  28.9 pg (27.0-31.0)   18  09:11    


 


MCHC Differential  32.0 pg (28.0-36.0)   18  09:11    


 


RDW  16.2 % (11.5-20.0)   18  09:11    


 


Plt Count  162 Th/cmm (150-400)   18  09:11    


 


MPV  9.7 fl  18  09:11    


 


Neutrophils %  72.2 % (40.0-80.0)   18  09:11    


 


Lymphocytes %  19.2 % (20.0-50.0)  L  18  09:11    


 


Monocytes %  5.9 % (2.0-10.0)   18  09:11    


 


Eosinophils %  1.5 % (0.0-5.0)   18  09:11    


 


Basophils %  1.2 % (0.0-2.0)   18  09:11    


 


Eos Smear Source  URINE   18  03:00    


 


Eos Smear Total Cells  FEW EOSINOPHILS SEEN  (NONE SEEN)   18  03:00    


 


PT  10.5 SECONDS (9.5-11.5)   18  06:00    


 


INR  1.01  (0.5-1.4)   18  06:00    


 


PTT (Actin FS)  25.3 SECONDS (26.0-38.0)  L  18  06:00    


 


Sodium  146 mEq/L (136-145)  H  18  05:54    


 


Potassium  3.9 mEq/L (3.5-5.1)   18  05:54    


 


Chloride  112 mEq/L ()  H  18  05:54    


 


Carbon Dioxide  29.4 mEq/L (21.0-31.0)   18  05:54    


 


Anion Gap  8.5  (7.0-16.0)   18  05:54    


 


BUN  20 mg/dL (7-25)   18  05:54    


 


Creatinine  1.2 mg/dL (0.7-1.3)   18  05:54    


 


Est GFR ( Amer)  TNP   18  05:54    


 


Est GFR (Non-Af Amer)  TNP   18  05:54    


 


BUN/Creatinine Ratio  16.7   18  05:54    


 


Glucose  191 mg/dL ()  H D 18  05:54    


 


POC Glucose  223 MG/DL (70 - 105)  H  18  16:28    


 


Hemoglobin A1c %  10.5 % (4.0-6.0)  H  18  05:26    


 


Uric Acid  11.9 mg/dL (4.4-7.6)  H  18  06:00    


 


Calcium  8.8 mg/dL (8.6-10.3)   18  05:54    


 


Phosphorus  4.1 mg/dL (2.5-5.0)   18  06:00    


 


Magnesium  2.9 mg/dL (1.9-2.7)  H  18  06:00    


 


Total Bilirubin  0.6 mg/dL (0.3-1.0)   18  09:11    


 


AST  17 U/L (13-39)   18  09:11    


 


ALT  13 U/L (7-52)   18  09:11    


 


Alkaline Phosphatase  81 U/L ()   18  09:11    


 


Total Protein  6.4 gm/dL (6.0-8.3)   18  09:11    


 


Albumin  3.0 gm/dL (4.2-5.5)  L  18  09:11    


 


Globulin  3.4 gm/dL  18  09:11    


 


Albumin/Globulin Ratio  0.9  (1.0-1.8)  L  18  09:11    


 


TSH  0.28 uIU/ml (0.34-5.60)  L  18  06:00    


 


Urine Source  CATH   18  03:00    


 


Urine Color  BROWN   18  03:00    


 


Urine Clarity  CLOUDY  (CLEAR)   18  03:00    


 


Urine pH  5.5  (4.6 - 8.0)   18  03:00    


 


Ur Specific Gravity  1.020  (1.005-1.030)   18  03:00    


 


Urine Protein  100 mg/dL (NEGATIVE)  H  18  03:00    


 


Urine Glucose (UA)  >=1000 mg/dL (NEGATIVE)  H  18  03:00    


 


Urine Ketones  TRACE mg/dL (NEGATIVE)   18  03:00    


 


Urine Blood  LARGE  (NEGATIVE)  H  18  03:00    


 


Urine Nitrate  NEGATIVE  (NEGATIVE)   18  03:00    


 


Urine Bilirubin  SMALL  (NEGATIVE)  H  18  03:00    


 


Urine Ictotest  NEGATIVE  (NEGATIVE)   18  03:00    


 


Urine Urobilinogen  0.2 E.U./dL (0.2 - 1.0)   18  03:00    


 


Ur Leukocyte Esterase  TRACE  (NEGATIVE)  H  18  03:00    


 


Urine RBC  >100 /hpf (0-5)  H  18  03:00    


 


Urine WBC  >100 /hpf (0-5)  H  18  03:00    


 


Ur Epithelial Cells  OCCASIONAL /lpf (FEW)   18  03:00    


 


Urine Bacteria  MODERATE /hpf (NONE SEEN)  H  18  03:00    


 


Urine Osmolality  749 mOsmol/kg  18  03:00    


 


Ur Random Sodium  39 mmol/L  18  03:00    


 


Urine Creatinine  136.0 mg/dl (39.0-259.0)   18  03:00    














- Physical Exam


Vitals and I&O: 


 Vital Signs











Temp  97.6 F   18 12:00


 


Pulse  102   18 12:00


 


Resp  18   18 12:00


 


BP  169/90   18 12:00


 


Pulse Ox  99   18 12:00








 Intake & Output











 18





 18:59 06:59 18:59


 


Intake Total 600  


 


Output Total 300  


 


Balance 300  


 


Weight (lbs) 78.018 kg 78.018 kg 


 


Intake:   


 


  Oral 600  


 


Output:   


 


  Urine 300  


 


Other:   


 


  # Bowel Movements 0  











Active Medications: 


Current Medications





Acetaminophen (Tylenol)  650 mg PO Q6HR PRN


   PRN Reason: mild to moderate pain


   Stop: 18 19:27


   Last Admin: 18 03:51 Dose:  650 mg


Acetaminophen/Hydrocodone Bitart (Norco 5mg/325mg)  1 tab PO Q6H PRN


   PRN Reason: Pain (Moderate)


   Stop: 18 08:21


Allopurinol (Zyloprim)  100 mg PO DAILY [facility]


   Stop: 18 08:59


   Last Admin: 18 09:21 Dose:  100 mg


Castor Oil/Cymraes Balsam/Trypsin (Venelex)  1 appl TP DAILY [facility]


   Stop: 18 15:59


   Last Admin: 18 09:23 Dose:  1 appl


Clonazepam (Klonopin)  1 mg PO BID [facility]


   PRN Reason: Protocol


   Stop: 18 08:59


   Last Admin: 18 09:23 Dose:  1 mg


Famotidine (Pepcid)  20 mg PO BID [facility]


   Stop: 18 08:59


   Last Admin: 18 09:21 Dose:  20 mg


Heparin Sodium (Porcine) (Heparin)  5,000 units SUBQ Q12HR [facility]


   PRN Reason: Protocol


   Stop: 18 20:59


   Last Admin: 18 09:21 Dose:  5,000 units


Levofloxacin (Levaquin Pb)  500 mg in 100 mls @ 100 mls/hr IV Q24HR [facility]


   Stop: 18 05:59


   Last Admin: 18 05:36 Dose:  100 mls/hr


Insulin Aspart (Novolog Insulin Sliding Scale)  0 units SUBQ ACHS [facility]


   PRN Reason: Protocol


   Stop: 18 20:59


   Last Admin: 18 12:43 Dose:  4 units


Insulin Detemir (Levemir Insulin)  16 units SUBQ DAILY [facility]


   PRN Reason: Protocol


   Stop: 18 08:59


   Last Admin: 18 09:12 Dose:  16 units


Lorazepam (Ativan)  0.5 mg PO Q6HR PRN; Protocol


   PRN Reason: agitation 


   Stop: 18 19:27


   Last Admin: 18 01:21 Dose:  0.5 mg


Magnesium Hydroxide (Milk Of Magnesia)  30 ml PO HS PRN


   PRN Reason: Constipation


   Stop: 18 19:27


Metoprolol Succinate (Toprol Xl)  25 mg PO BID [facility]


   Stop: 18 16:59


   Last Admin: 18 09:22 Dose:  25 mg


Miscellaneous (Clinical Monitoring)  1 ea MC PRN PRN


   PRN Reason: RENAL


   Stop: 18 10:33


Olanzapine (Zyprexa Zydis)  12.5 mg PO BID [facility]


   PRN Reason: Protocol


   Stop: 18 12:28


   Last Admin: 18 09:21 Dose:  12.5 mg


Zolpidem Tartrate (Ambien)  5 mg PO HS PRN


   PRN Reason: insomnia


   Stop: 18 19:27


   Last Admin: 18 20:36 Dose:  5 mg








General: no acute distress, well developed, well nourished


HEENT: atraumatic, normocephalic, PERRLA


Neck: supple, no thyromegaly


Cardiovascular: S1S2, regular


Lungs: clear to auscultation bilaterally, clear to percussion


Abdomen: soft, no tender, no distended, no mass


Extremities: no cyanosis, no clubbing, no edema


Skin: other (sacral wound: wound vac)





- Procedures


Procedures: 


 Procedures











Procedure Code Date


 


KEV MUSC/FASCIA 20 SQ CM/< 97663 18


 


EXCISION OF RIGHT HIP MUSCLE, OPEN APPROACH [id number] 18














Infectious Disease Assmt/Plan





- Assessment


Assessment: 


1.  Sacral stage III decubitus ulcer, infected.


2.  Status post I&D.


3.  Diabetes mellitus type .


4.  Dementia.  


5.   Depression.


6.  psychosis.  


7.  Schizophrenia.


8.  Hematuria.














- Plan


Plan: 





CPM.  


wound care.





Nutritional Asmnt/Malnutr-PDOC





- Dietary Evaluation


Malnutrition Findings (Please click <Entered> for more info): 








Nutritional Asmnt/Malnutrition                             Start:  18 17:

10


Text:                                                      Status: Complete    

  


Freq:                                                                          

  


 Document     18 17:13  LCHENG  (Rec: 18 17:25  [facility]  [id number])


 Nutritional Asmnt/Malnutrition


     Patient General Information


      Nutritional Screening                      High Risk


                                                 Consult


      Diagnosis                                  pressure ulcer, hyperglycemia,


                                                 dehydration


      Pertinent Medical Hx/Surgical Hx           DM, dementia, depression,


                                                 psychosis, schizophrenia,


                                                 chronic renal insuff


      Subjective Information                     Consult received for


                                                 unstageble pressure ulcer on , high BG on . Per


                                                 records, PO intake 50-75%. Pt


                                                 was on NPO today for surgery-


                                                 excisional debridement,


                                                 application of wound VAC


      Current Diet Order/ Nutrition Support      pureed, CCHO 60gm


      Pertinent Medications                      novolog


      Pertinent Labs                              Na 152, K 4.6, BUN 56, Cr


                                                 1.8, Glucose 262, -246


                                                 , Mg 2.9


     Nutritional Hx/Data


      Height                                     1.8 m


      Height (Calculated Centimeters)            180.3


      Current Weight (lbs)                       70.76 kg


      Weight (Calculated Kilograms)              70.8


      Weight (Calculated Grams)                  12911.4


      Ideal Body Weight                          172


      % Ideal Body Weight                        91


      Body Mass Index (BMI)                      21.7


      Weight Status                              Approriate


     GI Symptoms


      GI Symptoms                                None


      Last BM                                    


      Difficult in:                              None


      Skin Integrity/Comment:                    scar to right upper back,


                                                 decubitus ulcer to buttocks


      Current %PO                                Fair (50-74%)


     Estimated Nutritional Goals


      BEE in Kcals:                              Using Current wt


      Calories/Kcals/Kg                          27-32


      Kcals Calculated                           4244-4683


      Protein:                                   Using Current wt


      Protein g/k-1.2


      Protein Calculated                         71-85


      Fluid: ml                                  1917-2130ml (1ml/kcal)


     Nutritional Problem


      2. Problem


       Problem                                   altered nutrition related lab


                                                 values


       Etiology                                  hx of DM


       Signs/Symptoms:                           Glucose 262, -246


      1. Problem


       Problem                                   increased nutrition needs (


                                                 calorie and protein)


       Etiology                                  increased metabolic demand for


                                                 wound healing


       Signs/Symptoms:                           decubitus ulcer and surgery


     Intervention/Recommendation


      Comments                                   1. Continue with current diet


                                                 as ordered. If PO intake low,


                                                 will consider nutrition


                                                 supplements


                                                 2. MD to consider vit C and


                                                 zinc for wound healing


                                                 3. Monitor PO intake, wt, labs


                                                 and skin integrity


                                                 4. F/U as moderate risk in 3-5


                                                 days, 3/2-3/4, PO check 3/2


     Expected Outcomes/Goals


      Expected Outcomes/Goals                    1. PO intake to meet at least


                                                 75% of nutritional needs.


                                                 2. Wt stability, skin to


                                                 remain intact, labs to


                                                 approach WNL.

## 2018-03-04 NOTE — GENERAL PROGRESS NOTE
Subjective





- Review of Systems


Service Date: 18


Subjective: 





Patient still aggressive to staff. Unable to keep wound vac on. Patient blood 

sugar still elevated. No new changes. 





Objective





- Results


Result Diagrams: 


 18 09:11





 18 05:54


Recent Labs: 


 Laboratory Last Values











WBC  6.7 Th/cmm (4.8-10.8)  D 18  09:11    


 


RBC  4.73 Mil/cmm (3.80-5.80)   18  09:11    


 


Hgb  13.7 gm/dL (12-16)   18  09:11    


 


Hct  42.8 % (41.0-60)   18  09:11    


 


MCV  90.5 fl (80-99)   18  09:11    


 


MCH  28.9 pg (27.0-31.0)   18  09:11    


 


MCHC Differential  32.0 pg (28.0-36.0)   18  09:11    


 


RDW  16.2 % (11.5-20.0)   18  09:11    


 


Plt Count  162 Th/cmm (150-400)   18  09:11    


 


MPV  9.7 fl  18  09:11    


 


Neutrophils %  72.2 % (40.0-80.0)   18  09:11    


 


Lymphocytes %  19.2 % (20.0-50.0)  L  18  09:11    


 


Monocytes %  5.9 % (2.0-10.0)   18  09:11    


 


Eosinophils %  1.5 % (0.0-5.0)   18  09:11    


 


Basophils %  1.2 % (0.0-2.0)   18  09:11    


 


Eos Smear Source  URINE   18  03:00    


 


Eos Smear Total Cells  FEW EOSINOPHILS SEEN  (NONE SEEN)   18  03:00    


 


PT  10.5 SECONDS (9.5-11.5)   18  06:00    


 


INR  1.01  (0.5-1.4)   18  06:00    


 


PTT (Actin FS)  25.3 SECONDS (26.0-38.0)  L  18  06:00    


 


Sodium  146 mEq/L (136-145)  H  18  05:54    


 


Potassium  3.9 mEq/L (3.5-5.1)   18  05:54    


 


Chloride  112 mEq/L ()  H  18  05:54    


 


Carbon Dioxide  29.4 mEq/L (21.0-31.0)   18  05:54    


 


Anion Gap  8.5  (7.0-16.0)   18  05:54    


 


BUN  20 mg/dL (7-25)   18  05:54    


 


Creatinine  1.2 mg/dL (0.7-1.3)   18  05:54    


 


Est GFR ( Amer)  TNP   18  05:54    


 


Est GFR (Non-Af Amer)  TNP   18  05:54    


 


BUN/Creatinine Ratio  16.7   18  05:54    


 


Glucose  191 mg/dL ()  H D 18  05:54    


 


POC Glucose  223 MG/DL (70 - 105)  H  18  16:28    


 


Hemoglobin A1c %  10.5 % (4.0-6.0)  H  18  05:26    


 


Uric Acid  11.9 mg/dL (4.4-7.6)  H  18  06:00    


 


Calcium  8.8 mg/dL (8.6-10.3)   18  05:54    


 


Phosphorus  4.1 mg/dL (2.5-5.0)   18  06:00    


 


Magnesium  2.9 mg/dL (1.9-2.7)  H  18  06:00    


 


Total Bilirubin  0.6 mg/dL (0.3-1.0)   18  09:11    


 


AST  17 U/L (13-39)   18  09:11    


 


ALT  13 U/L (7-52)   18  09:11    


 


Alkaline Phosphatase  81 U/L ()   18  09:11    


 


Total Protein  6.4 gm/dL (6.0-8.3)   18  09:11    


 


Albumin  3.0 gm/dL (4.2-5.5)  L  18  09:11    


 


Globulin  3.4 gm/dL  18  09:11    


 


Albumin/Globulin Ratio  0.9  (1.0-1.8)  L  18  09:11    


 


TSH  0.28 uIU/ml (0.34-5.60)  L  18  06:00    


 


Urine Source  CATH   18  03:00    


 


Urine Color  BROWN   18  03:00    


 


Urine Clarity  CLOUDY  (CLEAR)   18  03:00    


 


Urine pH  5.5  (4.6 - 8.0)   18  03:00    


 


Ur Specific Gravity  1.020  (1.005-1.030)   18  03:00    


 


Urine Protein  100 mg/dL (NEGATIVE)  H  18  03:00    


 


Urine Glucose (UA)  >=1000 mg/dL (NEGATIVE)  H  18  03:00    


 


Urine Ketones  TRACE mg/dL (NEGATIVE)   18  03:00    


 


Urine Blood  LARGE  (NEGATIVE)  H  18  03:00    


 


Urine Nitrate  NEGATIVE  (NEGATIVE)   18  03:00    


 


Urine Bilirubin  SMALL  (NEGATIVE)  H  18  03:00    


 


Urine Ictotest  NEGATIVE  (NEGATIVE)   18  03:00    


 


Urine Urobilinogen  0.2 E.U./dL (0.2 - 1.0)   18  03:00    


 


Ur Leukocyte Esterase  TRACE  (NEGATIVE)  H  18  03:00    


 


Urine RBC  >100 /hpf (0-5)  H  18  03:00    


 


Urine WBC  >100 /hpf (0-5)  H  18  03:00    


 


Ur Epithelial Cells  OCCASIONAL /lpf (FEW)   18  03:00    


 


Urine Bacteria  MODERATE /hpf (NONE SEEN)  H  18  03:00    


 


Urine Osmolality  749 mOsmol/kg  18  03:00    


 


Ur Random Sodium  39 mmol/L  18  03:00    


 


Urine Creatinine  136.0 mg/dl (39.0-259.0)   18  03:00    














- Physical Exam


Vitals and I&O: 


 Vital Signs











Temp  97.6 F   18 12:00


 


Pulse  102   18 12:00


 


Resp  18   18 12:00


 


BP  169/90   18 12:00


 


Pulse Ox  99   18 12:00








 Intake & Output











 18





 18:59 06:59 18:59


 


Intake Total 600  


 


Output Total 300  


 


Balance 300  


 


Weight (lbs) 78.018 kg 78.018 kg 


 


Intake:   


 


  Oral 600  


 


Output:   


 


  Urine 300  


 


Other:   


 


  # Bowel Movements 0  











Active Medications: 


Current Medications





Acetaminophen (Tylenol)  650 mg PO Q6HR PRN


   PRN Reason: mild to moderate pain


   Stop: 18 19:27


   Last Admin: 18 03:51 Dose:  650 mg


Acetaminophen/Hydrocodone Bitart (Norco 5mg/325mg)  1 tab PO Q6H PRN


   PRN Reason: Pain (Moderate)


   Stop: 18 08:21


Allopurinol (Zyloprim)  100 mg PO DAILY [facility]


   Stop: 18 08:59


   Last Admin: 18 09:21 Dose:  100 mg


Castor Oil/[place] Balsam/Trypsin (Venelex)  1 appl TP DAILY [facility]


   Stop: 18 15:59


   Last Admin: 18 09:23 Dose:  1 appl


Clonazepam (Klonopin)  1 mg PO BID [facility]


   PRN Reason: Protocol


   Stop: 18 08:59


   Last Admin: 18 09:23 Dose:  1 mg


Famotidine (Pepcid)  20 mg PO BID [facility]


   Stop: 18 08:59


   Last Admin: 18 09:21 Dose:  20 mg


Heparin Sodium (Porcine) (Heparin)  5,000 units SUBQ Q12HR [facility]


   PRN Reason: Protocol


   Stop: 18 20:59


   Last Admin: 18 09:21 Dose:  5,000 units


Levofloxacin (Levaquin Pb)  500 mg in 100 mls @ 100 mls/hr IV Q24HR [facility]


   Stop: 18 05:59


   Last Admin: 18 05:36 Dose:  100 mls/hr


Insulin Aspart (Novolog Insulin Sliding Scale)  0 units SUBQ ACHS [facility]


   PRN Reason: Protocol


   Stop: 18 20:59


   Last Admin: 18 12:43 Dose:  4 units


Insulin Detemir (Levemir Insulin)  16 units SUBQ DAILY [facility]


   PRN Reason: Protocol


   Stop: 18 08:59


   Last Admin: 18 09:12 Dose:  16 units


Lorazepam (Ativan)  0.5 mg PO Q6HR PRN; Protocol


   PRN Reason: agitation 


   Stop: 18 19:27


   Last Admin: 18 01:21 Dose:  0.5 mg


Magnesium Hydroxide (Milk Of Magnesia)  30 ml PO HS PRN


   PRN Reason: Constipation


   Stop: 18 19:27


Metoprolol Succinate (Toprol Xl)  25 mg PO BID [facility]


   Stop: 18 16:59


   Last Admin: 18 09:22 Dose:  25 mg


Miscellaneous (Clinical Monitoring)  1 ea MC PRN PRN


   PRN Reason: RENAL


   Stop: 18 10:33


Olanzapine (Zyprexa Zydis)  12.5 mg PO BID [facility]


   PRN Reason: Protocol


   Stop: 18 12:28


   Last Admin: 18 09:21 Dose:  12.5 mg


Zolpidem Tartrate (Ambien)  5 mg PO HS PRN


   PRN Reason: insomnia


   Stop: 18 19:27


   Last Admin: 18 20:36 Dose:  5 mg








General: Alert, Oriented x3


HEENT: Atraumatic, PERRLA


Neck: Supple


Cardiovascular: Regular rate, Normal S1, Normal S2


Lungs: Clear to auscultation


Abdomen: Bowel sounds, Soft


Extremities: no Clubbing, no Cyanosis, no Edema


Neurological: Sensation intact


Skin: Rash


Psych/Mental Status: Mood NL





- Procedures


Procedures: 


 Procedures











Procedure Code Date


 


KEV MUSC/FASCIA 20 SQ CM/< 23418 18


 


EXCISION OF RIGHT HIP MUSCLE, OPEN APPROACH [id number] 18














Assessment/Plan





- Assessment


Assessment: 





hypernatremia


prerenal azotemia


hyperglycemia


depression/anxiety


gout


dementia


schizophrenia


S/P wound debridement of sacral decubitus


UTI


discharge planning.


BRYANT on CKD


hyperkalemia 








- Plan


Plan: 





will order repeat cmp,cbc tomorrow AM





renal consult -- Dr. Bajwa





sacral decubitus ... will order Gen Surgical consult -- Dr. Jones for possible 

wound debridement





Gout ... continue allopurinol 100mg PO daily





continue wound vac. 





continue hydrocodone PO for pain control. 





Nutritional Asmnt/Malnutr-PDOC





- Dietary Evaluation


Malnutrition Findings (Please click <Entered> for more info): 








Nutritional Asmnt/Malnutrition                             Start:  18 17:

10


Text:                                                      Status: Complete    

  


Freq:                                                                          

  


 Document     18 17:13  MAGEN  (Rec: 18 17:25  MAGEN  [id number])


 Nutritional Asmnt/Malnutrition


     Patient General Information


      Nutritional Screening                      High Risk


                                                 Consult


      Diagnosis                                  pressure ulcer, hyperglycemia,


                                                 dehydration


      Pertinent Medical Hx/Surgical Hx           DM, dementia, depression,


                                                 psychosis, schizophrenia,


                                                 chronic renal insuff


      Subjective Information                     Consult received for


                                                 unstageble pressure ulcer on , high BG on . Per


                                                 records, PO intake 50-75%. Pt


                                                 was on NPO today for surgery-


                                                 excisional debridement,


                                                 application of wound VAC


      Current Diet Order/ Nutrition Support      pureed, CCHO 60gm


      Pertinent Medications                      novolog


      Pertinent Labs                              Na 152, K 4.6, BUN 56, Cr


                                                 1.8, Glucose 262, -246


                                                 , Mg 2.9


     Nutritional Hx/Data


      Height                                     1.8 m


      Height (Calculated Centimeters)            180.3


      Current Weight (lbs)                       70.76 kg


      Weight (Calculated Kilograms)              70.8


      Weight (Calculated Grams)                  82429.4


      Ideal Body Weight                          172


      % Ideal Body Weight                        91


      Body Mass Index (BMI)                      21.7


      Weight Status                              Approriate


     GI Symptoms


      GI Symptoms                                None


      Last BM                                    


      Difficult in:                              None


      Skin Integrity/Comment:                    scar to right upper back,


                                                 decubitus ulcer to buttocks


      Current %PO                                Fair (50-74%)


     Estimated Nutritional Goals


      BEE in Kcals:                              Using Current wt


      Calories/Kcals/Kg                          27-32


      Kcals Calculated                           8488-2284


      Protein:                                   Using Current wt


      Protein g/k-1.2


      Protein Calculated                         71-85


      Fluid: ml                                  1917-2130ml (1ml/kcal)


     Nutritional Problem


      2. Problem


       Problem                                   altered nutrition related lab


                                                 values


       Etiology                                  hx of DM


       Signs/Symptoms:                           Glucose 262, -246


      1. Problem


       Problem                                   increased nutrition needs (


                                                 calorie and protein)


       Etiology                                  increased metabolic demand for


                                                 wound healing


       Signs/Symptoms:                           decubitus ulcer and surgery


     Intervention/Recommendation


      Comments                                   1. Continue with current diet


                                                 as ordered. If PO intake low,


                                                 will consider nutrition


                                                 supplements


                                                 2. MD to consider vit C and


                                                 zinc for wound healing


                                                 3. Monitor PO intake, wt, labs


                                                 and skin integrity


                                                 4. F/U as moderate risk in 3-5


                                                 days, 3/-3/4, PO check 3/2


     Expected Outcomes/Goals


      Expected Outcomes/Goals                    1. PO intake to meet at least


                                                 75% of nutritional needs.


                                                 2. Wt stability, skin to


                                                 remain intact, labs to


                                                 approach WNL.

## 2018-03-04 NOTE — PROGRESS NOTES
DATE:  03/04/2018



Case discussed with staff of the patient, reviewed records.  The patient

continues to be unpredictable, impulsive, needing redirection.  Continues to

have poor insight.  Continues to be easily irritable and agitated, trying to

strike staff at times.  He is compliant with the medication with no side

effects, no sedation, no nausea, no extrapyramidal symptoms.  I have increased

his Zyprexa to 12.5 mg twice a day to help improve his behavior.



Thank you very much for allowing me to participate in the care of this most

interesting gentleman.





DD: 03/04/2018 13:08

DT: 03/04/2018 22:41

JOB# [id number]  [id number]

## 2018-03-04 NOTE — GENERAL PROGRESS NOTE
Subjective





- Review of Systems


Service Date: 18


Subjective: 





awake, verbal, still confused





Objective





- Results


Result Diagrams: 


 18 09:11





 18 05:54


Recent Labs: 


 Laboratory Last Values











WBC  6.7 Th/cmm (4.8-10.8)  D 18  09:11    


 


RBC  4.73 Mil/cmm (3.80-5.80)   18  09:11    


 


Hgb  13.7 gm/dL (12-16)   18  09:11    


 


Hct  42.8 % (41.0-60)   18  09:11    


 


MCV  90.5 fl (80-99)   18  09:11    


 


MCH  28.9 pg (27.0-31.0)   18  09:11    


 


MCHC Differential  32.0 pg (28.0-36.0)   18  09:11    


 


RDW  16.2 % (11.5-20.0)   18  09:11    


 


Plt Count  162 Th/cmm (150-400)   18  09:11    


 


MPV  9.7 fl  18  09:11    


 


Neutrophils %  72.2 % (40.0-80.0)   18  09:11    


 


Lymphocytes %  19.2 % (20.0-50.0)  L  18  09:11    


 


Monocytes %  5.9 % (2.0-10.0)   18  09:11    


 


Eosinophils %  1.5 % (0.0-5.0)   18  09:11    


 


Basophils %  1.2 % (0.0-2.0)   18  09:11    


 


Eos Smear Source  URINE   18  03:00    


 


Eos Smear Total Cells  FEW EOSINOPHILS SEEN  (NONE SEEN)   18  03:00    


 


PT  10.5 SECONDS (9.5-11.5)   18  06:00    


 


INR  1.01  (0.5-1.4)   18  06:00    


 


PTT (Actin FS)  25.3 SECONDS (26.0-38.0)  L  18  06:00    


 


Sodium  146 mEq/L (136-145)  H  18  05:54    


 


Potassium  3.9 mEq/L (3.5-5.1)   18  05:54    


 


Chloride  112 mEq/L ()  H  18  05:54    


 


Carbon Dioxide  29.4 mEq/L (21.0-31.0)   18  05:54    


 


Anion Gap  8.5  (7.0-16.0)   18  05:54    


 


BUN  20 mg/dL (7-25)   18  05:54    


 


Creatinine  1.2 mg/dL (0.7-1.3)   18  05:54    


 


Est GFR ( Amer)  TNP   18  05:54    


 


Est GFR (Non-Af Amer)  TNP   18  05:54    


 


BUN/Creatinine Ratio  16.7   18  05:54    


 


Glucose  191 mg/dL ()  H D 18  05:54    


 


POC Glucose  216 MG/DL (70 - 105)  H  18  11:34    


 


Hemoglobin A1c %  10.5 % (4.0-6.0)  H  18  05:26    


 


Uric Acid  11.9 mg/dL (4.4-7.6)  H  18  06:00    


 


Calcium  8.8 mg/dL (8.6-10.3)   18  05:54    


 


Phosphorus  4.1 mg/dL (2.5-5.0)   18  06:00    


 


Magnesium  2.9 mg/dL (1.9-2.7)  H  18  06:00    


 


Total Bilirubin  0.6 mg/dL (0.3-1.0)   18  09:11    


 


AST  17 U/L (13-39)   18  09:11    


 


ALT  13 U/L (7-52)   18  09:11    


 


Alkaline Phosphatase  81 U/L ()   18  09:11    


 


Total Protein  6.4 gm/dL (6.0-8.3)   18  09:11    


 


Albumin  3.0 gm/dL (4.2-5.5)  L  18  09:11    


 


Globulin  3.4 gm/dL  18  09:11    


 


Albumin/Globulin Ratio  0.9  (1.0-1.8)  L  18  09:11    


 


TSH  0.28 uIU/ml (0.34-5.60)  L  18  06:00    


 


Urine Source  CATH   18  03:00    


 


Urine Color  BROWN   18  03:00    


 


Urine Clarity  CLOUDY  (CLEAR)   18  03:00    


 


Urine pH  5.5  (4.6 - 8.0)   18  03:00    


 


Ur Specific Gravity  1.020  (1.005-1.030)   18  03:00    


 


Urine Protein  100 mg/dL (NEGATIVE)  H  18  03:00    


 


Urine Glucose (UA)  >=1000 mg/dL (NEGATIVE)  H  18  03:00    


 


Urine Ketones  TRACE mg/dL (NEGATIVE)   18  03:00    


 


Urine Blood  LARGE  (NEGATIVE)  H  18  03:00    


 


Urine Nitrate  NEGATIVE  (NEGATIVE)   18  03:00    


 


Urine Bilirubin  SMALL  (NEGATIVE)  H  18  03:00    


 


Urine Ictotest  NEGATIVE  (NEGATIVE)   18  03:00    


 


Urine Urobilinogen  0.2 E.U./dL (0.2 - 1.0)   18  03:00    


 


Ur Leukocyte Esterase  TRACE  (NEGATIVE)  H  18  03:00    


 


Urine RBC  >100 /hpf (0-5)  H  18  03:00    


 


Urine WBC  >100 /hpf (0-5)  H  18  03:00    


 


Ur Epithelial Cells  OCCASIONAL /lpf (FEW)   18  03:00    


 


Urine Bacteria  MODERATE /hpf (NONE SEEN)  H  18  03:00    


 


Urine Osmolality  749 mOsmol/kg  18  03:00    


 


Ur Random Sodium  39 mmol/L  18  03:00    


 


Urine Creatinine  136.0 mg/dl (39.0-259.0)   18  03:00    














- Physical Exam


Vitals and I&O: 


 Vital Signs











Temp  97.6 F   18 12:00


 


Pulse  102   18 12:00


 


Resp  18   18 12:00


 


BP  169/90   18 12:00


 


Pulse Ox  99   18 12:00








 Intake & Output











 18





 18:59 06:59 18:59


 


Intake Total 600  


 


Output Total 300  


 


Balance 300  


 


Weight (lbs) 78.018 kg 78.018 kg 


 


Intake:   


 


  Oral 600  


 


Output:   


 


  Urine 300  


 


Other:   


 


  # Bowel Movements 0  











Active Medications: 


Current Medications





Acetaminophen (Tylenol)  650 mg PO Q6HR PRN


   PRN Reason: mild to moderate pain


   Stop: 18 19:27


   Last Admin: 18 03:51 Dose:  650 mg


Acetaminophen/Hydrocodone Bitart (Norco 5mg/325mg)  1 tab PO Q6H PRN


   PRN Reason: Pain (Moderate)


   Stop: 18 08:21


Allopurinol (Zyloprim)  100 mg PO DAILY [facility]


   Stop: 18 08:59


   Last Admin: 18 09:21 Dose:  100 mg


Castor Oil/Somali Balsam/Trypsin (Venelex)  1 appl TP DAILY [facility]


   Stop: 18 15:59


   Last Admin: 18 09:23 Dose:  1 appl


Clonazepam (Klonopin)  1 mg PO BID [facility]


   PRN Reason: Protocol


   Stop: 18 08:59


   Last Admin: 18 09:23 Dose:  1 mg


Famotidine (Pepcid)  20 mg PO BID [facility]


   Stop: 18 08:59


   Last Admin: 18 09:21 Dose:  20 mg


Heparin Sodium (Porcine) (Heparin)  5,000 units SUBQ Q12HR [facility]


   PRN Reason: Protocol


   Stop: 18 20:59


   Last Admin: 18 09:21 Dose:  5,000 units


Levofloxacin (Levaquin Pb)  500 mg in 100 mls @ 100 mls/hr IV Q24HR [facility]


   Stop: 18 05:59


   Last Admin: 18 05:36 Dose:  100 mls/hr


Insulin Aspart (Novolog Insulin Sliding Scale)  0 units SUBQ ACHS [facility]


   PRN Reason: Protocol


   Stop: 18 20:59


   Last Admin: 18 12:43 Dose:  4 units


Insulin Detemir (Levemir Insulin)  16 units SUBQ DAILY [facility]


   PRN Reason: Protocol


   Stop: 18 08:59


   Last Admin: 18 09:12 Dose:  16 units


Lorazepam (Ativan)  0.5 mg PO Q6HR PRN; Protocol


   PRN Reason: agitation 


   Stop: 18 19:27


   Last Admin: 18 01:21 Dose:  0.5 mg


Magnesium Hydroxide (Milk Of Magnesia)  30 ml PO HS PRN


   PRN Reason: Constipation


   Stop: 18 19:27


Metoprolol Succinate (Toprol Xl)  25 mg PO BID [facility]


   Stop: 18 16:59


   Last Admin: 18 09:22 Dose:  25 mg


Miscellaneous (Clinical Monitoring)  1 ea MC PRN PRN


   PRN Reason: RENAL


   Stop: 18 10:33


Olanzapine (Zyprexa Zydis)  12.5 mg PO BID [facility]


   PRN Reason: Protocol


   Stop: 18 12:28


   Last Admin: 18 09:21 Dose:  12.5 mg


Zolpidem Tartrate (Ambien)  5 mg PO HS PRN


   PRN Reason: insomnia


   Stop: 18 19:27


   Last Admin: 18 20:36 Dose:  5 mg








General: Alert, Oriented x3


HEENT: Atraumatic, PERRLA


Neck: Supple


Cardiovascular: Regular rate, Normal S1, Normal S2


Lungs: Clear to auscultation


Abdomen: Bowel sounds, Soft


Extremities: no Clubbing, no Cyanosis, no Edema


Neurological: Sensation intact


Skin: Rash


Psych/Mental Status: Mood NL





- Procedures


Procedures: 


 Procedures











Procedure Code Date


 


KEV MUSC/FASCIA 20 SQ CM/< 34292 18


 


EXCISION OF RIGHT HIP MUSCLE, OPEN APPROACH [id number]














Assessment/Plan





- Assessment


Assessment: 





BRYANT on CKD


Stage 4 decub ulcer S/P debridement


Severe dehydration


Ess HTN


Type 2 DM


Psychosis








- Plan


Plan: 


Lab - Result Diagrams





 18 06:00 





 18 06:00 





Current Medications





Acetaminophen (Tylenol)  650 mg PO Q6HR PRN


   PRN Reason: mild to moderate pain


   Stop: 18 19:27


Allopurinol (Zyloprim)  100 mg PO DAILY [facility]


   Stop: 18 08:59


   Last Admin: 18 10:00 Dose:  Not Given


Castor Oil/Somali Balsam/Trypsin (Venelex)  1 appl TP DAILY [facility]


   Stop: 18 15:59


   Last Admin: 18 10:00 Dose:  1 appl


Famotidine (Pepcid)  20 mg PO BID [facility]


   Stop: 18 08:59


   Last Admin: 18 18:22 Dose:  20 mg


Heparin Sodium (Porcine) (Heparin)  5,000 units SUBQ Q12HR [facility]


   PRN Reason: Protocol


   Stop: 18 20:59


   Last Admin: 18 07:50 Dose:  Not Given


Ceftriaxone Sodium 1 gm/ (Sodium Chloride)  50 mls @ 100 mls/hr IV Q24HR [facility]


   Stop: 18 16:44


   Last Admin: 18 17:45 Dose:  100 mls/hr


Insulin Aspart (Novolog Insulin Sliding Scale)  0 units SUBQ ACHS [facility]


   PRN Reason: Protocol


   Stop: 18 20:59


   Last Admin: 18 18:23 Dose:  6 units


Lorazepam (Ativan)  0.5 mg PO Q6HR PRN; Protocol


   PRN Reason: agitation 


   Stop: 18 19:27


Magnesium Hydroxide (Milk Of Magnesia)  30 ml PO HS PRN


   PRN Reason: Constipation


   Stop: 18 19:27


Metoprolol Succinate (Toprol Xl)  25 mg PO BID [facility]


   Stop: 18 16:59


   Last Admin: 18 18:23 Dose:  25 mg


Miscellaneous (Clinical Monitoring)  1 ea MC PRN PRN


   PRN Reason: RENAL


   Stop: 18 10:33


Olanzapine (Zyprexa Zydis)  5 mg PO BID [facility]


   PRN Reason: Protocol


   Stop: 18 16:59


Zolpidem Tartrate (Ambien)  5 mg PO HS PRN


   PRN Reason: insomnia


   Stop: 18 19:2


Lab - Result Diagrams





 18 09:11 





 18 05:54 


























 



































Na up to 146


Kidney fnc gradually improving


agree w/ Allopurinol


f/u electrolytes, agree w/ Levaquin


continue hydration


BS better control


increase Detemir








Nutritional Asmnt/Malnutr-PDOC





- Dietary Evaluation


Malnutrition Findings (Please click <Entered> for more info): 








Nutritional Asmnt/Malnutrition                             Start:  18 17:

10


Text:                                                      Status: Complete    

  


Freq:                                                                          

  


 Document     18 17:13  LCHENG  (Rec: 18 17:25  LCHENG  [id number])


 Nutritional Asmnt/Malnutrition


     Patient General Information


      Nutritional Screening                      High Risk


                                                 Consult


      Diagnosis                                  pressure ulcer, hyperglycemia,


                                                 dehydration


      Pertinent Medical Hx/Surgical Hx           DM, dementia, depression,


                                                 psychosis, schizophrenia,


                                                 chronic renal insuff


      Subjective Information                     Consult received for


                                                 unstageble pressure ulcer on , high BG on . Per


                                                 records, PO intake 50-75%. Pt


                                                 was on NPO today for surgery-


                                                 excisional debridement,


                                                 application of wound VAC


      Current Diet Order/ Nutrition Support      pureed, CCHO 60gm


      Pertinent Medications                      novolog


      Pertinent Labs                              Na 152, K 4.6, BUN 56, Cr


                                                 1.8, Glucose 262, -246


                                                 , Mg 2.9


     Nutritional Hx/Data


      Height                                     1.8 m


      Height (Calculated Centimeters)            180.3


      Current Weight (lbs)                       70.76 kg


      Weight (Calculated Kilograms)              70.8


      Weight (Calculated Grams)                  63267.4


      Ideal Body Weight                          172


      % Ideal Body Weight                        91


      Body Mass Index (BMI)                      21.7


      Weight Status                              Approriate


     GI Symptoms


      GI Symptoms                                None


      Last BM                                    


      Difficult in:                              None


      Skin Integrity/Comment:                    scar to right upper back,


                                                 decubitus ulcer to buttocks


      Current %PO                                Fair (50-74%)


     Estimated Nutritional Goals


      BEE in Kcals:                              Using Current wt


      Calories/Kcals/Kg                          27-32


      Kcals Calculated                           6739-3549


      Protein:                                   Using Current wt


      Protein g/k-1.2


      Protein Calculated                         71-85


      Fluid: ml                                  1917-2130ml (1ml/kcal)


     Nutritional Problem


      2. Problem


       Problem                                   altered nutrition related lab


                                                 values


       Etiology                                  hx of DM


       Signs/Symptoms:                           Glucose 262, -246


      1. Problem


       Problem                                   increased nutrition needs (


                                                 calorie and protein)


       Etiology                                  increased metabolic demand for


                                                 wound healing


       Signs/Symptoms:                           decubitus ulcer and surgery


     Intervention/Recommendation


      Comments                                   1. Continue with current diet


                                                 as ordered. If PO intake low,


                                                 will consider nutrition


                                                 supplements


                                                 2. MD to consider vit C and


                                                 zinc for wound healing


                                                 3. Monitor PO intake, wt, labs


                                                 and skin integrity


                                                 4. F/U as moderate risk in 3-5


                                                 days, 3/2-3/4, PO check 3/2


     Expected Outcomes/Goals


      Expected Outcomes/Goals                    1. PO intake to meet at least


                                                 75% of nutritional needs.


                                                 2. Wt stability, skin to


                                                 remain intact, labs to


                                                 approach WNL.

## 2018-03-05 RX ADMIN — CASTOR OIL AND BALSAM, PERU SCH APPL: 788; 87 OINTMENT TOPICAL at 08:22

## 2018-03-05 RX ADMIN — OLANZAPINE SCH MG: 5 TABLET, ORALLY DISINTEGRATING ORAL at 17:22

## 2018-03-05 RX ADMIN — INSULIN ASPART SCH UNITS: 100 INJECTION, SOLUTION INTRAVENOUS; SUBCUTANEOUS at 07:58

## 2018-03-05 RX ADMIN — LEVOFLOXACIN SCH MLS/HR: 5 INJECTION INTRAVENOUS at 06:33

## 2018-03-05 RX ADMIN — INSULIN ASPART SCH UNITS: 100 INJECTION, SOLUTION INTRAVENOUS; SUBCUTANEOUS at 21:40

## 2018-03-05 RX ADMIN — INSULIN DETEMIR SCH UNITS: 100 INJECTION, SOLUTION SUBCUTANEOUS at 08:59

## 2018-03-05 RX ADMIN — OLANZAPINE SCH MG: 5 TABLET, ORALLY DISINTEGRATING ORAL at 08:21

## 2018-03-05 RX ADMIN — INSULIN ASPART SCH: 100 INJECTION, SOLUTION INTRAVENOUS; SUBCUTANEOUS at 17:16

## 2018-03-05 RX ADMIN — INSULIN ASPART SCH UNITS: 100 INJECTION, SOLUTION INTRAVENOUS; SUBCUTANEOUS at 11:56

## 2018-03-05 NOTE — GENERAL PROGRESS NOTE
Subjective





- Review of Systems


Service Date: 18


Subjective: 





quiet today





Objective





- Results


Result Diagrams: 


 18 09:11





 18 05:54


Recent Labs: 


 Laboratory Last Values











WBC  6.7 Th/cmm (4.8-10.8)  D 18  09:11    


 


RBC  4.73 Mil/cmm (3.80-5.80)   18  09:11    


 


Hgb  13.7 gm/dL (12-16)   18  09:11    


 


Hct  42.8 % (41.0-60)   18  09:11    


 


MCV  90.5 fl (80-99)   18  09:11    


 


MCH  28.9 pg (27.0-31.0)   18  09:11    


 


MCHC Differential  32.0 pg (28.0-36.0)   18  09:11    


 


RDW  16.2 % (11.5-20.0)   18  09:11    


 


Plt Count  162 Th/cmm (150-400)   18  09:11    


 


MPV  9.7 fl  18  09:11    


 


Neutrophils %  72.2 % (40.0-80.0)   18  09:11    


 


Lymphocytes %  19.2 % (20.0-50.0)  L  18  09:11    


 


Monocytes %  5.9 % (2.0-10.0)   18  09:11    


 


Eosinophils %  1.5 % (0.0-5.0)   18  09:11    


 


Basophils %  1.2 % (0.0-2.0)   18  09:11    


 


Eos Smear Source  URINE   18  03:00    


 


Eos Smear Total Cells  FEW EOSINOPHILS SEEN  (NONE SEEN)   18  03:00    


 


PT  10.5 SECONDS (9.5-11.5)   18  06:00    


 


INR  1.01  (0.5-1.4)   18  06:00    


 


PTT (Actin FS)  25.3 SECONDS (26.0-38.0)  L  18  06:00    


 


Sodium  146 mEq/L (136-145)  H  18  05:54    


 


Potassium  3.9 mEq/L (3.5-5.1)   18  05:54    


 


Chloride  112 mEq/L ()  H  18  05:54    


 


Carbon Dioxide  29.4 mEq/L (21.0-31.0)   18  05:54    


 


Anion Gap  8.5  (7.0-16.0)   18  05:54    


 


BUN  20 mg/dL (7-25)   18  05:54    


 


Creatinine  1.2 mg/dL (0.7-1.3)   18  05:54    


 


Est GFR ( Amer)  TNP   18  05:54    


 


Est GFR (Non-Af Amer)  TNP   18  05:54    


 


BUN/Creatinine Ratio  16.7   18  05:54    


 


Glucose  191 mg/dL ()  H D 18  05:54    


 


POC Glucose  181 MG/DL (70 - 105)  H  18  11:10    


 


Hemoglobin A1c %  10.5 % (4.0-6.0)  H  18  05:26    


 


Uric Acid  5.6 mg/dL (4.4-7.6)   18  09:10    


 


Calcium  8.8 mg/dL (8.6-10.3)   18  05:54    


 


Phosphorus  4.1 mg/dL (2.5-5.0)   18  06:00    


 


Magnesium  2.9 mg/dL (1.9-2.7)  H  18  06:00    


 


Total Bilirubin  0.6 mg/dL (0.3-1.0)   18  09:11    


 


AST  17 U/L (13-39)   18  09:11    


 


ALT  13 U/L (7-52)   18  09:11    


 


Alkaline Phosphatase  81 U/L ()   18  09:11    


 


Total Protein  6.4 gm/dL (6.0-8.3)   18  09:11    


 


Albumin  3.0 gm/dL (4.2-5.5)  L  18  09:11    


 


Globulin  3.4 gm/dL  18  09:11    


 


Albumin/Globulin Ratio  0.9  (1.0-1.8)  L  18  09:11    


 


TSH  0.28 uIU/ml (0.34-5.60)  L  18  06:00    


 


Urine Source  CATH   18  03:00    


 


Urine Color  BROWN   18  03:00    


 


Urine Clarity  CLOUDY  (CLEAR)   18  03:00    


 


Urine pH  5.5  (4.6 - 8.0)   18  03:00    


 


Ur Specific Gravity  1.020  (1.005-1.030)   18  03:00    


 


Urine Protein  100 mg/dL (NEGATIVE)  H  18  03:00    


 


Urine Glucose (UA)  >=1000 mg/dL (NEGATIVE)  H  18  03:00    


 


Urine Ketones  TRACE mg/dL (NEGATIVE)   18  03:00    


 


Urine Blood  LARGE  (NEGATIVE)  H  18  03:00    


 


Urine Nitrate  NEGATIVE  (NEGATIVE)   18  03:00    


 


Urine Bilirubin  SMALL  (NEGATIVE)  H  18  03:00    


 


Urine Ictotest  NEGATIVE  (NEGATIVE)   18  03:00    


 


Urine Urobilinogen  0.2 E.U./dL (0.2 - 1.0)   18  03:00    


 


Ur Leukocyte Esterase  TRACE  (NEGATIVE)  H  18  03:00    


 


Urine RBC  >100 /hpf (0-5)  H  18  03:00    


 


Urine WBC  >100 /hpf (0-5)  H  18  03:00    


 


Ur Epithelial Cells  OCCASIONAL /lpf (FEW)   18  03:00    


 


Urine Bacteria  MODERATE /hpf (NONE SEEN)  H  18  03:00    


 


Urine Osmolality  749 mOsmol/kg  18  03:00    


 


Ur Random Sodium  39 mmol/L  18  03:00    


 


Urine Creatinine  136.0 mg/dl (39.0-259.0)   18  03:00    














- Physical Exam


Vitals and I&O: 


 Vital Signs











Temp  96.2 F   18 08:00


 


Pulse  101   18 08:21


 


Resp  18   18 10:04


 


BP  131/73   18 08:21


 


Pulse Ox  96   18 08:00








 Intake & Output











 18





 18:59 06:59 18:59


 


Intake Total 850  100


 


Output Total 600  


 


Balance 250  100


 


Weight (lbs) 78.018 kg 0 g 


 


Intake:   


 


  Intake, IV Amount   100


 


    Levofloxacin 500mg/100mL   100





    500 mg In 100 ml @ 100   





    mls/hr IV Q24HR [facility] Rx#:   





    [id number]   


 


  Oral 850  


 


Output:   


 


  Urine 600  


 


Other:   


 


  # Voids  3 


 


  # Bowel Movements 1  











Active Medications: 


Current Medications





Acetaminophen (Tylenol)  650 mg PO Q6HR PRN


   PRN Reason: mild to moderate pain


   Stop: 18 19:27


   Last Admin: 18 03:51 Dose:  650 mg


Acetaminophen/Hydrocodone Bitart (Norco 5mg/325mg)  1 tab PO Q6H PRN


   PRN Reason: Pain (Moderate)


   Stop: 18 08:21


Allopurinol (Zyloprim)  100 mg PO DAILY [facility]


   Stop: 18 08:59


   Last Admin: 18 08:21 Dose:  100 mg


Castor Oil/Cambodian Balsam/Trypsin (Venelex)  1 appl TP DAILY [facility]


   Stop: 18 15:59


   Last Admin: 18 08:22 Dose:  1 appl


Clonazepam (Klonopin)  1 mg PO BID [facility]


   PRN Reason: Protocol


   Stop: 18 08:59


   Last Admin: 18 08:22 Dose:  1 mg


Famotidine (Pepcid)  20 mg PO BID [facility]


   Stop: 18 08:59


   Last Admin: 18 08:22 Dose:  20 mg


Heparin Sodium (Porcine) (Heparin)  5,000 units SUBQ Q12HR [facility]


   PRN Reason: Protocol


   Stop: 18 20:59


   Last Admin: 18 08:23 Dose:  5,000 units


Levofloxacin (Levaquin Pb)  500 mg in 100 mls @ 100 mls/hr IV Q24HR [facility]


   Stop: 18 05:59


   Last Infusion: 18 10:00 Dose:  Infused


Insulin Aspart (Novolog Insulin Sliding Scale)  0 units SUBQ ACHS [facility]


   PRN Reason: Protocol


   Stop: 18 20:59


   Last Admin: 18 11:56 Dose:  2 units


Insulin Detemir (Levemir Insulin)  16 units SUBQ DAILY [facility]


   PRN Reason: Protocol


   Stop: 18 08:59


   Last Admin: 18 08:59 Dose:  16 units


Lorazepam (Ativan)  0.5 mg PO Q6HR PRN; Protocol


   PRN Reason: agitation 


   Stop: 18 19:27


   Last Admin: 18 01:21 Dose:  0.5 mg


Magnesium Hydroxide (Milk Of Magnesia)  30 ml PO HS PRN


   PRN Reason: Constipation


   Stop: 18 19:27


Metoprolol Succinate (Toprol Xl)  25 mg PO BID [facility]


   Stop: 18 16:59


   Last Admin: 18 08:21 Dose:  25 mg


Miscellaneous (Clinical Monitoring)  1 ea MC PRN PRN


   PRN Reason: RENAL


   Stop: 18 10:33


Olanzapine (Zyprexa Zydis)  12.5 mg PO BID [facility]


   PRN Reason: Protocol


   Stop: 18 12:28


   Last Admin: 18 08:21 Dose:  12.5 mg


Zolpidem Tartrate (Ambien)  5 mg PO HS PRN


   PRN Reason: insomnia


   Stop: 18 19:27


   Last Admin: 18 20:36 Dose:  5 mg








General: Alert, Oriented x3, No acute distress


HEENT: Atraumatic, PERRLA


Neck: Supple


Cardiovascular: Regular rate, Normal S1, Normal S2


Lungs: Clear to auscultation


Abdomen: Bowel sounds, Soft


Extremities: no Clubbing, no Cyanosis, no Edema


Neurological: Sensation intact


Skin: Rash


Psych/Mental Status: Mood NL





- Procedures


Procedures: 


 Procedures











Procedure Code Date


 


KEV MUSC/FASCIA 20 SQ CM/< 65460 18


 


EXCISION OF RIGHT HIP MUSCLE, OPEN APPROACH [id number]














Assessment/Plan





- Assessment


Assessment: 





BRYANT on CKD


Stage 4 decub ulcer S/P debridement


Severe dehydration


Ess HTN


Type 2 DM


Psychosis








- Plan


Plan: 


Lab - Result Diagrams





 18 06:00 





 18 06:00 





Current Medications





Acetaminophen (Tylenol)  650 mg PO Q6HR PRN


   PRN Reason: mild to moderate pain


   Stop: 18 19:27


Allopurinol (Zyloprim)  100 mg PO DAILY [facility]


   Stop: 18 08:59


   Last Admin: 18 10:00 Dose:  Not Given


Castor Oil/Cambodian Balsam/Trypsin (Venelex)  1 appl TP DAILY [facility]


   Stop: 18 15:59


   Last Admin: 18 10:00 Dose:  1 appl


Famotidine (Pepcid)  20 mg PO BID [facility]


   Stop: 18 08:59


   Last Admin: 18 18:22 Dose:  20 mg


Heparin Sodium (Porcine) (Heparin)  5,000 units SUBQ Q12HR [facility]


   PRN Reason: Protocol


   Stop: 18 20:59


   Last Admin: 18 07:50 Dose:  Not Given


Ceftriaxone Sodium 1 gm/ (Sodium Chloride)  50 mls @ 100 mls/hr IV Q24HR [facility]


   Stop: 18 16:44


   Last Admin: 18 17:45 Dose:  100 mls/hr


Insulin Aspart (Novolog Insulin Sliding Scale)  0 units SUBQ ACHS [facility]


   PRN Reason: Protocol


   Stop: 18 20:59


   Last Admin: 18 18:23 Dose:  6 units


Lorazepam (Ativan)  0.5 mg PO Q6HR PRN; Protocol


   PRN Reason: agitation 


   Stop: 18 19:27


Magnesium Hydroxide (Milk Of Magnesia)  30 ml PO HS PRN


   PRN Reason: Constipation


   Stop: 18 19:27


Metoprolol Succinate (Toprol Xl)  25 mg PO BID [facility]


   Stop: 18 16:59


   Last Admin: 18 18:23 Dose:  25 mg


Miscellaneous (Clinical Monitoring)  1 ea MC PRN PRN


   PRN Reason: RENAL


   Stop: 18 10:33


Olanzapine (Zyprexa Zydis)  5 mg PO BID [facility]


   PRN Reason: Protocol


   Stop: 18 16:59


Zolpidem Tartrate (Ambien)  5 mg PO HS PRN


   PRN Reason: insomnia


   Stop: 18 19:2





Lab - Result Diagrams





 18 09:11 





 18 05:54 



































 



































Na up to 146


Kidney fnc gradually improving


agree w/ Allopurinol


f/u electrolytes, agree w/ Levaquin


continue hydration


BS better control


increase Detemir








Nutritional Asmnt/Malnutr-PDOC





- Dietary Evaluation


Malnutrition Findings (Please click <Entered> for more info): 








Nutritional Asmnt/Malnutrition                             Start:  18 17:

10


Text:                                                      Status: Complete    

  


Freq:                                                                          

  


 Document     18 17:13  MAGEN  (Rec: 18 17:25  MAGEN  [id number])


 Nutritional Asmnt/Malnutrition


     Patient General Information


      Nutritional Screening                      High Risk


                                                 Consult


      Diagnosis                                  pressure ulcer, hyperglycemia,


                                                 dehydration


      Pertinent Medical Hx/Surgical Hx           DM, dementia, depression,


                                                 psychosis, schizophrenia,


                                                 chronic renal insuff


      Subjective Information                     Consult received for


                                                 unstageble pressure ulcer on , high BG on . Per


                                                 records, PO intake 50-75%. Pt


                                                 was on NPO today for surgery-


                                                 excisional debridement,


                                                 application of wound VAC


      Current Diet Order/ Nutrition Support      pureed, CCHO 60gm


      Pertinent Medications                      novolog


      Pertinent Labs                              Na 152, K 4.6, BUN 56, Cr


                                                 1.8, Glucose 262, -246


                                                 , Mg 2.9


     Nutritional Hx/Data


      Height                                     1.8 m


      Height (Calculated Centimeters)            180.3


      Current Weight (lbs)                       70.76 kg


      Weight (Calculated Kilograms)              70.8


      Weight (Calculated Grams)                  01562.4


      Ideal Body Weight                          172


      % Ideal Body Weight                        91


      Body Mass Index (BMI)                      21.7


      Weight Status                              Approriate


     GI Symptoms


      GI Symptoms                                None


      Last BM                                    


      Difficult in:                              None


      Skin Integrity/Comment:                    scar to right upper back,


                                                 decubitus ulcer to buttocks


      Current %PO                                Fair (50-74%)


     Estimated Nutritional Goals


      BEE in Kcals:                              Using Current wt


      Calories/Kcals/Kg                          27-32


      Kcals Calculated                           4098-7165


      Protein:                                   Using Current wt


      Protein g/k-1.2


      Protein Calculated                         71-85


      Fluid: ml                                  1917-2130ml (1ml/kcal)


     Nutritional Problem


      2. Problem


       Problem                                   altered nutrition related lab


                                                 values


       Etiology                                  hx of DM


       Signs/Symptoms:                           Glucose 262, -246


      1. Problem


       Problem                                   increased nutrition needs (


                                                 calorie and protein)


       Etiology                                  increased metabolic demand for


                                                 wound healing


       Signs/Symptoms:                           decubitus ulcer and surgery


     Intervention/Recommendation


      Comments                                   1. Continue with current diet


                                                 as ordered. If PO intake low,


                                                 will consider nutrition


                                                 supplements


                                                 2. MD to consider vit C and


                                                 zinc for wound healing


                                                 3. Monitor PO intake, wt, labs


                                                 and skin integrity


                                                 4. F/U as moderate risk in 3-5


                                                 days, 3/2-3/4, PO check 3/


     Expected Outcomes/Goals


      Expected Outcomes/Goals                    1. PO intake to meet at least


                                                 75% of nutritional needs.


                                                 2. Wt stability, skin to


                                                 remain intact, labs to


                                                 approach WNL.

## 2018-03-05 NOTE — GENERAL PROGRESS NOTE
Subjective





- Review of Systems


Service Date: 18


Subjective: 





Patient still aggressive to staff. Unable to keep wound vac on. Patient blood 

sugar much better controlled on levemir 16u Daily. 





Objective





- Results


Result Diagrams: 


 18 09:11





 18 05:54


Recent Labs: 


 Laboratory Last Values











WBC  6.7 Th/cmm (4.8-10.8)  D 18  09:11    


 


RBC  4.73 Mil/cmm (3.80-5.80)   18  09:11    


 


Hgb  13.7 gm/dL (12-16)   18  09:11    


 


Hct  42.8 % (41.0-60)   18  09:11    


 


MCV  90.5 fl (80-99)   18  09:11    


 


MCH  28.9 pg (27.0-31.0)   18  09:11    


 


MCHC Differential  32.0 pg (28.0-36.0)   18  09:11    


 


RDW  16.2 % (11.5-20.0)   18  09:11    


 


Plt Count  162 Th/cmm (150-400)   18  09:11    


 


MPV  9.7 fl  18  09:11    


 


Neutrophils %  72.2 % (40.0-80.0)   18  09:11    


 


Lymphocytes %  19.2 % (20.0-50.0)  L  18  09:11    


 


Monocytes %  5.9 % (2.0-10.0)   18  09:11    


 


Eosinophils %  1.5 % (0.0-5.0)   18  09:11    


 


Basophils %  1.2 % (0.0-2.0)   18  09:11    


 


Eos Smear Source  URINE   18  03:00    


 


Eos Smear Total Cells  FEW EOSINOPHILS SEEN  (NONE SEEN)   18  03:00    


 


PT  10.5 SECONDS (9.5-11.5)   18  06:00    


 


INR  1.01  (0.5-1.4)   18  06:00    


 


PTT (Actin FS)  25.3 SECONDS (26.0-38.0)  L  18  06:00    


 


Sodium  146 mEq/L (136-145)  H  18  05:54    


 


Potassium  3.9 mEq/L (3.5-5.1)   18  05:54    


 


Chloride  112 mEq/L ()  H  18  05:54    


 


Carbon Dioxide  29.4 mEq/L (21.0-31.0)   18  05:54    


 


Anion Gap  8.5  (7.0-16.0)   18  05:54    


 


BUN  20 mg/dL (7-25)   18  05:54    


 


Creatinine  1.2 mg/dL (0.7-1.3)   18  05:54    


 


Est GFR ( Amer)  TNP   18  05:54    


 


Est GFR (Non-Af Amer)  TNP   18  05:54    


 


BUN/Creatinine Ratio  16.7   18  05:54    


 


Glucose  191 mg/dL ()  H D 18  05:54    


 


POC Glucose  166 MG/DL (70 - 105)  H  18  05:39    


 


Hemoglobin A1c %  10.5 % (4.0-6.0)  H  18  05:26    


 


Uric Acid  11.9 mg/dL (4.4-7.6)  H  18  06:00    


 


Calcium  8.8 mg/dL (8.6-10.3)   18  05:54    


 


Phosphorus  4.1 mg/dL (2.5-5.0)   18  06:00    


 


Magnesium  2.9 mg/dL (1.9-2.7)  H  18  06:00    


 


Total Bilirubin  0.6 mg/dL (0.3-1.0)   18  09:11    


 


AST  17 U/L (13-39)   18  09:11    


 


ALT  13 U/L (7-52)   18  09:11    


 


Alkaline Phosphatase  81 U/L ()   18  09:11    


 


Total Protein  6.4 gm/dL (6.0-8.3)   18  09:11    


 


Albumin  3.0 gm/dL (4.2-5.5)  L  18  09:11    


 


Globulin  3.4 gm/dL  18  09:11    


 


Albumin/Globulin Ratio  0.9  (1.0-1.8)  L  18  09:11    


 


TSH  0.28 uIU/ml (0.34-5.60)  L  18  06:00    


 


Urine Source  CATH   18  03:00    


 


Urine Color  BROWN   18  03:00    


 


Urine Clarity  CLOUDY  (CLEAR)   18  03:00    


 


Urine pH  5.5  (4.6 - 8.0)   18  03:00    


 


Ur Specific Gravity  1.020  (1.005-1.030)   18  03:00    


 


Urine Protein  100 mg/dL (NEGATIVE)  H  18  03:00    


 


Urine Glucose (UA)  >=1000 mg/dL (NEGATIVE)  H  18  03:00    


 


Urine Ketones  TRACE mg/dL (NEGATIVE)   18  03:00    


 


Urine Blood  LARGE  (NEGATIVE)  H  18  03:00    


 


Urine Nitrate  NEGATIVE  (NEGATIVE)   18  03:00    


 


Urine Bilirubin  SMALL  (NEGATIVE)  H  18  03:00    


 


Urine Ictotest  NEGATIVE  (NEGATIVE)   18  03:00    


 


Urine Urobilinogen  0.2 E.U./dL (0.2 - 1.0)   18  03:00    


 


Ur Leukocyte Esterase  TRACE  (NEGATIVE)  H  18  03:00    


 


Urine RBC  >100 /hpf (0-5)  H  18  03:00    


 


Urine WBC  >100 /hpf (0-5)  H  18  03:00    


 


Ur Epithelial Cells  OCCASIONAL /lpf (FEW)   18  03:00    


 


Urine Bacteria  MODERATE /hpf (NONE SEEN)  H  18  03:00    


 


Urine Osmolality  749 mOsmol/kg  18  03:00    


 


Ur Random Sodium  39 mmol/L  18  03:00    


 


Urine Creatinine  136.0 mg/dl (39.0-259.0)   18  03:00    














- Physical Exam


Vitals and I&O: 


 Vital Signs











Temp  97.2 F   18 04:54


 


Pulse  102   18 04:54


 


Resp  18   18 04:54


 


BP  141/77   18 04:54


 


Pulse Ox  98   18 04:54








 Intake & Output











 18





 18:59 06:59 18:59


 


Intake Total 850  


 


Output Total 600  


 


Balance 250  


 


Weight (lbs) 78.018 kg 0 g 


 


Intake:   


 


  Oral 850  


 


Output:   


 


  Urine 600  


 


Other:   


 


  # Voids  3 


 


  # Bowel Movements 1  











Active Medications: 


Current Medications





Acetaminophen (Tylenol)  650 mg PO Q6HR PRN


   PRN Reason: mild to moderate pain


   Stop: 18 19:27


   Last Admin: 18 03:51 Dose:  650 mg


Acetaminophen/Hydrocodone Bitart (Norco 5mg/325mg)  1 tab PO Q6H PRN


   PRN Reason: Pain (Moderate)


   Stop: 18 08:21


Allopurinol (Zyloprim)  100 mg PO DAILY [facility]


   Stop: 18 08:59


   Last Admin: 18 09:21 Dose:  100 mg


Castor Oil/Norwegian Balsam/Trypsin (Venelex)  1 appl TP DAILY [facility]


   Stop: 18 15:59


   Last Admin: 18 09:23 Dose:  1 appl


Clonazepam (Klonopin)  1 mg PO BID [facility]


   PRN Reason: Protocol


   Stop: 18 08:59


   Last Admin: 18 17:51 Dose:  1 mg


Famotidine (Pepcid)  20 mg PO BID [facility]


   Stop: 18 08:59


   Last Admin: 18 17:51 Dose:  20 mg


Heparin Sodium (Porcine) (Heparin)  5,000 units SUBQ Q12HR [facility]


   PRN Reason: Protocol


   Stop: 18 20:59


   Last Admin: 18 21:33 Dose:  5,000 units


Levofloxacin (Levaquin Pb)  500 mg in 100 mls @ 100 mls/hr IV Q24HR [facility]


   Stop: 18 05:59


   Last Admin: 18 06:33 Dose:  100 mls/hr


Insulin Aspart (Novolog Insulin Sliding Scale)  0 units SUBQ ACHS [facility]


   PRN Reason: Protocol


   Stop: 18 20:59


   Last Admin: 18 07:58 Dose:  2 units


Insulin Detemir (Levemir Insulin)  16 units SUBQ DAILY [facility]


   PRN Reason: Protocol


   Stop: 18 08:59


   Last Admin: 18 09:12 Dose:  16 units


Lorazepam (Ativan)  0.5 mg PO Q6HR PRN; Protocol


   PRN Reason: agitation 


   Stop: 18 19:27


   Last Admin: 18 01:21 Dose:  0.5 mg


Magnesium Hydroxide (Milk Of Magnesia)  30 ml PO HS PRN


   PRN Reason: Constipation


   Stop: 18 19:27


Metoprolol Succinate (Toprol Xl)  25 mg PO BID [facility]


   Stop: 18 16:59


   Last Admin: 18 17:53 Dose:  25 mg


Miscellaneous (Clinical Monitoring)  1 ea MC PRN PRN


   PRN Reason: RENAL


   Stop: 18 10:33


Olanzapine (Zyprexa Zydis)  12.5 mg PO BID [facility]


   PRN Reason: Protocol


   Stop: 18 12:28


   Last Admin: 18 17:51 Dose:  12.5 mg


Zolpidem Tartrate (Ambien)  5 mg PO HS PRN


   PRN Reason: insomnia


   Stop: 18 19:27


   Last Admin: 18 20:36 Dose:  5 mg








General: Alert, Oriented x3


HEENT: Atraumatic, PERRLA


Neck: Supple


Cardiovascular: Regular rate, Normal S1, Normal S2


Lungs: Clear to auscultation


Abdomen: Bowel sounds, Soft


Extremities: no Clubbing, no Cyanosis, no Edema


Neurological: Sensation intact


Skin: Rash


Psych/Mental Status: Mood NL





- Procedures


Procedures: 


 Procedures











Procedure Code Date


 


KEV MUSC/FASCIA 20 SQ CM/< 11099 18


 


EXCISION OF RIGHT HIP MUSCLE, OPEN APPROACH [id number]














Assessment/Plan





- Assessment


Assessment: 





hypernatremia resolved


prerenal azotemia improved.


hyperglycemia ... stable


depression/anxiety


gout ... on allopurinol. check uric acid level


dementia


schizophrenia


S/P wound debridement of sacral decubitus ... continue wound care treatment.


UTI


discharge planning.


BRYANT on CKD ... resolved.


hyperkalemia resolved. 








- Plan


Plan: 





will order repeat cmp,cbc tomorrow AM





renal consult -- Dr. Bajwa





sacral decubitus ... will order Gen Surgical consult -- Dr. Jones for possible 

wound debridement





Gout ... continue allopurinol 100mg PO daily





continue wound vac. 





continue hydrocodone PO for pain control. 





discharge planning





Nutritional Asmnt/Malnutr-PDOC





- Dietary Evaluation


Malnutrition Findings (Please click <Entered> for more info): 








Nutritional Asmnt/Malnutrition                             Start:  18 17:

10


Text:                                                      Status: Complete    

  


Freq:                                                                          

  


 Document     18 17:13  MAGEN  (Rec: 18 17:25  HEN  [id number])


 Nutritional Asmnt/Malnutrition


     Patient General Information


      Nutritional Screening                      High Risk


                                                 Consult


      Diagnosis                                  pressure ulcer, hyperglycemia,


                                                 dehydration


      Pertinent Medical Hx/Surgical Hx           DM, dementia, depression,


                                                 psychosis, schizophrenia,


                                                 chronic renal insuff


      Subjective Information                     Consult received for


                                                 unstageble pressure ulcer on , high BG on . Per


                                                 records, PO intake 50-75%. Pt


                                                 was on NPO today for surgery-


                                                 excisional debridement,


                                                 application of wound VAC


      Current Diet Order/ Nutrition Support      pureed, CCHO 60gm


      Pertinent Medications                      novolog


      Pertinent Labs                              Na 152, K 4.6, BUN 56, Cr


                                                 1.8, Glucose 262, -246


                                                 , Mg 2.9


     Nutritional Hx/Data


      Height                                     1.8 m


      Height (Calculated Centimeters)            180.3


      Current Weight (lbs)                       70.76 kg


      Weight (Calculated Kilograms)              70.8


      Weight (Calculated Grams)                  85763.4


      Ideal Body Weight                          172


      % Ideal Body Weight                        91


      Body Mass Index (BMI)                      21.7


      Weight Status                              Approriate


     GI Symptoms


      GI Symptoms                                None


      Last BM                                    


      Difficult in:                              None


      Skin Integrity/Comment:                    scar to right upper back,


                                                 decubitus ulcer to buttocks


      Current %PO                                Fair (50-74%)


     Estimated Nutritional Goals


      BEE in Kcals:                              Using Current wt


      Calories/Kcals/Kg                          27-32


      Kcals Calculated                           0945-6012


      Protein:                                   Using Current wt


      Protein g/k-1.2


      Protein Calculated                         71-85


      Fluid: ml                                  1917-2130ml (1ml/kcal)


     Nutritional Problem


      2. Problem


       Problem                                   altered nutrition related lab


                                                 values


       Etiology                                  hx of DM


       Signs/Symptoms:                           Glucose 262, -246


      1. Problem


       Problem                                   increased nutrition needs (


                                                 calorie and protein)


       Etiology                                  increased metabolic demand for


                                                 wound healing


       Signs/Symptoms:                           decubitus ulcer and surgery


     Intervention/Recommendation


      Comments                                   1. Continue with current diet


                                                 as ordered. If PO intake low,


                                                 will consider nutrition


                                                 supplements


                                                 2. MD to consider vit C and


                                                 zinc for wound healing


                                                 3. Monitor PO intake, wt, labs


                                                 and skin integrity


                                                 4. F/U as moderate risk in 3-5


                                                 days, 3-3/4, PO check 3/


     Expected Outcomes/Goals


      Expected Outcomes/Goals                    1. PO intake to meet at least


                                                 75% of nutritional needs.


                                                 2. Wt stability, skin to


                                                 remain intact, labs to


                                                 approach WNL.

## 2018-03-05 NOTE — GENERAL PROGRESS NOTE
Subjective





- Review of Systems


Subjective: 





quiet today





Objective





- Results


Result Diagrams: 


 18 09:11





 18 05:54


Recent Labs: 


 Laboratory Last Values











WBC  6.7 Th/cmm (4.8-10.8)  D 18  09:11    


 


RBC  4.73 Mil/cmm (3.80-5.80)   18  09:11    


 


Hgb  13.7 gm/dL (12-16)   18  09:11    


 


Hct  42.8 % (41.0-60)   18  09:11    


 


MCV  90.5 fl (80-99)   18  09:11    


 


MCH  28.9 pg (27.0-31.0)   18  09:11    


 


MCHC Differential  32.0 pg (28.0-36.0)   18  09:11    


 


RDW  16.2 % (11.5-20.0)   18  09:11    


 


Plt Count  162 Th/cmm (150-400)   18  09:11    


 


MPV  9.7 fl  18  09:11    


 


Neutrophils %  72.2 % (40.0-80.0)   18  09:11    


 


Lymphocytes %  19.2 % (20.0-50.0)  L  18  09:11    


 


Monocytes %  5.9 % (2.0-10.0)   18  09:11    


 


Eosinophils %  1.5 % (0.0-5.0)   18  09:11    


 


Basophils %  1.2 % (0.0-2.0)   18  09:11    


 


Eos Smear Source  URINE   18  03:00    


 


Eos Smear Total Cells  FEW EOSINOPHILS SEEN  (NONE SEEN)   18  03:00    


 


PT  10.5 SECONDS (9.5-11.5)   18  06:00    


 


INR  1.01  (0.5-1.4)   18  06:00    


 


PTT (Actin FS)  25.3 SECONDS (26.0-38.0)  L  18  06:00    


 


Sodium  146 mEq/L (136-145)  H  18  05:54    


 


Potassium  3.9 mEq/L (3.5-5.1)   18  05:54    


 


Chloride  112 mEq/L ()  H  18  05:54    


 


Carbon Dioxide  29.4 mEq/L (21.0-31.0)   18  05:54    


 


Anion Gap  8.5  (7.0-16.0)   18  05:54    


 


BUN  20 mg/dL (7-25)   18  05:54    


 


Creatinine  1.2 mg/dL (0.7-1.3)   18  05:54    


 


Est GFR ( Amer)  TNP   18  05:54    


 


Est GFR (Non-Af Amer)  TNP   18  05:54    


 


BUN/Creatinine Ratio  16.7   18  05:54    


 


Glucose  191 mg/dL ()  H D 18  05:54    


 


POC Glucose  181 MG/DL (70 - 105)  H  18  11:10    


 


Hemoglobin A1c %  10.5 % (4.0-6.0)  H  18  05:26    


 


Uric Acid  5.6 mg/dL (4.4-7.6)   18  09:10    


 


Calcium  8.8 mg/dL (8.6-10.3)   18  05:54    


 


Phosphorus  4.1 mg/dL (2.5-5.0)   18  06:00    


 


Magnesium  2.9 mg/dL (1.9-2.7)  H  18  06:00    


 


Total Bilirubin  0.6 mg/dL (0.3-1.0)   18  09:11    


 


AST  17 U/L (13-39)   18  09:11    


 


ALT  13 U/L (7-52)   18  09:11    


 


Alkaline Phosphatase  81 U/L ()   18  09:11    


 


Total Protein  6.4 gm/dL (6.0-8.3)   18  09:11    


 


Albumin  3.0 gm/dL (4.2-5.5)  L  18  09:11    


 


Globulin  3.4 gm/dL  18  09:11    


 


Albumin/Globulin Ratio  0.9  (1.0-1.8)  L  18  09:11    


 


TSH  0.28 uIU/ml (0.34-5.60)  L  18  06:00    


 


Urine Source  CATH   18  03:00    


 


Urine Color  BROWN   18  03:00    


 


Urine Clarity  CLOUDY  (CLEAR)   18  03:00    


 


Urine pH  5.5  (4.6 - 8.0)   18  03:00    


 


Ur Specific Gravity  1.020  (1.005-1.030)   18  03:00    


 


Urine Protein  100 mg/dL (NEGATIVE)  H  18  03:00    


 


Urine Glucose (UA)  >=1000 mg/dL (NEGATIVE)  H  18  03:00    


 


Urine Ketones  TRACE mg/dL (NEGATIVE)   18  03:00    


 


Urine Blood  LARGE  (NEGATIVE)  H  18  03:00    


 


Urine Nitrate  NEGATIVE  (NEGATIVE)   18  03:00    


 


Urine Bilirubin  SMALL  (NEGATIVE)  H  18  03:00    


 


Urine Ictotest  NEGATIVE  (NEGATIVE)   18  03:00    


 


Urine Urobilinogen  0.2 E.U./dL (0.2 - 1.0)   18  03:00    


 


Ur Leukocyte Esterase  TRACE  (NEGATIVE)  H  18  03:00    


 


Urine RBC  >100 /hpf (0-5)  H  18  03:00    


 


Urine WBC  >100 /hpf (0-5)  H  18  03:00    


 


Ur Epithelial Cells  OCCASIONAL /lpf (FEW)   18  03:00    


 


Urine Bacteria  MODERATE /hpf (NONE SEEN)  H  18  03:00    


 


Urine Osmolality  749 mOsmol/kg  18  03:00    


 


Ur Random Sodium  39 mmol/L  18  03:00    


 


Urine Creatinine  136.0 mg/dl (39.0-259.0)   18  03:00    














- Physical Exam


Vitals and I&O: 


 Vital Signs











Temp  96.2 F   18 08:00


 


Pulse  101   18 08:21


 


Resp  18   18 10:04


 


BP  131/73   18 08:21


 


Pulse Ox  96   18 08:00








 Intake & Output











 18





 18:59 06:59 18:59


 


Intake Total 850  100


 


Output Total 600  


 


Balance 250  100


 


Weight (lbs) 78.018 kg 0 g 


 


Intake:   


 


  Intake, IV Amount   100


 


    Levofloxacin 500mg/100mL   100





    500 mg In 100 ml @ 100   





    mls/hr IV Q24HR [facility] Rx#:   





    [id number]   


 


  Oral 850  


 


Output:   


 


  Urine 600  


 


Other:   


 


  # Voids  3 


 


  # Bowel Movements 1  











Active Medications: 


Current Medications





Acetaminophen (Tylenol)  650 mg PO Q6HR PRN


   PRN Reason: mild to moderate pain


   Stop: 18 19:27


   Last Admin: 18 03:51 Dose:  650 mg


Acetaminophen/Hydrocodone Bitart (Norco 5mg/325mg)  1 tab PO Q6H PRN


   PRN Reason: Pain (Moderate)


   Stop: 18 08:21


Allopurinol (Zyloprim)  100 mg PO DAILY [facility]


   Stop: 18 08:59


   Last Admin: 18 08:21 Dose:  100 mg


Castor Oil/Ecuadorean Balsam/Trypsin (Venelex)  1 appl TP DAILY [facility]


   Stop: 18 15:59


   Last Admin: 18 08:22 Dose:  1 appl


Clonazepam (Klonopin)  1 mg PO BID [facility]


   PRN Reason: Protocol


   Stop: 18 08:59


   Last Admin: 18 08:22 Dose:  1 mg


Famotidine (Pepcid)  20 mg PO BID [facility]


   Stop: 18 08:59


   Last Admin: 18 08:22 Dose:  20 mg


Heparin Sodium (Porcine) (Heparin)  5,000 units SUBQ Q12HR [facility]


   PRN Reason: Protocol


   Stop: 18 20:59


   Last Admin: 18 08:23 Dose:  5,000 units


Levofloxacin (Levaquin Pb)  500 mg in 100 mls @ 100 mls/hr IV Q24HR [facility]


   Stop: 18 05:59


   Last Infusion: 18 10:00 Dose:  Infused


Insulin Aspart (Novolog Insulin Sliding Scale)  0 units SUBQ ACHS [facility]


   PRN Reason: Protocol


   Stop: 18 20:59


   Last Admin: 18 11:56 Dose:  2 units


Insulin Detemir (Levemir Insulin)  16 units SUBQ DAILY [facility]


   PRN Reason: Protocol


   Stop: 18 08:59


   Last Admin: 18 08:59 Dose:  16 units


Lorazepam (Ativan)  0.5 mg PO Q6HR PRN; Protocol


   PRN Reason: agitation 


   Stop: 18 19:27


   Last Admin: 18 01:21 Dose:  0.5 mg


Magnesium Hydroxide (Milk Of Magnesia)  30 ml PO HS PRN


   PRN Reason: Constipation


   Stop: 18 19:27


Metoprolol Succinate (Toprol Xl)  25 mg PO BID [facility]


   Stop: 18 16:59


   Last Admin: 18 08:21 Dose:  25 mg


Miscellaneous (Clinical Monitoring)  1 ea MC PRN PRN


   PRN Reason: RENAL


   Stop: 18 10:33


Olanzapine (Zyprexa Zydis)  12.5 mg PO BID [facility]


   PRN Reason: Protocol


   Stop: 18 12:28


   Last Admin: 18 08:21 Dose:  12.5 mg


Zolpidem Tartrate (Ambien)  5 mg PO HS PRN


   PRN Reason: insomnia


   Stop: 18 19:27


   Last Admin: 18 20:36 Dose:  5 mg








General: Alert, Oriented x3, No acute distress


HEENT: Atraumatic, PERRLA


Neck: Supple


Cardiovascular: Regular rate, Normal S1, Normal S2


Lungs: Clear to auscultation


Abdomen: Bowel sounds, Soft


Extremities: no Clubbing, no Cyanosis, no Edema


Neurological: Sensation intact


Skin: Rash


Psych/Mental Status: Mood NL





- Procedures


Procedures: 


 Procedures











Procedure Code Date


 


KEV MUSC/FASCIA 20 SQ CM/< 08422 18


 


EXCISION OF RIGHT HIP MUSCLE, OPEN APPROACH [id number] 18














Assessment/Plan





- Assessment


Assessment: 





BRYANT on CKD


Stage 4 decub ulcer S/P debridement


Severe dehydration


Ess HTN


Type 2 DM


Psychosis








- Plan


Plan: 


Lab - Result Diagrams





 18 06:00 





 18 06:00 





Current Medications





Acetaminophen (Tylenol)  650 mg PO Q6HR PRN


   PRN Reason: mild to moderate pain


   Stop: 18 19:27


Allopurinol (Zyloprim)  100 mg PO DAILY [facility]


   Stop: 18 08:59


   Last Admin: 18 10:00 Dose:  Not Given


Castor Oil/Ecuadorean Balsam/Trypsin (Venelex)  1 appl TP DAILY [facility]


   Stop: 18 15:59


   Last Admin: 18 10:00 Dose:  1 appl


Famotidine (Pepcid)  20 mg PO BID [facility]


   Stop: 18 08:59


   Last Admin: 18 18:22 Dose:  20 mg


Heparin Sodium (Porcine) (Heparin)  5,000 units SUBQ Q12HR [facility]


   PRN Reason: Protocol


   Stop: 18 20:59


   Last Admin: 18 07:50 Dose:  Not Given


Ceftriaxone Sodium 1 gm/ (Sodium Chloride)  50 mls @ 100 mls/hr IV Q24HR [facility]


   Stop: 18 16:44


   Last Admin: 18 17:45 Dose:  100 mls/hr


Insulin Aspart (Novolog Insulin Sliding Scale)  0 units SUBQ ACHS [facility]


   PRN Reason: Protocol


   Stop: 18 20:59


   Last Admin: 18 18:23 Dose:  6 units


Lorazepam (Ativan)  0.5 mg PO Q6HR PRN; Protocol


   PRN Reason: agitation 


   Stop: 18 19:27


Magnesium Hydroxide (Milk Of Magnesia)  30 ml PO HS PRN


   PRN Reason: Constipation


   Stop: 18 19:27


Metoprolol Succinate (Toprol Xl)  25 mg PO BID [facility]


   Stop: 18 16:59


   Last Admin: 18 18:23 Dose:  25 mg


Miscellaneous (Clinical Monitoring)  1 ea MC PRN PRN


   PRN Reason: RENAL


   Stop: 18 10:33


Olanzapine (Zyprexa Zydis)  5 mg PO BID [facility]


   PRN Reason: Protocol


   Stop: 18 16:59


Zolpidem Tartrate (Ambien)  5 mg PO HS PRN


   PRN Reason: insomnia


   Stop: 18 19:2





Lab - Result Diagrams





 18 09:11 





 18 05:54 



































 



































Na up to 146


Kidney fnc gradually improving


agree w/ Allopurinol


f/u electrolytes, agree w/ Levaquin


continue hydration


BS better control


increase Detemir








Nutritional Asmnt/Malnutr-PDOC





- Dietary Evaluation


Malnutrition Findings (Please click <Entered> for more info): 








Nutritional Asmnt/Malnutrition                             Start:  18 17:

10


Text:                                                      Status: Complete    

  


Freq:                                                                          

  


 Document     18 17:13  MAGEN  (Rec: 18 17:25  MAGEN  [id number])


 Nutritional Asmnt/Malnutrition


     Patient General Information


      Nutritional Screening                      High Risk


                                                 Consult


      Diagnosis                                  pressure ulcer, hyperglycemia,


                                                 dehydration


      Pertinent Medical Hx/Surgical Hx           DM, dementia, depression,


                                                 psychosis, schizophrenia,


                                                 chronic renal insuff


      Subjective Information                     Consult received for


                                                 unstageble pressure ulcer on , high BG on . Per


                                                 records, PO intake 50-75%. Pt


                                                 was on NPO today for surgery-


                                                 excisional debridement,


                                                 application of wound VAC


      Current Diet Order/ Nutrition Support      pureed, CCHO 60gm


      Pertinent Medications                      novolog


      Pertinent Labs                              Na 152, K 4.6, BUN 56, Cr


                                                 1.8, Glucose 262, -246


                                                 , Mg 2.9


     Nutritional Hx/Data


      Height                                     1.8 m


      Height (Calculated Centimeters)            180.3


      Current Weight (lbs)                       70.76 kg


      Weight (Calculated Kilograms)              70.8


      Weight (Calculated Grams)                  69494.4


      Ideal Body Weight                          172


      % Ideal Body Weight                        91


      Body Mass Index (BMI)                      21.7


      Weight Status                              Approriate


     GI Symptoms


      GI Symptoms                                None


      Last BM                                    


      Difficult in:                              None


      Skin Integrity/Comment:                    scar to right upper back,


                                                 decubitus ulcer to buttocks


      Current %PO                                Fair (50-74%)


     Estimated Nutritional Goals


      BEE in Kcals:                              Using Current wt


      Calories/Kcals/Kg                          27-32


      Kcals Calculated                           6058-0862


      Protein:                                   Using Current wt


      Protein g/k-1.2


      Protein Calculated                         71-85


      Fluid: ml                                  1917-2130ml (1ml/kcal)


     Nutritional Problem


      2. Problem


       Problem                                   altered nutrition related lab


                                                 values


       Etiology                                  hx of DM


       Signs/Symptoms:                           Glucose 262, -246


      1. Problem


       Problem                                   increased nutrition needs (


                                                 calorie and protein)


       Etiology                                  increased metabolic demand for


                                                 wound healing


       Signs/Symptoms:                           decubitus ulcer and surgery


     Intervention/Recommendation


      Comments                                   1. Continue with current diet


                                                 as ordered. If PO intake low,


                                                 will consider nutrition


                                                 supplements


                                                 2. MD to consider vit C and


                                                 zinc for wound healing


                                                 3. Monitor PO intake, wt, labs


                                                 and skin integrity


                                                 4. F/U as moderate risk in 3-5


                                                 days, 3/2-3/4, PO check 3/2


     Expected Outcomes/Goals


      Expected Outcomes/Goals                    1. PO intake to meet at least


                                                 75% of nutritional needs.


                                                 2. Wt stability, skin to


                                                 remain intact, labs to


                                                 approach WNL.

## 2018-03-06 LAB
ANION GAP SERPL CALC-SCNC: 5.6 MMOL/L (ref 7–16)
BUN SERPL-MCNC: 17 MG/DL (ref 7–25)
CALCIUM SERPL-MCNC: 8.9 MG/DL (ref 8.6–10.3)
CHLORIDE SERPL-SCNC: 112 MEQ/L (ref 98–107)
CO2 SERPL-SCNC: 27.8 MEQ/L (ref 21–31)
CREAT SERPL-MCNC: 1.1 MG/DL (ref 0.7–1.3)
GLUCOSE SERPL-MCNC: 165 MG/DL (ref 70–105)
POTASSIUM SERPL-SCNC: 3.4 MEQ/L (ref 3.5–5.1)
SODIUM SERPL-SCNC: 142 MEQ/L (ref 136–145)

## 2018-03-06 RX ADMIN — INSULIN ASPART SCH UNITS: 100 INJECTION, SOLUTION INTRAVENOUS; SUBCUTANEOUS at 19:25

## 2018-03-06 RX ADMIN — LEVOFLOXACIN SCH MLS/HR: 5 INJECTION INTRAVENOUS at 06:19

## 2018-03-06 RX ADMIN — CASTOR OIL AND BALSAM, PERU SCH: 788; 87 OINTMENT TOPICAL at 19:24

## 2018-03-06 RX ADMIN — OLANZAPINE SCH MG: 5 TABLET, ORALLY DISINTEGRATING ORAL at 09:45

## 2018-03-06 RX ADMIN — INSULIN ASPART SCH UNITS: 100 INJECTION, SOLUTION INTRAVENOUS; SUBCUTANEOUS at 21:19

## 2018-03-06 RX ADMIN — INSULIN ASPART SCH UNITS: 100 INJECTION, SOLUTION INTRAVENOUS; SUBCUTANEOUS at 13:24

## 2018-03-06 RX ADMIN — INSULIN DETEMIR SCH: 100 INJECTION, SOLUTION SUBCUTANEOUS at 09:18

## 2018-03-06 RX ADMIN — INSULIN ASPART SCH: 100 INJECTION, SOLUTION INTRAVENOUS; SUBCUTANEOUS at 09:17

## 2018-03-06 NOTE — PROGRESS NOTES
DATE:  



SUBJECTIVE:  Chart reviewed and the patient interviewed.  Also discussed the

patient's condition with the staff and reviewed records and labs.  The patient

is still anxious and still have episodes of irritability and anger, but less

than before.  The patient also is slightly easier to the redirect him.  He also

is compliant with taking all medications.



ASSESSMENT:  The patient is less psychotic and he is more cooperative with his

treatment.



TREATMENT PLAN:  We will continue monitoring his behavior and his condition

closely.  Also, continue adjusting psychotropic medications, but today, it seems

that he is calmer and no need for any further adjustment.  We will continue to

follow up his condition closely.





DD: 03/06/2018 07:41

DT: 03/06/2018 10:26

[facility]# [id number]  [id number]

## 2018-03-06 NOTE — GENERAL PROGRESS NOTE
Subjective





- Review of Systems


Service Date: 18


Subjective: 





alert, comfortable





Objective





- Results


Result Diagrams: 


 18 09:11





 18 08:45


Recent Labs: 


 Laboratory Last Values











WBC  6.7 Th/cmm (4.8-10.8)  D 18  09:11    


 


RBC  4.73 Mil/cmm (3.80-5.80)   18  09:11    


 


Hgb  13.7 gm/dL (12-16)   18  09:11    


 


Hct  42.8 % (41.0-60)   18  09:11    


 


MCV  90.5 fl (80-99)   18  09:11    


 


MCH  28.9 pg (27.0-31.0)   18  09:11    


 


MCHC Differential  32.0 pg (28.0-36.0)   18  09:11    


 


RDW  16.2 % (11.5-20.0)   18  09:11    


 


Plt Count  162 Th/cmm (150-400)   18  09:11    


 


MPV  9.7 fl  18  09:11    


 


Neutrophils %  72.2 % (40.0-80.0)   18  09:11    


 


Lymphocytes %  19.2 % (20.0-50.0)  L  18  09:11    


 


Monocytes %  5.9 % (2.0-10.0)   18  09:11    


 


Eosinophils %  1.5 % (0.0-5.0)   18  09:11    


 


Basophils %  1.2 % (0.0-2.0)   18  09:11    


 


Eos Smear Source  URINE   18  03:00    


 


Eos Smear Total Cells  FEW EOSINOPHILS SEEN  (NONE SEEN)   18  03:00    


 


PT  10.5 SECONDS (9.5-11.5)   18  06:00    


 


INR  1.01  (0.5-1.4)   18  06:00    


 


PTT (Actin FS)  25.3 SECONDS (26.0-38.0)  L  18  06:00    


 


Sodium  142 mEq/L (136-145)   18  08:45    


 


Potassium  3.4 mEq/L (3.5-5.1)  L  18  08:45    


 


Chloride  112 mEq/L ()  H  18  08:45    


 


Carbon Dioxide  27.8 mEq/L (21.0-31.0)   18  08:45    


 


Anion Gap  5.6  (7.0-16.0)  L  18  08:45    


 


BUN  17 mg/dL (7-25)   18  08:45    


 


Creatinine  1.1 mg/dL (0.7-1.3)   18  08:45    


 


Est GFR ( Amer)  TNP   18  08:45    


 


Est GFR (Non-Af Amer)  TNP   18  08:45    


 


BUN/Creatinine Ratio  15.5   18  08:45    


 


Glucose  165 mg/dL ()  H  18  08:45    


 


POC Glucose  147 MG/DL (70 - 105)  H  18  08:33    


 


Hemoglobin A1c %  10.5 % (4.0-6.0)  H  18  05:26    


 


Uric Acid  5.6 mg/dL (4.4-7.6)   18  09:10    


 


Calcium  8.9 mg/dL (8.6-10.3)   18  08:45    


 


Phosphorus  4.1 mg/dL (2.5-5.0)   18  06:00    


 


Magnesium  2.9 mg/dL (1.9-2.7)  H  18  06:00    


 


Total Bilirubin  0.6 mg/dL (0.3-1.0)   18  09:11    


 


AST  17 U/L (13-39)   18  09:11    


 


ALT  13 U/L (7-52)   18  09:11    


 


Alkaline Phosphatase  81 U/L ()   18  09:11    


 


Total Protein  6.4 gm/dL (6.0-8.3)   18  09:11    


 


Albumin  3.0 gm/dL (4.2-5.5)  L  18  09:11    


 


Globulin  3.4 gm/dL  18  09:11    


 


Albumin/Globulin Ratio  0.9  (1.0-1.8)  L  18  09:11    


 


TSH  0.28 uIU/ml (0.34-5.60)  L  18  06:00    


 


Urine Source  CATH   18  03:00    


 


Urine Color  BROWN   18  03:00    


 


Urine Clarity  CLOUDY  (CLEAR)   18  03:00    


 


Urine pH  5.5  (4.6 - 8.0)   18  03:00    


 


Ur Specific Gravity  1.020  (1.005-1.030)   18  03:00    


 


Urine Protein  100 mg/dL (NEGATIVE)  H  18  03:00    


 


Urine Glucose (UA)  >=1000 mg/dL (NEGATIVE)  H  18  03:00    


 


Urine Ketones  TRACE mg/dL (NEGATIVE)   18  03:00    


 


Urine Blood  LARGE  (NEGATIVE)  H  18  03:00    


 


Urine Nitrate  NEGATIVE  (NEGATIVE)   18  03:00    


 


Urine Bilirubin  SMALL  (NEGATIVE)  H  18  03:00    


 


Urine Ictotest  NEGATIVE  (NEGATIVE)   18  03:00    


 


Urine Urobilinogen  0.2 E.U./dL (0.2 - 1.0)   18  03:00    


 


Ur Leukocyte Esterase  TRACE  (NEGATIVE)  H  18  03:00    


 


Urine RBC  >100 /hpf (0-5)  H  18  03:00    


 


Urine WBC  >100 /hpf (0-5)  H  18  03:00    


 


Ur Epithelial Cells  OCCASIONAL /lpf (FEW)   18  03:00    


 


Urine Bacteria  MODERATE /hpf (NONE SEEN)  H  18  03:00    


 


Urine Osmolality  749 mOsmol/kg  18  03:00    


 


Ur Random Sodium  39 mmol/L  18  03:00    


 


Urine Creatinine  136.0 mg/dl (39.0-259.0)   18  03:00    














- Physical Exam


Vitals and I&O: 


 Vital Signs











Temp  98.8 F   18 09:38


 


Pulse  104   18 09:44


 


Resp  20   18 09:38


 


BP  152/74   18 09:44


 


Pulse Ox  99   18 09:38








 Intake & Output











 18





 18:59 06:59 18:59


 


Intake Total 100 150 100


 


Output Total  300 


 


Balance 100 -150 100


 


Weight (lbs)  77.111 kg 


 


Intake:   


 


  Intake, IV Amount 100  100


 


    Levofloxacin 500mg/100mL 100  100





    500 mg In 100 ml @ 100   





    mls/hr IV Q24HR [facility] Rx#:   





    [id number]   


 


  Oral  150 


 


Output:   


 


  Urine  300 


 


Other:   


 


  # Bowel Movements  0 











Active Medications: 


Current Medications





Acetaminophen (Tylenol)  650 mg PO Q6HR PRN


   PRN Reason: mild to moderate pain


   Stop: 18 19:27


   Last Admin: 18 03:51 Dose:  650 mg


Acetaminophen/Hydrocodone Bitart (Norco 5mg/325mg)  1 tab PO Q6H PRN


   PRN Reason: Pain (Moderate)


   Stop: 18 08:21


Allopurinol (Zyloprim)  100 mg PO DAILY [facility]


   Stop: 18 08:59


   Last Admin: 18 09:24 Dose:  100 mg


Castor Oil/Icelandic Balsam/Trypsin (Venelex)  1 appl TP DAILY [facility]


   Stop: 18 15:59


   Last Admin: 18 08:22 Dose:  1 appl


Clonazepam (Klonopin)  1 mg PO BID [facility]


   PRN Reason: Protocol


   Stop: 18 08:59


   Last Admin: 18 09:24 Dose:  1 mg


Famotidine (Pepcid)  20 mg PO BID [facility]


   Stop: 18 08:59


   Last Admin: 18 09:24 Dose:  20 mg


Heparin Sodium (Porcine) (Heparin)  5,000 units SUBQ Q12HR [facility]


   PRN Reason: Protocol


   Stop: 18 20:59


   Last Admin: 18 09:46 Dose:  Not Given


Levofloxacin (Levaquin Pb)  500 mg in 100 mls @ 100 mls/hr IV Q24HR [facility]


   Stop: 18 05:59


   Last Infusion: 18 07:19 Dose:  Infused


Insulin Aspart (Novolog Insulin Sliding Scale)  0 units SUBQ ACHS [facility]


   PRN Reason: Protocol


   Stop: 18 20:59


   Last Admin: 18 13:24 Dose:  4 units


Insulin Detemir (Levemir Insulin)  16 units SUBQ DAILY [facility]


   PRN Reason: Protocol


   Stop: 18 08:59


   Last Admin: 18 09:18 Dose:  Not Given


Lorazepam (Ativan)  0.5 mg PO Q6HR PRN; Protocol


   PRN Reason: agitation 


   Stop: 18 19:27


   Last Admin: 18 09:24 Dose:  0.5 mg


Magnesium Hydroxide (Milk Of Magnesia)  30 ml PO HS PRN


   PRN Reason: Constipation


   Stop: 18 19:27


Metoprolol Succinate (Toprol Xl)  25 mg PO BID [facility]


   Stop: 18 16:59


   Last Admin: 18 09:44 Dose:  25 mg


Miscellaneous (Clinical Monitoring)  1 ea MC PRN PRN


   PRN Reason: RENAL


   Stop: 18 10:33


Olanzapine (Zyprexa Zydis)  12.5 mg PO BID [facility]


   PRN Reason: Protocol


   Stop: 18 12:28


   Last Admin: 18 09:45 Dose:  12.5 mg


Zolpidem Tartrate (Ambien)  5 mg PO HS PRN


   PRN Reason: insomnia


   Stop: 18 19:27


   Last Admin: 18 20:36 Dose:  5 mg








General: Alert, Oriented x3, No acute distress


HEENT: Atraumatic, PERRLA


Neck: Supple


Cardiovascular: Regular rate, Normal S1, Normal S2


Lungs: Clear to auscultation


Abdomen: Bowel sounds, Soft


Extremities: no Clubbing, no Cyanosis, no Edema


Neurological: Sensation intact


Skin: no Rash


Psych/Mental Status: Mood NL





- Procedures


Procedures: 


 Procedures











Procedure Code Date


 


KEV MUSC/FASCIA 20 SQ CM/< 51597 18


 


EXCISION OF RIGHT HIP MUSCLE, OPEN APPROACH [id number]














Assessment/Plan





- Assessment


Assessment: 





BRYANT on CKD


Stage 4 decub ulcer S/P debridement


Severe dehydration


Ess HTN


Type 2 DM


Psychosis








- Plan


Plan: 


Lab - Result Diagrams





 18 06:00 





 18 06:00 





Current Medications





Acetaminophen (Tylenol)  650 mg PO Q6HR PRN


   PRN Reason: mild to moderate pain


   Stop: 18 19:27


Allopurinol (Zyloprim)  100 mg PO DAILY [facility]


   Stop: 18 08:59


   Last Admin: 18 10:00 Dose:  Not Given


Castor Oil/Icelandic Balsam/Trypsin (Venelex)  1 appl TP DAILY [facility]


   Stop: 18 15:59


   Last Admin: 18 10:00 Dose:  1 appl


Famotidine (Pepcid)  20 mg PO BID [facility]


   Stop: 18 08:59


   Last Admin: 18 18:22 Dose:  20 mg


Heparin Sodium (Porcine) (Heparin)  5,000 units SUBQ Q12HR [facility]


   PRN Reason: Protocol


   Stop: 18 20:59


   Last Admin: 18 07:50 Dose:  Not Given


Ceftriaxone Sodium 1 gm/ (Sodium Chloride)  50 mls @ 100 mls/hr IV Q24HR [facility]


   Stop: 18 16:44


   Last Admin: 18 17:45 Dose:  100 mls/hr


Insulin Aspart (Novolog Insulin Sliding Scale)  0 units SUBQ ACHS [facility]


   PRN Reason: Protocol


   Stop: 18 20:59


   Last Admin: 18 18:23 Dose:  6 units


Lorazepam (Ativan)  0.5 mg PO Q6HR PRN; Protocol


   PRN Reason: agitation 


   Stop: 18 19:27


Magnesium Hydroxide (Milk Of Magnesia)  30 ml PO HS PRN


   PRN Reason: Constipation


   Stop: 18 19:27


Metoprolol Succinate (Toprol Xl)  25 mg PO BID [facility]


   Stop: 18 16:59


   Last Admin: 18 18:23 Dose:  25 mg


Miscellaneous (Clinical Monitoring)  1 ea MC PRN PRN


   PRN Reason: RENAL


   Stop: 18 10:33


Olanzapine (Zyprexa Zydis)  5 mg PO BID [facility]


   PRN Reason: Protocol


   Stop: 18 16:59


Zolpidem Tartrate (Ambien)  5 mg PO HS PRN


   PRN Reason: insomnia


   Stop: 18 19:2








Lab - Result Diagrams





 18 09:11 





 18 08:45 





 






































 



































Na down to 142


Kidney fnc gradually improving


agree w/ Allopurinol


f/u electrolytes, agree w/ Levaquin


continue hydration


BS better control


increase Detemir


replace K








Nutritional Asmnt/Malnutr-PDOC





- Dietary Evaluation


Malnutrition Findings (Please click <Entered> for more info): 








Nutritional Asmnt/Malnutrition                             Start:  18 17:

10


Text:                                                      Status: Complete    

  


Freq:                                                                          

  


 Document     18 17:13  MAGEN  (Rec: 18 17:25  MAGEN  [id number])


 Nutritional Asmnt/Malnutrition


     Patient General Information


      Nutritional Screening                      High Risk


                                                 Consult


      Diagnosis                                  pressure ulcer, hyperglycemia,


                                                 dehydration


      Pertinent Medical Hx/Surgical Hx           DM, dementia, depression,


                                                 psychosis, schizophrenia,


                                                 chronic renal insuff


      Subjective Information                     Consult received for


                                                 unstageble pressure ulcer on , high BG on . Per


                                                 records, PO intake 50-75%. Pt


                                                 was on NPO today for surgery-


                                                 excisional debridement,


                                                 application of wound VAC


      Current Diet Order/ Nutrition Support      pureed, CCHO 60gm


      Pertinent Medications                      novolog


      Pertinent Labs                              Na 152, K 4.6, BUN 56, Cr


                                                 1.8, Glucose 262, -246


                                                 , Mg 2.9


     Nutritional Hx/Data


      Height                                     1.8 m


      Height (Calculated Centimeters)            180.3


      Current Weight (lbs)                       70.76 kg


      Weight (Calculated Kilograms)              70.8


      Weight (Calculated Grams)                  56102.4


      Ideal Body Weight                          172


      % Ideal Body Weight                        91


      Body Mass Index (BMI)                      21.7


      Weight Status                              Approriate


     GI Symptoms


      GI Symptoms                                None


      Last BM                                    


      Difficult in:                              None


      Skin Integrity/Comment:                    scar to right upper back,


                                                 decubitus ulcer to buttocks


      Current %PO                                Fair (50-74%)


     Estimated Nutritional Goals


      BEE in Kcals:                              Using Current wt


      Calories/Kcals/Kg                          27-32


      Kcals Calculated                           2582-5084


      Protein:                                   Using Current wt


      Protein g/k-1.2


      Protein Calculated                         71-85


      Fluid: ml                                  1917-2130ml (1ml/kcal)


     Nutritional Problem


      2. Problem


       Problem                                   altered nutrition related lab


                                                 values


       Etiology                                  hx of DM


       Signs/Symptoms:                           Glucose 262, -246


      1. Problem


       Problem                                   increased nutrition needs (


                                                 calorie and protein)


       Etiology                                  increased metabolic demand for


                                                 wound healing


       Signs/Symptoms:                           decubitus ulcer and surgery


     Intervention/Recommendation


      Comments                                   1. Continue with current diet


                                                 as ordered. If PO intake low,


                                                 will consider nutrition


                                                 supplements


                                                 2. MD to consider vit C and


                                                 zinc for wound healing


                                                 3. Monitor PO intake, wt, labs


                                                 and skin integrity


                                                 4. F/U as moderate risk in 3-5


                                                 days, 3/2-3/4, PO check 3/2


     Expected Outcomes/Goals


      Expected Outcomes/Goals                    1. PO intake to meet at least


                                                 75% of nutritional needs.


                                                 2. Wt stability, skin to


                                                 remain intact, labs to


                                                 approach WNL.

## 2018-03-06 NOTE — GENERAL PROGRESS NOTE
Subjective





- Review of Systems


Service Date: 18


Subjective: 





Patient still aggressive to staff. Patient blood sugar much better controlled 

on levemir 16u Daily.  





Objective





- Results


Result Diagrams: 


 18 09:11





 18 05:54


Recent Labs: 


 Laboratory Last Values











WBC  6.7 Th/cmm (4.8-10.8)  D 18  09:11    


 


RBC  4.73 Mil/cmm (3.80-5.80)   18  09:11    


 


Hgb  13.7 gm/dL (12-16)   18  09:11    


 


Hct  42.8 % (41.0-60)   18  09:11    


 


MCV  90.5 fl (80-99)   18  09:11    


 


MCH  28.9 pg (27.0-31.0)   18  09:11    


 


MCHC Differential  32.0 pg (28.0-36.0)   18  09:11    


 


RDW  16.2 % (11.5-20.0)   18  09:11    


 


Plt Count  162 Th/cmm (150-400)   18  09:11    


 


MPV  9.7 fl  18  09:11    


 


Neutrophils %  72.2 % (40.0-80.0)   18  09:11    


 


Lymphocytes %  19.2 % (20.0-50.0)  L  18  09:11    


 


Monocytes %  5.9 % (2.0-10.0)   18  09:11    


 


Eosinophils %  1.5 % (0.0-5.0)   18  09:11    


 


Basophils %  1.2 % (0.0-2.0)   18  09:11    


 


Eos Smear Source  URINE   18  03:00    


 


Eos Smear Total Cells  FEW EOSINOPHILS SEEN  (NONE SEEN)   18  03:00    


 


PT  10.5 SECONDS (9.5-11.5)   18  06:00    


 


INR  1.01  (0.5-1.4)   18  06:00    


 


PTT (Actin FS)  25.3 SECONDS (26.0-38.0)  L  18  06:00    


 


Sodium  146 mEq/L (136-145)  H  18  05:54    


 


Potassium  3.9 mEq/L (3.5-5.1)   18  05:54    


 


Chloride  112 mEq/L ()  H  18  05:54    


 


Carbon Dioxide  29.4 mEq/L (21.0-31.0)   18  05:54    


 


Anion Gap  8.5  (7.0-16.0)   18  05:54    


 


BUN  20 mg/dL (7-25)   18  05:54    


 


Creatinine  1.2 mg/dL (0.7-1.3)   18  05:54    


 


Est GFR ( Amer)  TNP   18  05:54    


 


Est GFR (Non-Af Amer)  TNP   18  05:54    


 


BUN/Creatinine Ratio  16.7   18  05:54    


 


Glucose  191 mg/dL ()  H D 18  05:54    


 


POC Glucose  187 MG/DL (70 - 105)  H  18  21:05    


 


Hemoglobin A1c %  10.5 % (4.0-6.0)  H  18  05:26    


 


Uric Acid  5.6 mg/dL (4.4-7.6)   18  09:10    


 


Calcium  8.8 mg/dL (8.6-10.3)   18  05:54    


 


Phosphorus  4.1 mg/dL (2.5-5.0)   18  06:00    


 


Magnesium  2.9 mg/dL (1.9-2.7)  H  18  06:00    


 


Total Bilirubin  0.6 mg/dL (0.3-1.0)   18  09:11    


 


AST  17 U/L (13-39)   18  09:11    


 


ALT  13 U/L (7-52)   18  09:11    


 


Alkaline Phosphatase  81 U/L ()   18  09:11    


 


Total Protein  6.4 gm/dL (6.0-8.3)   18  09:11    


 


Albumin  3.0 gm/dL (4.2-5.5)  L  18  09:11    


 


Globulin  3.4 gm/dL  18  09:11    


 


Albumin/Globulin Ratio  0.9  (1.0-1.8)  L  18  09:11    


 


TSH  0.28 uIU/ml (0.34-5.60)  L  18  06:00    


 


Urine Source  CATH   18  03:00    


 


Urine Color  BROWN   18  03:00    


 


Urine Clarity  CLOUDY  (CLEAR)   18  03:00    


 


Urine pH  5.5  (4.6 - 8.0)   18  03:00    


 


Ur Specific Gravity  1.020  (1.005-1.030)   18  03:00    


 


Urine Protein  100 mg/dL (NEGATIVE)  H  18  03:00    


 


Urine Glucose (UA)  >=1000 mg/dL (NEGATIVE)  H  18  03:00    


 


Urine Ketones  TRACE mg/dL (NEGATIVE)   18  03:00    


 


Urine Blood  LARGE  (NEGATIVE)  H  18  03:00    


 


Urine Nitrate  NEGATIVE  (NEGATIVE)   18  03:00    


 


Urine Bilirubin  SMALL  (NEGATIVE)  H  18  03:00    


 


Urine Ictotest  NEGATIVE  (NEGATIVE)   18  03:00    


 


Urine Urobilinogen  0.2 E.U./dL (0.2 - 1.0)   18  03:00    


 


Ur Leukocyte Esterase  TRACE  (NEGATIVE)  H  18  03:00    


 


Urine RBC  >100 /hpf (0-5)  H  18  03:00    


 


Urine WBC  >100 /hpf (0-5)  H  18  03:00    


 


Ur Epithelial Cells  OCCASIONAL /lpf (FEW)   18  03:00    


 


Urine Bacteria  MODERATE /hpf (NONE SEEN)  H  18  03:00    


 


Urine Osmolality  749 mOsmol/kg  18  03:00    


 


Ur Random Sodium  39 mmol/L  18  03:00    


 


Urine Creatinine  136.0 mg/dl (39.0-259.0)   18  03:00    














- Physical Exam


Vitals and I&O: 


 Vital Signs











Temp  97.6 F   18 00:00


 


Pulse  105   18 00:00


 


Resp  18   18 00:00


 


BP  133/76   18 00:00


 


Pulse Ox  95   18 00:00








 Intake & Output











 18





 18:59 06:59 18:59


 


Intake Total 100 150 


 


Output Total  300 


 


Balance 100 -150 


 


Weight (lbs)  77.111 kg 


 


Intake:   


 


  Intake, IV Amount 100  


 


    Levofloxacin 500mg/100mL 100  





    500 mg In 100 ml @ 100   





    mls/hr IV Q24HR [facility] Rx#:   





    [id number]   


 


  Oral  150 


 


Output:   


 


  Urine  300 


 


Other:   


 


  # Bowel Movements  0 











Active Medications: 


Current Medications





Acetaminophen (Tylenol)  650 mg PO Q6HR PRN


   PRN Reason: mild to moderate pain


   Stop: 18 19:27


   Last Admin: 18 03:51 Dose:  650 mg


Acetaminophen/Hydrocodone Bitart (Norco 5mg/325mg)  1 tab PO Q6H PRN


   PRN Reason: Pain (Moderate)


   Stop: 18 08:21


Allopurinol (Zyloprim)  100 mg PO DAILY [facility]


   Stop: 18 08:59


   Last Admin: 18 08:21 Dose:  100 mg


Castor Oil/Albanian Balsam/Trypsin (Venelex)  1 appl TP DAILY [facility]


   Stop: 18 15:59


   Last Admin: 18 08:22 Dose:  1 appl


Clonazepam (Klonopin)  1 mg PO BID [facility]


   PRN Reason: Protocol


   Stop: 18 08:59


   Last Admin: 18 17:23 Dose:  1 mg


Famotidine (Pepcid)  20 mg PO BID [facility]


   Stop: 18 08:59


   Last Admin: 18 17:22 Dose:  20 mg


Heparin Sodium (Porcine) (Heparin)  5,000 units SUBQ Q12HR [facility]


   PRN Reason: Protocol


   Stop: 18 20:59


   Last Admin: 18 21:40 Dose:  5,000 units


Levofloxacin (Levaquin Pb)  500 mg in 100 mls @ 100 mls/hr IV Q24HR [facility]


   Stop: 18 05:59


   Last Admin: 18 06:19 Dose:  100 mls/hr


Insulin Aspart (Novolog Insulin Sliding Scale)  0 units SUBQ ACHS [facility]


   PRN Reason: Protocol


   Stop: 18 20:59


   Last Admin: 18 21:40 Dose:  2 units


Insulin Detemir (Levemir Insulin)  16 units SUBQ DAILY [facility]


   PRN Reason: Protocol


   Stop: 18 08:59


   Last Admin: 18 08:59 Dose:  16 units


Lorazepam (Ativan)  0.5 mg PO Q6HR PRN; Protocol


   PRN Reason: agitation 


   Stop: 18 19:27


   Last Admin: 18 01:21 Dose:  0.5 mg


Magnesium Hydroxide (Milk Of Magnesia)  30 ml PO HS PRN


   PRN Reason: Constipation


   Stop: 18 19:27


Metoprolol Succinate (Toprol Xl)  25 mg PO BID [facility]


   Stop: 18 16:59


   Last Admin: 18 17:22 Dose:  25 mg


Miscellaneous (Clinical Monitoring)  1 ea MC PRN PRN


   PRN Reason: RENAL


   Stop: 18 10:33


Olanzapine (Zyprexa Zydis)  12.5 mg PO BID [facility]


   PRN Reason: Protocol


   Stop: 18 12:28


   Last Admin: 18 17:22 Dose:  12.5 mg


Zolpidem Tartrate (Ambien)  5 mg PO HS PRN


   PRN Reason: insomnia


   Stop: 18 19:27


   Last Admin: 18 20:36 Dose:  5 mg








General: Alert, Oriented x3, No acute distress


HEENT: Atraumatic, PERRLA


Neck: Supple


Cardiovascular: Regular rate, Normal S1, Normal S2


Lungs: Clear to auscultation


Abdomen: Bowel sounds, Soft


Extremities: no Clubbing, no Cyanosis, no Edema


Neurological: Sensation intact


Skin: Rash


Psych/Mental Status: Mood NL





- Procedures


Procedures: 


 Procedures











Procedure Code Date


 


KEV MUSC/FASCIA 20 SQ CM/< 49670 18


 


EXCISION OF RIGHT HIP MUSCLE, OPEN APPROACH [id number] 18














Assessment/Plan





- Assessment


Assessment: 





hypernatremia resolved


prerenal azotemia improved.


hyperglycemia ... stable


depression/anxiety


gout ... on allopurinol. check uric acid level


dementia


schizophrenia


S/P wound debridement of sacral decubitus ... continue wound care treatment.


UTI


BRYANT on CKD ... resolved.


hyperkalemia resolved. 





discharge planning.





- Plan


Plan: 





will order cmp this AM





renal consult -- Dr. Bajwa





sacral decubitus ... will order Gen Surgical consult -- Dr. Jones for possible 

wound debridement





Gout ... continue allopurinol 100mg PO daily





continue wound vac. 





continue hydrocodone PO for pain control. 





discharge planning





Nutritional Asmnt/Malnutr-PDOC





- Dietary Evaluation


Malnutrition Findings (Please click <Entered> for more info): 








Nutritional Asmnt/Malnutrition                             Start:  18 17:

10


Text:                                                      Status: Complete    

  


Freq:                                                                          

  


 Document     18 17:13  [facility]  (Rec: 18 17:25  [facility]


 Nutritional Asmnt/Malnutrition


     Patient General Information


      Nutritional Screening                      High Risk


                                                 Consult


      Diagnosis                                  pressure ulcer, hyperglycemia,


                                                 dehydration


      Pertinent Medical Hx/Surgical Hx           DM, dementia, depression,


                                                 psychosis, schizophrenia,


                                                 chronic renal insuff


      Subjective Information                     Consult received for


                                                 unstageble pressure ulcer on , high BG on . Per


                                                 records, PO intake 50-75%. Pt


                                                 was on NPO today for surgery-


                                                 excisional debridement,


                                                 application of wound VAC


      Current Diet Order/ Nutrition Support      pureed, CCHO 60gm


      Pertinent Medications                      novolog


      Pertinent Labs                              Na 152, K 4.6, BUN 56, Cr


                                                 1.8, Glucose 262, -246


                                                 , Mg 2.9


     Nutritional Hx/Data


      Height                                     1.8 m


      Height (Calculated Centimeters)            180.3


      Current Weight (lbs)                       70.76 kg


      Weight (Calculated Kilograms)              70.8


      Weight (Calculated Grams)                  20914.4


      Ideal Body Weight                          172


      % Ideal Body Weight                        91


      Body Mass Index (BMI)                      21.7


      Weight Status                              Approriate


     GI Symptoms


      GI Symptoms                                None


      Last BM                                    


      Difficult in:                              None


      Skin Integrity/Comment:                    scar to right upper back,


                                                 decubitus ulcer to buttocks


      Current %PO                                Fair (50-74%)


     Estimated Nutritional Goals


      BEE in Kcals:                              Using Current wt


      Calories/Kcals/Kg                          27-32


      Kcals Calculated                           0332-2109


      Protein:                                   Using Current wt


      Protein g/k-1.2


      Protein Calculated                         71-85


      Fluid: ml                                  -ml (1ml/kcal)


     Nutritional Problem


      2. Problem


       Problem                                   altered nutrition related lab


                                                 values


       Etiology                                  hx of DM


       Signs/Symptoms:                           Glucose 262, -246


      1. Problem


       Problem                                   increased nutrition needs (


                                                 calorie and protein)


       Etiology                                  increased metabolic demand for


                                                 wound healing


       Signs/Symptoms:                           decubitus ulcer and surgery


     Intervention/Recommendation


      Comments                                   1. Continue with current diet


                                                 as ordered. If PO intake low,


                                                 will consider nutrition


                                                 supplements


                                                 2. MD to consider vit C and


                                                 zinc for wound healing


                                                 3. Monitor PO intake, wt, labs


                                                 and skin integrity


                                                 4. F/U as moderate risk in 3-5


                                                 days, 3/2-3/4, PO check 3/2


     Expected Outcomes/Goals


      Expected Outcomes/Goals                    1. PO intake to meet at least


                                                 75% of nutritional needs.


                                                 2. Wt stability, skin to


                                                 remain intact, labs to


                                                 approach WNL.

## 2018-03-07 LAB
ANION GAP SERPL CALC-SCNC: 10.8 MMOL/L (ref 7–16)
BUN SERPL-MCNC: 16 MG/DL (ref 7–25)
CALCIUM SERPL-MCNC: 8.8 MG/DL (ref 8.6–10.3)
CHLORIDE SERPL-SCNC: 112 MEQ/L (ref 98–107)
CO2 SERPL-SCNC: 24.1 MEQ/L (ref 21–31)
CREAT SERPL-MCNC: 1 MG/DL (ref 0.7–1.3)
GLUCOSE SERPL-MCNC: 194 MG/DL (ref 70–105)
MAGNESIUM SERPL-MCNC: 2 MG/DL (ref 1.9–2.7)
POTASSIUM SERPL-SCNC: 3.9 MEQ/L (ref 3.5–5.1)
SODIUM SERPL-SCNC: 143 MEQ/L (ref 136–145)

## 2018-03-07 RX ADMIN — OLANZAPINE SCH MG: 5 TABLET, ORALLY DISINTEGRATING ORAL at 08:37

## 2018-03-07 RX ADMIN — INSULIN ASPART SCH UNITS: 100 INJECTION, SOLUTION INTRAVENOUS; SUBCUTANEOUS at 12:38

## 2018-03-07 RX ADMIN — INSULIN ASPART SCH: 100 INJECTION, SOLUTION INTRAVENOUS; SUBCUTANEOUS at 21:26

## 2018-03-07 RX ADMIN — INSULIN DETEMIR SCH UNITS: 100 INJECTION, SOLUTION SUBCUTANEOUS at 08:40

## 2018-03-07 RX ADMIN — HYDROCODONE BITARTRATE AND ACETAMINOPHEN PRN TAB: 5; 325 TABLET ORAL at 21:26

## 2018-03-07 RX ADMIN — INSULIN ASPART SCH: 100 INJECTION, SOLUTION INTRAVENOUS; SUBCUTANEOUS at 16:54

## 2018-03-07 RX ADMIN — INSULIN ASPART SCH UNITS: 100 INJECTION, SOLUTION INTRAVENOUS; SUBCUTANEOUS at 08:39

## 2018-03-07 RX ADMIN — LEVOFLOXACIN SCH MLS/HR: 5 INJECTION INTRAVENOUS at 05:35

## 2018-03-07 RX ADMIN — OLANZAPINE SCH MG: 5 TABLET, ORALLY DISINTEGRATING ORAL at 16:52

## 2018-03-07 RX ADMIN — CASTOR OIL AND BALSAM, PERU SCH APPL: 788; 87 OINTMENT TOPICAL at 08:56

## 2018-03-07 NOTE — INFECTIOUS DISEASE PROG NOTE
Infectious Disease Subjective





- Review of Systems


Service Date: 18


Subjective: 





no new change.





Infectious Disease Objective





- Results


Result Diagrams: 


 18 09:11





 18 05:05


Recent Labs: 


 Laboratory Last Values











WBC  6.7 Th/cmm (4.8-10.8)  D 18  09:11    


 


RBC  4.73 Mil/cmm (3.80-5.80)   18  09:11    


 


Hgb  13.7 gm/dL (12-16)   18  09:11    


 


Hct  42.8 % (41.0-60)   18  09:11    


 


MCV  90.5 fl (80-99)   18  09:11    


 


MCH  28.9 pg (27.0-31.0)   18  09:11    


 


MCHC Differential  32.0 pg (28.0-36.0)   18  09:11    


 


RDW  16.2 % (11.5-20.0)   18  09:11    


 


Plt Count  162 Th/cmm (150-400)   18  09:11    


 


MPV  9.7 fl  18  09:11    


 


Neutrophils %  72.2 % (40.0-80.0)   18  09:11    


 


Lymphocytes %  19.2 % (20.0-50.0)  L  18  09:11    


 


Monocytes %  5.9 % (2.0-10.0)   18  09:11    


 


Eosinophils %  1.5 % (0.0-5.0)   18  09:11    


 


Basophils %  1.2 % (0.0-2.0)   18  09:11    


 


Eos Smear Source  URINE   18  03:00    


 


Eos Smear Total Cells  FEW EOSINOPHILS SEEN  (NONE SEEN)   18  03:00    


 


PT  10.5 SECONDS (9.5-11.5)   18  06:00    


 


INR  1.01  (0.5-1.4)   18  06:00    


 


PTT (Actin FS)  25.3 SECONDS (26.0-38.0)  L  18  06:00    


 


Sodium  143 mEq/L (136-145)   18  05:05    


 


Potassium  3.9 mEq/L (3.5-5.1)   18  05:05    


 


Chloride  112 mEq/L ()  H  18  05:05    


 


Carbon Dioxide  24.1 mEq/L (21.0-31.0)   18  05:05    


 


Anion Gap  10.8  (7.0-16.0)   18  05:05    


 


BUN  16 mg/dL (7-25)   18  05:05    


 


Creatinine  1.0 mg/dL (0.7-1.3)   18  05:05    


 


Est GFR ( Amer)  TNP   18  05:05    


 


Est GFR (Non-Af Amer)  TNP   18  05:05    


 


BUN/Creatinine Ratio  16.0   18  05:05    


 


Glucose  194 mg/dL ()  H  18  05:05    


 


POC Glucose  168 MG/DL (70 - 105)  H  18  05:39    


 


Hemoglobin A1c %  10.5 % (4.0-6.0)  H  18  05:26    


 


Uric Acid  5.6 mg/dL (4.4-7.6)   18  09:10    


 


Calcium  8.8 mg/dL (8.6-10.3)   18  05:05    


 


Phosphorus  4.1 mg/dL (2.5-5.0)   18  06:00    


 


Magnesium  2.0 mg/dL (1.9-2.7)   18  05:05    


 


Total Bilirubin  0.6 mg/dL (0.3-1.0)   18  09:11    


 


AST  17 U/L (13-39)   18  09:11    


 


ALT  13 U/L (7-52)   18  09:11    


 


Alkaline Phosphatase  81 U/L ()   18  09:11    


 


Total Protein  6.4 gm/dL (6.0-8.3)   18  09:11    


 


Albumin  3.0 gm/dL (4.2-5.5)  L  18  09:11    


 


Globulin  3.4 gm/dL  18  09:11    


 


Albumin/Globulin Ratio  0.9  (1.0-1.8)  L  18  09:11    


 


TSH  0.28 uIU/ml (0.34-5.60)  L  18  06:00    


 


Urine Source  CATH   18  03:00    


 


Urine Color  BROWN   18  03:00    


 


Urine Clarity  CLOUDY  (CLEAR)   18  03:00    


 


Urine pH  5.5  (4.6 - 8.0)   18  03:00    


 


Ur Specific Gravity  1.020  (1.005-1.030)   18  03:00    


 


Urine Protein  100 mg/dL (NEGATIVE)  H  18  03:00    


 


Urine Glucose (UA)  >=1000 mg/dL (NEGATIVE)  H  18  03:00    


 


Urine Ketones  TRACE mg/dL (NEGATIVE)   18  03:00    


 


Urine Blood  LARGE  (NEGATIVE)  H  18  03:00    


 


Urine Nitrate  NEGATIVE  (NEGATIVE)   18  03:00    


 


Urine Bilirubin  SMALL  (NEGATIVE)  H  18  03:00    


 


Urine Ictotest  NEGATIVE  (NEGATIVE)   18  03:00    


 


Urine Urobilinogen  0.2 E.U./dL (0.2 - 1.0)   18  03:00    


 


Ur Leukocyte Esterase  TRACE  (NEGATIVE)  H  18  03:00    


 


Urine RBC  >100 /hpf (0-5)  H  18  03:00    


 


Urine WBC  >100 /hpf (0-5)  H  18  03:00    


 


Ur Epithelial Cells  OCCASIONAL /lpf (FEW)   18  03:00    


 


Urine Bacteria  MODERATE /hpf (NONE SEEN)  H  18  03:00    


 


Urine Osmolality  749 mOsmol/kg  18  03:00    


 


Ur Random Sodium  39 mmol/L  18  03:00    


 


Urine Creatinine  136.0 mg/dl (39.0-259.0)   18  03:00    














- Physical Exam


Vitals and I&O: 


 Vital Signs











Temp  98.1 F   18 08:00


 


Pulse  108   18 08:38


 


Resp  20   18 08:00


 


BP  143/82   18 08:38


 


Pulse Ox  98   18 08:00








 Intake & Output











 18





 18:59 06:59 18:59


 


Intake Total 100 800 


 


Output Total  250 


 


Balance 100 550 


 


Weight (lbs)  77.111 kg 


 


Intake:   


 


  Intake, IV Amount 100  


 


    Levofloxacin 500mg/100mL 100  





    500 mg In 100 ml @ 100   





    mls/hr IV Q24HR [facility] Rx#:   





    [id number]   


 


  Oral  800 


 


Output:   


 


  Urine  250 


 


Other:   


 


  # Voids  300 


 


  # Bowel Movements  0 











Active Medications: 


Current Medications





Acetaminophen (Tylenol)  650 mg PO Q6HR PRN


   PRN Reason: mild to moderate pain


   Stop: 18 19:27


   Last Admin: 18 03:51 Dose:  650 mg


Acetaminophen/Hydrocodone Bitart (Norco 5mg/325mg)  1 tab PO Q6H PRN


   PRN Reason: Pain (Moderate)


   Stop: 18 08:21


Allopurinol (Zyloprim)  100 mg PO DAILY [facility]


   Stop: 18 08:59


   Last Admin: 18 08:37 Dose:  100 mg


Castor Oil/American Balsam/Trypsin (Venelex)  1 appl TP DAILY [facility]


   Stop: 18 15:59


   Last Admin: 18 08:56 Dose:  1 appl


Clonazepam (Klonopin)  1 mg PO BID [facility]


   PRN Reason: Protocol


   Stop: 18 08:59


   Last Admin: 18 08:38 Dose:  1 mg


Famotidine (Pepcid)  20 mg PO BID [facility]


   Stop: 18 08:59


   Last Admin: 18 08:37 Dose:  20 mg


Heparin Sodium (Porcine) (Heparin)  5,000 units SUBQ Q12HR [facility]


   PRN Reason: Protocol


   Stop: 18 20:59


   Last Admin: 18 08:39 Dose:  5,000 units


Levofloxacin (Levaquin Pb)  500 mg in 100 mls @ 100 mls/hr IV Q24HR [facility]


   Stop: 18 05:59


   Last Admin: 18 05:35 Dose:  100 mls/hr


Insulin Aspart (Novolog Insulin Sliding Scale)  0 units SUBQ ACHS [facility]


   PRN Reason: Protocol


   Stop: 18 20:59


   Last Admin: 18 08:39 Dose:  2 units


Insulin Detemir (Levemir Insulin)  16 units SUBQ DAILY [facility]


   PRN Reason: Protocol


   Stop: 18 08:59


   Last Admin: 18 08:40 Dose:  16 units


Lorazepam (Ativan)  0.5 mg PO Q6HR PRN; Protocol


   PRN Reason: agitation 


   Stop: 18 19:27


   Last Admin: 18 09:24 Dose:  0.5 mg


Magnesium Hydroxide (Milk Of Magnesia)  30 ml PO HS PRN


   PRN Reason: Constipation


   Stop: 18 19:27


Metoprolol Succinate (Toprol Xl)  25 mg PO BID [facility]


   Stop: 18 16:59


   Last Admin: 18 08:38 Dose:  25 mg


Miscellaneous (Clinical Monitoring)  1 ea MC PRN PRN


   PRN Reason: RENAL


   Stop: 18 10:33


Olanzapine (Zyprexa Zydis)  12.5 mg PO BID [facility]


   PRN Reason: Protocol


   Stop: 18 12:28


   Last Admin: 18 08:37 Dose:  12.5 mg


Zolpidem Tartrate (Ambien)  5 mg PO HS PRN


   PRN Reason: insomnia


   Stop: 18 19:27


   Last Admin: 18 20:36 Dose:  5 mg








General: no acute distress, well developed, well nourished


HEENT: atraumatic, normocephalic, PERRLA, EOMI, moist mucous membrane


Neck: supple, no thyromegaly, no lymphadenopathy, no rigid


Cardiovascular: S1S2, regular


Lungs: clear to auscultation bilaterally, clear to percussion


Abdomen: soft, no tender, no distended, no mass


Extremities: no cyanosis, no clubbing, no edema


Neurological: awake, alert, oriented


Skin: other (sacral wound stage 4 wound vac.)





- Procedures


Procedures: 


 Procedures











Procedure Code Date


 


KEV MUSC/FASCIA 20 SQ CM/< 83023 18


 


EXCISION OF RIGHT HIP MUSCLE, OPEN APPROACH [id number]














Infectious Disease Assmt/Plan





- Assessment


Assessment: 


1.  Sacral stage III decubitus ulcer, infected.


2.  Status post I&D.


3.  Diabetes mellitus type .


4.  Dementia.  


5.   Depression.


6.  psychosis.  


7.  Schizophrenia.


8.  Hematuria.














- Plan


Plan: 





CPM.  


wound care.





Nutritional Asmnt/Malnutr-PDOC





- Dietary Evaluation


Malnutrition Findings (Please click <Entered> for more info): 








Nutritional Asmnt/Malnutrition                             Start:  18 17:

10


Text:                                                      Status: Complete    

  


Freq:                                                                          

  


 Document     18 17:13  MAGEN  (Rec: 18 17:25  MAGEN  [id number])


 Nutritional Asmnt/Malnutrition


     Patient General Information


      Nutritional Screening                      High Risk


                                                 Consult


      Diagnosis                                  pressure ulcer, hyperglycemia,


                                                 dehydration


      Pertinent Medical Hx/Surgical Hx           DM, dementia, depression,


                                                 psychosis, schizophrenia,


                                                 chronic renal insuff


      Subjective Information                     Consult received for


                                                 unstageble pressure ulcer on , high BG on . Per


                                                 records, PO intake 50-75%. Pt


                                                 was on NPO today for surgery-


                                                 excisional debridement,


                                                 application of wound VAC


      Current Diet Order/ Nutrition Support      pureed, CCHO 60gm


      Pertinent Medications                      novolog


      Pertinent Labs                              Na 152, K 4.6, BUN 56, Cr


                                                 1.8, Glucose 262, -246


                                                 , Mg 2.9


     Nutritional Hx/Data


      Height                                     1.8 m


      Height (Calculated Centimeters)            180.3


      Current Weight (lbs)                       70.76 kg


      Weight (Calculated Kilograms)              70.8


      Weight (Calculated Grams)                  54042.4


      Ideal Body Weight                          172


      % Ideal Body Weight                        91


      Body Mass Index (BMI)                      21.7


      Weight Status                              Approriate


     GI Symptoms


      GI Symptoms                                None


      Last BM                                    


      Difficult in:                              None


      Skin Integrity/Comment:                    scar to right upper back,


                                                 decubitus ulcer to buttocks


      Current %PO                                Fair (50-74%)


     Estimated Nutritional Goals


      BEE in Kcals:                              Using Current wt


      Calories/Kcals/Kg                          27-32


      Kcals Calculated                           6702-4439


      Protein:                                   Using Current wt


      Protein g/k-1.2


      Protein Calculated                         71-85


      Fluid: ml                                  1917-2130ml (1ml/kcal)


     Nutritional Problem


      2. Problem


       Problem                                   altered nutrition related lab


                                                 values


       Etiology                                  hx of DM


       Signs/Symptoms:                           Glucose 262, -246


      1. Problem


       Problem                                   increased nutrition needs (


                                                 calorie and protein)


       Etiology                                  increased metabolic demand for


                                                 wound healing


       Signs/Symptoms:                           decubitus ulcer and surgery


     Intervention/Recommendation


      Comments                                   1. Continue with current diet


                                                 as ordered. If PO intake low,


                                                 will consider nutrition


                                                 supplements


                                                 2. MD to consider vit C and


                                                 zinc for wound healing


                                                 3. Monitor PO intake, wt, labs


                                                 and skin integrity


                                                 4. F/U as moderate risk in 3-5


                                                 days, 3/2-3/4, PO check 3/2


     Expected Outcomes/Goals


      Expected Outcomes/Goals                    1. PO intake to meet at least


                                                 75% of nutritional needs.


                                                 2. Wt stability, skin to


                                                 remain intact, labs to


                                                 approach WNL.

## 2018-03-07 NOTE — GENERAL PROGRESS NOTE
Subjective





- Review of Systems


Service Date: 18


Subjective: 





Patient still aggressive to staff. Patient blood sugar much better controlled 

on levemir 16u Daily.  





Objective





- Results


Result Diagrams: 


 18 09:11





 18 05:05


Recent Labs: 


 Laboratory Last Values











WBC  6.7 Th/cmm (4.8-10.8)  D 18  09:11    


 


RBC  4.73 Mil/cmm (3.80-5.80)   18  09:11    


 


Hgb  13.7 gm/dL (12-16)   18  09:11    


 


Hct  42.8 % (41.0-60)   18  09:11    


 


MCV  90.5 fl (80-99)   18  09:11    


 


MCH  28.9 pg (27.0-31.0)   18  09:11    


 


MCHC Differential  32.0 pg (28.0-36.0)   18  09:11    


 


RDW  16.2 % (11.5-20.0)   18  09:11    


 


Plt Count  162 Th/cmm (150-400)   18  09:11    


 


MPV  9.7 fl  18  09:11    


 


Neutrophils %  72.2 % (40.0-80.0)   18  09:11    


 


Lymphocytes %  19.2 % (20.0-50.0)  L  18  09:11    


 


Monocytes %  5.9 % (2.0-10.0)   18  09:11    


 


Eosinophils %  1.5 % (0.0-5.0)   18  09:11    


 


Basophils %  1.2 % (0.0-2.0)   18  09:11    


 


Eos Smear Source  URINE   18  03:00    


 


Eos Smear Total Cells  FEW EOSINOPHILS SEEN  (NONE SEEN)   18  03:00    


 


PT  10.5 SECONDS (9.5-11.5)   18  06:00    


 


INR  1.01  (0.5-1.4)   18  06:00    


 


PTT (Actin FS)  25.3 SECONDS (26.0-38.0)  L  18  06:00    


 


Sodium  143 mEq/L (136-145)   18  05:05    


 


Potassium  3.9 mEq/L (3.5-5.1)   18  05:05    


 


Chloride  112 mEq/L ()  H  18  05:05    


 


Carbon Dioxide  24.1 mEq/L (21.0-31.0)   18  05:05    


 


Anion Gap  10.8  (7.0-16.0)   18  05:05    


 


BUN  16 mg/dL (7-25)   18  05:05    


 


Creatinine  1.0 mg/dL (0.7-1.3)   18  05:05    


 


Est GFR ( Amer)  TNP   18  05:05    


 


Est GFR (Non-Af Amer)  TNP   18  05:05    


 


BUN/Creatinine Ratio  16.0   18  05:05    


 


Glucose  194 mg/dL ()  H  18  05:05    


 


POC Glucose  168 MG/DL (70 - 105)  H  18  05:39    


 


Hemoglobin A1c %  10.5 % (4.0-6.0)  H  18  05:26    


 


Uric Acid  5.6 mg/dL (4.4-7.6)   18  09:10    


 


Calcium  8.8 mg/dL (8.6-10.3)   18  05:05    


 


Phosphorus  4.1 mg/dL (2.5-5.0)   18  06:00    


 


Magnesium  2.0 mg/dL (1.9-2.7)   18  05:05    


 


Total Bilirubin  0.6 mg/dL (0.3-1.0)   18  09:11    


 


AST  17 U/L (13-39)   18  09:11    


 


ALT  13 U/L (7-52)   18  09:11    


 


Alkaline Phosphatase  81 U/L ()   18  09:11    


 


Total Protein  6.4 gm/dL (6.0-8.3)   18  09:11    


 


Albumin  3.0 gm/dL (4.2-5.5)  L  18  09:11    


 


Globulin  3.4 gm/dL  18  09:11    


 


Albumin/Globulin Ratio  0.9  (1.0-1.8)  L  18  09:11    


 


TSH  0.28 uIU/ml (0.34-5.60)  L  18  06:00    


 


Urine Source  CATH   18  03:00    


 


Urine Color  BROWN   18  03:00    


 


Urine Clarity  CLOUDY  (CLEAR)   18  03:00    


 


Urine pH  5.5  (4.6 - 8.0)   18  03:00    


 


Ur Specific Gravity  1.020  (1.005-1.030)   18  03:00    


 


Urine Protein  100 mg/dL (NEGATIVE)  H  18  03:00    


 


Urine Glucose (UA)  >=1000 mg/dL (NEGATIVE)  H  18  03:00    


 


Urine Ketones  TRACE mg/dL (NEGATIVE)   18  03:00    


 


Urine Blood  LARGE  (NEGATIVE)  H  18  03:00    


 


Urine Nitrate  NEGATIVE  (NEGATIVE)   18  03:00    


 


Urine Bilirubin  SMALL  (NEGATIVE)  H  18  03:00    


 


Urine Ictotest  NEGATIVE  (NEGATIVE)   18  03:00    


 


Urine Urobilinogen  0.2 E.U./dL (0.2 - 1.0)   18  03:00    


 


Ur Leukocyte Esterase  TRACE  (NEGATIVE)  H  18  03:00    


 


Urine RBC  >100 /hpf (0-5)  H  18  03:00    


 


Urine WBC  >100 /hpf (0-5)  H  18  03:00    


 


Ur Epithelial Cells  OCCASIONAL /lpf (FEW)   18  03:00    


 


Urine Bacteria  MODERATE /hpf (NONE SEEN)  H  18  03:00    


 


Urine Osmolality  749 mOsmol/kg  18  03:00    


 


Ur Random Sodium  39 mmol/L  18  03:00    


 


Urine Creatinine  136.0 mg/dl (39.0-259.0)   18  03:00    














- Physical Exam


Vitals and I&O: 


 Vital Signs











Temp  97.1 F   18 04:00


 


Pulse  108   18 08:38


 


Resp  18   18 00:00


 


BP  143/82   18 08:38


 


Pulse Ox  97   18 00:00








 Intake & Output











 18





 18:59 06:59 18:59


 


Intake Total 100 800 


 


Output Total  250 


 


Balance 100 550 


 


Weight (lbs)  77.111 kg 


 


Intake:   


 


  Intake, IV Amount 100  


 


    Levofloxacin 500mg/100mL 100  





    500 mg In 100 ml @ 100   





    mls/hr IV Q24HR [facility] Rx#:   





    [id number]   


 


  Oral  800 


 


Output:   


 


  Urine  250 


 


Other:   


 


  # Voids  300 


 


  # Bowel Movements  0 











Active Medications: 


Current Medications





Acetaminophen (Tylenol)  650 mg PO Q6HR PRN


   PRN Reason: mild to moderate pain


   Stop: 18 19:27


   Last Admin: 18 03:51 Dose:  650 mg


Acetaminophen/Hydrocodone Bitart (Norco 5mg/325mg)  1 tab PO Q6H PRN


   PRN Reason: Pain (Moderate)


   Stop: 18 08:21


Allopurinol (Zyloprim)  100 mg PO DAILY [facility]


   Stop: 18 08:59


   Last Admin: 18 08:37 Dose:  100 mg


Castor Oil/Somali Balsam/Trypsin (Venelex)  1 appl TP DAILY [facility]


   Stop: 18 15:59


   Last Admin: 18 19:24 Dose:  Not Given


Clonazepam (Klonopin)  1 mg PO BID [facility]


   PRN Reason: Protocol


   Stop: 18 08:59


   Last Admin: 18 08:38 Dose:  1 mg


Famotidine (Pepcid)  20 mg PO BID [facility]


   Stop: 18 08:59


   Last Admin: 18 08:37 Dose:  20 mg


Heparin Sodium (Porcine) (Heparin)  5,000 units SUBQ Q12HR [facility]


   PRN Reason: Protocol


   Stop: 18 20:59


   Last Admin: 18 21:18 Dose:  5,000 units


Levofloxacin (Levaquin Pb)  500 mg in 100 mls @ 100 mls/hr IV Q24HR [facility]


   Stop: 18 05:59


   Last Admin: 18 05:35 Dose:  100 mls/hr


Insulin Aspart (Novolog Insulin Sliding Scale)  0 units SUBQ ACHS [facility]


   PRN Reason: Protocol


   Stop: 18 20:59


   Last Admin: 18 21:19 Dose:  6 units


Insulin Detemir (Levemir Insulin)  16 units SUBQ DAILY [facility]


   PRN Reason: Protocol


   Stop: 18 08:59


   Last Admin: 18 09:18 Dose:  Not Given


Lorazepam (Ativan)  0.5 mg PO Q6HR PRN; Protocol


   PRN Reason: agitation 


   Stop: 18 19:27


   Last Admin: 18 09:24 Dose:  0.5 mg


Magnesium Hydroxide (Milk Of Magnesia)  30 ml PO HS PRN


   PRN Reason: Constipation


   Stop: 18 19:27


Metoprolol Succinate (Toprol Xl)  25 mg PO BID [facility]


   Stop: 18 16:59


   Last Admin: 18 08:38 Dose:  25 mg


Miscellaneous (Clinical Monitoring)  1 ea MC PRN PRN


   PRN Reason: RENAL


   Stop: 18 10:33


Olanzapine (Zyprexa Zydis)  12.5 mg PO BID [facility]


   PRN Reason: Protocol


   Stop: 18 12:28


   Last Admin: 18 08:37 Dose:  12.5 mg


Zolpidem Tartrate (Ambien)  5 mg PO HS PRN


   PRN Reason: insomnia


   Stop: 18 19:27


   Last Admin: 18 20:36 Dose:  5 mg








General: Alert, Oriented x3, No acute distress


HEENT: Atraumatic, PERRLA


Neck: Supple


Cardiovascular: Regular rate, Normal S1, Normal S2


Lungs: Clear to auscultation


Abdomen: Bowel sounds, Soft


Extremities: no Clubbing, no Cyanosis, no Edema


Neurological: Sensation intact


Skin: no Rash


Psych/Mental Status: Mood NL





- Procedures


Procedures: 


 Procedures











Procedure Code Date


 


KEV MUSC/FASCIA 20 SQ CM/< 63271 18


 


EXCISION OF RIGHT HIP MUSCLE, OPEN APPROACH [id number]














Assessment/Plan





- Assessment


Assessment: 





hypernatremia resolved


prerenal azotemia improved.


hyperglycemia ... stable


depression/anxiety


gout ... on allopurinol. check uric acid level


dementia


schizophrenia


S/P wound debridement of sacral decubitus ... continue wound care treatment.


UTI


BRYANT on CKD ... resolved.


hyperkalemia resolved. 





discharge planning.





- Plan


Plan: 





will order cmp this AM





renal consult -- Dr. Bajwa





sacral decubitus ... will order Gen Surgical consult -- Dr. Jones for possible 

wound debridement





Gout ... continue allopurinol 100mg PO daily





continue wound vac. 





continue hydrocodone PO for pain control. 





discharge planning





Nutritional Asmnt/Malnutr-PDOC





- Dietary Evaluation


Malnutrition Findings (Please click <Entered> for more info): 








Nutritional Asmnt/Malnutrition                             Start:  18 17:

10


Text:                                                      Status: Complete    

  


Freq:                                                                          

  


 Document     18 17:13  [facility]  (Rec: 18 17:25  [facility]


 Nutritional Asmnt/Malnutrition


     Patient General Information


      Nutritional Screening                      High Risk


                                                 Consult


      Diagnosis                                  pressure ulcer, hyperglycemia,


                                                 dehydration


      Pertinent Medical Hx/Surgical Hx           DM, dementia, depression,


                                                 psychosis, schizophrenia,


                                                 chronic renal insuff


      Subjective Information                     Consult received for


                                                 unstageble pressure ulcer on , high BG on . Per


                                                 records, PO intake 50-75%. Pt


                                                 was on NPO today for surgery-


                                                 excisional debridement,


                                                 application of wound VAC


      Current Diet Order/ Nutrition Support      pureed, CCHO 60gm


      Pertinent Medications                      novolog


      Pertinent Labs                              Na 152, K 4.6, BUN 56, Cr


                                                 1.8, Glucose 262, -246


                                                 , Mg 2.9


     Nutritional Hx/Data


      Height                                     1.8 m


      Height (Calculated Centimeters)            180.3


      Current Weight (lbs)                       70.76 kg


      Weight (Calculated Kilograms)              70.8


      Weight (Calculated Grams)                  85497.4


      Ideal Body Weight                          172


      % Ideal Body Weight                        91


      Body Mass Index (BMI)                      21.7


      Weight Status                              Approriate


     GI Symptoms


      GI Symptoms                                None


      Last BM                                    


      Difficult in:                              None


      Skin Integrity/Comment:                    scar to right upper back,


                                                 decubitus ulcer to buttocks


      Current %PO                                Fair (50-74%)


     Estimated Nutritional Goals


      BEE in Kcals:                              Using Current wt


      Calories/Kcals/Kg                          27-32


      Kcals Calculated                           3231-9550


      Protein:                                   Using Current wt


      Protein g/k-1.2


      Protein Calculated                         71-85


      Fluid: ml                                  -ml (1ml/kcal)


     Nutritional Problem


      2. Problem


       Problem                                   altered nutrition related lab


                                                 values


       Etiology                                  hx of DM


       Signs/Symptoms:                           Glucose 262, -246


      1. Problem


       Problem                                   increased nutrition needs (


                                                 calorie and protein)


       Etiology                                  increased metabolic demand for


                                                 wound healing


       Signs/Symptoms:                           decubitus ulcer and surgery


     Intervention/Recommendation


      Comments                                   1. Continue with current diet


                                                 as ordered. If PO intake low,


                                                 will consider nutrition


                                                 supplements


                                                 2. MD to consider vit C and


                                                 zinc for wound healing


                                                 3. Monitor PO intake, wt, labs


                                                 and skin integrity


                                                 4. F/U as moderate risk in 3-5


                                                 days, 3/2-3/4, PO check 3/2


     Expected Outcomes/Goals


      Expected Outcomes/Goals                    1. PO intake to meet at least


                                                 75% of nutritional needs.


                                                 2. Wt stability, skin to


                                                 remain intact, labs to


                                                 approach WNL.

## 2018-03-07 NOTE — GENERAL PROGRESS NOTE
Subjective





- Review of Systems


Service Date: 18


Subjective: 





alert, comfortable, periods of confusion





Objective





- Results


Result Diagrams: 


 18 09:11





 18 05:05


Recent Labs: 


 Laboratory Last Values











WBC  6.7 Th/cmm (4.8-10.8)  D 18  09:11    


 


RBC  4.73 Mil/cmm (3.80-5.80)   18  09:11    


 


Hgb  13.7 gm/dL (12-16)   18  09:11    


 


Hct  42.8 % (41.0-60)   18  09:11    


 


MCV  90.5 fl (80-99)   18  09:11    


 


MCH  28.9 pg (27.0-31.0)   18  09:11    


 


MCHC Differential  32.0 pg (28.0-36.0)   18  09:11    


 


RDW  16.2 % (11.5-20.0)   18  09:11    


 


Plt Count  162 Th/cmm (150-400)   18  09:11    


 


MPV  9.7 fl  18  09:11    


 


Neutrophils %  72.2 % (40.0-80.0)   18  09:11    


 


Lymphocytes %  19.2 % (20.0-50.0)  L  18  09:11    


 


Monocytes %  5.9 % (2.0-10.0)   18  09:11    


 


Eosinophils %  1.5 % (0.0-5.0)   18  09:11    


 


Basophils %  1.2 % (0.0-2.0)   18  09:11    


 


Eos Smear Source  URINE   18  03:00    


 


Eos Smear Total Cells  FEW EOSINOPHILS SEEN  (NONE SEEN)   18  03:00    


 


PT  10.5 SECONDS (9.5-11.5)   18  06:00    


 


INR  1.01  (0.5-1.4)   18  06:00    


 


PTT (Actin FS)  25.3 SECONDS (26.0-38.0)  L  18  06:00    


 


Sodium  143 mEq/L (136-145)   18  05:05    


 


Potassium  3.9 mEq/L (3.5-5.1)   18  05:05    


 


Chloride  112 mEq/L ()  H  18  05:05    


 


Carbon Dioxide  24.1 mEq/L (21.0-31.0)   18  05:05    


 


Anion Gap  10.8  (7.0-16.0)   18  05:05    


 


BUN  16 mg/dL (7-25)   18  05:05    


 


Creatinine  1.0 mg/dL (0.7-1.3)   18  05:05    


 


Est GFR ( Amer)  TNP   18  05:05    


 


Est GFR (Non-Af Amer)  TNP   18  05:05    


 


BUN/Creatinine Ratio  16.0   18  05:05    


 


Glucose  194 mg/dL ()  H  18  05:05    


 


POC Glucose  182 MG/DL (70 - 105)  H  18  11:47    


 


Hemoglobin A1c %  10.5 % (4.0-6.0)  H  18  05:26    


 


Uric Acid  5.6 mg/dL (4.4-7.6)   18  09:10    


 


Calcium  8.8 mg/dL (8.6-10.3)   18  05:05    


 


Phosphorus  4.1 mg/dL (2.5-5.0)   18  06:00    


 


Magnesium  2.0 mg/dL (1.9-2.7)   18  05:05    


 


Total Bilirubin  0.6 mg/dL (0.3-1.0)   18  09:11    


 


AST  17 U/L (13-39)   18  09:11    


 


ALT  13 U/L (7-52)   18  09:11    


 


Alkaline Phosphatase  81 U/L ()   18  09:11    


 


Total Protein  6.4 gm/dL (6.0-8.3)   18  09:11    


 


Albumin  3.0 gm/dL (4.2-5.5)  L  18  09:11    


 


Globulin  3.4 gm/dL  18  09:11    


 


Albumin/Globulin Ratio  0.9  (1.0-1.8)  L  18  09:11    


 


TSH  0.28 uIU/ml (0.34-5.60)  L  18  06:00    


 


Urine Source  CATH   18  03:00    


 


Urine Color  BROWN   18  03:00    


 


Urine Clarity  CLOUDY  (CLEAR)   18  03:00    


 


Urine pH  5.5  (4.6 - 8.0)   18  03:00    


 


Ur Specific Gravity  1.020  (1.005-1.030)   18  03:00    


 


Urine Protein  100 mg/dL (NEGATIVE)  H  18  03:00    


 


Urine Glucose (UA)  >=1000 mg/dL (NEGATIVE)  H  18  03:00    


 


Urine Ketones  TRACE mg/dL (NEGATIVE)   18  03:00    


 


Urine Blood  LARGE  (NEGATIVE)  H  18  03:00    


 


Urine Nitrate  NEGATIVE  (NEGATIVE)   18  03:00    


 


Urine Bilirubin  SMALL  (NEGATIVE)  H  18  03:00    


 


Urine Ictotest  NEGATIVE  (NEGATIVE)   18  03:00    


 


Urine Urobilinogen  0.2 E.U./dL (0.2 - 1.0)   18  03:00    


 


Ur Leukocyte Esterase  TRACE  (NEGATIVE)  H  18  03:00    


 


Urine RBC  >100 /hpf (0-5)  H  18  03:00    


 


Urine WBC  >100 /hpf (0-5)  H  18  03:00    


 


Ur Epithelial Cells  OCCASIONAL /lpf (FEW)   18  03:00    


 


Urine Bacteria  MODERATE /hpf (NONE SEEN)  H  18  03:00    


 


Urine Osmolality  749 mOsmol/kg  18  03:00    


 


Ur Random Sodium  39 mmol/L  18  03:00    


 


Urine Creatinine  136.0 mg/dl (39.0-259.0)   18  03:00    














- Physical Exam


Vitals and I&O: 


 Vital Signs











Temp  97.9 F   18 12:00


 


Pulse  94   18 12:00


 


Resp  18   18 12:00


 


BP  129/81   18 12:00


 


Pulse Ox  95   18 12:00








 Intake & Output











 18





 18:59 06:59 18:59


 


Intake Total 100 800 


 


Output Total  250 


 


Balance 100 550 


 


Weight (lbs)  77.111 kg 


 


Intake:   


 


  Intake, IV Amount 100  


 


    Levofloxacin 500mg/100mL 100  





    500 mg In 100 ml @ 100   





    mls/hr IV Q24HR [facility] Rx#:   





    [id number]   


 


  Oral  800 


 


Output:   


 


  Urine  250 


 


Other:   


 


  # Voids  300 


 


  # Bowel Movements  0 











Active Medications: 


Current Medications





Acetaminophen (Tylenol)  650 mg PO Q6HR PRN


   PRN Reason: mild to moderate pain


   Stop: 18 19:27


   Last Admin: 18 03:51 Dose:  650 mg


Acetaminophen/Hydrocodone Bitart (Norco 5mg/325mg)  1 tab PO Q6H PRN


   PRN Reason: Pain (Moderate)


   Stop: 18 08:21


Allopurinol (Zyloprim)  100 mg PO DAILY [facility]


   Stop: 18 08:59


   Last Admin: 18 08:37 Dose:  100 mg


Castor Oil/[place] Balsam/Trypsin (Venelex)  1 appl TP DAILY [facility]


   Stop: 18 15:59


   Last Admin: 18 08:56 Dose:  1 appl


Clonazepam (Klonopin)  1 mg PO BID [facility]


   PRN Reason: Protocol


   Stop: 18 08:59


   Last Admin: 18 08:38 Dose:  1 mg


Famotidine (Pepcid)  20 mg PO BID [facility]


   Stop: 18 08:59


   Last Admin: 18 08:37 Dose:  20 mg


Heparin Sodium (Porcine) (Heparin)  5,000 units SUBQ Q12HR [facility]


   PRN Reason: Protocol


   Stop: 18 20:59


   Last Admin: 18 08:39 Dose:  5,000 units


Levofloxacin (Levaquin Pb)  500 mg in 100 mls @ 100 mls/hr IV Q24HR [facility]


   Stop: 18 05:59


   Last Admin: 18 05:35 Dose:  100 mls/hr


Insulin Aspart (Novolog Insulin Sliding Scale)  0 units SUBQ ACHS [facility]


   PRN Reason: Protocol


   Stop: 18 20:59


   Last Admin: 18 12:38 Dose:  2 units


Insulin Detemir (Levemir Insulin)  16 units SUBQ DAILY [facility]


   PRN Reason: Protocol


   Stop: 18 08:59


   Last Admin: 18 08:40 Dose:  16 units


Lorazepam (Ativan)  0.5 mg PO Q6HR PRN; Protocol


   PRN Reason: agitation 


   Stop: 18 19:27


   Last Admin: 18 09:24 Dose:  0.5 mg


Magnesium Hydroxide (Milk Of Magnesia)  30 ml PO HS PRN


   PRN Reason: Constipation


   Stop: 18 19:27


Metoprolol Succinate (Toprol Xl)  25 mg PO BID [facility]


   Stop: 18 16:59


   Last Admin: 18 08:38 Dose:  25 mg


Miscellaneous (Clinical Monitoring)  1 ea MC PRN PRN


   PRN Reason: RENAL


   Stop: 18 10:33


Olanzapine (Zyprexa Zydis)  12.5 mg PO BID [facility]


   PRN Reason: Protocol


   Stop: 18 12:28


   Last Admin: 18 08:37 Dose:  12.5 mg


Zolpidem Tartrate (Ambien)  5 mg PO HS PRN


   PRN Reason: insomnia


   Stop: 18 19:27


   Last Admin: 18 20:36 Dose:  5 mg








General: Oriented x3, No acute distress


HEENT: Atraumatic, PERRLA


Neck: Supple


Cardiovascular: Regular rate, Normal S1, Normal S2


Lungs: Clear to auscultation


Abdomen: Bowel sounds, Soft


Extremities: no Clubbing, no Cyanosis, no Edema


Neurological: Sensation intact


Skin: no Rash


Psych/Mental Status: Mood NL





- Procedures


Procedures: 


 Procedures











Procedure Code Date


 


KEV MUSC/FASCIA 20 SQ CM/< 20196 18


 


EXCISION OF RIGHT HIP MUSCLE, OPEN APPROACH [id number]














Assessment/Plan





- Assessment


Assessment: 





BRYANT on CKD


Stage 4 decub ulcer S/P debridement


Severe dehydration


Ess HTN


Type 2 DM


Psychosis








- Plan


Plan: 


Lab - Result Diagrams





 18 06:00 





 18 06:00 





Current Medications





Acetaminophen (Tylenol)  650 mg PO Q6HR PRN


   PRN Reason: mild to moderate pain


   Stop: 18 19:27


Allopurinol (Zyloprim)  100 mg PO DAILY [facility]


   Stop: 18 08:59


   Last Admin: 18 10:00 Dose:  Not Given


Castor Oil/[place] Balsam/Trypsin (Venelex)  1 appl TP DAILY [facility]


   Stop: 18 15:59


   Last Admin: 18 10:00 Dose:  1 appl


Famotidine (Pepcid)  20 mg PO BID [facility]


   Stop: 18 08:59


   Last Admin: 18 18:22 Dose:  20 mg


Heparin Sodium (Porcine) (Heparin)  5,000 units SUBQ Q12HR [facility]


   PRN Reason: Protocol


   Stop: 18 20:59


   Last Admin: 18 07:50 Dose:  Not Given


Ceftriaxone Sodium 1 gm/ (Sodium Chloride)  50 mls @ 100 mls/hr IV Q24HR [facility]


   Stop: 18 16:44


   Last Admin: 18 17:45 Dose:  100 mls/hr


Insulin Aspart (Novolog Insulin Sliding Scale)  0 units SUBQ ACHS [facility]


   PRN Reason: Protocol


   Stop: 18 20:59


   Last Admin: 18 18:23 Dose:  6 units


Lorazepam (Ativan)  0.5 mg PO Q6HR PRN; Protocol


   PRN Reason: agitation 


   Stop: 18 19:27


Magnesium Hydroxide (Milk Of Magnesia)  30 ml PO HS PRN


   PRN Reason: Constipation


   Stop: 18 19:27


Metoprolol Succinate (Toprol Xl)  25 mg PO BID [facility]


   Stop: 18 16:59


   Last Admin: 18 18:23 Dose:  25 mg


Miscellaneous (Clinical Monitoring)  1 ea MC PRN PRN


   PRN Reason: RENAL


   Stop: 18 10:33


Olanzapine (Zyprexa Zydis)  5 mg PO BID [facility]


   PRN Reason: Protocol


   Stop: 18 16:59


Zolpidem Tartrate (Ambien)  5 mg PO HS PRN


   PRN Reason: insomnia


   Stop: 18 








 






































 


Lab - Result Diagrams





 18 09:11 





 18 05:05 









































Na down to 143


Kidney fnc gradually improving


agree w/ Allopurinol


f/u electrolytes, agree w/ Levaquin


continue hydration


BS better control


increase Detemir


replace K


Placement in progress








Nutritional Asmnt/Malnutr-PDOC





- Dietary Evaluation


Malnutrition Findings (Please click <Entered> for more info): 








Nutritional Asmnt/Malnutrition                             Start:  18 17:

10


Text:                                                      Status: Complete    

  


Freq:                                                                          

  


 Document     18 17:13  MAGEN  (Rec: 18 17:25  MAGEN  [id number])


 Nutritional Asmnt/Malnutrition


     Patient General Information


      Nutritional Screening                      High Risk


                                                 Consult


      Diagnosis                                  pressure ulcer, hyperglycemia,


                                                 dehydration


      Pertinent Medical Hx/Surgical Hx           DM, dementia, depression,


                                                 psychosis, schizophrenia,


                                                 chronic renal insuff


      Subjective Information                     Consult received for


                                                 unstageble pressure ulcer on , high BG on . Per


                                                 records, PO intake 50-75%. Pt


                                                 was on NPO today for surgery-


                                                 excisional debridement,


                                                 application of wound VAC


      Current Diet Order/ Nutrition Support      pureed, CCHO 60gm


      Pertinent Medications                      novolog


      Pertinent Labs                              Na 152, K 4.6, BUN 56, Cr


                                                 1.8, Glucose 262, -246


                                                 , Mg 2.9


     Nutritional Hx/Data


      Height                                     1.8 m


      Height (Calculated Centimeters)            180.3


      Current Weight (lbs)                       70.76 kg


      Weight (Calculated Kilograms)              70.8


      Weight (Calculated Grams)                  51107.4


      Ideal Body Weight                          172


      % Ideal Body Weight                        91


      Body Mass Index (BMI)                      21.7


      Weight Status                              Approriate


     GI Symptoms


      GI Symptoms                                None


      Last BM                                    


      Difficult in:                              None


      Skin Integrity/Comment:                    scar to right upper back,


                                                 decubitus ulcer to buttocks


      Current %PO                                Fair (50-74%)


     Estimated Nutritional Goals


      BEE in Kcals:                              Using Current wt


      Calories/Kcals/Kg                          27-32


      Kcals Calculated                           3866-9339


      Protein:                                   Using Current wt


      Protein g/k-1.2


      Protein Calculated                         71-85


      Fluid: ml                                  1917-2130ml (1ml/kcal)


     Nutritional Problem


      2. Problem


       Problem                                   altered nutrition related lab


                                                 values


       Etiology                                  hx of DM


       Signs/Symptoms:                           Glucose 262, -246


      1. Problem


       Problem                                   increased nutrition needs (


                                                 calorie and protein)


       Etiology                                  increased metabolic demand for


                                                 wound healing


       Signs/Symptoms:                           decubitus ulcer and surgery


     Intervention/Recommendation


      Comments                                   1. Continue with current diet


                                                 as ordered. If PO intake low,


                                                 will consider nutrition


                                                 supplements


                                                 2. MD to consider vit C and


                                                 zinc for wound healing


                                                 3. Monitor PO intake, wt, labs


                                                 and skin integrity


                                                 4. F/U as moderate risk in 3-5


                                                 days, 3/2-3/4, PO check 3/


     Expected Outcomes/Goals


      Expected Outcomes/Goals                    1. PO intake to meet at least


                                                 75% of nutritional needs.


                                                 2. Wt stability, skin to


                                                 remain intact, labs to


                                                 approach WNL.

## 2018-03-08 RX ADMIN — OLANZAPINE SCH MG: 5 TABLET, ORALLY DISINTEGRATING ORAL at 17:27

## 2018-03-08 RX ADMIN — INSULIN ASPART SCH: 100 INJECTION, SOLUTION INTRAVENOUS; SUBCUTANEOUS at 16:17

## 2018-03-08 RX ADMIN — INSULIN ASPART SCH UNITS: 100 INJECTION, SOLUTION INTRAVENOUS; SUBCUTANEOUS at 22:23

## 2018-03-08 RX ADMIN — INSULIN DETEMIR SCH: 100 INJECTION, SOLUTION SUBCUTANEOUS at 16:16

## 2018-03-08 RX ADMIN — OLANZAPINE SCH: 5 TABLET, ORALLY DISINTEGRATING ORAL at 16:16

## 2018-03-08 RX ADMIN — LEVOFLOXACIN SCH MLS/HR: 5 INJECTION INTRAVENOUS at 06:16

## 2018-03-08 RX ADMIN — INSULIN ASPART SCH: 100 INJECTION, SOLUTION INTRAVENOUS; SUBCUTANEOUS at 07:55

## 2018-03-08 RX ADMIN — INSULIN ASPART SCH: 100 INJECTION, SOLUTION INTRAVENOUS; SUBCUTANEOUS at 17:27

## 2018-03-08 RX ADMIN — CASTOR OIL AND BALSAM, PERU SCH: 788; 87 OINTMENT TOPICAL at 16:16

## 2018-03-08 NOTE — GENERAL PROGRESS NOTE
Subjective





- Review of Systems


Service Date: 18


Subjective: 





Patient still aggressive to staff. Patient blood sugar much better controlled 

on levemir 16u Daily.  





Objective





- Results


Result Diagrams: 


 18 09:11





 18 05:05


Recent Labs: 


 Laboratory Last Values











WBC  6.7 Th/cmm (4.8-10.8)  D 18  09:11    


 


RBC  4.73 Mil/cmm (3.80-5.80)   18  09:11    


 


Hgb  13.7 gm/dL (12-16)   18  09:11    


 


Hct  42.8 % (41.0-60)   18  09:11    


 


MCV  90.5 fl (80-99)   18  09:11    


 


MCH  28.9 pg (27.0-31.0)   18  09:11    


 


MCHC Differential  32.0 pg (28.0-36.0)   18  09:11    


 


RDW  16.2 % (11.5-20.0)   18  09:11    


 


Plt Count  162 Th/cmm (150-400)   18  09:11    


 


MPV  9.7 fl  18  09:11    


 


Neutrophils %  72.2 % (40.0-80.0)   18  09:11    


 


Lymphocytes %  19.2 % (20.0-50.0)  L  18  09:11    


 


Monocytes %  5.9 % (2.0-10.0)   18  09:11    


 


Eosinophils %  1.5 % (0.0-5.0)   18  09:11    


 


Basophils %  1.2 % (0.0-2.0)   18  09:11    


 


Eos Smear Source  URINE   18  03:00    


 


Eos Smear Total Cells  FEW EOSINOPHILS SEEN  (NONE SEEN)   18  03:00    


 


PT  10.5 SECONDS (9.5-11.5)   18  06:00    


 


INR  1.01  (0.5-1.4)   18  06:00    


 


PTT (Actin FS)  25.3 SECONDS (26.0-38.0)  L  18  06:00    


 


Sodium  143 mEq/L (136-145)   18  05:05    


 


Potassium  3.9 mEq/L (3.5-5.1)   18  05:05    


 


Chloride  112 mEq/L ()  H  18  05:05    


 


Carbon Dioxide  24.1 mEq/L (21.0-31.0)   18  05:05    


 


Anion Gap  10.8  (7.0-16.0)   18  05:05    


 


BUN  16 mg/dL (7-25)   18  05:05    


 


Creatinine  1.0 mg/dL (0.7-1.3)   18  05:05    


 


Est GFR ( Amer)  TNP   18  05:05    


 


Est GFR (Non-Af Amer)  TNP   18  05:05    


 


BUN/Creatinine Ratio  16.0   18  05:05    


 


Glucose  194 mg/dL ()  H  18  05:05    


 


POC Glucose  105 MG/DL (70 - 105)   18  05:25    


 


Hemoglobin A1c %  10.5 % (4.0-6.0)  H  18  05:26    


 


Uric Acid  5.6 mg/dL (4.4-7.6)   18  09:10    


 


Calcium  8.8 mg/dL (8.6-10.3)   18  05:05    


 


Phosphorus  4.1 mg/dL (2.5-5.0)   18  06:00    


 


Magnesium  2.0 mg/dL (1.9-2.7)   18  05:05    


 


Total Bilirubin  0.6 mg/dL (0.3-1.0)   18  09:11    


 


AST  17 U/L (13-39)   18  09:11    


 


ALT  13 U/L (7-52)   18  09:11    


 


Alkaline Phosphatase  81 U/L ()   18  09:11    


 


Total Protein  6.4 gm/dL (6.0-8.3)   18  09:11    


 


Albumin  3.0 gm/dL (4.2-5.5)  L  18  09:11    


 


Globulin  3.4 gm/dL  18  09:11    


 


Albumin/Globulin Ratio  0.9  (1.0-1.8)  L  18  09:11    


 


TSH  0.28 uIU/ml (0.34-5.60)  L  18  06:00    


 


Urine Source  CATH   18  03:00    


 


Urine Color  BROWN   18  03:00    


 


Urine Clarity  CLOUDY  (CLEAR)   18  03:00    


 


Urine pH  5.5  (4.6 - 8.0)   18  03:00    


 


Ur Specific Gravity  1.020  (1.005-1.030)   18  03:00    


 


Urine Protein  100 mg/dL (NEGATIVE)  H  18  03:00    


 


Urine Glucose (UA)  >=1000 mg/dL (NEGATIVE)  H  18  03:00    


 


Urine Ketones  TRACE mg/dL (NEGATIVE)   18  03:00    


 


Urine Blood  LARGE  (NEGATIVE)  H  18  03:00    


 


Urine Nitrate  NEGATIVE  (NEGATIVE)   18  03:00    


 


Urine Bilirubin  SMALL  (NEGATIVE)  H  18  03:00    


 


Urine Ictotest  NEGATIVE  (NEGATIVE)   18  03:00    


 


Urine Urobilinogen  0.2 E.U./dL (0.2 - 1.0)   18  03:00    


 


Ur Leukocyte Esterase  TRACE  (NEGATIVE)  H  18  03:00    


 


Urine RBC  >100 /hpf (0-5)  H  18  03:00    


 


Urine WBC  >100 /hpf (0-5)  H  18  03:00    


 


Ur Epithelial Cells  OCCASIONAL /lpf (FEW)   18  03:00    


 


Urine Bacteria  MODERATE /hpf (NONE SEEN)  H  18  03:00    


 


Urine Osmolality  749 mOsmol/kg  18  03:00    


 


Ur Random Sodium  39 mmol/L  18  03:00    


 


Urine Creatinine  136.0 mg/dl (39.0-259.0)   18  03:00    














- Physical Exam


Vitals and I&O: 


 Vital Signs











Temp  97.7 F   18 04:00


 


Pulse  91   18 04:00


 


Resp  18   18 04:00


 


BP  144/78   18 04:00


 


Pulse Ox  98   18 04:00








 Intake & Output











 18





 18:59 06:59 18:59


 


Intake Total 650 180 


 


Output Total 300 550 


 


Balance 350 -370 


 


Weight (lbs) 77.111 kg 77.111 kg 


 


Intake:   


 


  Oral 650 180 


 


Output:   


 


  Urine 300 550 


 


Other:   


 


  # Bowel Movements 0 0 











Active Medications: 


Current Medications





Acetaminophen (Tylenol)  650 mg PO Q6HR PRN


   PRN Reason: mild to moderate pain


   Stop: 18 19:27


   Last Admin: 18 00:25 Dose:  650 mg


Acetaminophen/Hydrocodone Bitart (Norco 5mg/325mg)  1 tab PO Q6H PRN


   PRN Reason: Pain (Moderate)


   Stop: 18 08:21


   Last Admin: 18 21:26 Dose:  1 tab


Allopurinol (Zyloprim)  100 mg PO DAILY [facility]


   Stop: 18 08:59


   Last Admin: 18 08:37 Dose:  100 mg


Castor Oil/[place] Balsam/Trypsin (Venelex)  1 appl TP DAILY [facility]


   Stop: 18 15:59


   Last Admin: 18 08:56 Dose:  1 appl


Clonazepam (Klonopin)  1 mg PO BID [facility]


   PRN Reason: Protocol


   Stop: 18 08:59


   Last Admin: 18 21:28 Dose:  1 mg


Famotidine (Pepcid)  20 mg PO BID [facility]


   Stop: 18 08:59


   Last Admin: 18 16:52 Dose:  20 mg


Heparin Sodium (Porcine) (Heparin)  5,000 units SUBQ Q12HR [facility]


   PRN Reason: Protocol


   Stop: 18 20:59


   Last Admin: 18 21:10 Dose:  5,000 units


Levofloxacin (Levaquin Pb)  500 mg in 100 mls @ 100 mls/hr IV Q24HR [facility]


   Stop: 18 05:59


   Last Admin: 18 06:16 Dose:  100 mls/hr


Insulin Aspart (Novolog Insulin Sliding Scale)  0 units SUBQ ACHS [facility]


   PRN Reason: Protocol


   Stop: 18 20:59


   Last Admin: 18 07:55 Dose:  Not Given


Insulin Detemir (Levemir Insulin)  16 units SUBQ DAILY [facility]


   PRN Reason: Protocol


   Stop: 18 08:59


   Last Admin: 18 08:40 Dose:  16 units


Lorazepam (Ativan)  0.5 mg PO Q6HR PRN; Protocol


   PRN Reason: agitation 


   Stop: 18 19:27


   Last Admin: 18 09:24 Dose:  0.5 mg


Magnesium Hydroxide (Milk Of Magnesia)  30 ml PO HS PRN


   PRN Reason: Constipation


   Stop: 18 19:27


Metoprolol Succinate (Toprol Xl)  25 mg PO BID [facility]


   Stop: 18 16:59


   Last Admin: 18 16:53 Dose:  25 mg


Miscellaneous (Clinical Monitoring)  1 ea MC PRN PRN


   PRN Reason: RENAL


   Stop: 18 10:33


Olanzapine (Zyprexa Zydis)  12.5 mg PO BID [facility]


   PRN Reason: Protocol


   Stop: 18 12:28


   Last Admin: 18 16:52 Dose:  12.5 mg


Zolpidem Tartrate (Ambien)  5 mg PO HS PRN


   PRN Reason: insomnia


   Stop: 18 19:27


   Last Admin: 18 20:36 Dose:  5 mg








General: Oriented x3, No acute distress


HEENT: Atraumatic, PERRLA


Neck: Supple


Cardiovascular: Regular rate, Normal S1, Normal S2


Lungs: Clear to auscultation


Abdomen: Bowel sounds, Soft


Extremities: no Clubbing, no Cyanosis, no Edema


Neurological: Sensation intact


Skin: no Rash


Psych/Mental Status: Mood NL





- Procedures


Procedures: 


 Procedures











Procedure Code Date


 


KEV MUSC/FASCIA 20 SQ CM/< 27680 18


 


EXCISION OF RIGHT HIP MUSCLE, OPEN APPROACH [id number]














Assessment/Plan





- Assessment


Assessment: 


Sacral stage III decubitus ulcer, infected.


Diabetes mellitus type


Depression.


psychosis.  


Hematuria.


hypernatremia resolved


prerenal azotemia improved.


hyperglycemia ... stable


depression/anxiety


gout ... on allopurinol. check uric acid level


dementia


schizophrenia


S/P wound debridement of sacral decubitus ... continue wound care treatment.


UTI


BRYANT on CKD ... resolved.


hyperkalemia resolved. 





discharge planning.





- Plan


Plan: 





will order cmp this AM





renal consult -- Dr. Bajwa





sacral decubitus ... will order Gen Surgical consult -- Dr. Jones for possible 

wound debridement





Gout ... continue allopurinol 100mg PO daily





continue wound vac. 





continue hydrocodone PO for pain control. 





discharge planning





Nutritional Asmnt/Malnutr-PDOC





- Dietary Evaluation


Malnutrition Findings (Please click <Entered> for more info): 








Nutritional Asmnt/Malnutrition                             Start:  18 17:

10


Text:                                                      Status: Complete    

  


Freq:                                                                          

  


 Document     18 17:13  [facility]  (Rec: 18 17:25  [facility]


 Nutritional Asmnt/Malnutrition


     Patient General Information


      Nutritional Screening                      High Risk


                                                 Consult


      Diagnosis                                  pressure ulcer, hyperglycemia,


                                                 dehydration


      Pertinent Medical Hx/Surgical Hx           DM, dementia, depression,


                                                 psychosis, schizophrenia,


                                                 chronic renal insuff


      Subjective Information                     Consult received for


                                                 unstageble pressure ulcer on , high BG on . Per


                                                 records, PO intake 50-75%. Pt


                                                 was on NPO today for surgery-


                                                 excisional debridement,


                                                 application of wound VAC


      Current Diet Order/ Nutrition Support      pureed, CCHO 60gm


      Pertinent Medications                      novolog


      Pertinent Labs                              Na 152, K 4.6, BUN 56, Cr


                                                 1.8, Glucose 262, -246


                                                 , Mg 2.9


     Nutritional Hx/Data


      Height                                     1.8 m


      Height (Calculated Centimeters)            180.3


      Current Weight (lbs)                       70.76 kg


      Weight (Calculated Kilograms)              70.8


      Weight (Calculated Grams)                  21121.4


      Ideal Body Weight                          172


      % Ideal Body Weight                        91


      Body Mass Index (BMI)                      21.7


      Weight Status                              Approriate


     GI Symptoms


      GI Symptoms                                None


      Last BM                                    


      Difficult in:                              None


      Skin Integrity/Comment:                    scar to right upper back,


                                                 decubitus ulcer to buttocks


      Current %PO                                Fair (50-74%)


     Estimated Nutritional Goals


      BEE in Kcals:                              Using Current wt


      Calories/Kcals/Kg                          27-32


      Kcals Calculated                           3770-3506


      Protein:                                   Using Current wt


      Protein g/k-1.2


      Protein Calculated                         71-85


      Fluid: ml                                  -ml (1ml/kcal)


     Nutritional Problem


      2. Problem


       Problem                                   altered nutrition related lab


                                                 values


       Etiology                                  hx of DM


       Signs/Symptoms:                           Glucose 262, -246


      1. Problem


       Problem                                   increased nutrition needs (


                                                 calorie and protein)


       Etiology                                  increased metabolic demand for


                                                 wound healing


       Signs/Symptoms:                           decubitus ulcer and surgery


     Intervention/Recommendation


      Comments                                   1. Continue with current diet


                                                 as ordered. If PO intake low,


                                                 will consider nutrition


                                                 supplements


                                                 2. MD to consider vit C and


                                                 zinc for wound healing


                                                 3. Monitor PO intake, wt, labs


                                                 and skin integrity


                                                 4. F/U as moderate risk in 3-5


                                                 days, 3/2-3/4, PO check 3/2


     Expected Outcomes/Goals


      Expected Outcomes/Goals                    1. PO intake to meet at least


                                                 75% of nutritional needs.


                                                 2. Wt stability, skin to


                                                 remain intact, labs to


                                                 approach WNL.

## 2018-03-08 NOTE — GENERAL PROGRESS NOTE
Subjective





- Review of Systems


Service Date: 18


Subjective: 





alert, comfortable, periods of confusion





Objective





- Results


Result Diagrams: 


 18 09:11





 18 05:05


Recent Labs: 


 Laboratory Last Values











WBC  6.7 Th/cmm (4.8-10.8)  D 18  09:11    


 


RBC  4.73 Mil/cmm (3.80-5.80)   18  09:11    


 


Hgb  13.7 gm/dL (12-16)   18  09:11    


 


Hct  42.8 % (41.0-60)   18  09:11    


 


MCV  90.5 fl (80-99)   18  09:11    


 


MCH  28.9 pg (27.0-31.0)   18  09:11    


 


MCHC Differential  32.0 pg (28.0-36.0)   18  09:11    


 


RDW  16.2 % (11.5-20.0)   18  09:11    


 


Plt Count  162 Th/cmm (150-400)   18  09:11    


 


MPV  9.7 fl  18  09:11    


 


Neutrophils %  72.2 % (40.0-80.0)   18  09:11    


 


Lymphocytes %  19.2 % (20.0-50.0)  L  18  09:11    


 


Monocytes %  5.9 % (2.0-10.0)   18  09:11    


 


Eosinophils %  1.5 % (0.0-5.0)   18  09:11    


 


Basophils %  1.2 % (0.0-2.0)   18  09:11    


 


Eos Smear Source  URINE   18  03:00    


 


Eos Smear Total Cells  FEW EOSINOPHILS SEEN  (NONE SEEN)   18  03:00    


 


PT  10.5 SECONDS (9.5-11.5)   18  06:00    


 


INR  1.01  (0.5-1.4)   18  06:00    


 


PTT (Actin FS)  25.3 SECONDS (26.0-38.0)  L  18  06:00    


 


Sodium  143 mEq/L (136-145)   18  05:05    


 


Potassium  3.9 mEq/L (3.5-5.1)   18  05:05    


 


Chloride  112 mEq/L ()  H  18  05:05    


 


Carbon Dioxide  24.1 mEq/L (21.0-31.0)   18  05:05    


 


Anion Gap  10.8  (7.0-16.0)   18  05:05    


 


BUN  16 mg/dL (7-25)   18  05:05    


 


Creatinine  1.0 mg/dL (0.7-1.3)   18  05:05    


 


Est GFR ( Amer)  TNP   18  05:05    


 


Est GFR (Non-Af Amer)  TNP   18  05:05    


 


BUN/Creatinine Ratio  16.0   18  05:05    


 


Glucose  194 mg/dL ()  H  18  05:05    


 


POC Glucose  105 MG/DL (70 - 105)   18  05:25    


 


Hemoglobin A1c %  10.5 % (4.0-6.0)  H  18  05:26    


 


Uric Acid  5.6 mg/dL (4.4-7.6)   18  09:10    


 


Calcium  8.8 mg/dL (8.6-10.3)   18  05:05    


 


Phosphorus  4.1 mg/dL (2.5-5.0)   18  06:00    


 


Magnesium  2.0 mg/dL (1.9-2.7)   18  05:05    


 


Total Bilirubin  0.6 mg/dL (0.3-1.0)   18  09:11    


 


AST  17 U/L (13-39)   18  09:11    


 


ALT  13 U/L (7-52)   18  09:11    


 


Alkaline Phosphatase  81 U/L ()   18  09:11    


 


Total Protein  6.4 gm/dL (6.0-8.3)   18  09:11    


 


Albumin  3.0 gm/dL (4.2-5.5)  L  18  09:11    


 


Globulin  3.4 gm/dL  18  09:11    


 


Albumin/Globulin Ratio  0.9  (1.0-1.8)  L  18  09:11    


 


TSH  0.28 uIU/ml (0.34-5.60)  L  18  06:00    


 


Urine Source  CATH   18  03:00    


 


Urine Color  BROWN   18  03:00    


 


Urine Clarity  CLOUDY  (CLEAR)   18  03:00    


 


Urine pH  5.5  (4.6 - 8.0)   18  03:00    


 


Ur Specific Gravity  1.020  (1.005-1.030)   18  03:00    


 


Urine Protein  100 mg/dL (NEGATIVE)  H  18  03:00    


 


Urine Glucose (UA)  >=1000 mg/dL (NEGATIVE)  H  18  03:00    


 


Urine Ketones  TRACE mg/dL (NEGATIVE)   18  03:00    


 


Urine Blood  LARGE  (NEGATIVE)  H  18  03:00    


 


Urine Nitrate  NEGATIVE  (NEGATIVE)   18  03:00    


 


Urine Bilirubin  SMALL  (NEGATIVE)  H  18  03:00    


 


Urine Ictotest  NEGATIVE  (NEGATIVE)   18  03:00    


 


Urine Urobilinogen  0.2 E.U./dL (0.2 - 1.0)   18  03:00    


 


Ur Leukocyte Esterase  TRACE  (NEGATIVE)  H  18  03:00    


 


Urine RBC  >100 /hpf (0-5)  H  18  03:00    


 


Urine WBC  >100 /hpf (0-5)  H  18  03:00    


 


Ur Epithelial Cells  OCCASIONAL /lpf (FEW)   18  03:00    


 


Urine Bacteria  MODERATE /hpf (NONE SEEN)  H  18  03:00    


 


Urine Osmolality  749 mOsmol/kg  18  03:00    


 


Ur Random Sodium  39 mmol/L  18  03:00    


 


Urine Creatinine  136.0 mg/dl (39.0-259.0)   18  03:00    














- Physical Exam


Vitals and I&O: 


 Vital Signs











Temp  97.7 F   18 04:00


 


Pulse  76   18 09:31


 


Resp  18   18 04:00


 


BP  144/80   18 09:31


 


Pulse Ox  98   18 04:00








 Intake & Output











 18





 18:59 06:59 18:59


 


Intake Total 650 180 


 


Output Total 300 550 


 


Balance 350 -370 


 


Weight (lbs) 77.111 kg 77.111 kg 


 


Intake:   


 


  Oral 650 180 


 


Output:   


 


  Urine 300 550 


 


Other:   


 


  # Bowel Movements 0 0 











Active Medications: 


Current Medications





Acetaminophen (Tylenol)  650 mg PO Q6HR PRN


   PRN Reason: mild to moderate pain


   Stop: 18 19:27


   Last Admin: 18 00:25 Dose:  650 mg


Acetaminophen/Hydrocodone Bitart (Norco 5mg/325mg)  1 tab PO Q6H PRN


   PRN Reason: Pain (Moderate)


   Stop: 18 08:21


   Last Admin: 18 21:26 Dose:  1 tab


Allopurinol (Zyloprim)  100 mg PO DAILY [facility]


   Stop: 18 08:59


   Last Admin: 18 09:30 Dose:  100 mg


Castor Oil/[place] Balsam/Trypsin (Venelex)  1 appl TP DAILY [facility]


   Stop: 18 15:59


   Last Admin: 18 08:56 Dose:  1 appl


Clonazepam (Klonopin)  1 mg PO BID [facility]


   PRN Reason: Protocol


   Stop: 18 08:59


   Last Admin: 18 21:28 Dose:  1 mg


Famotidine (Pepcid)  20 mg PO BID [facility]


   Stop: 18 08:59


   Last Admin: 18 09:31 Dose:  20 mg


Heparin Sodium (Porcine) (Heparin)  5,000 units SUBQ Q12HR [facility]


   PRN Reason: Protocol


   Stop: 18 20:59


   Last Admin: 18 09:35 Dose:  5,000 units


Levofloxacin (Levaquin Pb)  500 mg in 100 mls @ 100 mls/hr IV Q24HR [facility]


   Stop: 18 05:59


   Last Admin: 18 06:16 Dose:  100 mls/hr


Insulin Aspart (Novolog Insulin Sliding Scale)  0 units SUBQ ACHS [facility]


   PRN Reason: Protocol


   Stop: 18 20:59


   Last Admin: 18 07:55 Dose:  Not Given


Insulin Detemir (Levemir Insulin)  16 units SUBQ DAILY [facility]


   PRN Reason: Protocol


   Stop: 18 08:59


   Last Admin: 18 08:40 Dose:  16 units


Lorazepam (Ativan)  0.5 mg PO Q6HR PRN; Protocol


   PRN Reason: agitation 


   Stop: 18 19:27


   Last Admin: 18 09:24 Dose:  0.5 mg


Magnesium Hydroxide (Milk Of Magnesia)  30 ml PO HS PRN


   PRN Reason: Constipation


   Stop: 18 19:27


Metoprolol Succinate (Toprol Xl)  25 mg PO BID [facility]


   Stop: 18 16:59


   Last Admin: 18 09:31 Dose:  25 mg


Miscellaneous (Clinical Monitoring)  1 ea MC PRN PRN


   PRN Reason: RENAL


   Stop: 18 10:33


Olanzapine (Zyprexa Zydis)  12.5 mg PO BID [facility]


   PRN Reason: Protocol


   Stop: 18 12:28


   Last Admin: 18 16:52 Dose:  12.5 mg


Zolpidem Tartrate (Ambien)  5 mg PO HS PRN


   PRN Reason: insomnia


   Stop: 18 19:27


   Last Admin: 18 20:36 Dose:  5 mg








General: Oriented x3, No acute distress


HEENT: Atraumatic, PERRLA


Neck: Supple


Cardiovascular: Regular rate, Normal S1, Normal S2


Lungs: Clear to auscultation


Abdomen: Bowel sounds, Soft


Extremities: no Clubbing, no Cyanosis, no Edema


Neurological: Sensation intact


Skin: no Rash


Psych/Mental Status: Mood NL





- Procedures


Procedures: 


 Procedures











Procedure Code Date


 


KEV MUSC/FASCIA 20 SQ CM/< 75062 18


 


EXCISION OF RIGHT HIP MUSCLE, OPEN APPROACH [id number]














Assessment/Plan





- Assessment


Assessment: 





BRYANT on CKD


Stage 4 decub ulcer S/P debridement


Severe dehydration


Ess HTN


Type 2 DM


Psychosis








- Plan


Plan: 


Lab - Result Diagrams





 18 06:00 





 18 06:00 





Current Medications





Acetaminophen (Tylenol)  650 mg PO Q6HR PRN


   PRN Reason: mild to moderate pain


   Stop: 18 19:27


Allopurinol (Zyloprim)  100 mg PO DAILY [facility]


   Stop: 18 08:59


   Last Admin: 18 10:00 Dose:  Not Given


Castor Oil/[place] Balsam/Trypsin (Venelex)  1 appl TP DAILY [facility]


   Stop: 18 15:59


   Last Admin: 18 10:00 Dose:  1 appl


Famotidine (Pepcid)  20 mg PO BID [facility]


   Stop: 18 08:59


   Last Admin: 18 18:22 Dose:  20 mg


Heparin Sodium (Porcine) (Heparin)  5,000 units SUBQ Q12HR [facility]


   PRN Reason: Protocol


   Stop: 18 20:59


   Last Admin: 18 07:50 Dose:  Not Given


Ceftriaxone Sodium 1 gm/ (Sodium Chloride)  50 mls @ 100 mls/hr IV Q24HR [facility]


   Stop: 18 16:44


   Last Admin: 18 17:45 Dose:  100 mls/hr


Insulin Aspart (Novolog Insulin Sliding Scale)  0 units SUBQ ACHS [facility]


   PRN Reason: Protocol


   Stop: 18 20:59


   Last Admin: 18 18:23 Dose:  6 units


Lorazepam (Ativan)  0.5 mg PO Q6HR PRN; Protocol


   PRN Reason: agitation 


   Stop: 18 19:27


Magnesium Hydroxide (Milk Of Magnesia)  30 ml PO HS PRN


   PRN Reason: Constipation


   Stop: 18 19:27


Metoprolol Succinate (Toprol Xl)  25 mg PO BID [facility]


   Stop: 18 16:59


   Last Admin: 18 18:23 Dose:  25 mg


Miscellaneous (Clinical Monitoring)  1 ea MC PRN PRN


   PRN Reason: RENAL


   Stop: 18 10:33


Olanzapine (Zyprexa Zydis)  5 mg PO BID [facility]


   PRN Reason: Protocol


   Stop: 18 16:59


Zolpidem Tartrate (Ambien)  5 mg PO HS PRN


   PRN Reason: insomnia


   Stop: 18 








 













































































Na down to 143


Kidney fnc gradually improving


agree w/ Allopurinol


f/u electrolytes, agree w/ Levaquin


continue hydration


BS better control


increase Detemir


replace K


Placement in progress


Lab - Result Diagrams





 18 09:11 





 18 05:05 











Nutritional Asmnt/Malnutr-PDOC





- Dietary Evaluation


Malnutrition Findings (Please click <Entered> for more info): 








Nutritional Asmnt/Malnutrition                             Start:  18 17:

10


Text:                                                      Status: Complete    

  


Freq:                                                                          

  


 Document     18 17:13  MAGEN  (Rec: 18 17:25  MAGEN  [id number])


 Nutritional Asmnt/Malnutrition


     Patient General Information


      Nutritional Screening                      High Risk


                                                 Consult


      Diagnosis                                  pressure ulcer, hyperglycemia,


                                                 dehydration


      Pertinent Medical Hx/Surgical Hx           DM, dementia, depression,


                                                 psychosis, schizophrenia,


                                                 chronic renal insuff


      Subjective Information                     Consult received for


                                                 unstageble pressure ulcer on , high BG on . Per


                                                 records, PO intake 50-75%. Pt


                                                 was on NPO today for surgery-


                                                 excisional debridement,


                                                 application of wound VAC


      Current Diet Order/ Nutrition Support      pureed, CCHO 60gm


      Pertinent Medications                      novolog


      Pertinent Labs                              Na 152, K 4.6, BUN 56, Cr


                                                 1.8, Glucose 262, -246


                                                 , Mg 2.9


     Nutritional Hx/Data


      Height                                     1.8 m


      Height (Calculated Centimeters)            180.3


      Current Weight (lbs)                       70.76 kg


      Weight (Calculated Kilograms)              70.8


      Weight (Calculated Grams)                  87248.4


      Ideal Body Weight                          172


      % Ideal Body Weight                        91


      Body Mass Index (BMI)                      21.7


      Weight Status                              Approriate


     GI Symptoms


      GI Symptoms                                None


      Last BM                                    


      Difficult in:                              None


      Skin Integrity/Comment:                    scar to right upper back,


                                                 decubitus ulcer to buttocks


      Current %PO                                Fair (50-74%)


     Estimated Nutritional Goals


      BEE in Kcals:                              Using Current wt


      Calories/Kcals/Kg                          27-32


      Kcals Calculated                           1292-5178


      Protein:                                   Using Current wt


      Protein g/k-1.2


      Protein Calculated                         71-85


      Fluid: ml                                  1917-2130ml (1ml/kcal)


     Nutritional Problem


      2. Problem


       Problem                                   altered nutrition related lab


                                                 values


       Etiology                                  hx of DM


       Signs/Symptoms:                           Glucose 262, -246


      1. Problem


       Problem                                   increased nutrition needs (


                                                 calorie and protein)


       Etiology                                  increased metabolic demand for


                                                 wound healing


       Signs/Symptoms:                           decubitus ulcer and surgery


     Intervention/Recommendation


      Comments                                   1. Continue with current diet


                                                 as ordered. If PO intake low,


                                                 will consider nutrition


                                                 supplements


                                                 2. MD to consider vit C and


                                                 zinc for wound healing


                                                 3. Monitor PO intake, wt, labs


                                                 and skin integrity


                                                 4. F/U as moderate risk in 3-5


                                                 days, 3/2-3/4, PO check 3/2


     Expected Outcomes/Goals


      Expected Outcomes/Goals                    1. PO intake to meet at least


                                                 75% of nutritional needs.


                                                 2. Wt stability, skin to


                                                 remain intact, labs to


                                                 approach WNL.

## 2018-03-08 NOTE — PROGRESS NOTES
DATE:  03/08/2018



PSYCHIATRIC PROGRESS NOTE



Chart reviewed and the patient interviewed.  Also discussed the patient's

condition with the staff and reviewed records and labs.  The patient is slightly

calmer, but he is still confused and still needs redirections.  The patient also

still has episodes of agitation and irritability, but also seems to be less than

before.  He is still unable to provide any safe plan for self-care.  Otherwise,

the patient is compliant with taking his medications with no side effects of

medications.



ASSESSMENT:  The patient is still psychotic and considered to be gravely

disabled.



TREATMENT PLAN:  Continue monitoring his behavior and his condition and will

continue to follow up.





DD: 03/08/2018 08:20

DT: 03/08/2018 17:15

JOB# [id number]  [id number]

## 2018-03-09 RX ADMIN — INSULIN ASPART SCH UNITS: 100 INJECTION, SOLUTION INTRAVENOUS; SUBCUTANEOUS at 09:46

## 2018-03-09 RX ADMIN — CASTOR OIL AND BALSAM, PERU SCH APPL: 788; 87 OINTMENT TOPICAL at 09:52

## 2018-03-09 RX ADMIN — LEVOFLOXACIN SCH MLS/HR: 5 INJECTION INTRAVENOUS at 06:11

## 2018-03-09 RX ADMIN — OLANZAPINE SCH MG: 5 TABLET, ORALLY DISINTEGRATING ORAL at 09:45

## 2018-03-09 RX ADMIN — INSULIN ASPART SCH: 100 INJECTION, SOLUTION INTRAVENOUS; SUBCUTANEOUS at 17:33

## 2018-03-09 RX ADMIN — INSULIN ASPART SCH UNITS: 100 INJECTION, SOLUTION INTRAVENOUS; SUBCUTANEOUS at 12:19

## 2018-03-09 RX ADMIN — INSULIN ASPART SCH: 100 INJECTION, SOLUTION INTRAVENOUS; SUBCUTANEOUS at 21:07

## 2018-03-09 RX ADMIN — OLANZAPINE SCH MG: 5 TABLET, ORALLY DISINTEGRATING ORAL at 17:24

## 2018-03-09 RX ADMIN — INSULIN DETEMIR SCH UNITS: 100 INJECTION, SOLUTION SUBCUTANEOUS at 09:47

## 2018-03-09 NOTE — GENERAL PROGRESS NOTE
Subjective





- Review of Systems


Service Date: 18


Subjective: 





patient is awake and alert. no acute distress. patient eating fine.  Patient 

blood sugar improving. will increase levemir to 18u Daily.  





Objective





- Results


Result Diagrams: 


 18 09:11





 18 05:05


Recent Labs: 


 Laboratory Last Values











WBC  6.7 Th/cmm (4.8-10.8)  D 18  09:11    


 


RBC  4.73 Mil/cmm (3.80-5.80)   18  09:11    


 


Hgb  13.7 gm/dL (12-16)   18  09:11    


 


Hct  42.8 % (41.0-60)   18  09:11    


 


MCV  90.5 fl (80-99)   18  09:11    


 


MCH  28.9 pg (27.0-31.0)   18  09:11    


 


MCHC Differential  32.0 pg (28.0-36.0)   18  09:11    


 


RDW  16.2 % (11.5-20.0)   18  09:11    


 


Plt Count  162 Th/cmm (150-400)   18  09:11    


 


MPV  9.7 fl  18  09:11    


 


Neutrophils %  72.2 % (40.0-80.0)   18  09:11    


 


Lymphocytes %  19.2 % (20.0-50.0)  L  18  09:11    


 


Monocytes %  5.9 % (2.0-10.0)   18  09:11    


 


Eosinophils %  1.5 % (0.0-5.0)   18  09:11    


 


Basophils %  1.2 % (0.0-2.0)   18  09:11    


 


Eos Smear Source  URINE   18  03:00    


 


Eos Smear Total Cells  FEW EOSINOPHILS SEEN  (NONE SEEN)   18  03:00    


 


PT  10.5 SECONDS (9.5-11.5)   18  06:00    


 


INR  1.01  (0.5-1.4)   18  06:00    


 


PTT (Actin FS)  25.3 SECONDS (26.0-38.0)  L  18  06:00    


 


Sodium  143 mEq/L (136-145)   18  05:05    


 


Potassium  3.9 mEq/L (3.5-5.1)   18  05:05    


 


Chloride  112 mEq/L ()  H  18  05:05    


 


Carbon Dioxide  24.1 mEq/L (21.0-31.0)   18  05:05    


 


Anion Gap  10.8  (7.0-16.0)   18  05:05    


 


BUN  16 mg/dL (7-25)   18  05:05    


 


Creatinine  1.0 mg/dL (0.7-1.3)   18  05:05    


 


Est GFR ( Amer)  TNP   18  05:05    


 


Est GFR (Non-Af Amer)  TNP   18  05:05    


 


BUN/Creatinine Ratio  16.0   18  05:05    


 


Glucose  194 mg/dL ()  H  18  05:05    


 


POC Glucose  216 MG/DL (70 - 105)  H  18  06:17    


 


Hemoglobin A1c %  10.5 % (4.0-6.0)  H  18  05:26    


 


Uric Acid  5.6 mg/dL (4.4-7.6)   18  09:10    


 


Calcium  8.8 mg/dL (8.6-10.3)   18  05:05    


 


Phosphorus  4.1 mg/dL (2.5-5.0)   18  06:00    


 


Magnesium  2.0 mg/dL (1.9-2.7)   18  05:05    


 


Total Bilirubin  0.6 mg/dL (0.3-1.0)   18  09:11    


 


AST  17 U/L (13-39)   18  09:11    


 


ALT  13 U/L (7-52)   18  09:11    


 


Alkaline Phosphatase  81 U/L ()   18  09:11    


 


Total Protein  6.4 gm/dL (6.0-8.3)   18  09:11    


 


Albumin  3.0 gm/dL (4.2-5.5)  L  18  09:11    


 


Globulin  3.4 gm/dL  18  09:11    


 


Albumin/Globulin Ratio  0.9  (1.0-1.8)  L  18  09:11    


 


TSH  0.28 uIU/ml (0.34-5.60)  L  18  06:00    


 


Urine Source  CATH   18  03:00    


 


Urine Color  BROWN   18  03:00    


 


Urine Clarity  CLOUDY  (CLEAR)   18  03:00    


 


Urine pH  5.5  (4.6 - 8.0)   18  03:00    


 


Ur Specific Gravity  1.020  (1.005-1.030)   18  03:00    


 


Urine Protein  100 mg/dL (NEGATIVE)  H  18  03:00    


 


Urine Glucose (UA)  >=1000 mg/dL (NEGATIVE)  H  18  03:00    


 


Urine Ketones  TRACE mg/dL (NEGATIVE)   18  03:00    


 


Urine Blood  LARGE  (NEGATIVE)  H  18  03:00    


 


Urine Nitrate  NEGATIVE  (NEGATIVE)   18  03:00    


 


Urine Bilirubin  SMALL  (NEGATIVE)  H  18  03:00    


 


Urine Ictotest  NEGATIVE  (NEGATIVE)   18  03:00    


 


Urine Urobilinogen  0.2 E.U./dL (0.2 - 1.0)   18  03:00    


 


Ur Leukocyte Esterase  TRACE  (NEGATIVE)  H  18  03:00    


 


Urine RBC  >100 /hpf (0-5)  H  18  03:00    


 


Urine WBC  >100 /hpf (0-5)  H  18  03:00    


 


Ur Epithelial Cells  OCCASIONAL /lpf (FEW)   18  03:00    


 


Urine Bacteria  MODERATE /hpf (NONE SEEN)  H  18  03:00    


 


Urine Osmolality  749 mOsmol/kg  18  03:00    


 


Ur Random Sodium  39 mmol/L  18  03:00    


 


Urine Creatinine  136.0 mg/dl (39.0-259.0)   18  03:00    














- Physical Exam


Vitals and I&O: 


 Vital Signs











Temp  97.8 F   18 04:00


 


Pulse  109   18 04:00


 


Resp  18   18 04:00


 


BP  156/91   18 04:00


 


Pulse Ox  98   18 04:00








 Intake & Output











 18





 18:59 06:59 18:59


 


Intake Total 100 100 


 


Output Total 300 650 


 


Balance -200 -550 


 


Weight (lbs) 77.111 kg 77.111 kg 


 


Intake:   


 


  Intake, IV Amount 100  


 


    Levofloxacin 500mg/100mL 100  





    500 mg In 100 ml @ 100   





    mls/hr IV Q24HR [facility] Rx#:   





    [id number]   


 


  Oral  100 


 


Output:   


 


  Urine 300 650 


 


Other:   


 


  # Bowel Movements 0  











Active Medications: 


Current Medications





Acetaminophen (Tylenol)  650 mg PO Q6HR PRN


   PRN Reason: mild to moderate pain


   Stop: 18 19:27


   Last Admin: 18 00:25 Dose:  650 mg


Acetaminophen/Hydrocodone Bitart (Norco 5mg/325mg)  1 tab PO Q6H PRN


   PRN Reason: Pain (Moderate)


   Stop: 18 08:21


   Last Admin: 18 21:26 Dose:  1 tab


Allopurinol (Zyloprim)  100 mg PO DAILY [facility]


   Stop: 18 08:59


   Last Admin: 18 09:30 Dose:  100 mg


Castor Oil/Ghanaian Balsam/Trypsin (Venelex)  1 appl TP DAILY [facility]


   Stop: 18 15:59


   Last Admin: 18 16:16 Dose:  Not Given


Clonazepam (Klonopin)  1 mg PO BID RENETTA


   PRN Reason: Protocol


   Stop: 18 08:59


   Last Admin: 18 17:29 Dose:  Not Given


Famotidine (Pepcid)  20 mg PO BID [facility]


   Stop: 18 08:59


   Last Admin: 18 17:29 Dose:  Not Given


Heparin Sodium (Porcine) (Heparin)  5,000 units SUBQ Q12HR RENETTA


   PRN Reason: Protocol


   Stop: 18 20:59


   Last Admin: 18 21:55 Dose:  5,000 units


Levofloxacin (Levaquin Pb)  500 mg in 100 mls @ 100 mls/hr IV Q24HR RENETTA


   Stop: 18 05:59


   Last Admin: 18 06:11 Dose:  100 mls/hr


Insulin Aspart (Novolog Insulin Sliding Scale)  0 units SUBQ ACHS RENETTA


   PRN Reason: Protocol


   Stop: 18 20:59


   Last Admin: 18 22:23 Dose:  4 units


Insulin Detemir (Levemir Insulin)  18 units SUBQ DAILY RENETTA


   PRN Reason: Protocol


   Stop: 18 08:13


Lorazepam (Ativan)  0.5 mg PO Q6HR PRN; Protocol


   PRN Reason: agitation 


   Stop: 18 19:27


   Last Admin: 18 00:09 Dose:  0.5 mg


Magnesium Hydroxide (Milk Of Magnesia)  30 ml PO HS PRN


   PRN Reason: Constipation


   Stop: 18 19:27


Metoprolol Succinate (Toprol Xl)  25 mg PO BID RENETTA


   Stop: 18 16:59


   Last Admin: 18 17:29 Dose:  Not Given


Miscellaneous (Clinical Monitoring)  1 ea MC PRN PRN


   PRN Reason: RENAL


   Stop: 18 10:33


Olanzapine (Zyprexa Zydis)  12.5 mg PO BID RENETTA


   PRN Reason: Protocol


   Stop: 18 12:28


   Last Admin: 18 17:27 Dose:  12.5 mg


Zolpidem Tartrate (Ambien)  5 mg PO HS PRN


   PRN Reason: insomnia


   Stop: 18 19:27


   Last Admin: 18 20:36 Dose:  5 mg








General: Oriented x3, No acute distress


HEENT: Atraumatic, PERRLA


Neck: Supple


Cardiovascular: Regular rate, Normal S1, Normal S2


Lungs: Clear to auscultation


Abdomen: Bowel sounds, Soft


Extremities: no Clubbing, no Cyanosis, no Edema


Neurological: Sensation intact


Skin: no Rash


Psych/Mental Status: Mood NL





- Procedures


Procedures: 


 Procedures











Procedure Code Date


 


KEV MUSC/FASCIA 20 SQ CM/< 67449 18


 


EXCISION OF RIGHT HIP MUSCLE, OPEN APPROACH [id number]














Assessment/Plan





- Assessment


Assessment: 


Sacral stage III decubitus ulcer, infected.


Diabetes mellitus type


Depression.


psychosis.  


Hematuria.


hypernatremia resolved


prerenal azotemia improved.


hyperglycemia ... stable


depression/anxiety


gout ... on allopurinol. check uric acid level


dementia


schizophrenia


S/P wound debridement of sacral decubitus ... continue wound care treatment.


UTI


BRYANT on CKD ... resolved.


hyperkalemia resolved. 





discharge planning.





- Plan


Plan: 





will order cmp this AM





renal consult -- Dr. Bajwa





sacral decubitus ... will order Gen Surgical consult -- Dr. Jones for possible 

wound debridement





Gout ... continue allopurinol 100mg PO daily





continue wound vac. 





continue hydrocodone PO for pain control. 





discharge planning/placement





Nutritional Asmnt/Malnutr-PDOC





- Dietary Evaluation


Malnutrition Findings (Please click <Entered> for more info): 








Nutritional Asmnt/Malnutrition                             Start:  18 17:

10


Text:                                                      Status: Complete    

  


Freq:                                                                          

  


 Document     18 17:13  HENG  (Rec: 18 17:25  [facility]  [id number])


 Nutritional Asmnt/Malnutrition


     Patient General Information


      Nutritional Screening                      High Risk


                                                 Consult


      Diagnosis                                  pressure ulcer, hyperglycemia,


                                                 dehydration


      Pertinent Medical Hx/Surgical Hx           DM, dementia, depression,


                                                 psychosis, schizophrenia,


                                                 chronic renal insuff


      Subjective Information                     Consult received for


                                                 unstageble pressure ulcer on , high BG on . Per


                                                 records, PO intake 50-75%. Pt


                                                 was on NPO today for surgery-


                                                 excisional debridement,


                                                 application of wound VAC


      Current Diet Order/ Nutrition Support      pureed, CCHO 60gm


      Pertinent Medications                      novolog


      Pertinent Labs                              Na 152, K 4.6, BUN 56, Cr


                                                 1.8, Glucose 262, -246


                                                 , Mg 2.9


     Nutritional Hx/Data


      Height                                     1.8 m


      Height (Calculated Centimeters)            180.3


      Current Weight (lbs)                       70.76 kg


      Weight (Calculated Kilograms)              70.8


      Weight (Calculated Grams)                  02764.4


      Ideal Body Weight                          172


      % Ideal Body Weight                        91


      Body Mass Index (BMI)                      21.7


      Weight Status                              Approriate


     GI Symptoms


      GI Symptoms                                None


      Last BM                                    


      Difficult in:                              None


      Skin Integrity/Comment:                    scar to right upper back,


                                                 decubitus ulcer to buttocks


      Current %PO                                Fair (50-74%)


     Estimated Nutritional Goals


      BEE in Kcals:                              Using Current wt


      Calories/Kcals/Kg                          27-32


      Kcals Calculated                           1099-3635


      Protein:                                   Using Current wt


      Protein g/k-1.2


      Protein Calculated                         71-85


      Fluid: ml                                  1917-2130ml (1ml/kcal)


     Nutritional Problem


      2. Problem


       Problem                                   altered nutrition related lab


                                                 values


       Etiology                                  hx of DM


       Signs/Symptoms:                           Glucose 262, -246


      1. Problem


       Problem                                   increased nutrition needs (


                                                 calorie and protein)


       Etiology                                  increased metabolic demand for


                                                 wound healing


       Signs/Symptoms:                           decubitus ulcer and surgery


     Intervention/Recommendation


      Comments                                   1. Continue with current diet


                                                 as ordered. If PO intake low,


                                                 will consider nutrition


                                                 supplements


                                                 2. MD to consider vit C and


                                                 zinc for wound healing


                                                 3. Monitor PO intake, wt, labs


                                                 and skin integrity


                                                 4. F/U as moderate risk in 3-5


                                                 days, 3/-3/4, PO check 3/


     Expected Outcomes/Goals


      Expected Outcomes/Goals                    1. PO intake to meet at least


                                                 75% of nutritional needs.


                                                 2. Wt stability, skin to


                                                 remain intact, labs to


                                                 approach WNL.

## 2018-03-09 NOTE — GENERAL PROGRESS NOTE
Subjective





- Review of Systems


Service Date: 18


Subjective: 





alert, comfortable, periods of confusion





Objective





- Results


Result Diagrams: 


 18 09:11





 18 05:05


Recent Labs: 


 Laboratory Last Values











WBC  6.7 Th/cmm (4.8-10.8)  D 18  09:11    


 


RBC  4.73 Mil/cmm (3.80-5.80)   18  09:11    


 


Hgb  13.7 gm/dL (12-16)   18  09:11    


 


Hct  42.8 % (41.0-60)   18  09:11    


 


MCV  90.5 fl (80-99)   18  09:11    


 


MCH  28.9 pg (27.0-31.0)   18  09:11    


 


MCHC Differential  32.0 pg (28.0-36.0)   18  09:11    


 


RDW  16.2 % (11.5-20.0)   18  09:11    


 


Plt Count  162 Th/cmm (150-400)   18  09:11    


 


MPV  9.7 fl  18  09:11    


 


Neutrophils %  72.2 % (40.0-80.0)   18  09:11    


 


Lymphocytes %  19.2 % (20.0-50.0)  L  18  09:11    


 


Monocytes %  5.9 % (2.0-10.0)   18  09:11    


 


Eosinophils %  1.5 % (0.0-5.0)   18  09:11    


 


Basophils %  1.2 % (0.0-2.0)   18  09:11    


 


Eos Smear Source  URINE   18  03:00    


 


Eos Smear Total Cells  FEW EOSINOPHILS SEEN  (NONE SEEN)   18  03:00    


 


PT  10.5 SECONDS (9.5-11.5)   18  06:00    


 


INR  1.01  (0.5-1.4)   18  06:00    


 


PTT (Actin FS)  25.3 SECONDS (26.0-38.0)  L  18  06:00    


 


Sodium  143 mEq/L (136-145)   18  05:05    


 


Potassium  3.9 mEq/L (3.5-5.1)   18  05:05    


 


Chloride  112 mEq/L ()  H  18  05:05    


 


Carbon Dioxide  24.1 mEq/L (21.0-31.0)   18  05:05    


 


Anion Gap  10.8  (7.0-16.0)   18  05:05    


 


BUN  16 mg/dL (7-25)   18  05:05    


 


Creatinine  1.0 mg/dL (0.7-1.3)   18  05:05    


 


Est GFR ( Amer)  TNP   18  05:05    


 


Est GFR (Non-Af Amer)  TNP   18  05:05    


 


BUN/Creatinine Ratio  16.0   18  05:05    


 


Glucose  194 mg/dL ()  H  18  05:05    


 


POC Glucose  213 MG/DL (70 - 105)  H  18  12:15    


 


Hemoglobin A1c %  10.5 % (4.0-6.0)  H  18  05:26    


 


Uric Acid  5.6 mg/dL (4.4-7.6)   18  09:10    


 


Calcium  8.8 mg/dL (8.6-10.3)   18  05:05    


 


Phosphorus  4.1 mg/dL (2.5-5.0)   18  06:00    


 


Magnesium  2.0 mg/dL (1.9-2.7)   18  05:05    


 


Total Bilirubin  0.6 mg/dL (0.3-1.0)   18  09:11    


 


AST  17 U/L (13-39)   18  09:11    


 


ALT  13 U/L (7-52)   18  09:11    


 


Alkaline Phosphatase  81 U/L ()   18  09:11    


 


Total Protein  6.4 gm/dL (6.0-8.3)   18  09:11    


 


Albumin  3.0 gm/dL (4.2-5.5)  L  18  09:11    


 


Globulin  3.4 gm/dL  18  09:11    


 


Albumin/Globulin Ratio  0.9  (1.0-1.8)  L  18  09:11    


 


TSH  0.28 uIU/ml (0.34-5.60)  L  18  06:00    


 


Urine Source  CATH   18  03:00    


 


Urine Color  BROWN   18  03:00    


 


Urine Clarity  CLOUDY  (CLEAR)   18  03:00    


 


Urine pH  5.5  (4.6 - 8.0)   18  03:00    


 


Ur Specific Gravity  1.020  (1.005-1.030)   18  03:00    


 


Urine Protein  100 mg/dL (NEGATIVE)  H  18  03:00    


 


Urine Glucose (UA)  >=1000 mg/dL (NEGATIVE)  H  18  03:00    


 


Urine Ketones  TRACE mg/dL (NEGATIVE)   18  03:00    


 


Urine Blood  LARGE  (NEGATIVE)  H  18  03:00    


 


Urine Nitrate  NEGATIVE  (NEGATIVE)   18  03:00    


 


Urine Bilirubin  SMALL  (NEGATIVE)  H  18  03:00    


 


Urine Ictotest  NEGATIVE  (NEGATIVE)   18  03:00    


 


Urine Urobilinogen  0.2 E.U./dL (0.2 - 1.0)   18  03:00    


 


Ur Leukocyte Esterase  TRACE  (NEGATIVE)  H  18  03:00    


 


Urine RBC  >100 /hpf (0-5)  H  18  03:00    


 


Urine WBC  >100 /hpf (0-5)  H  18  03:00    


 


Ur Epithelial Cells  OCCASIONAL /lpf (FEW)   18  03:00    


 


Urine Bacteria  MODERATE /hpf (NONE SEEN)  H  18  03:00    


 


Urine Osmolality  749 mOsmol/kg  18  03:00    


 


Ur Random Sodium  39 mmol/L  18  03:00    


 


Urine Creatinine  136.0 mg/dl (39.0-259.0)   18  03:00    














- Physical Exam


Vitals and I&O: 


 Vital Signs











Temp  97.6 F   18 12:00


 


Pulse  117   18 12:00


 


Resp  18   18 12:00


 


BP  157/81   18 12:00


 


Pulse Ox  98   18 12:00








 Intake & Output











 18





 18:59 06:59 18:59


 


Intake Total 100 100 


 


Output Total 300 650 


 


Balance -200 -550 


 


Weight (lbs) 77.111 kg 77.111 kg 


 


Intake:   


 


  Intake, IV Amount 100  


 


    Levofloxacin 500mg/100mL 100  





    500 mg In 100 ml @ 100   





    mls/hr IV Q24HR [facility] Rx#:   





    [id number]   


 


  Oral  100 


 


Output:   


 


  Urine 300 650 


 


Other:   


 


  # Bowel Movements 0  











Active Medications: 


Current Medications





Acetaminophen (Tylenol)  650 mg PO Q6HR PRN


   PRN Reason: mild to moderate pain


   Stop: 18 19:27


   Last Admin: 18 00:25 Dose:  650 mg


Acetaminophen/Hydrocodone Bitart (Norco 5mg/325mg)  1 tab PO Q6H PRN


   PRN Reason: Pain (Moderate)


   Stop: 18 08:21


   Last Admin: 18 21:26 Dose:  1 tab


Allopurinol (Zyloprim)  100 mg PO DAILY [facility]


   Stop: 18 08:59


   Last Admin: 18 09:45 Dose:  100 mg


Castor Oil/Irish Balsam/Trypsin (Venelex)  1 appl TP DAILY [facility]


   Stop: 18 15:59


   Last Admin: 18 09:52 Dose:  1 appl


Clonazepam (Klonopin)  1 mg PO BID [facility]


   PRN Reason: Protocol


   Stop: 18 08:59


   Last Admin: 18 09:45 Dose:  1 mg


Famotidine (Pepcid)  20 mg PO BID [facility]


   Stop: 18 08:59


   Last Admin: 18 09:46 Dose:  20 mg


Heparin Sodium (Porcine) (Heparin)  5,000 units SUBQ Q12HR [facility]


   PRN Reason: Protocol


   Stop: 18 20:59


   Last Admin: 18 09:46 Dose:  5,000 units


Levofloxacin (Levaquin Pb)  500 mg in 100 mls @ 100 mls/hr IV Q24HR [facility]


   Stop: 18 05:59


   Last Admin: 18 06:11 Dose:  100 mls/hr


Insulin Aspart (Novolog Insulin Sliding Scale)  0 units SUBQ ACHS [facility]


   PRN Reason: Protocol


   Stop: 18 20:59


   Last Admin: 18 12:19 Dose:  4 units


Insulin Detemir (Levemir Insulin)  18 units SUBQ DAILY [facility]


   PRN Reason: Protocol


   Stop: 18 08:13


   Last Admin: 18 09:47 Dose:  18 units


Lorazepam (Ativan)  0.5 mg PO Q6HR PRN; Protocol


   PRN Reason: agitation 


   Stop: 18 19:27


   Last Admin: 18 00:09 Dose:  0.5 mg


Magnesium Hydroxide (Milk Of Magnesia)  30 ml PO HS PRN


   PRN Reason: Constipation


   Stop: 18 19:27


Metoprolol Succinate (Toprol Xl)  25 mg PO BID [facility]


   Stop: 18 16:59


   Last Admin: 18 09:44 Dose:  25 mg


Miscellaneous (Clinical Monitoring)  1 ea MC PRN PRN


   PRN Reason: RENAL


   Stop: 18 10:33


Olanzapine (Zyprexa Zydis)  12.5 mg PO BID [facility]


   PRN Reason: Protocol


   Stop: 18 12:28


   Last Admin: 18 09:45 Dose:  12.5 mg


Zolpidem Tartrate (Ambien)  5 mg PO HS PRN


   PRN Reason: insomnia


   Stop: 18 19:27


   Last Admin: 18 20:36 Dose:  5 mg








General: No acute distress


HEENT: Atraumatic, PERRLA


Neck: Supple


Cardiovascular: Regular rate, Normal S1, Normal S2


Lungs: Clear to auscultation


Abdomen: Bowel sounds, Soft


Extremities: no Clubbing, no Cyanosis, no Edema


Neurological: Sensation intact


Skin: no Rash


Psych/Mental Status: Mood NL





- Procedures


Procedures: 


 Procedures











Procedure Code Date


 


KEV MUSC/FASCIA 20 SQ CM/< 88063 18


 


EXCISION OF RIGHT HIP MUSCLE, OPEN APPROACH [id number]














Assessment/Plan





- Assessment


Assessment: 





BRYANT on CKD


Stage 4 decub ulcer S/P debridement


Severe dehydration


Ess HTN


Type 2 DM


Psychosis








- Plan


Plan: 


Lab - Result Diagrams





 18 06:00 





 18 06:00 





Current Medications





Acetaminophen (Tylenol)  650 mg PO Q6HR PRN


   PRN Reason: mild to moderate pain


   Stop: 18 19:27


Allopurinol (Zyloprim)  100 mg PO DAILY [facility]


   Stop: 18 08:59


   Last Admin: 18 10:00 Dose:  Not Given


Castor Oil/Irish Balsam/Trypsin (Venelex)  1 appl TP DAILY [facility]


   Stop: 18 15:59


   Last Admin: 18 10:00 Dose:  1 appl


Famotidine (Pepcid)  20 mg PO BID [facility]


   Stop: 18 08:59


   Last Admin: 18 18:22 Dose:  20 mg


Heparin Sodium (Porcine) (Heparin)  5,000 units SUBQ Q12HR [facility]


   PRN Reason: Protocol


   Stop: 18 20:59


   Last Admin: 18 07:50 Dose:  Not Given


Ceftriaxone Sodium 1 gm/ (Sodium Chloride)  50 mls @ 100 mls/hr IV Q24HR [facility]


   Stop: 18 16:44


   Last Admin: 18 17:45 Dose:  100 mls/hr


Insulin Aspart (Novolog Insulin Sliding Scale)  0 units SUBQ ACHS [facility]


   PRN Reason: Protocol


   Stop: 18 20:59


   Last Admin: 18 18:23 Dose:  6 units


Lorazepam (Ativan)  0.5 mg PO Q6HR PRN; Protocol


   PRN Reason: agitation 


   Stop: 18 19:27


Magnesium Hydroxide (Milk Of Magnesia)  30 ml PO HS PRN


   PRN Reason: Constipation


   Stop: 18 19:27


Metoprolol Succinate (Toprol Xl)  25 mg PO BID [facility]


   Stop: 18 16:59


   Last Admin: 18 18:23 Dose:  25 mg


Miscellaneous (Clinical Monitoring)  1 ea MC PRN PRN


   PRN Reason: RENAL


   Stop: 18 10:33


Olanzapine (Zyprexa Zydis)  5 mg PO BID [facility]


   PRN Reason: Protocol


   Stop: 18 16:59


Zolpidem Tartrate (Ambien)  5 mg PO HS PRN


   PRN Reason: insomnia


   Stop: 18 








 





















































Lab - Result Diagrams





 18 09:11 





 18 05:05 
































Na down to 143


Kidney fnc gradually improving


agree w/ Allopurinol


f/u electrolytes, agree w/ Levaquin


continue hydration


BS better control


increase Detemir


replace K


Placement in progress

















Nutritional Asmnt/Malnutr-PDOC





- Dietary Evaluation


Malnutrition Findings (Please click <Entered> for more info): 








Nutritional Asmnt/Malnutrition                             Start:  18 17:

10


Text:                                                      Status: Complete    

  


Freq:                                                                          

  


 Document     18 17:13  MAGEN  (Rec: 18 17:25  LCPAT  [id number])


 Nutritional Asmnt/Malnutrition


     Patient General Information


      Nutritional Screening                      High Risk


                                                 Consult


      Diagnosis                                  pressure ulcer, hyperglycemia,


                                                 dehydration


      Pertinent Medical Hx/Surgical Hx           DM, dementia, depression,


                                                 psychosis, schizophrenia,


                                                 chronic renal insuff


      Subjective Information                     Consult received for


                                                 unstageble pressure ulcer on , high BG on . Per


                                                 records, PO intake 50-75%. Pt


                                                 was on NPO today for surgery-


                                                 excisional debridement,


                                                 application of wound VAC


      Current Diet Order/ Nutrition Support      pureed, CCHO 60gm


      Pertinent Medications                      novolog


      Pertinent Labs                              Na 152, K 4.6, BUN 56, Cr


                                                 1.8, Glucose 262, -246


                                                 , Mg 2.9


     Nutritional Hx/Data


      Height                                     1.8 m


      Height (Calculated Centimeters)            180.3


      Current Weight (lbs)                       70.76 kg


      Weight (Calculated Kilograms)              70.8


      Weight (Calculated Grams)                  03991.4


      Ideal Body Weight                          172


      % Ideal Body Weight                        91


      Body Mass Index (BMI)                      21.7


      Weight Status                              Approriate


     GI Symptoms


      GI Symptoms                                None


      Last BM                                    


      Difficult in:                              None


      Skin Integrity/Comment:                    scar to right upper back,


                                                 decubitus ulcer to buttocks


      Current %PO                                Fair (50-74%)


     Estimated Nutritional Goals


      BEE in Kcals:                              Using Current wt


      Calories/Kcals/Kg                          27-32


      Kcals Calculated                           3002-1073


      Protein:                                   Using Current wt


      Protein g/k-1.2


      Protein Calculated                         71-85


      Fluid: ml                                  1917-2130ml (1ml/kcal)


     Nutritional Problem


      2. Problem


       Problem                                   altered nutrition related lab


                                                 values


       Etiology                                  hx of DM


       Signs/Symptoms:                           Glucose 262, -246


      1. Problem


       Problem                                   increased nutrition needs (


                                                 calorie and protein)


       Etiology                                  increased metabolic demand for


                                                 wound healing


       Signs/Symptoms:                           decubitus ulcer and surgery


     Intervention/Recommendation


      Comments                                   1. Continue with current diet


                                                 as ordered. If PO intake low,


                                                 will consider nutrition


                                                 supplements


                                                 2. MD to consider vit C and


                                                 zinc for wound healing


                                                 3. Monitor PO intake, wt, labs


                                                 and skin integrity


                                                 4. F/U as moderate risk in 3-5


                                                 days, 3/2-3/4, PO check 3/


     Expected Outcomes/Goals


      Expected Outcomes/Goals                    1. PO intake to meet at least


                                                 75% of nutritional needs.


                                                 2. Wt stability, skin to


                                                 remain intact, labs to


                                                 approach WNL.

## 2018-03-09 NOTE — PROGRESS NOTES
DATE:  



SUBJECTIVE:  Chart reviewed and the patient interviewed.  Also discussed the

patient's condition with the staff and reviewed records and labs.  The patient

is still anxious, but he seems to be calmer.  Also seems to be slightly

confused.  The patient was waving with his hand, but unable to carry out any

conversation.  Also seems to be calm and is slightly easier to follow

directions.  The patient is compliant with taking his medications with no side

effects of medications.



ASSESSMENT:  The patient is less agitated and compliant with treatment.



TREATMENT PLAN:  We will continue to monitor his behavior and his condition and

continue to follow up.





DD: 03/09/2018 07:58

DT: 03/09/2018 19:11

JOB# [id number]  [id number]

## 2018-03-10 RX ADMIN — INSULIN DETEMIR SCH: 100 INJECTION, SOLUTION SUBCUTANEOUS at 09:51

## 2018-03-10 RX ADMIN — INSULIN ASPART SCH: 100 INJECTION, SOLUTION INTRAVENOUS; SUBCUTANEOUS at 07:49

## 2018-03-10 RX ADMIN — INSULIN ASPART SCH: 100 INJECTION, SOLUTION INTRAVENOUS; SUBCUTANEOUS at 17:16

## 2018-03-10 RX ADMIN — INSULIN ASPART SCH UNITS: 100 INJECTION, SOLUTION INTRAVENOUS; SUBCUTANEOUS at 12:13

## 2018-03-10 RX ADMIN — CASTOR OIL AND BALSAM, PERU SCH APPL: 788; 87 OINTMENT TOPICAL at 09:45

## 2018-03-10 RX ADMIN — OLANZAPINE SCH MG: 5 TABLET, ORALLY DISINTEGRATING ORAL at 09:43

## 2018-03-10 RX ADMIN — OLANZAPINE SCH MG: 5 TABLET, ORALLY DISINTEGRATING ORAL at 17:16

## 2018-03-10 RX ADMIN — INSULIN ASPART SCH UNITS: 100 INJECTION, SOLUTION INTRAVENOUS; SUBCUTANEOUS at 22:12

## 2018-03-10 RX ADMIN — LEVOFLOXACIN SCH MLS/HR: 5 INJECTION INTRAVENOUS at 05:45

## 2018-03-10 NOTE — INFECTIOUS DISEASE PROG NOTE
Infectious Disease Subjective





- Review of Systems


Service Date: 03/10/18


Subjective: 





no new change.





Infectious Disease Objective





- Results


Result Diagrams: 


 18 09:11





 18 05:05


Recent Labs: 


 Laboratory Last Values











WBC  6.7 Th/cmm (4.8-10.8)  D 18  09:11    


 


RBC  4.73 Mil/cmm (3.80-5.80)   18  09:11    


 


Hgb  13.7 gm/dL (12-16)   18  09:11    


 


Hct  42.8 % (41.0-60)   18  09:11    


 


MCV  90.5 fl (80-99)   18  09:11    


 


MCH  28.9 pg (27.0-31.0)   18  09:11    


 


MCHC Differential  32.0 pg (28.0-36.0)   18  09:11    


 


RDW  16.2 % (11.5-20.0)   18  09:11    


 


Plt Count  162 Th/cmm (150-400)   18  09:11    


 


MPV  9.7 fl  18  09:11    


 


Neutrophils %  72.2 % (40.0-80.0)   18  09:11    


 


Lymphocytes %  19.2 % (20.0-50.0)  L  18  09:11    


 


Monocytes %  5.9 % (2.0-10.0)   18  09:11    


 


Eosinophils %  1.5 % (0.0-5.0)   18  09:11    


 


Basophils %  1.2 % (0.0-2.0)   18  09:11    


 


Eos Smear Source  URINE   18  03:00    


 


Eos Smear Total Cells  FEW EOSINOPHILS SEEN  (NONE SEEN)   18  03:00    


 


PT  10.5 SECONDS (9.5-11.5)   18  06:00    


 


INR  1.01  (0.5-1.4)   18  06:00    


 


PTT (Actin FS)  25.3 SECONDS (26.0-38.0)  L  18  06:00    


 


Sodium  143 mEq/L (136-145)   18  05:05    


 


Potassium  3.9 mEq/L (3.5-5.1)   18  05:05    


 


Chloride  112 mEq/L ()  H  18  05:05    


 


Carbon Dioxide  24.1 mEq/L (21.0-31.0)   18  05:05    


 


Anion Gap  10.8  (7.0-16.0)   18  05:05    


 


BUN  16 mg/dL (7-25)   18  05:05    


 


Creatinine  1.0 mg/dL (0.7-1.3)   18  05:05    


 


Est GFR ( Amer)  TNP   18  05:05    


 


Est GFR (Non-Af Amer)  TNP   18  05:05    


 


BUN/Creatinine Ratio  16.0   18  05:05    


 


Glucose  194 mg/dL ()  H  18  05:05    


 


POC Glucose  99 MG/DL (70 - 105)   03/10/18  06:34    


 


Hemoglobin A1c %  10.5 % (4.0-6.0)  H  18  05:26    


 


Uric Acid  5.6 mg/dL (4.4-7.6)   18  09:10    


 


Calcium  8.8 mg/dL (8.6-10.3)   18  05:05    


 


Phosphorus  4.1 mg/dL (2.5-5.0)   18  06:00    


 


Magnesium  2.0 mg/dL (1.9-2.7)   18  05:05    


 


Total Bilirubin  0.6 mg/dL (0.3-1.0)   18  09:11    


 


AST  17 U/L (13-39)   18  09:11    


 


ALT  13 U/L (7-52)   18  09:11    


 


Alkaline Phosphatase  81 U/L ()   18  09:11    


 


Total Protein  6.4 gm/dL (6.0-8.3)   18  09:11    


 


Albumin  3.0 gm/dL (4.2-5.5)  L  18  09:11    


 


Globulin  3.4 gm/dL  18  09:11    


 


Albumin/Globulin Ratio  0.9  (1.0-1.8)  L  18  09:11    


 


TSH  0.28 uIU/ml (0.34-5.60)  L  18  06:00    


 


Urine Source  CATH   18  03:00    


 


Urine Color  BROWN   18  03:00    


 


Urine Clarity  CLOUDY  (CLEAR)   18  03:00    


 


Urine pH  5.5  (4.6 - 8.0)   18  03:00    


 


Ur Specific Gravity  1.020  (1.005-1.030)   18  03:00    


 


Urine Protein  100 mg/dL (NEGATIVE)  H  18  03:00    


 


Urine Glucose (UA)  >=1000 mg/dL (NEGATIVE)  H  18  03:00    


 


Urine Ketones  TRACE mg/dL (NEGATIVE)   18  03:00    


 


Urine Blood  LARGE  (NEGATIVE)  H  18  03:00    


 


Urine Nitrate  NEGATIVE  (NEGATIVE)   18  03:00    


 


Urine Bilirubin  SMALL  (NEGATIVE)  H  18  03:00    


 


Urine Ictotest  NEGATIVE  (NEGATIVE)   18  03:00    


 


Urine Urobilinogen  0.2 E.U./dL (0.2 - 1.0)   18  03:00    


 


Ur Leukocyte Esterase  TRACE  (NEGATIVE)  H  18  03:00    


 


Urine RBC  >100 /hpf (0-5)  H  18  03:00    


 


Urine WBC  >100 /hpf (0-5)  H  18  03:00    


 


Ur Epithelial Cells  OCCASIONAL /lpf (FEW)   18  03:00    


 


Urine Bacteria  MODERATE /hpf (NONE SEEN)  H  18  03:00    


 


Urine Osmolality  749 mOsmol/kg  18  03:00    


 


Ur Random Sodium  39 mmol/L  18  03:00    


 


Urine Creatinine  136.0 mg/dl (39.0-259.0)   18  03:00    














- Physical Exam


Vitals and I&O: 


 Vital Signs











Temp  98.8 F   03/10/18 00:00


 


Pulse  98   03/10/18 09:38


 


Resp  18   03/10/18 00:00


 


BP  141/83   03/10/18 09:38


 


Pulse Ox  97   03/10/18 00:00








 Intake & Output











 03/09/18 03/10/18 03/10/18





 18:59 06:59 18:59


 


Intake Total 450 150 


 


Output Total 250  


 


Balance 200 150 


 


Weight (lbs) 77.201 kg 77.111 kg 


 


Intake:   


 


  Intake, IV Amount 100  


 


    Levofloxacin 500mg/100mL 100  





    500 mg In 100 ml @ 100   





    mls/hr IV Q24HR [facility] Rx#:   





    [id number]   


 


  Oral 350 150 


 


Output:   


 


  Urine 250  


 


Other:   


 


  # Bowel Movements 0  


 


  Stool Characteristics  Soft 











Active Medications: 


Current Medications





Acetaminophen (Tylenol)  650 mg PO Q6HR PRN


   PRN Reason: mild to moderate pain


   Stop: 18 19:27


   Last Admin: 18 00:25 Dose:  650 mg


Acetaminophen/Hydrocodone Bitart (Norco 5mg/325mg)  1 tab PO Q6H PRN


   PRN Reason: Pain (Moderate)


   Stop: 18 08:21


   Last Admin: 18 21:26 Dose:  1 tab


Allopurinol (Zyloprim)  100 mg PO DAILY [facility]


   Stop: 18 08:59


   Last Admin: 03/10/18 09:43 Dose:  100 mg


Castor Oil/Norwegian Balsam/Trypsin (Venelex)  1 appl TP DAILY [facility]


   Stop: 18 15:59


   Last Admin: 03/10/18 09:45 Dose:  1 appl


Clonazepam (Klonopin)  1 mg PO BID [facility]


   PRN Reason: Protocol


   Stop: 18 08:59


   Last Admin: 03/10/18 09:43 Dose:  1 mg


Famotidine (Pepcid)  20 mg PO BID [facility]


   Stop: 18 08:59


   Last Admin: 03/10/18 09:43 Dose:  20 mg


Heparin Sodium (Porcine) (Heparin)  5,000 units SUBQ Q12HR [facility]


   PRN Reason: Protocol


   Stop: 18 20:59


   Last Admin: 03/10/18 09:38 Dose:  5,000 units


Levofloxacin (Levaquin Pb)  500 mg in 100 mls @ 100 mls/hr IV Q24HR [facility]


   Stop: 18 05:59


   Last Admin: 03/10/18 05:45 Dose:  100 mls/hr


Insulin Aspart (Novolog Insulin Sliding Scale)  0 units SUBQ ACHS [facility]


   PRN Reason: Protocol


   Stop: 18 20:59


   Last Admin: 03/10/18 07:49 Dose:  Not Given


Insulin Detemir (Levemir Insulin)  18 units SUBQ DAILY [facility]


   PRN Reason: Protocol


   Stop: 18 08:13


   Last Admin: 03/10/18 09:51 Dose:  Not Given


Lorazepam (Ativan)  0.5 mg PO Q6HR PRN; Protocol


   PRN Reason: agitation 


   Stop: 18 19:27


   Last Admin: 18 00:09 Dose:  0.5 mg


Magnesium Hydroxide (Milk Of Magnesia)  30 ml PO HS PRN


   PRN Reason: Constipation


   Stop: 18 19:27


Metoprolol Succinate (Toprol Xl)  25 mg PO BID [facility]


   Stop: 18 16:59


   Last Admin: 03/10/18 09:38 Dose:  25 mg


Miscellaneous (Clinical Monitoring)  1 ea MC PRN PRN


   PRN Reason: RENAL


   Stop: 18 10:33


Olanzapine (Zyprexa Zydis)  12.5 mg PO BID [facility]


   PRN Reason: Protocol


   Stop: 18 12:28


   Last Admin: 03/10/18 09:43 Dose:  12.5 mg


Zolpidem Tartrate (Ambien)  5 mg PO HS PRN


   PRN Reason: insomnia


   Stop: 18 19:27


   Last Admin: 18 20:36 Dose:  5 mg








General: no acute distress, well developed, well nourished


HEENT: atraumatic, normocephalic, PERRLA


Neck: supple, no thyromegaly


Cardiovascular: S1S2, regular


Lungs: clear to auscultation bilaterally, clear to percussion


Abdomen: soft, no tender, no distended, no rebound


Extremities: no cyanosis, no clubbing, no edema


Neurological: awake, alert, oriented


Skin: other (sacral decubitus with wound vac)





- Procedures


Procedures: 


 Procedures











Procedure Code Date


 


KEV MUSC/FASCIA 20 SQ CM/< 67722 18


 


EXCISION OF RIGHT HIP MUSCLE, OPEN APPROACH [id number] 18














Infectious Disease Assmt/Plan





- Assessment


Assessment: 


1.  Sacral stage III decubitus ulcer, infected.


2.  Status post I&D.


3.  Diabetes mellitus type .


4.  Dementia.  


5.   Depression.


6.  psychosis.  


7.  Schizophrenia.


8.  Hematuria.














- Plan


Plan: 





CPM.  


wound care.





Nutritional Asmnt/Malnutr-PDOC





- Dietary Evaluation


Malnutrition Findings (Please click <Entered> for more info): 








Nutritional Asmnt/Malnutrition                             Start:  18 17:

10


Text:                                                      Status: Complete    

  


Freq:                                                                          

  


 Document     18 17:13  MAGEN  (Rec: 18 17:25  HEN  [id number])


 Nutritional Asmnt/Malnutrition


     Patient General Information


      Nutritional Screening                      High Risk


                                                 Consult


      Diagnosis                                  pressure ulcer, hyperglycemia,


                                                 dehydration


      Pertinent Medical Hx/Surgical Hx           DM, dementia, depression,


                                                 psychosis, schizophrenia,


                                                 chronic renal insuff


      Subjective Information                     Consult received for


                                                 unstageble pressure ulcer on , high BG on . Per


                                                 records, PO intake 50-75%. Pt


                                                 was on NPO today for surgery-


                                                 excisional debridement,


                                                 application of wound VAC


      Current Diet Order/ Nutrition Support      pureed, CCHO 60gm


      Pertinent Medications                      novolog


      Pertinent Labs                              Na 152, K 4.6, BUN 56, Cr


                                                 1.8, Glucose 262, -246


                                                 , Mg 2.9


     Nutritional Hx/Data


      Height                                     1.8 m


      Height (Calculated Centimeters)            180.3


      Current Weight (lbs)                       70.76 kg


      Weight (Calculated Kilograms)              70.8


      Weight (Calculated Grams)                  00381.4


      Ideal Body Weight                          172


      % Ideal Body Weight                        91


      Body Mass Index (BMI)                      21.7


      Weight Status                              Approriate


     GI Symptoms


      GI Symptoms                                None


      Last BM                                    


      Difficult in:                              None


      Skin Integrity/Comment:                    scar to right upper back,


                                                 decubitus ulcer to buttocks


      Current %PO                                Fair (50-74%)


     Estimated Nutritional Goals


      BEE in Kcals:                              Using Current wt


      Calories/Kcals/Kg                          27-32


      Kcals Calculated                           5333-6999


      Protein:                                   Using Current wt


      Protein g/k-1.2


      Protein Calculated                         71-85


      Fluid: ml                                  1917-2130ml (1ml/kcal)


     Nutritional Problem


      2. Problem


       Problem                                   altered nutrition related lab


                                                 values


       Etiology                                  hx of DM


       Signs/Symptoms:                           Glucose 262, -246


      1. Problem


       Problem                                   increased nutrition needs (


                                                 calorie and protein)


       Etiology                                  increased metabolic demand for


                                                 wound healing


       Signs/Symptoms:                           decubitus ulcer and surgery


     Intervention/Recommendation


      Comments                                   1. Continue with current diet


                                                 as ordered. If PO intake low,


                                                 will consider nutrition


                                                 supplements


                                                 2. MD to consider vit C and


                                                 zinc for wound healing


                                                 3. Monitor PO intake, wt, labs


                                                 and skin integrity


                                                 4. F/U as moderate risk in 3-5


                                                 days, 3/2-3/4, PO check 3/2


     Expected Outcomes/Goals


      Expected Outcomes/Goals                    1. PO intake to meet at least


                                                 75% of nutritional needs.


                                                 2. Wt stability, skin to


                                                 remain intact, labs to


                                                 approach WNL.

## 2018-03-10 NOTE — GENERAL PROGRESS NOTE
Subjective





- Review of Systems


Service Date: 03/10/18


Subjective: 





Patient is awake and alert. no acute distress. patient eating fine.  Irritable. 

Patient blood sugar improving. will increase levemir to 18u Daily.  





Objective





- Results


Result Diagrams: 


 18 09:11





 18 05:05


Recent Labs: 


 Laboratory Last Values











WBC  6.7 Th/cmm (4.8-10.8)  D 18  09:11    


 


RBC  4.73 Mil/cmm (3.80-5.80)   18  09:11    


 


Hgb  13.7 gm/dL (12-16)   18  09:11    


 


Hct  42.8 % (41.0-60)   18  09:11    


 


MCV  90.5 fl (80-99)   18  09:11    


 


MCH  28.9 pg (27.0-31.0)   18  09:11    


 


MCHC Differential  32.0 pg (28.0-36.0)   18  09:11    


 


RDW  16.2 % (11.5-20.0)   18  09:11    


 


Plt Count  162 Th/cmm (150-400)   18  09:11    


 


MPV  9.7 fl  18  09:11    


 


Neutrophils %  72.2 % (40.0-80.0)   18  09:11    


 


Lymphocytes %  19.2 % (20.0-50.0)  L  18  09:11    


 


Monocytes %  5.9 % (2.0-10.0)   18  09:11    


 


Eosinophils %  1.5 % (0.0-5.0)   18  09:11    


 


Basophils %  1.2 % (0.0-2.0)   18  09:11    


 


Eos Smear Source  URINE   18  03:00    


 


Eos Smear Total Cells  FEW EOSINOPHILS SEEN  (NONE SEEN)   18  03:00    


 


PT  10.5 SECONDS (9.5-11.5)   18  06:00    


 


INR  1.01  (0.5-1.4)   18  06:00    


 


PTT (Actin FS)  25.3 SECONDS (26.0-38.0)  L  18  06:00    


 


Sodium  143 mEq/L (136-145)   18  05:05    


 


Potassium  3.9 mEq/L (3.5-5.1)   18  05:05    


 


Chloride  112 mEq/L ()  H  18  05:05    


 


Carbon Dioxide  24.1 mEq/L (21.0-31.0)   18  05:05    


 


Anion Gap  10.8  (7.0-16.0)   18  05:05    


 


BUN  16 mg/dL (7-25)   18  05:05    


 


Creatinine  1.0 mg/dL (0.7-1.3)   18  05:05    


 


Est GFR ( Amer)  TNP   18  05:05    


 


Est GFR (Non-Af Amer)  TNP   18  05:05    


 


BUN/Creatinine Ratio  16.0   18  05:05    


 


Glucose  194 mg/dL ()  H  18  05:05    


 


POC Glucose  99 MG/DL (70 - 105)   18  17:29    


 


Hemoglobin A1c %  10.5 % (4.0-6.0)  H  18  05:26    


 


Uric Acid  5.6 mg/dL (4.4-7.6)   18  09:10    


 


Calcium  8.8 mg/dL (8.6-10.3)   18  05:05    


 


Phosphorus  4.1 mg/dL (2.5-5.0)   18  06:00    


 


Magnesium  2.0 mg/dL (1.9-2.7)   18  05:05    


 


Total Bilirubin  0.6 mg/dL (0.3-1.0)   18  09:11    


 


AST  17 U/L (13-39)   18  09:11    


 


ALT  13 U/L (7-52)   18  09:11    


 


Alkaline Phosphatase  81 U/L ()   18  09:11    


 


Total Protein  6.4 gm/dL (6.0-8.3)   18  09:11    


 


Albumin  3.0 gm/dL (4.2-5.5)  L  18  09:11    


 


Globulin  3.4 gm/dL  18  09:11    


 


Albumin/Globulin Ratio  0.9  (1.0-1.8)  L  18  09:11    


 


TSH  0.28 uIU/ml (0.34-5.60)  L  18  06:00    


 


Urine Source  CATH   18  03:00    


 


Urine Color  BROWN   18  03:00    


 


Urine Clarity  CLOUDY  (CLEAR)   18  03:00    


 


Urine pH  5.5  (4.6 - 8.0)   18  03:00    


 


Ur Specific Gravity  1.020  (1.005-1.030)   18  03:00    


 


Urine Protein  100 mg/dL (NEGATIVE)  H  18  03:00    


 


Urine Glucose (UA)  >=1000 mg/dL (NEGATIVE)  H  18  03:00    


 


Urine Ketones  TRACE mg/dL (NEGATIVE)   18  03:00    


 


Urine Blood  LARGE  (NEGATIVE)  H  18  03:00    


 


Urine Nitrate  NEGATIVE  (NEGATIVE)   18  03:00    


 


Urine Bilirubin  SMALL  (NEGATIVE)  H  18  03:00    


 


Urine Ictotest  NEGATIVE  (NEGATIVE)   18  03:00    


 


Urine Urobilinogen  0.2 E.U./dL (0.2 - 1.0)   18  03:00    


 


Ur Leukocyte Esterase  TRACE  (NEGATIVE)  H  18  03:00    


 


Urine RBC  >100 /hpf (0-5)  H  18  03:00    


 


Urine WBC  >100 /hpf (0-5)  H  18  03:00    


 


Ur Epithelial Cells  OCCASIONAL /lpf (FEW)   18  03:00    


 


Urine Bacteria  MODERATE /hpf (NONE SEEN)  H  18  03:00    


 


Urine Osmolality  749 mOsmol/kg  18  03:00    


 


Ur Random Sodium  39 mmol/L  18  03:00    


 


Urine Creatinine  136.0 mg/dl (39.0-259.0)   18  03:00    














- Physical Exam


Vitals and I&O: 


 Vital Signs











Temp  98.8 F   03/10/18 00:00


 


Pulse  107   03/10/18 00:00


 


Resp  18   03/10/18 00:00


 


BP  168/97   03/10/18 00:00


 


Pulse Ox  97   03/10/18 00:00








 Intake & Output











 03/09/18 03/09/18 03/10/18





 06:59 18:59 06:59


 


Intake Total 100 350 150


 


Output Total 650 250 


 


Balance -550 100 150


 


Weight (lbs) 77.111 kg 77.201 kg 77.111 kg


 


Intake:   


 


  Oral 100 350 150


 


Output:   


 


  Urine 650 250 


 


Other:   


 


  # Bowel Movements  0 


 


  Stool Characteristics   Soft











Active Medications: 


Current Medications





Acetaminophen (Tylenol)  650 mg PO Q6HR PRN


   PRN Reason: mild to moderate pain


   Stop: 18 19:27


   Last Admin: 18 00:25 Dose:  650 mg


Acetaminophen/Hydrocodone Bitart (Norco 5mg/325mg)  1 tab PO Q6H PRN


   PRN Reason: Pain (Moderate)


   Stop: 18 08:21


   Last Admin: 18 21:26 Dose:  1 tab


Allopurinol (Zyloprim)  100 mg PO DAILY [facility]


   Stop: 18 08:59


   Last Admin: 18 09:45 Dose:  100 mg


Castor Oil/Scottish Balsam/Trypsin (Venelex)  1 appl TP DAILY [facility]


   Stop: 18 15:59


   Last Admin: 18 09:52 Dose:  1 appl


Clonazepam (Klonopin)  1 mg PO BID [facility]


   PRN Reason: Protocol


   Stop: 18 08:59


   Last Admin: 18 17:23 Dose:  1 mg


Famotidine (Pepcid)  20 mg PO BID [facility]


   Stop: 18 08:59


   Last Admin: 18 17:24 Dose:  20 mg


Heparin Sodium (Porcine) (Heparin)  5,000 units SUBQ Q12HR [facility]


   PRN Reason: Protocol


   Stop: 18 20:59


   Last Admin: 18 21:48 Dose:  5,000 units


Levofloxacin (Levaquin Pb)  500 mg in 100 mls @ 100 mls/hr IV Q24HR [facility]


   Stop: 18 05:59


   Last Admin: 18 06:11 Dose:  100 mls/hr


Insulin Aspart (Novolog Insulin Sliding Scale)  0 units SUBQ ACHS [facility]


   PRN Reason: Protocol


   Stop: 18 20:59


   Last Admin: 18 21:07 Dose:  Not Given


Insulin Detemir (Levemir Insulin)  18 units SUBQ DAILY [facility]


   PRN Reason: Protocol


   Stop: 18 08:13


   Last Admin: 18 09:47 Dose:  18 units


Lorazepam (Ativan)  0.5 mg PO Q6HR PRN; Protocol


   PRN Reason: agitation 


   Stop: 18 19:27


   Last Admin: 18 00:09 Dose:  0.5 mg


Magnesium Hydroxide (Milk Of Magnesia)  30 ml PO HS PRN


   PRN Reason: Constipation


   Stop: 18 19:27


Metoprolol Succinate (Toprol Xl)  25 mg PO BID [facility]


   Stop: 18 16:59


   Last Admin: 18 17:22 Dose:  25 mg


Miscellaneous (Clinical Monitoring)  1 ea MC PRN PRN


   PRN Reason: RENAL


   Stop: 18 10:33


Olanzapine (Zyprexa Zydis)  12.5 mg PO BID [facility]


   PRN Reason: Protocol


   Stop: 18 12:28


   Last Admin: 18 17:24 Dose:  12.5 mg


Zolpidem Tartrate (Ambien)  5 mg PO HS PRN


   PRN Reason: insomnia


   Stop: 18 19:27


   Last Admin: 18 20:36 Dose:  5 mg








General: No acute distress


HEENT: Atraumatic, PERRLA


Neck: Supple


Cardiovascular: Regular rate, Normal S1, Normal S2


Lungs: Clear to auscultation


Abdomen: Bowel sounds, Soft


Extremities: no Clubbing, no Cyanosis, no Edema


Neurological: Sensation intact


Skin: no Rash


Psych/Mental Status: Mood NL





- Procedures


Procedures: 


 Procedures











Procedure Code Date


 


KEV MUSC/FASCIA 20 SQ CM/< 59557 18


 


EXCISION OF RIGHT HIP MUSCLE, OPEN APPROACH [id number]














Assessment/Plan





- Assessment


Assessment: 


Sacral stage III decubitus ulcer, infected.


Diabetes mellitus type


Depression.


psychosis.  


Hematuria.


hypernatremia resolved


prerenal azotemia improved.


hyperglycemia ... stable


depression/anxiety


gout ... on allopurinol. check uric acid level


dementia


schizophrenia


S/P wound debridement of sacral decubitus ... continue wound care treatment.


UTI


BRYANT on CKD ... resolved.


hyperkalemia resolved. 





discharge planning.





- Plan


Plan: 





will order cmp this AM





renal consult -- Dr. Bajwa





sacral decubitus ... will order Gen Surgical consult -- Dr. Jones for possible 

wound debridement





Gout ... continue allopurinol 100mg PO daily





continue wound vac. 





continue hydrocodone PO for pain control. 





discharge planning/placement





Nutritional Asmnt/Malnutr-PDOC





- Dietary Evaluation


Malnutrition Findings (Please click <Entered> for more info): 








Nutritional Asmnt/Malnutrition                             Start:  18 17:

10


Text:                                                      Status: Complete    

  


Freq:                                                                          

  


 Document     18 17:13  [facility]  (Rec: 18 17:25  HEN  [id number])


 Nutritional Asmnt/Malnutrition


     Patient General Information


      Nutritional Screening                      High Risk


                                                 Consult


      Diagnosis                                  pressure ulcer, hyperglycemia,


                                                 dehydration


      Pertinent Medical Hx/Surgical Hx           DM, dementia, depression,


                                                 psychosis, schizophrenia,


                                                 chronic renal insuff


      Subjective Information                     Consult received for


                                                 unstageble pressure ulcer on , high BG on . Per


                                                 records, PO intake 50-75%. Pt


                                                 was on NPO today for surgery-


                                                 excisional debridement,


                                                 application of wound VAC


      Current Diet Order/ Nutrition Support      pureed, CCHO 60gm


      Pertinent Medications                      novolog


      Pertinent Labs                              Na 152, K 4.6, BUN 56, Cr


                                                 1.8, Glucose 262, -246


                                                 , Mg 2.9


     Nutritional Hx/Data


      Height                                     1.8 m


      Height (Calculated Centimeters)            180.3


      Current Weight (lbs)                       70.76 kg


      Weight (Calculated Kilograms)              70.8


      Weight (Calculated Grams)                  01191.4


      Ideal Body Weight                          172


      % Ideal Body Weight                        91


      Body Mass Index (BMI)                      21.7


      Weight Status                              Approriate


     GI Symptoms


      GI Symptoms                                None


      Last BM                                    


      Difficult in:                              None


      Skin Integrity/Comment:                    scar to right upper back,


                                                 decubitus ulcer to buttocks


      Current %PO                                Fair (50-74%)


     Estimated Nutritional Goals


      BEE in Kcals:                              Using Current wt


      Calories/Kcals/Kg                          27-32


      Kcals Calculated                           8519-7866


      Protein:                                   Using Current wt


      Protein g/k-1.2


      Protein Calculated                         71-85


      Fluid: ml                                  -ml (1ml/kcal)


     Nutritional Problem


      2. Problem


       Problem                                   altered nutrition related lab


                                                 values


       Etiology                                  hx of DM


       Signs/Symptoms:                           Glucose 262, -246


      1. Problem


       Problem                                   increased nutrition needs (


                                                 calorie and protein)


       Etiology                                  increased metabolic demand for


                                                 wound healing


       Signs/Symptoms:                           decubitus ulcer and surgery


     Intervention/Recommendation


      Comments                                   1. Continue with current diet


                                                 as ordered. If PO intake low,


                                                 will consider nutrition


                                                 supplements


                                                 2. MD to consider vit C and


                                                 zinc for wound healing


                                                 3. Monitor PO intake, wt, labs


                                                 and skin integrity


                                                 4. F/U as moderate risk in 3-5


                                                 days, 3/2-3/, PO check 3/2


     Expected Outcomes/Goals


      Expected Outcomes/Goals                    1. PO intake to meet at least


                                                 75% of nutritional needs.


                                                 2. Wt stability, skin to


                                                 remain intact, labs to


                                                 approach WNL.

## 2018-03-11 RX ADMIN — INSULIN ASPART SCH UNITS: 100 INJECTION, SOLUTION INTRAVENOUS; SUBCUTANEOUS at 12:00

## 2018-03-11 RX ADMIN — LEVOFLOXACIN SCH MLS/HR: 5 INJECTION INTRAVENOUS at 05:39

## 2018-03-11 RX ADMIN — INSULIN DETEMIR SCH UNITS: 100 INJECTION, SOLUTION SUBCUTANEOUS at 08:49

## 2018-03-11 RX ADMIN — INSULIN ASPART SCH UNITS: 100 INJECTION, SOLUTION INTRAVENOUS; SUBCUTANEOUS at 21:24

## 2018-03-11 RX ADMIN — INSULIN ASPART SCH UNITS: 100 INJECTION, SOLUTION INTRAVENOUS; SUBCUTANEOUS at 08:48

## 2018-03-11 RX ADMIN — OLANZAPINE SCH MG: 5 TABLET, ORALLY DISINTEGRATING ORAL at 16:52

## 2018-03-11 RX ADMIN — INSULIN ASPART SCH: 100 INJECTION, SOLUTION INTRAVENOUS; SUBCUTANEOUS at 16:20

## 2018-03-11 RX ADMIN — OLANZAPINE SCH MG: 5 TABLET, ORALLY DISINTEGRATING ORAL at 08:47

## 2018-03-11 RX ADMIN — CASTOR OIL AND BALSAM, PERU SCH APPL: 788; 87 OINTMENT TOPICAL at 08:48

## 2018-03-11 NOTE — INFECTIOUS DISEASE PROG NOTE
Infectious Disease Subjective





- Review of Systems


Service Date: 18


Subjective: 





no new change.





Infectious Disease Objective





- Results


Result Diagrams: 


 18 09:11





 18 05:05


Recent Labs: 


 Laboratory Last Values











WBC  6.7 Th/cmm (4.8-10.8)  D 18  09:11    


 


RBC  4.73 Mil/cmm (3.80-5.80)   18  09:11    


 


Hgb  13.7 gm/dL (12-16)   18  09:11    


 


Hct  42.8 % (41.0-60)   18  09:11    


 


MCV  90.5 fl (80-99)   18  09:11    


 


MCH  28.9 pg (27.0-31.0)   18  09:11    


 


MCHC Differential  32.0 pg (28.0-36.0)   18  09:11    


 


RDW  16.2 % (11.5-20.0)   18  09:11    


 


Plt Count  162 Th/cmm (150-400)   18  09:11    


 


MPV  9.7 fl  18  09:11    


 


Neutrophils %  72.2 % (40.0-80.0)   18  09:11    


 


Lymphocytes %  19.2 % (20.0-50.0)  L  18  09:11    


 


Monocytes %  5.9 % (2.0-10.0)   18  09:11    


 


Eosinophils %  1.5 % (0.0-5.0)   18  09:11    


 


Basophils %  1.2 % (0.0-2.0)   18  09:11    


 


Eos Smear Source  URINE   18  03:00    


 


Eos Smear Total Cells  FEW EOSINOPHILS SEEN  (NONE SEEN)   18  03:00    


 


PT  10.5 SECONDS (9.5-11.5)   18  06:00    


 


INR  1.01  (0.5-1.4)   18  06:00    


 


PTT (Actin FS)  25.3 SECONDS (26.0-38.0)  L  18  06:00    


 


Sodium  143 mEq/L (136-145)   18  05:05    


 


Potassium  3.9 mEq/L (3.5-5.1)   18  05:05    


 


Chloride  112 mEq/L ()  H  18  05:05    


 


Carbon Dioxide  24.1 mEq/L (21.0-31.0)   18  05:05    


 


Anion Gap  10.8  (7.0-16.0)   18  05:05    


 


BUN  16 mg/dL (7-25)   18  05:05    


 


Creatinine  1.0 mg/dL (0.7-1.3)   18  05:05    


 


Est GFR ( Amer)  TNP   18  05:05    


 


Est GFR (Non-Af Amer)  TNP   18  05:05    


 


BUN/Creatinine Ratio  16.0   18  05:05    


 


Glucose  194 mg/dL ()  H  18  05:05    


 


POC Glucose  154 MG/DL (70 - 105)  H  18  20:42    


 


Hemoglobin A1c %  10.5 % (4.0-6.0)  H  18  05:26    


 


Uric Acid  5.6 mg/dL (4.4-7.6)   18  09:10    


 


Calcium  8.8 mg/dL (8.6-10.3)   18  05:05    


 


Phosphorus  4.1 mg/dL (2.5-5.0)   18  06:00    


 


Magnesium  2.0 mg/dL (1.9-2.7)   18  05:05    


 


Total Bilirubin  0.6 mg/dL (0.3-1.0)   18  09:11    


 


AST  17 U/L (13-39)   18  09:11    


 


ALT  13 U/L (7-52)   18  09:11    


 


Alkaline Phosphatase  81 U/L ()   18  09:11    


 


Total Protein  6.4 gm/dL (6.0-8.3)   18  09:11    


 


Albumin  3.0 gm/dL (4.2-5.5)  L  18  09:11    


 


Globulin  3.4 gm/dL  18  09:11    


 


Albumin/Globulin Ratio  0.9  (1.0-1.8)  L  18  09:11    


 


TSH  0.28 uIU/ml (0.34-5.60)  L  18  06:00    


 


Urine Source  CATH   18  03:00    


 


Urine Color  BROWN   18  03:00    


 


Urine Clarity  CLOUDY  (CLEAR)   18  03:00    


 


Urine pH  5.5  (4.6 - 8.0)   18  03:00    


 


Ur Specific Gravity  1.020  (1.005-1.030)   18  03:00    


 


Urine Protein  100 mg/dL (NEGATIVE)  H  18  03:00    


 


Urine Glucose (UA)  >=1000 mg/dL (NEGATIVE)  H  18  03:00    


 


Urine Ketones  TRACE mg/dL (NEGATIVE)   18  03:00    


 


Urine Blood  LARGE  (NEGATIVE)  H  18  03:00    


 


Urine Nitrate  NEGATIVE  (NEGATIVE)   18  03:00    


 


Urine Bilirubin  SMALL  (NEGATIVE)  H  18  03:00    


 


Urine Ictotest  NEGATIVE  (NEGATIVE)   18  03:00    


 


Urine Urobilinogen  0.2 E.U./dL (0.2 - 1.0)   18  03:00    


 


Ur Leukocyte Esterase  TRACE  (NEGATIVE)  H  18  03:00    


 


Urine RBC  >100 /hpf (0-5)  H  18  03:00    


 


Urine WBC  >100 /hpf (0-5)  H  18  03:00    


 


Ur Epithelial Cells  OCCASIONAL /lpf (FEW)   18  03:00    


 


Urine Bacteria  MODERATE /hpf (NONE SEEN)  H  18  03:00    


 


Urine Osmolality  749 mOsmol/kg  18  03:00    


 


Ur Random Sodium  39 mmol/L  18  03:00    


 


Urine Creatinine  136.0 mg/dl (39.0-259.0)   18  03:00    














- Physical Exam


Vitals and I&O: 


 Vital Signs











Temp  98.9 F   18 21:00


 


Pulse  109   18 21:00


 


Resp  18   18 21:00


 


BP  139/81   18 21:00


 


Pulse Ox  97   18 21:00








 Intake & Output











 18





 06:59 18:59 06:59


 


Intake Total  450 550


 


Output Total  200 


 


Balance  250 550


 


Weight (lbs)  77.111 kg 77.111 kg


 


Intake:   


 


  Oral  450 550


 


Output:   


 


  Urine  200 


 


Other:   


 


  # Voids   2


 


  # Bowel Movements  0 











Active Medications: 


Current Medications





Acetaminophen (Tylenol)  650 mg PO Q6HR PRN


   PRN Reason: mild to moderate pain


   Stop: 18 19:27


   Last Admin: 18 00:25 Dose:  650 mg


Acetaminophen/Hydrocodone Bitart (Norco 5mg/325mg)  1 tab PO Q6H PRN


   PRN Reason: Pain (Moderate)


   Stop: 18 08:21


   Last Admin: 18 21:26 Dose:  1 tab


Allopurinol (Zyloprim)  100 mg PO DAILY [facility]


   Stop: 18 08:59


   Last Admin: 18 08:47 Dose:  100 mg


Castor Oil/Austrian Balsam/Trypsin (Venelex)  1 appl TP DAILY [facility]


   Stop: 18 15:59


   Last Admin: 18 08:48 Dose:  1 appl


Clonazepam (Klonopin)  1 mg PO BID [facility]


   PRN Reason: Protocol


   Stop: 18 08:59


   Last Admin: 18 16:54 Dose:  1 mg


Famotidine (Pepcid)  20 mg PO BID [facility]


   Stop: 18 08:59


   Last Admin: 18 16:54 Dose:  20 mg


Heparin Sodium (Porcine) (Heparin)  5,000 units SUBQ Q12HR [facility]


   PRN Reason: Protocol


   Stop: 18 20:59


   Last Admin: 18 21:25 Dose:  5,000 units


Levofloxacin (Levaquin Pb)  500 mg in 100 mls @ 100 mls/hr IV Q24HR [facility]


   Stop: 18 05:59


   Last Admin: 18 05:39 Dose:  100 mls/hr


Insulin Aspart (Novolog Insulin Sliding Scale)  0 units SUBQ ACHS [facility]


   PRN Reason: Protocol


   Stop: 18 20:59


   Last Admin: 18 21:24 Dose:  2 units


Insulin Detemir (Levemir Insulin)  18 units SUBQ DAILY [facility]


   PRN Reason: Protocol


   Stop: 18 08:13


   Last Admin: 18 08:49 Dose:  18 units


Lorazepam (Ativan)  0.5 mg PO Q6HR PRN; Protocol


   PRN Reason: agitation 


   Stop: 18 19:27


   Last Admin: 18 00:09 Dose:  0.5 mg


Magnesium Hydroxide (Milk Of Magnesia)  30 ml PO HS PRN


   PRN Reason: Constipation


   Stop: 18 19:27


Metoprolol Succinate (Toprol Xl)  25 mg PO BID [facility]


   Stop: 18 16:59


   Last Admin: 18 16:53 Dose:  25 mg


Olanzapine (Zyprexa Zydis)  12.5 mg PO BID [facility]


   PRN Reason: Protocol


   Stop: 18 12:28


   Last Admin: 18 16:52 Dose:  12.5 mg


Zolpidem Tartrate (Ambien)  5 mg PO HS PRN


   PRN Reason: insomnia


   Stop: 18 19:27


   Last Admin: 18 21:25 Dose:  5 mg








General: no acute distress, well developed, well nourished


HEENT: atraumatic, normocephalic, PERRLA, EOMI


Neck: supple, thyromegaly


Cardiovascular: S1S2, regular


Lungs: clear to auscultation bilaterally, clear to percussion


Abdomen: soft, no tender, no distended


Extremities: no cyanosis, no clubbing, no edema


Neurological: awake, alert, oriented


Skin: other (stage 4 sacral decubitus.)





- Procedures


Procedures: 


 Procedures











Procedure Code Date


 


KEV MUSC/FASCIA 20 SQ CM/< 94798 18


 


EXCISION OF RIGHT HIP MUSCLE, OPEN APPROACH [id number] 18














Infectious Disease Assmt/Plan





- Assessment


Assessment: 


1.  Sacral stage III decubitus ulcer, infected.


2.  Status post I&D.


3.  Diabetes mellitus type .


4.  Dementia.  


5.   Depression.


6.  psychosis.  


7.  Schizophrenia.


8.  Hematuria.














- Plan


Plan: 





CPM.  


wound care.





Nutritional Asmnt/Malnutr-PDOC





- Dietary Evaluation


Malnutrition Findings (Please click <Entered> for more info): 








Nutritional Asmnt/Malnutrition                             Start:  18 17:

10


Text:                                                      Status: Complete    

  


Freq:                                                                          

  


 Document     18 17:13  MAGEN  (Rec: 18 17:25  [facility]  [id number])


 Nutritional Asmnt/Malnutrition


     Patient General Information


      Nutritional Screening                      High Risk


                                                 Consult


      Diagnosis                                  pressure ulcer, hyperglycemia,


                                                 dehydration


      Pertinent Medical Hx/Surgical Hx           DM, dementia, depression,


                                                 psychosis, schizophrenia,


                                                 chronic renal insuff


      Subjective Information                     Consult received for


                                                 unstageble pressure ulcer on , high BG on . Per


                                                 records, PO intake 50-75%. Pt


                                                 was on NPO today for surgery-


                                                 excisional debridement,


                                                 application of wound VAC


      Current Diet Order/ Nutrition Support      pureed, CCHO 60gm


      Pertinent Medications                      novolog


      Pertinent Labs                              Na 152, K 4.6, BUN 56, Cr


                                                 1.8, Glucose 262, -246


                                                 , Mg 2.9


     Nutritional Hx/Data


      Height                                     1.8 m


      Height (Calculated Centimeters)            180.3


      Current Weight (lbs)                       70.76 kg


      Weight (Calculated Kilograms)              70.8


      Weight (Calculated Grams)                  93985.4


      Ideal Body Weight                          172


      % Ideal Body Weight                        91


      Body Mass Index (BMI)                      21.7


      Weight Status                              Approriate


     GI Symptoms


      GI Symptoms                                None


      Last BM                                    


      Difficult in:                              None


      Skin Integrity/Comment:                    scar to right upper back,


                                                 decubitus ulcer to buttocks


      Current %PO                                Fair (50-74%)


     Estimated Nutritional Goals


      BEE in Kcals:                              Using Current wt


      Calories/Kcals/Kg                          27-32


      Kcals Calculated                           3267-5400


      Protein:                                   Using Current wt


      Protein g/k-1.2


      Protein Calculated                         71-85


      Fluid: ml                                  1917-2130ml (1ml/kcal)


     Nutritional Problem


      2. Problem


       Problem                                   altered nutrition related lab


                                                 values


       Etiology                                  hx of DM


       Signs/Symptoms:                           Glucose 262, -246


      1. Problem


       Problem                                   increased nutrition needs (


                                                 calorie and protein)


       Etiology                                  increased metabolic demand for


                                                 wound healing


       Signs/Symptoms:                           decubitus ulcer and surgery


     Intervention/Recommendation


      Comments                                   1. Continue with current diet


                                                 as ordered. If PO intake low,


                                                 will consider nutrition


                                                 supplements


                                                 2. MD to consider vit C and


                                                 zinc for wound healing


                                                 3. Monitor PO intake, wt, labs


                                                 and skin integrity


                                                 4. F/U as moderate risk in 3-5


                                                 days, 32-3/4, PO check 3/2


     Expected Outcomes/Goals


      Expected Outcomes/Goals                    1. PO intake to meet at least


                                                 75% of nutritional needs.


                                                 2. Wt stability, skin to


                                                 remain intact, labs to


                                                 approach WNL.

## 2018-03-11 NOTE — GENERAL PROGRESS NOTE
Subjective





- Review of Systems


Service Date: 18


Subjective: 





Patient is awake and alert. no acute distress. patient eating fine.  Irritable. 

Patient blood sugar improving. will increase levemir to 18u Daily.  





Objective





- Results


Result Diagrams: 


 18 09:11





 18 05:05


Recent Labs: 


 Laboratory Last Values











WBC  6.7 Th/cmm (4.8-10.8)  D 18  09:11    


 


RBC  4.73 Mil/cmm (3.80-5.80)   18  09:11    


 


Hgb  13.7 gm/dL (12-16)   18  09:11    


 


Hct  42.8 % (41.0-60)   18  09:11    


 


MCV  90.5 fl (80-99)   18  09:11    


 


MCH  28.9 pg (27.0-31.0)   18  09:11    


 


MCHC Differential  32.0 pg (28.0-36.0)   18  09:11    


 


RDW  16.2 % (11.5-20.0)   18  09:11    


 


Plt Count  162 Th/cmm (150-400)   18  09:11    


 


MPV  9.7 fl  18  09:11    


 


Neutrophils %  72.2 % (40.0-80.0)   18  09:11    


 


Lymphocytes %  19.2 % (20.0-50.0)  L  18  09:11    


 


Monocytes %  5.9 % (2.0-10.0)   18  09:11    


 


Eosinophils %  1.5 % (0.0-5.0)   18  09:11    


 


Basophils %  1.2 % (0.0-2.0)   18  09:11    


 


Eos Smear Source  URINE   18  03:00    


 


Eos Smear Total Cells  FEW EOSINOPHILS SEEN  (NONE SEEN)   18  03:00    


 


PT  10.5 SECONDS (9.5-11.5)   18  06:00    


 


INR  1.01  (0.5-1.4)   18  06:00    


 


PTT (Actin FS)  25.3 SECONDS (26.0-38.0)  L  18  06:00    


 


Sodium  143 mEq/L (136-145)   18  05:05    


 


Potassium  3.9 mEq/L (3.5-5.1)   18  05:05    


 


Chloride  112 mEq/L ()  H  18  05:05    


 


Carbon Dioxide  24.1 mEq/L (21.0-31.0)   18  05:05    


 


Anion Gap  10.8  (7.0-16.0)   18  05:05    


 


BUN  16 mg/dL (7-25)   18  05:05    


 


Creatinine  1.0 mg/dL (0.7-1.3)   18  05:05    


 


Est GFR ( Amer)  TNP   18  05:05    


 


Est GFR (Non-Af Amer)  TNP   18  05:05    


 


BUN/Creatinine Ratio  16.0   18  05:05    


 


Glucose  194 mg/dL ()  H  18  05:05    


 


POC Glucose  119 MG/DL (70 - 105)  H  18  01:40    


 


Hemoglobin A1c %  10.5 % (4.0-6.0)  H  18  05:26    


 


Uric Acid  5.6 mg/dL (4.4-7.6)   18  09:10    


 


Calcium  8.8 mg/dL (8.6-10.3)   18  05:05    


 


Phosphorus  4.1 mg/dL (2.5-5.0)   18  06:00    


 


Magnesium  2.0 mg/dL (1.9-2.7)   18  05:05    


 


Total Bilirubin  0.6 mg/dL (0.3-1.0)   18  09:11    


 


AST  17 U/L (13-39)   18  09:11    


 


ALT  13 U/L (7-52)   18  09:11    


 


Alkaline Phosphatase  81 U/L ()   18  09:11    


 


Total Protein  6.4 gm/dL (6.0-8.3)   18  09:11    


 


Albumin  3.0 gm/dL (4.2-5.5)  L  18  09:11    


 


Globulin  3.4 gm/dL  18  09:11    


 


Albumin/Globulin Ratio  0.9  (1.0-1.8)  L  18  09:11    


 


TSH  0.28 uIU/ml (0.34-5.60)  L  18  06:00    


 


Urine Source  CATH   18  03:00    


 


Urine Color  BROWN   18  03:00    


 


Urine Clarity  CLOUDY  (CLEAR)   18  03:00    


 


Urine pH  5.5  (4.6 - 8.0)   18  03:00    


 


Ur Specific Gravity  1.020  (1.005-1.030)   18  03:00    


 


Urine Protein  100 mg/dL (NEGATIVE)  H  18  03:00    


 


Urine Glucose (UA)  >=1000 mg/dL (NEGATIVE)  H  18  03:00    


 


Urine Ketones  TRACE mg/dL (NEGATIVE)   18  03:00    


 


Urine Blood  LARGE  (NEGATIVE)  H  18  03:00    


 


Urine Nitrate  NEGATIVE  (NEGATIVE)   18  03:00    


 


Urine Bilirubin  SMALL  (NEGATIVE)  H  18  03:00    


 


Urine Ictotest  NEGATIVE  (NEGATIVE)   18  03:00    


 


Urine Urobilinogen  0.2 E.U./dL (0.2 - 1.0)   18  03:00    


 


Ur Leukocyte Esterase  TRACE  (NEGATIVE)  H  18  03:00    


 


Urine RBC  >100 /hpf (0-5)  H  18  03:00    


 


Urine WBC  >100 /hpf (0-5)  H  18  03:00    


 


Ur Epithelial Cells  OCCASIONAL /lpf (FEW)   18  03:00    


 


Urine Bacteria  MODERATE /hpf (NONE SEEN)  H  18  03:00    


 


Urine Osmolality  749 mOsmol/kg  18  03:00    


 


Ur Random Sodium  39 mmol/L  18  03:00    


 


Urine Creatinine  136.0 mg/dl (39.0-259.0)   18  03:00    














- Physical Exam


Vitals and I&O: 


 Vital Signs











Temp  98.1 F   18 05:00


 


Pulse  105   18 05:00


 


Resp  18   18 05:00


 


BP  160/70   18 05:00


 


Pulse Ox  100   18 05:00








 Intake & Output











 03/10/18 03/10/18 03/11/18





 06:59 18:59 07:59


 


Intake Total 150 350 


 


Output Total  230 


 


Balance 150 120 


 


Weight (lbs) 77.111 kg 77.156 kg 


 


Intake:   


 


  Oral 150 350 


 


Output:   


 


  Urine  230 


 


Other:   


 


  # Bowel Movements  0 


 


  Stool Characteristics Soft  











Active Medications: 


Current Medications





Acetaminophen (Tylenol)  650 mg PO Q6HR PRN


   PRN Reason: mild to moderate pain


   Stop: 18 19:27


   Last Admin: 18 00:25 Dose:  650 mg


Acetaminophen/Hydrocodone Bitart (Norco 5mg/325mg)  1 tab PO Q6H PRN


   PRN Reason: Pain (Moderate)


   Stop: 18 08:21


   Last Admin: 18 21:26 Dose:  1 tab


Allopurinol (Zyloprim)  100 mg PO DAILY RENETTA


   Stop: 18 08:59


   Last Admin: 03/10/18 09:43 Dose:  100 mg


Castor Oil/[place] Balsam/Trypsin (Venelex)  1 appl TP DAILY RENETTA


   Stop: 18 15:59


   Last Admin: 03/10/18 09:45 Dose:  1 appl


Clonazepam (Klonopin)  1 mg PO BID RENETTA


   PRN Reason: Protocol


   Stop: 18 08:59


   Last Admin: 03/10/18 17:15 Dose:  1 mg


Famotidine (Pepcid)  20 mg PO BID RENETTA


   Stop: 18 08:59


   Last Admin: 03/10/18 17:15 Dose:  20 mg


Heparin Sodium (Porcine) (Heparin)  5,000 units SUBQ Q12HR RENETTA


   PRN Reason: Protocol


   Stop: 18 20:59


   Last Admin: 03/10/18 22:12 Dose:  5,000 units


Levofloxacin (Levaquin Pb)  500 mg in 100 mls @ 100 mls/hr IV Q24HR RENETTA


   Stop: 18 05:59


   Last Admin: 03/10/18 05:45 Dose:  100 mls/hr


Insulin Aspart (Novolog Insulin Sliding Scale)  0 units SUBQ ACHS RENETTA


   PRN Reason: Protocol


   Stop: 18 20:59


   Last Admin: 03/10/18 22:12 Dose:  4 units


Insulin Detemir (Levemir Insulin)  18 units SUBQ DAILY RENETTA


   PRN Reason: Protocol


   Stop: 18 08:13


   Last Admin: 03/10/18 09:51 Dose:  Not Given


Lorazepam (Ativan)  0.5 mg PO Q6HR PRN; Protocol


   PRN Reason: agitation 


   Stop: 18 19:27


   Last Admin: 18 00:09 Dose:  0.5 mg


Magnesium Hydroxide (Milk Of Magnesia)  30 ml PO HS PRN


   PRN Reason: Constipation


   Stop: 18 19:27


Metoprolol Succinate (Toprol Xl)  25 mg PO BID RENETTA


   Stop: 18 16:59


   Last Admin: 03/10/18 17:15 Dose:  25 mg


Miscellaneous (Clinical Monitoring)  1 ea MC PRN PRN


   PRN Reason: RENAL


   Stop: 18 10:33


Olanzapine (Zyprexa Zydis)  12.5 mg PO BID RENETTA


   PRN Reason: Protocol


   Stop: 18 12:28


   Last Admin: 03/10/18 17:16 Dose:  12.5 mg


Zolpidem Tartrate (Ambien)  5 mg PO HS PRN


   PRN Reason: insomnia


   Stop: 18 19:27


   Last Admin: 18 20:36 Dose:  5 mg








General: No acute distress


HEENT: Atraumatic, PERRLA


Neck: Supple


Cardiovascular: Regular rate, Normal S1, Normal S2


Lungs: Clear to auscultation


Abdomen: Bowel sounds, Soft


Extremities: no Clubbing, no Cyanosis, no Edema


Neurological: Sensation intact


Skin: no Rash


Psych/Mental Status: Mood NL





- Procedures


Procedures: 


 Procedures











Procedure Code Date


 


KEV MUSC/FASCIA 20 SQ CM/< 97868 18


 


EXCISION OF RIGHT HIP MUSCLE, OPEN APPROACH [id number]














Assessment/Plan





- Assessment


Assessment: 


Sacral stage III decubitus ulcer, infected.


Diabetes mellitus type


Depression.


psychosis.  


Hematuria.


hypernatremia resolved


prerenal azotemia improved.


hyperglycemia ... stable


depression/anxiety


gout ... on allopurinol. check uric acid level


dementia


schizophrenia


S/P wound debridement of sacral decubitus ... continue wound care treatment.


UTI


BRYANT on CKD ... resolved.


hyperkalemia resolved. 





discharge planning.





- Plan


Plan: 





will order cmp this AM





renal consult -- Dr. Bajwa





sacral decubitus ... will order Gen Surgical consult -- Dr. Jones for possible 

wound debridement





Gout ... continue allopurinol 100mg PO daily





continue wound vac. 





continue hydrocodone PO for pain control. 





discharge planning/placement





Nutritional Asmnt/Malnutr-PDOC





- Dietary Evaluation


Malnutrition Findings (Please click <Entered> for more info): 








Nutritional Asmnt/Malnutrition                             Start:  18 17:

10


Text:                                                      Status: Complete    

  


Freq:                                                                          

  


 Document     18 17:13  [facility]  (Rec: 18 17:25  [facility]


 Nutritional Asmnt/Malnutrition


     Patient General Information


      Nutritional Screening                      High Risk


                                                 Consult


      Diagnosis                                  pressure ulcer, hyperglycemia,


                                                 dehydration


      Pertinent Medical Hx/Surgical Hx           DM, dementia, depression,


                                                 psychosis, schizophrenia,


                                                 chronic renal insuff


      Subjective Information                     Consult received for


                                                 unstageble pressure ulcer on , high BG on . Per


                                                 records, PO intake 50-75%. Pt


                                                 was on NPO today for surgery-


                                                 excisional debridement,


                                                 application of wound VAC


      Current Diet Order/ Nutrition Support      pureed, CCHO 60gm


      Pertinent Medications                      novolog


      Pertinent Labs                              Na 152, K 4.6, BUN 56, Cr


                                                 1.8, Glucose 262, -246


                                                 , Mg 2.9


     Nutritional Hx/Data


      Height                                     1.8 m


      Height (Calculated Centimeters)            180.3


      Current Weight (lbs)                       70.76 kg


      Weight (Calculated Kilograms)              70.8


      Weight (Calculated Grams)                  93854.4


      Ideal Body Weight                          172


      % Ideal Body Weight                        91


      Body Mass Index (BMI)                      21.7


      Weight Status                              Approriate


     GI Symptoms


      GI Symptoms                                None


      Last BM                                    


      Difficult in:                              None


      Skin Integrity/Comment:                    scar to right upper back,


                                                 decubitus ulcer to buttocks


      Current %PO                                Fair (50-74%)


     Estimated Nutritional Goals


      BEE in Kcals:                              Using Current wt


      Calories/Kcals/Kg                          27-32


      Kcals Calculated                           [id number]


      Protein:                                   Using Current wt


      Protein g/k-1.2


      Protein Calculated                         71-85


      Fluid: ml                                  -213ml (1ml/kcal)


     Nutritional Problem


      2. Problem


       Problem                                   altered nutrition related lab


                                                 values


       Etiology                                  hx of DM


       Signs/Symptoms:                           Glucose 262, -246


      1. Problem


       Problem                                   increased nutrition needs (


                                                 calorie and protein)


       Etiology                                  increased metabolic demand for


                                                 wound healing


       Signs/Symptoms:                           decubitus ulcer and surgery


     Intervention/Recommendation


      Comments                                   1. Continue with current diet


                                                 as ordered. If PO intake low,


                                                 will consider nutrition


                                                 supplements


                                                 2. MD to consider vit C and


                                                 zinc for wound healing


                                                 3. Monitor PO intake, wt, labs


                                                 and skin integrity


                                                 4. F/U as moderate risk in 3-5


                                                 days, 3/2-3/4, PO check 3/2


     Expected Outcomes/Goals


      Expected Outcomes/Goals                    1. PO intake to meet at least


                                                 75% of nutritional needs.


                                                 2. Wt stability, skin to


                                                 remain intact, labs to


                                                 approach WNL.

## 2018-03-12 RX ADMIN — CASTOR OIL AND BALSAM, PERU SCH APPL: 788; 87 OINTMENT TOPICAL at 14:25

## 2018-03-12 RX ADMIN — OLANZAPINE SCH: 5 TABLET, ORALLY DISINTEGRATING ORAL at 09:13

## 2018-03-12 RX ADMIN — INSULIN ASPART SCH UNITS: 100 INJECTION, SOLUTION INTRAVENOUS; SUBCUTANEOUS at 17:41

## 2018-03-12 RX ADMIN — LEVOFLOXACIN SCH MLS/HR: 5 INJECTION INTRAVENOUS at 06:39

## 2018-03-12 RX ADMIN — INSULIN DETEMIR SCH: 100 INJECTION, SOLUTION SUBCUTANEOUS at 09:13

## 2018-03-12 RX ADMIN — INSULIN ASPART SCH UNITS: 100 INJECTION, SOLUTION INTRAVENOUS; SUBCUTANEOUS at 22:39

## 2018-03-12 RX ADMIN — OLANZAPINE SCH MG: 5 TABLET, ORALLY DISINTEGRATING ORAL at 17:40

## 2018-03-12 RX ADMIN — INSULIN ASPART SCH UNITS: 100 INJECTION, SOLUTION INTRAVENOUS; SUBCUTANEOUS at 14:23

## 2018-03-12 RX ADMIN — INSULIN ASPART SCH: 100 INJECTION, SOLUTION INTRAVENOUS; SUBCUTANEOUS at 06:40

## 2018-03-12 RX ADMIN — OLANZAPINE SCH MG: 5 TABLET, ORALLY DISINTEGRATING ORAL at 17:38

## 2018-03-12 NOTE — GENERAL PROGRESS NOTE
Subjective





- Review of Systems


Service Date: 18


Subjective: 





Patient is awake and alert. no acute distress. patient eating fine.  Irritable. 

Patient blood sugar stable. continue levemir 18u Daily.  





Objective





- Results


Result Diagrams: 


 18 09:11





 18 05:05


Recent Labs: 


 Laboratory Last Values











WBC  6.7 Th/cmm (4.8-10.8)  D 18  09:11    


 


RBC  4.73 Mil/cmm (3.80-5.80)   18  09:11    


 


Hgb  13.7 gm/dL (12-16)   18  09:11    


 


Hct  42.8 % (41.0-60)   18  09:11    


 


MCV  90.5 fl (80-99)   18  09:11    


 


MCH  28.9 pg (27.0-31.0)   18  09:11    


 


MCHC Differential  32.0 pg (28.0-36.0)   18  09:11    


 


RDW  16.2 % (11.5-20.0)   18  09:11    


 


Plt Count  162 Th/cmm (150-400)   18  09:11    


 


MPV  9.7 fl  18  09:11    


 


Neutrophils %  72.2 % (40.0-80.0)   18  09:11    


 


Lymphocytes %  19.2 % (20.0-50.0)  L  18  09:11    


 


Monocytes %  5.9 % (2.0-10.0)   18  09:11    


 


Eosinophils %  1.5 % (0.0-5.0)   18  09:11    


 


Basophils %  1.2 % (0.0-2.0)   18  09:11    


 


Eos Smear Source  URINE   18  03:00    


 


Eos Smear Total Cells  FEW EOSINOPHILS SEEN  (NONE SEEN)   18  03:00    


 


PT  10.5 SECONDS (9.5-11.5)   18  06:00    


 


INR  1.01  (0.5-1.4)   18  06:00    


 


PTT (Actin FS)  25.3 SECONDS (26.0-38.0)  L  18  06:00    


 


Sodium  143 mEq/L (136-145)   18  05:05    


 


Potassium  3.9 mEq/L (3.5-5.1)   18  05:05    


 


Chloride  112 mEq/L ()  H  18  05:05    


 


Carbon Dioxide  24.1 mEq/L (21.0-31.0)   18  05:05    


 


Anion Gap  10.8  (7.0-16.0)   18  05:05    


 


BUN  16 mg/dL (7-25)   18  05:05    


 


Creatinine  1.0 mg/dL (0.7-1.3)   18  05:05    


 


Est GFR ( Amer)  TNP   18  05:05    


 


Est GFR (Non-Af Amer)  TNP   18  05:05    


 


BUN/Creatinine Ratio  16.0   18  05:05    


 


Glucose  194 mg/dL ()  H  18  05:05    


 


POC Glucose  154 MG/DL (70 - 105)  H  18  20:42    


 


Hemoglobin A1c %  10.5 % (4.0-6.0)  H  18  05:26    


 


Uric Acid  5.6 mg/dL (4.4-7.6)   18  09:10    


 


Calcium  8.8 mg/dL (8.6-10.3)   18  05:05    


 


Phosphorus  4.1 mg/dL (2.5-5.0)   18  06:00    


 


Magnesium  2.0 mg/dL (1.9-2.7)   18  05:05    


 


Total Bilirubin  0.6 mg/dL (0.3-1.0)   18  09:11    


 


AST  17 U/L (13-39)   18  09:11    


 


ALT  13 U/L (7-52)   18  09:11    


 


Alkaline Phosphatase  81 U/L ()   18  09:11    


 


Total Protein  6.4 gm/dL (6.0-8.3)   18  09:11    


 


Albumin  3.0 gm/dL (4.2-5.5)  L  18  09:11    


 


Globulin  3.4 gm/dL  18  09:11    


 


Albumin/Globulin Ratio  0.9  (1.0-1.8)  L  18  09:11    


 


TSH  0.28 uIU/ml (0.34-5.60)  L  18  06:00    


 


Urine Source  CATH   18  03:00    


 


Urine Color  BROWN   18  03:00    


 


Urine Clarity  CLOUDY  (CLEAR)   18  03:00    


 


Urine pH  5.5  (4.6 - 8.0)   18  03:00    


 


Ur Specific Gravity  1.020  (1.005-1.030)   18  03:00    


 


Urine Protein  100 mg/dL (NEGATIVE)  H  18  03:00    


 


Urine Glucose (UA)  >=1000 mg/dL (NEGATIVE)  H  18  03:00    


 


Urine Ketones  TRACE mg/dL (NEGATIVE)   18  03:00    


 


Urine Blood  LARGE  (NEGATIVE)  H  18  03:00    


 


Urine Nitrate  NEGATIVE  (NEGATIVE)   18  03:00    


 


Urine Bilirubin  SMALL  (NEGATIVE)  H  18  03:00    


 


Urine Ictotest  NEGATIVE  (NEGATIVE)   18  03:00    


 


Urine Urobilinogen  0.2 E.U./dL (0.2 - 1.0)   18  03:00    


 


Ur Leukocyte Esterase  TRACE  (NEGATIVE)  H  18  03:00    


 


Urine RBC  >100 /hpf (0-5)  H  18  03:00    


 


Urine WBC  >100 /hpf (0-5)  H  18  03:00    


 


Ur Epithelial Cells  OCCASIONAL /lpf (FEW)   18  03:00    


 


Urine Bacteria  MODERATE /hpf (NONE SEEN)  H  18  03:00    


 


Urine Osmolality  749 mOsmol/kg  18  03:00    


 


Ur Random Sodium  39 mmol/L  18  03:00    


 


Urine Creatinine  136.0 mg/dl (39.0-259.0)   18  03:00    














- Physical Exam


Vitals and I&O: 


 Vital Signs











Temp  98.2 F   18 04:00


 


Pulse  104   18 04:00


 


Resp  20   18 04:00


 


BP  139/92   18 04:00


 


Pulse Ox  98   18 04:00








 Intake & Output











 18





 18:59 06:59 18:59


 


Intake Total 450 550 


 


Output Total 200  


 


Balance 250 550 


 


Weight (lbs) 77.111 kg 77.111 kg 


 


Intake:   


 


  Oral 450 550 


 


Output:   


 


  Urine 200  


 


Other:   


 


  # Voids  2 


 


  # Bowel Movements 0  











Active Medications: 


Current Medications





Acetaminophen (Tylenol)  650 mg PO Q6HR PRN


   PRN Reason: mild to moderate pain


   Stop: 18 19:27


   Last Admin: 18 00:25 Dose:  650 mg


Acetaminophen/Hydrocodone Bitart (Norco 5mg/325mg)  1 tab PO Q6H PRN


   PRN Reason: Pain (Moderate)


   Stop: 18 08:21


   Last Admin: 18 21:26 Dose:  1 tab


Allopurinol (Zyloprim)  100 mg PO DAILY RENETTA


   Stop: 18 08:59


   Last Admin: 18 08:47 Dose:  100 mg


Castor Oil/Indonesian Balsam/Trypsin (Venelex)  1 appl TP DAILY RENETTA


   Stop: 18 15:59


   Last Admin: 18 08:48 Dose:  1 appl


Clonazepam (Klonopin)  1 mg PO BID RENETTA


   PRN Reason: Protocol


   Stop: 18 08:59


   Last Admin: 18 16:54 Dose:  1 mg


Famotidine (Pepcid)  20 mg PO BID RENETTA


   Stop: 18 08:59


   Last Admin: 18 16:54 Dose:  20 mg


Heparin Sodium (Porcine) (Heparin)  5,000 units SUBQ Q12HR RENETTA


   PRN Reason: Protocol


   Stop: 18 20:59


   Last Admin: 18 21:25 Dose:  5,000 units


Levofloxacin (Levaquin Pb)  500 mg in 100 mls @ 100 mls/hr IV Q24HR RENETTA


   Stop: 18 05:59


   Last Admin: 18 06:39 Dose:  100 mls/hr


Insulin Aspart (Novolog Insulin Sliding Scale)  0 units SUBQ ACHS RENETTA


   PRN Reason: Protocol


   Stop: 18 20:59


   Last Admin: 18 06:40 Dose:  Not Given


Insulin Detemir (Levemir Insulin)  18 units SUBQ DAILY RENETTA


   PRN Reason: Protocol


   Stop: 18 08:13


   Last Admin: 18 08:49 Dose:  18 units


Lorazepam (Ativan)  0.5 mg PO Q6HR PRN; Protocol


   PRN Reason: agitation 


   Stop: 18 19:27


   Last Admin: 18 00:09 Dose:  0.5 mg


Magnesium Hydroxide (Milk Of Magnesia)  30 ml PO HS PRN


   PRN Reason: Constipation


   Stop: 18 19:27


Metoprolol Succinate (Toprol Xl)  25 mg PO BID RENETTA


   Stop: 18 16:59


   Last Admin: 18 16:53 Dose:  25 mg


Olanzapine (Zyprexa Zydis)  12.5 mg PO BID RENETTA


   PRN Reason: Protocol


   Stop: 18 12:28


   Last Admin: 18 16:52 Dose:  12.5 mg


Zolpidem Tartrate (Ambien)  5 mg PO HS PRN


   PRN Reason: insomnia


   Stop: 18 19:27


   Last Admin: 18 21:25 Dose:  5 mg








General: No acute distress


HEENT: Atraumatic, PERRLA


Neck: Supple


Cardiovascular: Regular rate, Normal S1, Normal S2


Lungs: Clear to auscultation


Abdomen: Bowel sounds, Soft


Extremities: no Clubbing, no Cyanosis, no Edema


Neurological: Sensation intact


Skin: no Rash


Psych/Mental Status: Mood NL





- Procedures


Procedures: 


 Procedures











Procedure Code Date


 


KEV MUSC/FASCIA 20 SQ CM/< 98869 18


 


EXCISION OF RIGHT HIP MUSCLE, OPEN APPROACH [id number]














Assessment/Plan





- Assessment


Assessment: 


Sacral stage III decubitus ulcer, infected.


Diabetes mellitus type


Depression.


psychosis.  


Hematuria.


hypernatremia resolved


prerenal azotemia improved.


hyperglycemia ... stable


depression/anxiety


gout ... on allopurinol. check uric acid level


dementia


schizophrenia


S/P wound debridement of sacral decubitus ... continue wound care treatment.


UTI


BRYANT on CKD ... resolved.


hyperkalemia resolved. 





discharge planning.





- Plan


Plan: 





will order cmp this AM





renal consult -- Dr. Bajwa





sacral decubitus ... will order Gen Surgical consult -- Dr. Atil for possible 

wound debridement





Gout ... continue allopurinol 100mg PO daily





continue wound vac. 





continue hydrocodone PO for pain control. 





discharge planning/placement





Nutritional Asmnt/Malnutr-PDOC





- Dietary Evaluation


Malnutrition Findings (Please click <Entered> for more info): 








Nutritional Asmnt/Malnutrition                             Start:  18 17:

10


Text:                                                      Status: Complete    

  


Freq:                                                                          

  


 Document     18 17:13  MAGEN  (Rec: 18 17:25  HEN  [id number])


 Nutritional Asmnt/Malnutrition


     Patient General Information


      Nutritional Screening                      High Risk


                                                 Consult


      Diagnosis                                  pressure ulcer, hyperglycemia,


                                                 dehydration


      Pertinent Medical Hx/Surgical Hx           DM, dementia, depression,


                                                 psychosis, schizophrenia,


                                                 chronic renal insuff


      Subjective Information                     Consult received for


                                                 unstageble pressure ulcer on , high BG on . Per


                                                 records, PO intake 50-75%. Pt


                                                 was on NPO today for surgery-


                                                 excisional debridement,


                                                 application of wound VAC


      Current Diet Order/ Nutrition Support      pureed, CCHO 60gm


      Pertinent Medications                      novolog


      Pertinent Labs                              Na 152, K 4.6, BUN 56, Cr


                                                 1.8, Glucose 262, -246


                                                 , Mg 2.9


     Nutritional Hx/Data


      Height                                     1.8 m


      Height (Calculated Centimeters)            180.3


      Current Weight (lbs)                       70.76 kg


      Weight (Calculated Kilograms)              70.8


      Weight (Calculated Grams)                  06880.4


      Ideal Body Weight                          172


      % Ideal Body Weight                        91


      Body Mass Index (BMI)                      21.7


      Weight Status                              Approriate


     GI Symptoms


      GI Symptoms                                None


      Last BM                                    


      Difficult in:                              None


      Skin Integrity/Comment:                    scar to right upper back,


                                                 decubitus ulcer to buttocks


      Current %PO                                Fair (50-74%)


     Estimated Nutritional Goals


      BEE in Kcals:                              Using Current wt


      Calories/Kcals/Kg                          27-32


      Kcals Calculated                           8028-1107


      Protein:                                   Using Current wt


      Protein g/k-1.2


      Protein Calculated                         71-85


      Fluid: ml                                  1917-2130ml (1ml/kcal)


     Nutritional Problem


      2. Problem


       Problem                                   altered nutrition related lab


                                                 values


       Etiology                                  hx of DM


       Signs/Symptoms:                           Glucose 262, -246


      1. Problem


       Problem                                   increased nutrition needs (


                                                 calorie and protein)


       Etiology                                  increased metabolic demand for


                                                 wound healing


       Signs/Symptoms:                           decubitus ulcer and surgery


     Intervention/Recommendation


      Comments                                   1. Continue with current diet


                                                 as ordered. If PO intake low,


                                                 will consider nutrition


                                                 supplements


                                                 2. MD to consider vit C and


                                                 zinc for wound healing


                                                 3. Monitor PO intake, wt, labs


                                                 and skin integrity


                                                 4. F/U as moderate risk in 3-5


                                                 days, 3/2-3/4, PO check 3/2


     Expected Outcomes/Goals


      Expected Outcomes/Goals                    1. PO intake to meet at least


                                                 75% of nutritional needs.


                                                 2. Wt stability, skin to


                                                 remain intact, labs to


                                                 approach WNL.

## 2018-03-12 NOTE — INFECTIOUS DISEASE PROG NOTE
Infectious Disease Subjective





- Review of Systems


Service Date: 18


Subjective: 





no new change.





Infectious Disease Objective





- Results


Result Diagrams: 


 18 09:11





 18 05:05


Recent Labs: 


 Laboratory Last Values











WBC  6.7 Th/cmm (4.8-10.8)  D 18  09:11    


 


RBC  4.73 Mil/cmm (3.80-5.80)   18  09:11    


 


Hgb  13.7 gm/dL (12-16)   18  09:11    


 


Hct  42.8 % (41.0-60)   18  09:11    


 


MCV  90.5 fl (80-99)   18  09:11    


 


MCH  28.9 pg (27.0-31.0)   18  09:11    


 


MCHC Differential  32.0 pg (28.0-36.0)   18  09:11    


 


RDW  16.2 % (11.5-20.0)   18  09:11    


 


Plt Count  162 Th/cmm (150-400)   18  09:11    


 


MPV  9.7 fl  18  09:11    


 


Neutrophils %  72.2 % (40.0-80.0)   18  09:11    


 


Lymphocytes %  19.2 % (20.0-50.0)  L  18  09:11    


 


Monocytes %  5.9 % (2.0-10.0)   18  09:11    


 


Eosinophils %  1.5 % (0.0-5.0)   18  09:11    


 


Basophils %  1.2 % (0.0-2.0)   18  09:11    


 


Eos Smear Source  URINE   18  03:00    


 


Eos Smear Total Cells  FEW EOSINOPHILS SEEN  (NONE SEEN)   18  03:00    


 


PT  10.5 SECONDS (9.5-11.5)   18  06:00    


 


INR  1.01  (0.5-1.4)   18  06:00    


 


PTT (Actin FS)  25.3 SECONDS (26.0-38.0)  L  18  06:00    


 


Sodium  143 mEq/L (136-145)   18  05:05    


 


Potassium  3.9 mEq/L (3.5-5.1)   18  05:05    


 


Chloride  112 mEq/L ()  H  18  05:05    


 


Carbon Dioxide  24.1 mEq/L (21.0-31.0)   18  05:05    


 


Anion Gap  10.8  (7.0-16.0)   18  05:05    


 


BUN  16 mg/dL (7-25)   18  05:05    


 


Creatinine  1.0 mg/dL (0.7-1.3)   18  05:05    


 


Est GFR ( Amer)  TNP   18  05:05    


 


Est GFR (Non-Af Amer)  TNP   18  05:05    


 


BUN/Creatinine Ratio  16.0   18  05:05    


 


Glucose  194 mg/dL ()  H  18  05:05    


 


POC Glucose  163 MG/DL (70 - 105)  H  18  12:27    


 


Hemoglobin A1c %  10.5 % (4.0-6.0)  H  18  05:26    


 


Uric Acid  5.6 mg/dL (4.4-7.6)   18  09:10    


 


Calcium  8.8 mg/dL (8.6-10.3)   18  05:05    


 


Phosphorus  4.1 mg/dL (2.5-5.0)   18  06:00    


 


Magnesium  2.0 mg/dL (1.9-2.7)   18  05:05    


 


Total Bilirubin  0.6 mg/dL (0.3-1.0)   18  09:11    


 


AST  17 U/L (13-39)   18  09:11    


 


ALT  13 U/L (7-52)   18  09:11    


 


Alkaline Phosphatase  81 U/L ()   18  09:11    


 


Total Protein  6.4 gm/dL (6.0-8.3)   18  09:11    


 


Albumin  3.0 gm/dL (4.2-5.5)  L  18  09:11    


 


Globulin  3.4 gm/dL  18  09:11    


 


Albumin/Globulin Ratio  0.9  (1.0-1.8)  L  18  09:11    


 


TSH  0.28 uIU/ml (0.34-5.60)  L  18  06:00    


 


Urine Source  CATH   18  03:00    


 


Urine Color  BROWN   18  03:00    


 


Urine Clarity  CLOUDY  (CLEAR)   18  03:00    


 


Urine pH  5.5  (4.6 - 8.0)   18  03:00    


 


Ur Specific Gravity  1.020  (1.005-1.030)   18  03:00    


 


Urine Protein  100 mg/dL (NEGATIVE)  H  18  03:00    


 


Urine Glucose (UA)  >=1000 mg/dL (NEGATIVE)  H  18  03:00    


 


Urine Ketones  TRACE mg/dL (NEGATIVE)   18  03:00    


 


Urine Blood  LARGE  (NEGATIVE)  H  18  03:00    


 


Urine Nitrate  NEGATIVE  (NEGATIVE)   18  03:00    


 


Urine Bilirubin  SMALL  (NEGATIVE)  H  18  03:00    


 


Urine Ictotest  NEGATIVE  (NEGATIVE)   18  03:00    


 


Urine Urobilinogen  0.2 E.U./dL (0.2 - 1.0)   18  03:00    


 


Ur Leukocyte Esterase  TRACE  (NEGATIVE)  H  18  03:00    


 


Urine RBC  >100 /hpf (0-5)  H  18  03:00    


 


Urine WBC  >100 /hpf (0-5)  H  18  03:00    


 


Ur Epithelial Cells  OCCASIONAL /lpf (FEW)   18  03:00    


 


Urine Bacteria  MODERATE /hpf (NONE SEEN)  H  18  03:00    


 


Urine Osmolality  749 mOsmol/kg  18  03:00    


 


Ur Random Sodium  39 mmol/L  18  03:00    


 


Urine Creatinine  136.0 mg/dl (39.0-259.0)   18  03:00    














- Physical Exam


Vitals and I&O: 


 Vital Signs











Temp  98.2 F   18 04:00


 


Pulse  77   18 08:58


 


Resp  18   18 08:00


 


BP  146/76   18 08:58


 


Pulse Ox  98   18 04:00








 Intake & Output











 18





 18:59 06:59 18:59


 


Intake Total 450 550 


 


Output Total 200  


 


Balance 250 550 


 


Weight (lbs) 77.111 kg 77.111 kg 


 


Intake:   


 


  Oral 450 550 


 


Output:   


 


  Urine 200  


 


Other:   


 


  # Voids  2 


 


  # Bowel Movements 0  











Active Medications: 


Current Medications





Acetaminophen (Tylenol)  650 mg PO Q6HR PRN


   PRN Reason: mild to moderate pain


   Stop: 18 19:27


   Last Admin: 18 00:25 Dose:  650 mg


Acetaminophen/Hydrocodone Bitart (Norco 5mg/325mg)  1 tab PO Q6H PRN


   PRN Reason: Pain (Moderate)


   Stop: 18 08:21


   Last Admin: 18 21:26 Dose:  1 tab


Allopurinol (Zyloprim)  100 mg PO DAILY [facility]


   Stop: 18 08:59


   Last Admin: 18 08:58 Dose:  Not Given


Castor Oil/Bahamian Balsam/Trypsin (Venelex)  1 appl TP DAILY [facility]


   Stop: 18 15:59


   Last Admin: 18 08:48 Dose:  1 appl


Clonazepam (Klonopin)  1 mg PO BID [facility]


   PRN Reason: Protocol


   Stop: 18 08:59


   Last Admin: 18 08:57 Dose:  Not Given


Famotidine (Pepcid)  20 mg PO BID [facility]


   Stop: 18 08:59


   Last Admin: 18 08:59 Dose:  Not Given


Heparin Sodium (Porcine) (Heparin)  5,000 units SUBQ Q12HR [facility]


   PRN Reason: Protocol


   Stop: 18 20:59


   Last Admin: 18 09:07 Dose:  Not Given


Levofloxacin (Levaquin Pb)  500 mg in 100 mls @ 100 mls/hr IV Q24HR [facility]


   Stop: 18 05:59


   Last Admin: 18 06:39 Dose:  100 mls/hr


Insulin Aspart (Novolog Insulin Sliding Scale)  0 units SUBQ ACHS [facility]


   PRN Reason: Protocol


   Stop: 18 20:59


   Last Admin: 18 06:40 Dose:  Not Given


Insulin Detemir (Levemir Insulin)  18 units SUBQ DAILY [facility]


   PRN Reason: Protocol


   Stop: 18 08:13


   Last Admin: 18 09:13 Dose:  Not Given


Lorazepam (Ativan)  0.5 mg PO Q6HR PRN; Protocol


   PRN Reason: agitation 


   Stop: 18 19:27


   Last Admin: 18 00:09 Dose:  0.5 mg


Magnesium Hydroxide (Milk Of Magnesia)  30 ml PO HS PRN


   PRN Reason: Constipation


   Stop: 18 19:27


Metoprolol Succinate (Toprol Xl)  25 mg PO BID [facility]


   Stop: 18 16:59


   Last Admin: 18 08:58 Dose:  Not Given


Olanzapine (Zyprexa Zydis)  12.5 mg PO BID [facility]


   PRN Reason: Protocol


   Stop: 18 12:28


   Last Admin: 18 09:13 Dose:  Not Given


Zolpidem Tartrate (Ambien)  5 mg PO HS PRN


   PRN Reason: insomnia


   Stop: 18 19:27


   Last Admin: 18 21:25 Dose:  5 mg








General: no acute distress, well developed, well nourished


HEENT: atraumatic, normocephalic, PERRLA, EOMI


Neck: supple, no thyromegaly


Cardiovascular: S1S2, regular


Lungs: clear to auscultation bilaterally, clear to percussion


Abdomen: soft, no tender, no distended, no rebound


Extremities: no cyanosis, no clubbing, no edema


Neurological: awake, alert, oriented





- Procedures


Procedures: 


 Procedures











Procedure Code Date


 


KEV MUSC/FASCIA 20 SQ CM/< 16761 18


 


EXCISION OF RIGHT HIP MUSCLE, OPEN APPROACH [id number] 18














Infectious Disease Assmt/Plan





- Assessment


Assessment: 


1.  Sacral stage III decubitus ulcer, infected.


2.  Status post I&D.


3.  Diabetes mellitus type .


4.  Dementia.  


5.   Depression.


6.  psychosis.  


7.  Schizophrenia.


 














- Plan


Plan: 





CPM.  


wound care.





Nutritional Asmnt/Malnutr-PDOC





- Dietary Evaluation


Malnutrition Findings (Please click <Entered> for more info): 








Nutritional Asmnt/Malnutrition                             Start:  18 17:

10


Text:                                                      Status: Complete    

  


Freq:                                                                          

  


 Document     18 17:13  LCHENG  (Rec: 18 17:25  LCHENG  [id number])


 Nutritional Asmnt/Malnutrition


     Patient General Information


      Nutritional Screening                      High Risk


                                                 Consult


      Diagnosis                                  pressure ulcer, hyperglycemia,


                                                 dehydration


      Pertinent Medical Hx/Surgical Hx           DM, dementia, depression,


                                                 psychosis, schizophrenia,


                                                 chronic renal insuff


      Subjective Information                     Consult received for


                                                 unstageble pressure ulcer on , high BG on . Per


                                                 records, PO intake 50-75%. Pt


                                                 was on NPO today for surgery-


                                                 excisional debridement,


                                                 application of wound VAC


      Current Diet Order/ Nutrition Support      pureed, CCHO 60gm


      Pertinent Medications                      novolog


      Pertinent Labs                              Na 152, K 4.6, BUN 56, Cr


                                                 1.8, Glucose 262, -246


                                                 , Mg 2.9


     Nutritional Hx/Data


      Height                                     1.8 m


      Height (Calculated Centimeters)            180.3


      Current Weight (lbs)                       70.76 kg


      Weight (Calculated Kilograms)              70.8


      Weight (Calculated Grams)                  22894.4


      Ideal Body Weight                          172


      % Ideal Body Weight                        91


      Body Mass Index (BMI)                      21.7


      Weight Status                              Approriate


     GI Symptoms


      GI Symptoms                                None


      Last BM                                    


      Difficult in:                              None


      Skin Integrity/Comment:                    scar to right upper back,


                                                 decubitus ulcer to buttocks


      Current %PO                                Fair (50-74%)


     Estimated Nutritional Goals


      BEE in Kcals:                              Using Current wt


      Calories/Kcals/Kg                          27-32


      Kcals Calculated                           6472-6793


      Protein:                                   Using Current wt


      Protein g/k-1.2


      Protein Calculated                         71-85


      Fluid: ml                                  1917-2130ml (1ml/kcal)


     Nutritional Problem


      2. Problem


       Problem                                   altered nutrition related lab


                                                 values


       Etiology                                  hx of DM


       Signs/Symptoms:                           Glucose 262, -246


      1. Problem


       Problem                                   increased nutrition needs (


                                                 calorie and protein)


       Etiology                                  increased metabolic demand for


                                                 wound healing


       Signs/Symptoms:                           decubitus ulcer and surgery


     Intervention/Recommendation


      Comments                                   1. Continue with current diet


                                                 as ordered. If PO intake low,


                                                 will consider nutrition


                                                 supplements


                                                 2. MD to consider vit C and


                                                 zinc for wound healing


                                                 3. Monitor PO intake, wt, labs


                                                 and skin integrity


                                                 4. F/U as moderate risk in 3-5


                                                 days, 3/2-3, PO check 3/2


     Expected Outcomes/Goals


      Expected Outcomes/Goals                    1. PO intake to meet at least


                                                 75% of nutritional needs.


                                                 2. Wt stability, skin to


                                                 remain intact, labs to


                                                 approach WNL.

## 2018-03-13 LAB
ALBUMIN SERPL-MCNC: 3 GM/DL (ref 4.2–5.5)
ALBUMIN/GLOB SERPL: 0.8 {RATIO} (ref 1–1.8)
ALP SERPL-CCNC: 67 U/L (ref 34–104)
ALT SERPL-CCNC: 16 U/L (ref 7–52)
ANION GAP SERPL CALC-SCNC: 15.9 MMOL/L (ref 7–16)
APPEARANCE UR: (no result)
AST SERPL-CCNC: 18 U/L (ref 13–39)
BACTERIA #/AREA URNS HPF: (no result) /HPF
BASOPHILS # BLD AUTO: 0 TH/CUMM (ref 0–0.2)
BASOPHILS NFR BLD AUTO: 0.6 % (ref 0–2)
BILIRUB SERPL-MCNC: 0.7 MG/DL (ref 0.3–1)
BILIRUB UR-MCNC: (no result) MG/DL
BUN SERPL-MCNC: 17 MG/DL (ref 7–25)
CALCIUM SERPL-MCNC: 8.9 MG/DL (ref 8.6–10.3)
CHLORIDE SERPL-SCNC: 103 MEQ/L (ref 98–107)
CO2 SERPL-SCNC: 22 MEQ/L (ref 21–31)
COLOR UR: (no result)
CREAT SERPL-MCNC: 1.1 MG/DL (ref 0.7–1.3)
EOSINOPHIL # BLD AUTO: 0 TH/CMM (ref 0.1–0.4)
EOSINOPHIL NFR BLD AUTO: 0.7 % (ref 0–5)
EPI CELLS URNS QL MICRO: (no result) /LPF
ERYTHROCYTE [DISTWIDTH] IN BLOOD BY AUTOMATED COUNT: 16.5 % (ref 11.5–20)
GLOBULIN SER-MCNC: 3.8 GM/DL
GLUCOSE SERPL-MCNC: 191 MG/DL (ref 70–105)
GLUCOSE UR STRIP-MCNC: NEGATIVE MG/DL
HCT VFR BLD CALC: 38.4 % (ref 41–60)
HGB BLD-MCNC: 12.7 GM/DL (ref 12–16)
KETONES UR STRIP-MCNC: (no result) MG/DL
LEUKOCYTE ESTERASE UR-ACNC: (no result)
LYMPHOCYTE AB SER FC-ACNC: 1.6 TH/CMM (ref 1.5–3)
LYMPHOCYTES NFR BLD AUTO: 26.6 % (ref 20–50)
MCH RBC QN AUTO: 29.4 PG (ref 27–31)
MCHC RBC AUTO-ENTMCNC: 33 PG (ref 28–36)
MCV RBC AUTO: 89.2 FL (ref 80–99)
MICRO URNS: YES
MONOCYTES # BLD AUTO: 0.4 TH/CMM (ref 0.3–1)
MONOCYTES NFR BLD AUTO: 5.9 % (ref 2–10)
NEUTROPHILS # BLD: 4.2 TH/CMM (ref 1.8–8)
NEUTROPHILS NFR BLD AUTO: 66.2 % (ref 40–80)
NITRITE UR QL STRIP: NEGATIVE
PH UR STRIP: 5.5 [PH] (ref 4.6–8)
PLATELET # BLD: 264 TH/CMM (ref 150–400)
PMV BLD AUTO: 8.6 FL
POTASSIUM SERPL-SCNC: 3.9 MEQ/L (ref 3.5–5.1)
PROT UR STRIP-MCNC: 30 MG/DL
RBC # BLD AUTO: 4.3 MIL/CMM (ref 3.8–5.8)
RBC # UR STRIP: (no result) /UL
SODIUM SERPL-SCNC: 137 MEQ/L (ref 136–145)
SP GR UR STRIP: >= 1.03 (ref 1–1.03)
URINALYSIS COMPLETE PNL UR: (no result)
UROBILINOGEN UR STRIP-ACNC: 1 E.U./DL (ref 0.2–1)
WBC # BLD AUTO: 6.2 TH/CMM (ref 4.8–10.8)
WBC #/AREA URNS HPF: (no result) /HPF (ref 0–5)
YEAST URNS QL MICRO: (no result) /HPF

## 2018-03-13 RX ADMIN — INSULIN DETEMIR SCH UNITS: 100 INJECTION, SOLUTION SUBCUTANEOUS at 08:31

## 2018-03-13 RX ADMIN — INSULIN ASPART SCH: 100 INJECTION, SOLUTION INTRAVENOUS; SUBCUTANEOUS at 22:04

## 2018-03-13 RX ADMIN — OLANZAPINE SCH MG: 5 TABLET, ORALLY DISINTEGRATING ORAL at 17:44

## 2018-03-13 RX ADMIN — INSULIN ASPART SCH: 100 INJECTION, SOLUTION INTRAVENOUS; SUBCUTANEOUS at 12:07

## 2018-03-13 RX ADMIN — INSULIN ASPART SCH UNITS: 100 INJECTION, SOLUTION INTRAVENOUS; SUBCUTANEOUS at 08:24

## 2018-03-13 RX ADMIN — OLANZAPINE SCH MG: 5 TABLET, ORALLY DISINTEGRATING ORAL at 10:45

## 2018-03-13 RX ADMIN — INSULIN ASPART SCH UNITS: 100 INJECTION, SOLUTION INTRAVENOUS; SUBCUTANEOUS at 18:17

## 2018-03-13 RX ADMIN — CASTOR OIL AND BALSAM, PERU SCH APPL: 788; 87 OINTMENT TOPICAL at 10:52

## 2018-03-13 RX ADMIN — LEVOFLOXACIN SCH MLS/HR: 5 INJECTION INTRAVENOUS at 06:24

## 2018-03-13 NOTE — GENERAL PROGRESS NOTE
Subjective





- Review of Systems


Service Date: 18


Subjective: 





alert, comfortable, periods of confusion





Objective





- Results


Result Diagrams: 


 18 08:40





 18 08:40


Recent Labs: 


 Laboratory Last Values











WBC  6.2 Th/cmm (4.8-10.8)   18  08:40    


 


RBC  4.30 Mil/cmm (3.80-5.80)   18  08:40    


 


Hgb  12.7 gm/dL (12-16)   18  08:40    


 


Hct  38.4 % (41.0-60)  L  18  08:40    


 


MCV  89.2 fl (80-99)   18  08:40    


 


MCH  29.4 pg (27.0-31.0)   18  08:40    


 


MCHC Differential  33.0 pg (28.0-36.0)   18  08:40    


 


RDW  16.5 % (11.5-20.0)   18  08:40    


 


Plt Count  264 Th/cmm (150-400)   18  08:40    


 


MPV  8.6 fl  18  08:40    


 


Neutrophils %  66.2 % (40.0-80.0)   18  08:40    


 


Lymphocytes %  26.6 % (20.0-50.0)   18  08:40    


 


Monocytes %  5.9 % (2.0-10.0)   18  08:40    


 


Eosinophils %  0.7 % (0.0-5.0)   18  08:40    


 


Basophils %  0.6 % (0.0-2.0)   18  08:40    


 


Eos Smear Source  URINE   18  03:00    


 


Eos Smear Total Cells  FEW EOSINOPHILS SEEN  (NONE SEEN)   18  03:00    


 


PT  10.5 SECONDS (9.5-11.5)   18  06:00    


 


INR  1.01  (0.5-1.4)   18  06:00    


 


PTT (Actin FS)  25.3 SECONDS (26.0-38.0)  L  18  06:00    


 


Sodium  137 mEq/L (136-145)   18  08:40    


 


Potassium  3.9 mEq/L (3.5-5.1)   18  08:40    


 


Chloride  103 mEq/L ()   18  08:40    


 


Carbon Dioxide  22.0 mEq/L (21.0-31.0)   18  08:40    


 


Anion Gap  15.9  (7.0-16.0)   18  08:40    


 


BUN  17 mg/dL (7-25)   18  08:40    


 


Creatinine  1.1 mg/dL (0.7-1.3)   18  08:40    


 


Est GFR ( Amer)  TNP   18  08:40    


 


Est GFR (Non-Af Amer)  TNP   18  08:40    


 


BUN/Creatinine Ratio  15.5   18  08:40    


 


Glucose  191 mg/dL ()  H  18  08:40    


 


POC Glucose  147 MG/DL (70 - 105)  H  18  11:53    


 


Hemoglobin A1c %  10.5 % (4.0-6.0)  H  18  05:26    


 


Uric Acid  5.6 mg/dL (4.4-7.6)   18  09:10    


 


Calcium  8.9 mg/dL (8.6-10.3)   18  08:40    


 


Phosphorus  4.1 mg/dL (2.5-5.0)   18  06:00    


 


Magnesium  2.0 mg/dL (1.9-2.7)   18  05:05    


 


Total Bilirubin  0.7 mg/dL (0.3-1.0)   18  08:40    


 


AST  18 U/L (13-39)   18  08:40    


 


ALT  16 U/L (7-52)   18  08:40    


 


Alkaline Phosphatase  67 U/L ()   18  08:40    


 


Total Protein  6.8 gm/dL (6.0-8.3)   18  08:40    


 


Albumin  3.0 gm/dL (4.2-5.5)  L  18  08:40    


 


Globulin  3.8 gm/dL  18  08:40    


 


Albumin/Globulin Ratio  0.8  (1.0-1.8)  L  18  08:40    


 


TSH  0.28 uIU/ml (0.34-5.60)  L  18  06:00    


 


Urine Source  CATH   18  03:00    


 


Urine Color  BROWN   18  03:00    


 


Urine Clarity  CLOUDY  (CLEAR)   18  03:00    


 


Urine pH  5.5  (4.6 - 8.0)   18  03:00    


 


Ur Specific Gravity  1.020  (1.005-1.030)   18  03:00    


 


Urine Protein  100 mg/dL (NEGATIVE)  H  18  03:00    


 


Urine Glucose (UA)  >=1000 mg/dL (NEGATIVE)  H  18  03:00    


 


Urine Ketones  TRACE mg/dL (NEGATIVE)   18  03:00    


 


Urine Blood  LARGE  (NEGATIVE)  H  18  03:00    


 


Urine Nitrate  NEGATIVE  (NEGATIVE)   18  03:00    


 


Urine Bilirubin  SMALL  (NEGATIVE)  H  18  03:00    


 


Urine Ictotest  NEGATIVE  (NEGATIVE)   18  03:00    


 


Urine Urobilinogen  0.2 E.U./dL (0.2 - 1.0)   18  03:00    


 


Ur Leukocyte Esterase  TRACE  (NEGATIVE)  H  18  03:00    


 


Urine RBC  >100 /hpf (0-5)  H  18  03:00    


 


Urine WBC  >100 /hpf (0-5)  H  18  03:00    


 


Ur Epithelial Cells  OCCASIONAL /lpf (FEW)   18  03:00    


 


Urine Bacteria  MODERATE /hpf (NONE SEEN)  H  18  03:00    


 


Urine Osmolality  749 mOsmol/kg  18  03:00    


 


Ur Random Sodium  39 mmol/L  18  03:00    


 


Urine Creatinine  136.0 mg/dl (39.0-259.0)   18  03:00    














- Physical Exam


Vitals and I&O: 


 Vital Signs











Temp  98.4 F   18 04:00


 


Pulse  120   18 10:43


 


Resp  112   18 04:00


 


BP  137/72   18 10:43


 


Pulse Ox  98   18 04:00








 Intake & Output











 18





 18:59 06:59 18:59


 


Intake Total 100 1150 


 


Output Total  450 


 


Balance 100 700 


 


Weight (lbs)  77.111 kg 


 


Intake:   


 


  Intake, IV Amount 100  


 


    Levofloxacin 500mg/100mL 100  





    500 mg In 100 ml @ 100   





    mls/hr IV Q24HR [facility] Rx#:   





    [id number]   


 


  Oral  1150 


 


Output:   


 


  Urine  450 


 


Other:   


 


  # Voids  3 


 


  # Bowel Movements  2 











Active Medications: 


Current Medications





Acetaminophen (Tylenol)  650 mg PO Q6HR PRN


   PRN Reason: mild to moderate pain


   Stop: 18 19:27


   Last Admin: 18 00:25 Dose:  650 mg


Acetaminophen/Hydrocodone Bitart (Norco 5mg/325mg)  1 tab PO Q6H PRN


   PRN Reason: Pain (Moderate)


   Stop: 18 08:21


   Last Admin: 18 21:26 Dose:  1 tab


Allopurinol (Zyloprim)  100 mg PO DAILY [facility]


   Stop: 18 08:59


   Last Admin: 18 10:44 Dose:  100 mg


Castor Oil/Rw[place] Balsam/Trypsin (Venelex)  1 appl TP DAILY [facility]


   Stop: 18 15:59


   Last Admin: 18 10:52 Dose:  1 appl


Clonazepam (Klonopin)  1 mg PO BID [facility]


   PRN Reason: Protocol


   Stop: 18 08:59


   Last Admin: 18 10:44 Dose:  1 mg


Famotidine (Pepcid)  20 mg PO BID [facility]


   Stop: 18 08:59


   Last Admin: 18 10:43 Dose:  20 mg


Heparin Sodium (Porcine) (Heparin)  5,000 units SUBQ Q12HR [facility]


   PRN Reason: Protocol


   Stop: 18 20:59


   Last Admin: 18 10:51 Dose:  Not Given


Levofloxacin (Levaquin Pb)  500 mg in 100 mls @ 100 mls/hr IV Q24HR [facility]


   Stop: 18 05:59


   Last Admin: 18 06:24 Dose:  100 mls/hr


Insulin Aspart (Novolog Insulin Sliding Scale)  0 units SUBQ ACHS [facility]


   PRN Reason: Protocol


   Stop: 18 20:59


   Last Admin: 18 12:07 Dose:  Not Given


Insulin Detemir (Levemir Insulin)  18 units SUBQ DAILY [facility]


   PRN Reason: Protocol


   Stop: 18 08:13


   Last Admin: 18 08:31 Dose:  18 units


Lorazepam (Ativan)  0.5 mg PO Q6HR PRN; Protocol


   PRN Reason: agitation 


   Stop: 18 19:27


   Last Admin: 18 00:09 Dose:  0.5 mg


Magnesium Hydroxide (Milk Of Magnesia)  30 ml PO HS PRN


   PRN Reason: Constipation


   Stop: 18 19:27


Metoprolol Succinate (Toprol Xl)  25 mg PO BID [facility]


   Stop: 18 16:59


   Last Admin: 18 10:43 Dose:  25 mg


Olanzapine (Zyprexa Zydis)  12.5 mg PO BID [facility]


   PRN Reason: Protocol


   Stop: 18 12:28


   Last Admin: 18 10:45 Dose:  12.5 mg


Zolpidem Tartrate (Ambien)  5 mg PO HS PRN


   PRN Reason: insomnia


   Stop: 18 19:27


   Last Admin: 18 21:25 Dose:  5 mg








General: Alert, No acute distress


HEENT: Atraumatic, PERRLA


Neck: Supple


Cardiovascular: Regular rate, Normal S1, Normal S2


Lungs: Clear to auscultation


Abdomen: Bowel sounds, Soft


Extremities: no Clubbing, no Cyanosis, no Edema


Neurological: Sensation intact


Skin: no Rash


Psych/Mental Status: Mood NL





- Procedures


Procedures: 


 Procedures











Procedure Code Date


 


KEV MUSC/FASCIA 20 SQ CM/< 29475 18


 


EXCISION OF RIGHT HIP MUSCLE, OPEN APPROACH [id number]














Assessment/Plan





- Assessment


Assessment: 





BRYANT on CKD


Stage 4 decub ulcer S/P debridement


Severe dehydration


Ess HTN


Type 2 DM


Psychosis








- Plan


Plan: 


Lab - Result Diagrams





 18 06:00 





 18 06:00 





Current Medications





Acetaminophen (Tylenol)  650 mg PO Q6HR PRN


   PRN Reason: mild to moderate pain


   Stop: 18 19:27


Allopurinol (Zyloprim)  100 mg PO DAILY [facility]


   Stop: 18 08:59


   Last Admin: 18 10:00 Dose:  Not Given


Castor Oil/Rwandan Balsam/Trypsin (Venelex)  1 appl TP DAILY [facility]


   Stop: 18 15:59


   Last Admin: 18 10:00 Dose:  1 appl


Famotidine (Pepcid)  20 mg PO BID [facility]


   Stop: 18 08:59


   Last Admin: 18 18:22 Dose:  20 mg


Heparin Sodium (Porcine) (Heparin)  5,000 units SUBQ Q12HR [facility]


   PRN Reason: Protocol


   Stop: 18 20:59


   Last Admin: 18 07:50 Dose:  Not Given


Ceftriaxone Sodium 1 gm/ (Sodium Chloride)  50 mls @ 100 mls/hr IV Q24HR [facility]


   Stop: 18 16:44


   Last Admin: 18 17:45 Dose:  100 mls/hr


Insulin Aspart (Novolog Insulin Sliding Scale)  0 units SUBQ ACHS [facility]


   PRN Reason: Protocol


   Stop: 18 20:59


   Last Admin: 18 18:23 Dose:  6 units


Lorazepam (Ativan)  0.5 mg PO Q6HR PRN; Protocol


   PRN Reason: agitation 


   Stop: 18 19:27


Magnesium Hydroxide (Milk Of Magnesia)  30 ml PO HS PRN


   PRN Reason: Constipation


   Stop: 18 19:27


Metoprolol Succinate (Toprol Xl)  25 mg PO BID [facility]


   Stop: 18 16:59


   Last Admin: 18 18:23 Dose:  25 mg


Miscellaneous (Clinical Monitoring)  1 ea MC PRN PRN


   PRN Reason: RENAL


   Stop: 18 10:33


Olanzapine (Zyprexa Zydis)  5 mg PO BID [facility]


   PRN Reason: Protocol


   Stop: 18 16:59


Zolpidem Tartrate (Ambien)  5 mg PO HS PRN


   PRN Reason: insomnia


   Stop: 18 


Lab - Result Diagrams





 18 08:40 





 18 08:40 









































Na down to 137


Kidney fnc gradually improving


agree w/ Allopurinol


f/u electrolytes, agree w/ Levaquin


continue hydration


BS better control


increase Detemir


replace K


Placement in progress

















Nutritional Asmnt/Malnutr-PDOC





- Dietary Evaluation


Malnutrition Findings (Please click <Entered> for more info): 








Nutritional Asmnt/Malnutrition                             Start:  18 17:

10


Text:                                                      Status: Complete    

  


Freq:                                                                          

  


 Document     18 17:13  MAGEN  (Rec: 18 17:25  MAGEN  [id number])


 Nutritional Asmnt/Malnutrition


     Patient General Information


      Nutritional Screening                      High Risk


                                                 Consult


      Diagnosis                                  pressure ulcer, hyperglycemia,


                                                 dehydration


      Pertinent Medical Hx/Surgical Hx           DM, dementia, depression,


                                                 psychosis, schizophrenia,


                                                 chronic renal insuff


      Subjective Information                     Consult received for


                                                 unstageble pressure ulcer on , high BG on . Per


                                                 records, PO intake 50-75%. Pt


                                                 was on NPO today for surgery-


                                                 excisional debridement,


                                                 application of wound VAC


      Current Diet Order/ Nutrition Support      pureed, CCHO 60gm


      Pertinent Medications                      novolog


      Pertinent Labs                              Na 152, K 4.6, BUN 56, Cr


                                                 1.8, Glucose 262, -246


                                                 , Mg 2.9


     Nutritional Hx/Data


      Height                                     1.8 m


      Height (Calculated Centimeters)            180.3


      Current Weight (lbs)                       70.76 kg


      Weight (Calculated Kilograms)              70.8


      Weight (Calculated Grams)                  29273.4


      Ideal Body Weight                          172


      % Ideal Body Weight                        91


      Body Mass Index (BMI)                      21.7


      Weight Status                              Approriate


     GI Symptoms


      GI Symptoms                                None


      Last BM                                    


      Difficult in:                              None


      Skin Integrity/Comment:                    scar to right upper back,


                                                 decubitus ulcer to buttocks


      Current %PO                                Fair (50-74%)


     Estimated Nutritional Goals


      BEE in Kcals:                              Using Current wt


      Calories/Kcals/Kg                          27-32


      Kcals Calculated                           3457-5439


      Protein:                                   Using Current wt


      Protein g/k-1.2


      Protein Calculated                         71-85


      Fluid: ml                                  1917-2130ml (1ml/kcal)


     Nutritional Problem


      2. Problem


       Problem                                   altered nutrition related lab


                                                 values


       Etiology                                  hx of DM


       Signs/Symptoms:                           Glucose 262, -246


      1. Problem


       Problem                                   increased nutrition needs (


                                                 calorie and protein)


       Etiology                                  increased metabolic demand for


                                                 wound healing


       Signs/Symptoms:                           decubitus ulcer and surgery


     Intervention/Recommendation


      Comments                                   1. Continue with current diet


                                                 as ordered. If PO intake low,


                                                 will consider nutrition


                                                 supplements


                                                 2. MD to consider vit C and


                                                 zinc for wound healing


                                                 3. Monitor PO intake, wt, labs


                                                 and skin integrity


                                                 4. F/U as moderate risk in 3-5


                                                 days, 3/2-3/4, PO check 3/2


     Expected Outcomes/Goals


      Expected Outcomes/Goals                    1. PO intake to meet at least


                                                 75% of nutritional needs.


                                                 2. Wt stability, skin to


                                                 remain intact, labs to


                                                 approach WNL.

## 2018-03-14 RX ADMIN — INSULIN ASPART SCH: 100 INJECTION, SOLUTION INTRAVENOUS; SUBCUTANEOUS at 06:50

## 2018-03-14 RX ADMIN — INSULIN DETEMIR SCH: 100 INJECTION, SOLUTION SUBCUTANEOUS at 10:03

## 2018-03-14 RX ADMIN — LEVOFLOXACIN SCH MLS/HR: 5 INJECTION INTRAVENOUS at 06:33

## 2018-03-14 RX ADMIN — OLANZAPINE SCH: 5 TABLET, ORALLY DISINTEGRATING ORAL at 10:19

## 2018-03-14 RX ADMIN — HYDROCODONE BITARTRATE AND ACETAMINOPHEN PRN TAB: 5; 325 TABLET ORAL at 15:59

## 2018-03-14 RX ADMIN — OLANZAPINE SCH MG: 5 TABLET, ORALLY DISINTEGRATING ORAL at 16:17

## 2018-03-14 RX ADMIN — CASTOR OIL AND BALSAM, PERU SCH APPL: 788; 87 OINTMENT TOPICAL at 10:04

## 2018-03-14 RX ADMIN — INSULIN ASPART SCH: 100 INJECTION, SOLUTION INTRAVENOUS; SUBCUTANEOUS at 16:22

## 2018-03-14 RX ADMIN — INSULIN ASPART SCH: 100 INJECTION, SOLUTION INTRAVENOUS; SUBCUTANEOUS at 12:03

## 2018-03-14 RX ADMIN — INSULIN ASPART SCH UNITS: 100 INJECTION, SOLUTION INTRAVENOUS; SUBCUTANEOUS at 21:20

## 2018-03-14 NOTE — GENERAL PROGRESS NOTE
Subjective





- Review of Systems


Service Date: 18


Subjective: 





alert, comfortable, periods of confusion





Objective





- Results


Result Diagrams: 


 18 08:40





 18 08:40


Recent Labs: 


 Laboratory Last Values











WBC  6.2 Th/cmm (4.8-10.8)   18  08:40    


 


RBC  4.30 Mil/cmm (3.80-5.80)   18  08:40    


 


Hgb  12.7 gm/dL (12-16)   18  08:40    


 


Hct  38.4 % (41.0-60)  L  18  08:40    


 


MCV  89.2 fl (80-99)   18  08:40    


 


MCH  29.4 pg (27.0-31.0)   18  08:40    


 


MCHC Differential  33.0 pg (28.0-36.0)   18  08:40    


 


RDW  16.5 % (11.5-20.0)   18  08:40    


 


Plt Count  264 Th/cmm (150-400)   18  08:40    


 


MPV  8.6 fl  18  08:40    


 


Neutrophils %  66.2 % (40.0-80.0)   18  08:40    


 


Lymphocytes %  26.6 % (20.0-50.0)   18  08:40    


 


Monocytes %  5.9 % (2.0-10.0)   18  08:40    


 


Eosinophils %  0.7 % (0.0-5.0)   18  08:40    


 


Basophils %  0.6 % (0.0-2.0)   18  08:40    


 


Eos Smear Source  URINE   18  03:00    


 


Eos Smear Total Cells  FEW EOSINOPHILS SEEN  (NONE SEEN)   18  03:00    


 


PT  10.5 SECONDS (9.5-11.5)   18  06:00    


 


INR  1.01  (0.5-1.4)   18  06:00    


 


PTT (Actin FS)  25.3 SECONDS (26.0-38.0)  L  18  06:00    


 


Sodium  137 mEq/L (136-145)   18  08:40    


 


Potassium  3.9 mEq/L (3.5-5.1)   18  08:40    


 


Chloride  103 mEq/L ()   18  08:40    


 


Carbon Dioxide  22.0 mEq/L (21.0-31.0)   18  08:40    


 


Anion Gap  15.9  (7.0-16.0)   18  08:40    


 


BUN  17 mg/dL (7-25)   18  08:40    


 


Creatinine  1.1 mg/dL (0.7-1.3)   18  08:40    


 


Est GFR ( Amer)  TNP   18  08:40    


 


Est GFR (Non-Af Amer)  TNP   18  08:40    


 


BUN/Creatinine Ratio  15.5   18  08:40    


 


Glucose  191 mg/dL ()  H  18  08:40    


 


POC Glucose  172 MG/DL (70 - 105)  H  18  12:01    


 


Hemoglobin A1c %  10.5 % (4.0-6.0)  H  18  05:26    


 


Uric Acid  5.6 mg/dL (4.4-7.6)   18  09:10    


 


Calcium  8.9 mg/dL (8.6-10.3)   18  08:40    


 


Phosphorus  4.1 mg/dL (2.5-5.0)   18  06:00    


 


Magnesium  2.0 mg/dL (1.9-2.7)   18  05:05    


 


Total Bilirubin  0.7 mg/dL (0.3-1.0)   18  08:40    


 


AST  18 U/L (13-39)   18  08:40    


 


ALT  16 U/L (7-52)   18  08:40    


 


Alkaline Phosphatase  67 U/L ()   18  08:40    


 


Total Protein  6.8 gm/dL (6.0-8.3)   18  08:40    


 


Albumin  3.0 gm/dL (4.2-5.5)  L  18  08:40    


 


Globulin  3.8 gm/dL  18  08:40    


 


Albumin/Globulin Ratio  0.8  (1.0-1.8)  L  18  08:40    


 


TSH  0.28 uIU/ml (0.34-5.60)  L  18  06:00    


 


Urine Source  RAVI PORT   18  14:00    


 


Urine Color  ORANGE   18  14:00    


 


Urine Clarity  CLOUDY  (CLEAR)   18  14:00    


 


Urine pH  5.5  (4.6 - 8.0)   18  14:00    


 


Ur Specific Gravity  >= 1.030  (1.005-1.030)   18  14:00    


 


Urine Protein  30 mg/dL (NEGATIVE)  H  18  14:00    


 


Urine Glucose (UA)  NEGATIVE mg/dL (NEGATIVE)   18  14:00    


 


Urine Ketones  TRACE mg/dL (NEGATIVE)   18  14:00    


 


Urine Blood  MODERATE  (NEGATIVE)  H  18  14:00    


 


Urine Nitrate  NEGATIVE  (NEGATIVE)   18  14:00    


 


Urine Bilirubin  MODERATE  (NEGATIVE)  H  18  14:00    


 


Urine Ictotest  NEGATIVE  (NEGATIVE)   18  03:00    


 


Urine Urobilinogen  1.0 E.U./dL (0.2 - 1.0)   18  14:00    


 


Ur Leukocyte Esterase  SMALL  (NEGATIVE)  H  18  14:00    


 


Urine RBC  10-25 /hpf (0-5)  H  18  14:00    


 


Urine WBC  6-10 /hpf (0-5)   18  14:00    


 


Ur Epithelial Cells  FEW /lpf (FEW)   18  14:00    


 


Urine Bacteria  FEW /hpf (NONE SEEN)   18  14:00    


 


Urine Yeast  MANY /hpf (NONE SEEN)  H  18  14:00    


 


Urine Osmolality  749 mOsmol/kg  18  03:00    


 


Ur Random Sodium  39 mmol/L  18  03:00    


 


Urine Creatinine  136.0 mg/dl (39.0-259.0)   18  03:00    














- Physical Exam


Vitals and I&O: 


 Vital Signs











Temp  98.0 F   18 08:00


 


Pulse  118   18 08:00


 


Resp  20   18 08:00


 


BP  149/93   18 08:00


 


Pulse Ox  98   03/14/18 08:00








 Intake & Output











 18





 18:59 06:59 18:59


 


Intake Total 600 120 


 


Output Total 400  


 


Balance 200 120 


 


Weight (lbs) 77.111 kg 77.111 kg 


 


Intake:   


 


  Intake, IV Amount 100  


 


    Levofloxacin 500mg/100mL 100  





    500 mg In 100 ml @ 100   





    mls/hr IV Q24HR [facility] Rx#:   





    [id number]   


 


  Oral 500 120 


 


Output:   


 


  Urine 400  


 


Other:   


 


  # Voids  4 


 


  # Bowel Movements  2 











Active Medications: 


Current Medications





Acetaminophen (Tylenol)  650 mg PO Q6HR PRN


   PRN Reason: mild to moderate pain


   Stop: 18 19:27


   Last Admin: 18 00:25 Dose:  650 mg


Acetaminophen/Hydrocodone Bitart (Norco 5mg/325mg)  1 tab PO Q6H PRN


   PRN Reason: Pain (Moderate)


   Stop: 18 08:21


   Last Admin: 18 21:26 Dose:  1 tab


Allopurinol (Zyloprim)  100 mg PO DAILY [facility]


   Stop: 18 08:59


   Last Admin: 18 10:03 Dose:  Not Given


Castor Oil/Irish Balsam/Trypsin (Venelex)  1 appl TP DAILY RENETTA


   Stop: 18 15:59


   Last Admin: 18 10:04 Dose:  1 appl


Clonazepam (Klonopin)  1 mg PO BID RENETTA


   PRN Reason: Protocol


   Stop: 18 08:59


   Last Admin: 18 10:03 Dose:  1 mg


Famotidine (Pepcid)  20 mg PO BID RENETTA


   Stop: 18 08:59


   Last Admin: 18 10:03 Dose:  Not Given


Heparin Sodium (Porcine) (Heparin)  5,000 units SUBQ Q12HR RENETTA


   PRN Reason: Protocol


   Stop: 18 20:59


   Last Admin: 18 10:03 Dose:  Not Given


Insulin Aspart (Novolog Insulin Sliding Scale)  0 units SUBQ ACHS RENETTA


   PRN Reason: Protocol


   Stop: 18 20:59


   Last Admin: 18 12:03 Dose:  Not Given


Insulin Detemir (Levemir Insulin)  18 units SUBQ DAILY RENETTA


   PRN Reason: Protocol


   Stop: 18 08:13


   Last Admin: 18 10:03 Dose:  Not Given


Levofloxacin (Levaquin)  500 mg PO DAILY RENETTA


   Stop: 18 08:59


   Last Admin: 18 10:02 Dose:  500 mg


Lorazepam (Ativan)  0.5 mg PO Q6HR PRN; Protocol


   PRN Reason: agitation 


   Stop: 18 19:27


   Last Admin: 18 10:02 Dose:  0.5 mg


Magnesium Hydroxide (Milk Of Magnesia)  30 ml PO HS PRN


   PRN Reason: Constipation


   Stop: 18 19:27


Metoprolol Succinate (Toprol Xl)  25 mg PO BID RENETTA


   Stop: 18 16:59


   Last Admin: 18 10:19 Dose:  Not Given


Olanzapine (Zyprexa Zydis)  12.5 mg PO BID RENETTA


   PRN Reason: Protocol


   Stop: 18 12:28


   Last Admin: 18 10:19 Dose:  Not Given


Zolpidem Tartrate (Ambien)  5 mg PO HS PRN


   PRN Reason: insomnia


   Stop: 18 19:27


   Last Admin: 18 21:25 Dose:  5 mg








General: Alert, No acute distress


HEENT: Atraumatic, PERRLA


Neck: Supple


Cardiovascular: Regular rate, Normal S1, Normal S2


Lungs: Clear to auscultation


Abdomen: Bowel sounds, Soft


Extremities: no Clubbing, no Cyanosis, no Edema


Neurological: Sensation intact


Skin: no Rash


Psych/Mental Status: Mood NL





- Procedures


Procedures: 


 Procedures











Procedure Code Date


 


KEV MUSC/FASCIA 20 SQ CM/< 52505 18


 


EXCISION OF RIGHT HIP MUSCLE, OPEN APPROACH [id number]














Assessment/Plan





- Assessment


Assessment: 





BRYANT on CKD


Stage 4 decub ulcer S/P debridement


Severe dehydration


Ess HTN


Type 2 DM


Psychosis


MSSA/Yeast UTI





- Plan


Plan: 


Lab - Result Diagrams





 18 06:00 





 18 06:00 





Current Medications





Acetaminophen (Tylenol)  650 mg PO Q6HR PRN


   PRN Reason: mild to moderate pain


   Stop: 18 19:27


Allopurinol (Zyloprim)  100 mg PO DAILY RENETTA


   Stop: 18 08:59


   Last Admin: 18 10:00 Dose:  Not Given


Castor Oil/Irish Balsam/Trypsin (Venelex)  1 appl TP DAILY RENETTA


   Stop: 18 15:59


   Last Admin: 18 10:00 Dose:  1 appl


Famotidine (Pepcid)  20 mg PO BID [facility]


   Stop: 18 08:59


   Last Admin: 18 18:22 Dose:  20 mg


Heparin Sodium (Porcine) (Heparin)  5,000 units SUBQ Q12HR RENETTA


   PRN Reason: Protocol


   Stop: 18 20:59


   Last Admin: 18 07:50 Dose:  Not Given


Ceftriaxone Sodium 1 gm/ (Sodium Chloride)  50 mls @ 100 mls/hr IV Q24HR RENETTA


   Stop: 18 16:44


   Last Admin: 18 17:45 Dose:  100 mls/hr


Insulin Aspart (Novolog Insulin Sliding Scale)  0 units SUBQ ACHS RENETTA


   PRN Reason: Protocol


   Stop: 18 20:59


   Last Admin: 18 18:23 Dose:  6 units


Lorazepam (Ativan)  0.5 mg PO Q6HR PRN; Protocol


   PRN Reason: agitation 


   Stop: 18 19:27


Magnesium Hydroxide (Milk Of Magnesia)  30 ml PO HS PRN


   PRN Reason: Constipation


   Stop: 18 19:27


Metoprolol Succinate (Toprol Xl)  25 mg PO BID [facility]


   Stop: 18 16:59


   Last Admin: 18 18:23 Dose:  25 mg


Miscellaneous (Clinical Monitoring)  1 ea MC PRN PRN


   PRN Reason: RENAL


   Stop: 18 10:33


Olanzapine (Zyprexa Zydis)  5 mg PO BID RENETTA


   PRN Reason: Protocol


   Stop: 18 16:59


Zolpidem Tartrate (Ambien)  5 mg PO HS PRN


   PRN Reason: insomnia


   Stop: 18 


Lab - Result Diagrams





 18 08:40 





 18 08:40 









































Na down to 137


Kidney fnc gradually improving


agree w/ Allopurinol


f/u electrolytes, 


continue hydration


BS better control


increase Detemir


replace K


Urine grew MSSA, Yeast


add Diflucan to Levaquin

















Nutritional Asmnt/Malnutr-PDOC





- Dietary Evaluation


Malnutrition Findings (Please click <Entered> for more info): 








Nutritional Asmnt/Malnutrition                             Start:  18 17:

10


Text:                                                      Status: Complete    

  


Freq:                                                                          

  


 Document     18 17:13  MAGEN  (Rec: 18 17:25  MAGEN  [id number])


 Nutritional Asmnt/Malnutrition


     Patient General Information


      Nutritional Screening                      High Risk


                                                 Consult


      Diagnosis                                  pressure ulcer, hyperglycemia,


                                                 dehydration


      Pertinent Medical Hx/Surgical Hx           DM, dementia, depression,


                                                 psychosis, schizophrenia,


                                                 chronic renal insuff


      Subjective Information                     Consult received for


                                                 unstageble pressure ulcer on , high BG on . Per


                                                 records, PO intake 50-75%. Pt


                                                 was on NPO today for surgery-


                                                 excisional debridement,


                                                 application of wound VAC


      Current Diet Order/ Nutrition Support      pureed, CCHO 60gm


      Pertinent Medications                      novolog


      Pertinent Labs                              Na 152, K 4.6, BUN 56, Cr


                                                 1.8, Glucose 262, -246


                                                 , Mg 2.9


     Nutritional Hx/Data


      Height                                     1.8 m


      Height (Calculated Centimeters)            180.3


      Current Weight (lbs)                       70.76 kg


      Weight (Calculated Kilograms)              70.8


      Weight (Calculated Grams)                  59686.4


      Ideal Body Weight                          172


      % Ideal Body Weight                        91


      Body Mass Index (BMI)                      21.7


      Weight Status                              Approriate


     GI Symptoms


      GI Symptoms                                None


      Last BM                                    


      Difficult in:                              None


      Skin Integrity/Comment:                    scar to right upper back,


                                                 decubitus ulcer to buttocks


      Current %PO                                Fair (50-74%)


     Estimated Nutritional Goals


      BEE in Kcals:                              Using Current wt


      Calories/Kcals/Kg                          27-32


      Kcals Calculated                           8167-4580


      Protein:                                   Using Current wt


      Protein g/k-1.2


      Protein Calculated                         71-85


      Fluid: ml                                  1917-2130ml (1ml/kcal)


     Nutritional Problem


      2. Problem


       Problem                                   altered nutrition related lab


                                                 values


       Etiology                                  hx of DM


       Signs/Symptoms:                           Glucose 262, -246


      1. Problem


       Problem                                   increased nutrition needs (


                                                 calorie and protein)


       Etiology                                  increased metabolic demand for


                                                 wound healing


       Signs/Symptoms:                           decubitus ulcer and surgery


     Intervention/Recommendation


      Comments                                   1. Continue with current diet


                                                 as ordered. If PO intake low,


                                                 will consider nutrition


                                                 supplements


                                                 2. MD to consider vit C and


                                                 zinc for wound healing


                                                 3. Monitor PO intake, wt, labs


                                                 and skin integrity


                                                 4. F/U as moderate risk in 3-5


                                                 days, 3/2-3/4, PO check 3/2


     Expected Outcomes/Goals


      Expected Outcomes/Goals                    1. PO intake to meet at least


                                                 75% of nutritional needs.


                                                 2. Wt stability, skin to


                                                 remain intact, labs to


                                                 approach WNL.

## 2018-03-14 NOTE — GENERAL PROGRESS NOTE
Subjective





- Review of Systems


Service Date: 18


Subjective: 





Patient is awake and alert. no acute distress. patient eating fine.  Irritable. 

Patient blood sugar stable. continue levemir 18u Daily.  Urine + Staph Aureas 

sensitive to Levofloxacin. 





Objective





- Results


Result Diagrams: 


 18 08:40





 18 08:40


Recent Labs: 


 Laboratory Last Values











WBC  6.2 Th/cmm (4.8-10.8)   18  08:40    


 


RBC  4.30 Mil/cmm (3.80-5.80)   18  08:40    


 


Hgb  12.7 gm/dL (12-16)   18  08:40    


 


Hct  38.4 % (41.0-60)  L  18  08:40    


 


MCV  89.2 fl (80-99)   18  08:40    


 


MCH  29.4 pg (27.0-31.0)   18  08:40    


 


MCHC Differential  33.0 pg (28.0-36.0)   18  08:40    


 


RDW  16.5 % (11.5-20.0)   18  08:40    


 


Plt Count  264 Th/cmm (150-400)   18  08:40    


 


MPV  8.6 fl  18  08:40    


 


Neutrophils %  66.2 % (40.0-80.0)   18  08:40    


 


Lymphocytes %  26.6 % (20.0-50.0)   18  08:40    


 


Monocytes %  5.9 % (2.0-10.0)   18  08:40    


 


Eosinophils %  0.7 % (0.0-5.0)   18  08:40    


 


Basophils %  0.6 % (0.0-2.0)   18  08:40    


 


Eos Smear Source  URINE   18  03:00    


 


Eos Smear Total Cells  FEW EOSINOPHILS SEEN  (NONE SEEN)   18  03:00    


 


PT  10.5 SECONDS (9.5-11.5)   18  06:00    


 


INR  1.01  (0.5-1.4)   18  06:00    


 


PTT (Actin FS)  25.3 SECONDS (26.0-38.0)  L  18  06:00    


 


Sodium  137 mEq/L (136-145)   18  08:40    


 


Potassium  3.9 mEq/L (3.5-5.1)   18  08:40    


 


Chloride  103 mEq/L ()   18  08:40    


 


Carbon Dioxide  22.0 mEq/L (21.0-31.0)   18  08:40    


 


Anion Gap  15.9  (7.0-16.0)   18  08:40    


 


BUN  17 mg/dL (7-25)   18  08:40    


 


Creatinine  1.1 mg/dL (0.7-1.3)   18  08:40    


 


Est GFR ( Amer)  TNP   18  08:40    


 


Est GFR (Non-Af Amer)  TNP   18  08:40    


 


BUN/Creatinine Ratio  15.5   18  08:40    


 


Glucose  191 mg/dL ()  H  18  08:40    


 


POC Glucose  156 MG/DL (70 - 105)  H  18  17:54    


 


Hemoglobin A1c %  10.5 % (4.0-6.0)  H  18  05:26    


 


Uric Acid  5.6 mg/dL (4.4-7.6)   18  09:10    


 


Calcium  8.9 mg/dL (8.6-10.3)   18  08:40    


 


Phosphorus  4.1 mg/dL (2.5-5.0)   18  06:00    


 


Magnesium  2.0 mg/dL (1.9-2.7)   18  05:05    


 


Total Bilirubin  0.7 mg/dL (0.3-1.0)   18  08:40    


 


AST  18 U/L (13-39)   18  08:40    


 


ALT  16 U/L (7-52)   18  08:40    


 


Alkaline Phosphatase  67 U/L ()   18  08:40    


 


Total Protein  6.8 gm/dL (6.0-8.3)   18  08:40    


 


Albumin  3.0 gm/dL (4.2-5.5)  L  18  08:40    


 


Globulin  3.8 gm/dL  18  08:40    


 


Albumin/Globulin Ratio  0.8  (1.0-1.8)  L  18  08:40    


 


TSH  0.28 uIU/ml (0.34-5.60)  L  18  06:00    


 


Urine Source  RAVI PORT   18  14:00    


 


Urine Color  ORANGE   18  14:00    


 


Urine Clarity  CLOUDY  (CLEAR)   18  14:00    


 


Urine pH  5.5  (4.6 - 8.0)   18  14:00    


 


Ur Specific Gravity  >= 1.030  (1.005-1.030)   18  14:00    


 


Urine Protein  30 mg/dL (NEGATIVE)  H  18  14:00    


 


Urine Glucose (UA)  NEGATIVE mg/dL (NEGATIVE)   18  14:00    


 


Urine Ketones  TRACE mg/dL (NEGATIVE)   18  14:00    


 


Urine Blood  MODERATE  (NEGATIVE)  H  18  14:00    


 


Urine Nitrate  NEGATIVE  (NEGATIVE)   18  14:00    


 


Urine Bilirubin  MODERATE  (NEGATIVE)  H  18  14:00    


 


Urine Ictotest  NEGATIVE  (NEGATIVE)   18  03:00    


 


Urine Urobilinogen  1.0 E.U./dL (0.2 - 1.0)   18  14:00    


 


Ur Leukocyte Esterase  SMALL  (NEGATIVE)  H  18  14:00    


 


Urine RBC  10-25 /hpf (0-5)  H  18  14:00    


 


Urine WBC  6-10 /hpf (0-5)   18  14:00    


 


Ur Epithelial Cells  FEW /lpf (FEW)   18  14:00    


 


Urine Bacteria  FEW /hpf (NONE SEEN)   18  14:00    


 


Urine Yeast  MANY /hpf (NONE SEEN)  H  18  14:00    


 


Urine Osmolality  749 mOsmol/kg  18  03:00    


 


Ur Random Sodium  39 mmol/L  18  03:00    


 


Urine Creatinine  136.0 mg/dl (39.0-259.0)   18  03:00    














- Physical Exam


Vitals and I&O: 


 Vital Signs











Temp  98.0 F   18 00:00


 


Pulse  95   18 00:00


 


Resp  20   18 00:00


 


BP  130/81   18 00:00


 


Pulse Ox  98   18 00:00








 Intake & Output











 18





 18:59 06:59 18:59


 


Intake Total 600 120 


 


Output Total 400  


 


Balance 200 120 


 


Weight (lbs) 77.111 kg 77.111 kg 


 


Intake:   


 


  Intake, IV Amount 100  


 


    Levofloxacin 500mg/100mL 100  





    500 mg In 100 ml @ 100   





    mls/hr IV Q24HR [facility] Rx#:   





    [id number]   


 


  Oral 500 120 


 


Output:   


 


  Urine 400  


 


Other:   


 


  # Voids  4 


 


  # Bowel Movements  2 











Active Medications: 


Current Medications





Acetaminophen (Tylenol)  650 mg PO Q6HR PRN


   PRN Reason: mild to moderate pain


   Stop: 18 19:27


   Last Admin: 18 00:25 Dose:  650 mg


Acetaminophen/Hydrocodone Bitart (Norco 5mg/325mg)  1 tab PO Q6H PRN


   PRN Reason: Pain (Moderate)


   Stop: 18 08:21


   Last Admin: 18 21:26 Dose:  1 tab


Allopurinol (Zyloprim)  100 mg PO DAILY [facility]


   Stop: 18 08:59


   Last Admin: 18 10:44 Dose:  100 mg


Castor Oil/Kazakh Balsam/Trypsin (Venelex)  1 appl TP DAILY [facility]


   Stop: 18 15:59


   Last Admin: 18 10:52 Dose:  1 appl


Clonazepam (Klonopin)  1 mg PO BID [facility]


   PRN Reason: Protocol


   Stop: 18 08:59


   Last Admin: 18 17:42 Dose:  1 mg


Famotidine (Pepcid)  20 mg PO BID [facility]


   Stop: 18 08:59


   Last Admin: 18 17:44 Dose:  20 mg


Heparin Sodium (Porcine) (Heparin)  5,000 units SUBQ Q12HR [facility]


   PRN Reason: Protocol


   Stop: 18 20:59


   Last Admin: 18 22:05 Dose:  5,000 units


Levofloxacin (Levaquin Pb)  500 mg in 100 mls @ 100 mls/hr IV Q24HR [facility]


   Stop: 18 05:59


   Last Admin: 18 06:33 Dose:  100 mls/hr


Insulin Aspart (Novolog Insulin Sliding Scale)  0 units SUBQ ACHS [facility]


   PRN Reason: Protocol


   Stop: 18 20:59


   Last Admin: 18 06:50 Dose:  Not Given


Insulin Detemir (Levemir Insulin)  18 units SUBQ DAILY [facility]


   PRN Reason: Protocol


   Stop: 18 08:13


   Last Admin: 18 08:31 Dose:  18 units


Lorazepam (Ativan)  0.5 mg PO Q6HR PRN; Protocol


   PRN Reason: agitation 


   Stop: 18 19:27


   Last Admin: 18 00:09 Dose:  0.5 mg


Magnesium Hydroxide (Milk Of Magnesia)  30 ml PO HS PRN


   PRN Reason: Constipation


   Stop: 18 19:27


Metoprolol Succinate (Toprol Xl)  25 mg PO BID [facility]


   Stop: 18 16:59


   Last Admin: 18 17:42 Dose:  25 mg


Olanzapine (Zyprexa Zydis)  12.5 mg PO BID [facility]


   PRN Reason: Protocol


   Stop: 18 12:28


   Last Admin: 18 17:44 Dose:  12.5 mg


Zolpidem Tartrate (Ambien)  5 mg PO HS PRN


   PRN Reason: insomnia


   Stop: 18 19:27


   Last Admin: 18 21:25 Dose:  5 mg








General: Alert, No acute distress


HEENT: Atraumatic, PERRLA


Neck: Supple


Cardiovascular: Regular rate, Normal S1, Normal S2


Lungs: Clear to auscultation


Abdomen: Bowel sounds, Soft


Extremities: no Clubbing, no Cyanosis, no Edema


Neurological: Sensation intact


Skin: no Rash


Psych/Mental Status: Mood NL





- Procedures


Procedures: 


 Procedures











Procedure Code Date


 


KEV MUSC/FASCIA 20 SQ CM/< 58685 18


 


EXCISION OF RIGHT HIP MUSCLE, OPEN APPROACH [id number] 18














Assessment/Plan





- Assessment


Assessment: 


Sacral stage III decubitus ulcer, infected.


Diabetes mellitus type


Depression.


psychosis.  


Hematuria.


hypernatremia resolved


prerenal azotemia improved.


hyperglycemia ... stable


depression/anxiety


gout ... on allopurinol. check uric acid level


dementia


schizophrenia


S/P wound debridement of sacral decubitus ... continue wound care treatment.


UTI +Staph Aureus sensitive to Levofloxacin


BRYANT on CKD ... resolved.


hyperkalemia resolved. 





discharge planning.





- Plan


Plan: 





will order cmp this AM





renal consult -- Dr. Bajwa





sacral decubitus ... will order Gen Surgical consult -- Dr. Jones for possible 

wound debridement





Gout ... continue allopurinol 100mg PO daily





continue wound vac. 





continue hydrocodone PO for pain control. 





change Levofloxacin to PO





discharge planning/placement





Nutritional Asmnt/Malnutr-PDOC





- Dietary Evaluation


Malnutrition Findings (Please click <Entered> for more info): 








Nutritional Asmnt/Malnutrition                             Start:  18 17:

10


Text:                                                      Status: Complete    

  


Freq:                                                                          

  


 Document     18 17:13  SAÚL  (Rec[facility] 18 17:25  [id number]


 Nutritional Asmnt/Malnutrition


     Patient General Information


      Nutritional Screening                      High Risk


                                                 Consult


      Diagnosis                                  pressure ulcer, hyperglycemia,


                                                 dehydration


      Pertinent Medical Hx/Surgical Hx           DM, dementia, depression,


                                                 psychosis, schizophrenia,


                                                 chronic renal insuff


      Subjective Information                     Consult received for


                                                 unstageble pressure ulcer on , high BG on . Per


                                                 records, PO intake 50-75%. Pt


                                                 was on NPO today for surgery-


                                                 excisional debridement,


                                                 application of wound VAC


      Current Diet Order/ Nutrition Support      pureed, CCHO 60gm


      Pertinent Medications                      novolog


      Pertinent Labs                              Na 152, K 4.6, BUN 56, Cr


                                                 1.8, Glucose 262, -246


                                                 , Mg 2.9


     Nutritional Hx/Data


      Height                                     1.8 m


      Height (Calculated Centimeters)            180.3


      Current Weight (lbs)                       70.76 kg


      Weight (Calculated Kilograms)              70.8


      Weight (Calculated Grams)                  72352.4


      Ideal Body Weight                          172


      % Ideal Body Weight                        91


      Body Mass Index (BMI)                      21.7


      Weight Status                              Approriate


     GI Symptoms


      GI Symptoms                                None


      Last BM                                    


      Difficult in:                              None


      Skin Integrity/Comment:                    scar to right upper back,


                                                 decubitus ulcer to buttocks


      Current %PO                                Fair (50-74%)


     Estimated Nutritional Goals


      BEE in Kcals:                              Using Current wt


      Calories/Kcals/Kg                          27-32


      Kcals Calculated                           5102-1094


      Protein:                                   Using Current wt


      Protein g/k-1.2


      Protein Calculated                         71-85


      Fluid: ml                                  191-ml (1ml/kcal)


     Nutritional Problem


      2. Problem


       Problem                                   altered nutrition related lab


                                                 values


       Etiology                                  hx of DM


       Signs/Symptoms:                           Glucose 262, -246


      1. Problem


       Problem                                   increased nutrition needs (


                                                 calorie and protein)


       Etiology                                  increased metabolic demand for


                                                 wound healing


       Signs/Symptoms:                           decubitus ulcer and surgery


     Intervention/Recommendation


      Comments                                   1. Continue with current diet


                                                 as ordered. If PO intake low,


                                                 will consider nutrition


                                                 supplements


                                                 2. MD to consider vit C and


                                                 zinc for wound healing


                                                 3. Monitor PO intake, wt, labs


                                                 and skin integrity


                                                 4. F/U as moderate risk in 3-5


                                                 days, 3/2-3/, PO check 3/2


     Expected Outcomes/Goals


      Expected Outcomes/Goals                    1. PO intake to meet at least


                                                 75% of nutritional needs.


                                                 2. Wt stability, skin to


                                                 remain intact, labs to


                                                 approach WNL.

## 2018-03-15 RX ADMIN — CASTOR OIL AND BALSAM, PERU SCH APPL: 788; 87 OINTMENT TOPICAL at 15:51

## 2018-03-15 RX ADMIN — INSULIN ASPART SCH: 100 INJECTION, SOLUTION INTRAVENOUS; SUBCUTANEOUS at 15:24

## 2018-03-15 RX ADMIN — INSULIN ASPART SCH UNITS: 100 INJECTION, SOLUTION INTRAVENOUS; SUBCUTANEOUS at 21:20

## 2018-03-15 RX ADMIN — INSULIN DETEMIR SCH: 100 INJECTION, SOLUTION SUBCUTANEOUS at 11:14

## 2018-03-15 RX ADMIN — LEVOFLOXACIN SCH MLS/HR: 5 INJECTION INTRAVENOUS at 15:48

## 2018-03-15 RX ADMIN — INSULIN ASPART SCH: 100 INJECTION, SOLUTION INTRAVENOUS; SUBCUTANEOUS at 18:20

## 2018-03-15 RX ADMIN — OLANZAPINE SCH: 5 TABLET, ORALLY DISINTEGRATING ORAL at 15:25

## 2018-03-15 RX ADMIN — OLANZAPINE SCH: 5 TABLET, ORALLY DISINTEGRATING ORAL at 18:20

## 2018-03-15 NOTE — GENERAL PROGRESS NOTE
Subjective





- Review of Systems


Service Date: 03/15/18


Subjective: 





Patient is awake and alert. no acute distress. patient eating fine.  Irritable. 

Patient blood sugar stable. continue levemir 18u Daily.  Urine + Staph Aureas 

sensitive to Levofloxacin. 





Objective





- Results


Result Diagrams: 


 18 08:40





 18 08:40


Recent Labs: 


 Laboratory Last Values











WBC  6.2 Th/cmm (4.8-10.8)   18  08:40    


 


RBC  4.30 Mil/cmm (3.80-5.80)   18  08:40    


 


Hgb  12.7 gm/dL (12-16)   18  08:40    


 


Hct  38.4 % (41.0-60)  L  18  08:40    


 


MCV  89.2 fl (80-99)   18  08:40    


 


MCH  29.4 pg (27.0-31.0)   18  08:40    


 


MCHC Differential  33.0 pg (28.0-36.0)   18  08:40    


 


RDW  16.5 % (11.5-20.0)   18  08:40    


 


Plt Count  264 Th/cmm (150-400)   18  08:40    


 


MPV  8.6 fl  18  08:40    


 


Neutrophils %  66.2 % (40.0-80.0)   18  08:40    


 


Lymphocytes %  26.6 % (20.0-50.0)   18  08:40    


 


Monocytes %  5.9 % (2.0-10.0)   18  08:40    


 


Eosinophils %  0.7 % (0.0-5.0)   18  08:40    


 


Basophils %  0.6 % (0.0-2.0)   18  08:40    


 


Eos Smear Source  URINE   18  03:00    


 


Eos Smear Total Cells  FEW EOSINOPHILS SEEN  (NONE SEEN)   18  03:00    


 


PT  10.5 SECONDS (9.5-11.5)   18  06:00    


 


INR  1.01  (0.5-1.4)   18  06:00    


 


PTT (Actin FS)  25.3 SECONDS (26.0-38.0)  L  18  06:00    


 


Sodium  137 mEq/L (136-145)   18  08:40    


 


Potassium  3.9 mEq/L (3.5-5.1)   18  08:40    


 


Chloride  103 mEq/L ()   18  08:40    


 


Carbon Dioxide  22.0 mEq/L (21.0-31.0)   18  08:40    


 


Anion Gap  15.9  (7.0-16.0)   18  08:40    


 


BUN  17 mg/dL (7-25)   18  08:40    


 


Creatinine  1.1 mg/dL (0.7-1.3)   18  08:40    


 


Est GFR ( Amer)  TNP   18  08:40    


 


Est GFR (Non-Af Amer)  TNP   18  08:40    


 


BUN/Creatinine Ratio  15.5   18  08:40    


 


Glucose  191 mg/dL ()  H  18  08:40    


 


POC Glucose  155 MG/DL (70 - 105)  H  03/15/18  06:03    


 


Hemoglobin A1c %  10.5 % (4.0-6.0)  H  18  05:26    


 


Uric Acid  5.6 mg/dL (4.4-7.6)   18  09:10    


 


Calcium  8.9 mg/dL (8.6-10.3)   18  08:40    


 


Phosphorus  4.1 mg/dL (2.5-5.0)   18  06:00    


 


Magnesium  2.0 mg/dL (1.9-2.7)   18  05:05    


 


Total Bilirubin  0.7 mg/dL (0.3-1.0)   18  08:40    


 


AST  18 U/L (13-39)   18  08:40    


 


ALT  16 U/L (7-52)   18  08:40    


 


Alkaline Phosphatase  67 U/L ()   18  08:40    


 


Total Protein  6.8 gm/dL (6.0-8.3)   18  08:40    


 


Albumin  3.0 gm/dL (4.2-5.5)  L  18  08:40    


 


Globulin  3.8 gm/dL  18  08:40    


 


Albumin/Globulin Ratio  0.8  (1.0-1.8)  L  18  08:40    


 


TSH  0.28 uIU/ml (0.34-5.60)  L  18  06:00    


 


Urine Source  RAVI PORT   18  14:00    


 


Urine Color  ORANGE   18  14:00    


 


Urine Clarity  CLOUDY  (CLEAR)   18  14:00    


 


Urine pH  5.5  (4.6 - 8.0)   18  14:00    


 


Ur Specific Gravity  >= 1.030  (1.005-1.030)   18  14:00    


 


Urine Protein  30 mg/dL (NEGATIVE)  H  18  14:00    


 


Urine Glucose (UA)  NEGATIVE mg/dL (NEGATIVE)   18  14:00    


 


Urine Ketones  TRACE mg/dL (NEGATIVE)   18  14:00    


 


Urine Blood  MODERATE  (NEGATIVE)  H  18  14:00    


 


Urine Nitrate  NEGATIVE  (NEGATIVE)   18  14:00    


 


Urine Bilirubin  MODERATE  (NEGATIVE)  H  18  14:00    


 


Urine Ictotest  NEGATIVE  (NEGATIVE)   18  03:00    


 


Urine Urobilinogen  1.0 E.U./dL (0.2 - 1.0)   18  14:00    


 


Ur Leukocyte Esterase  SMALL  (NEGATIVE)  H  18  14:00    


 


Urine RBC  10-25 /hpf (0-5)  H  18  14:00    


 


Urine WBC  6-10 /hpf (0-5)   18  14:00    


 


Ur Epithelial Cells  FEW /lpf (FEW)   18  14:00    


 


Urine Bacteria  FEW /hpf (NONE SEEN)   18  14:00    


 


Urine Yeast  MANY /hpf (NONE SEEN)  H  18  14:00    


 


Urine Osmolality  749 mOsmol/kg  18  03:00    


 


Ur Random Sodium  39 mmol/L  18  03:00    


 


Urine Creatinine  136.0 mg/dl (39.0-259.0)   18  03:00    














- Physical Exam


Vitals and I&O: 


 Vital Signs











Temp  97.0 F   03/15/18 04:00


 


Pulse  103   03/15/18 04:00


 


Resp  18   03/15/18 04:00


 


BP  140/84   03/15/18 04:00


 


Pulse Ox  97   03/15/18 04:00








 Intake & Output











 03/14/18 03/15/18 03/15/18





 18:59 06:59 18:59


 


Intake Total 800  


 


Output Total 400 500 


 


Balance 400 -500 


 


Weight (lbs) 77.111 kg 77.111 kg 


 


Intake:   


 


  Oral 800  


 


Output:   


 


  Urine 400 500 


 


Other:   


 


  # Bowel Movements 1 1 











Active Medications: 


Current Medications





Acetaminophen (Tylenol)  650 mg PO Q6HR PRN


   PRN Reason: mild to moderate pain


   Stop: 18 19:27


   Last Admin: 18 00:25 Dose:  650 mg


Acetaminophen/Hydrocodone Bitart (Norco 5mg/325mg)  1 tab PO Q6H PRN


   PRN Reason: Pain (Moderate)


   Stop: 18 08:21


   Last Admin: 18 15:59 Dose:  1 tab


Allopurinol (Zyloprim)  100 mg PO DAILY RENETTA


   Stop: 18 08:59


   Last Admin: 18 10:03 Dose:  Not Given


Castor Oil/Gambian Balsam/Trypsin (Venelex)  1 appl TP DAILY RENETTA


   Stop: 18 15:59


   Last Admin: 18 10:04 Dose:  1 appl


Clonazepam (Klonopin)  1 mg PO BID RENETTA


   PRN Reason: Protocol


   Stop: 18 08:59


   Last Admin: 18 15:59 Dose:  1 mg


Famotidine (Pepcid)  20 mg PO BID RENETTA


   Stop: 18 08:59


   Last Admin: 18 16:21 Dose:  Not Given


Fluconazole (Diflucan)  100 mg PO DAILY RENETTA


   Stop: 18 08:59


Heparin Sodium (Porcine) (Heparin)  5,000 units SUBQ Q12HR RENETTA


   PRN Reason: Protocol


   Stop: 18 20:59


   Last Admin: 18 21:21 Dose:  5,000 units


Insulin Aspart (Novolog Insulin Sliding Scale)  0 units SUBQ ACHS RENETTA


   PRN Reason: Protocol


   Stop: 18 20:59


   Last Admin: 18 21:20 Dose:  4 units


Insulin Detemir (Levemir Insulin)  18 units SUBQ DAILY RENETTA


   PRN Reason: Protocol


   Stop: 18 08:13


   Last Admin: 18 10:03 Dose:  Not Given


Levofloxacin (Levaquin)  500 mg PO DAILY RENETTA


   Stop: 18 08:59


   Last Admin: 18 10:02 Dose:  500 mg


Lorazepam (Ativan)  0.5 mg PO Q6HR PRN; Protocol


   PRN Reason: agitation 


   Stop: 18 19:27


   Last Admin: 18 15:59 Dose:  0.5 mg


Magnesium Hydroxide (Milk Of Magnesia)  30 ml PO HS PRN


   PRN Reason: Constipation


   Stop: 18 19:27


Metoprolol Succinate (Toprol Xl)  25 mg PO BID RENETTA


   Stop: 18 16:59


   Last Admin: 18 15:59 Dose:  25 mg


Olanzapine (Zyprexa Zydis)  15 mg PO BID RENETTA


   PRN Reason: Protocol


   Stop: 18 06:50


Zolpidem Tartrate (Ambien)  5 mg PO HS PRN


   PRN Reason: insomnia


   Stop: 18 19:27


   Last Admin: 18 21:25 Dose:  5 mg








General: Alert, No acute distress


HEENT: Atraumatic, PERRLA


Neck: Supple


Cardiovascular: Regular rate, Normal S1, Normal S2


Lungs: Clear to auscultation


Abdomen: Bowel sounds, Soft


Extremities: no Clubbing, no Cyanosis, no Edema


Neurological: Sensation intact


Skin: no Rash


Psych/Mental Status: Mood NL





- Procedures


Procedures: 


 Procedures











Procedure Code Date


 


KEV MUSC/FASCIA 20 SQ CM/< 16461 18


 


EXCISION OF RIGHT HIP MUSCLE, OPEN APPROACH [id number]














Assessment/Plan





- Assessment


Assessment: 


Sacral stage III decubitus ulcer, infected.


Diabetes mellitus type


Depression.


psychosis.  


Hematuria.


hypernatremia resolved


prerenal azotemia improved.


hyperglycemia ... stable


depression/anxiety


gout ... on allopurinol. check uric acid level


dementia


schizophrenia


S/P wound debridement of sacral decubitus ... continue wound care treatment.


UTI +Staph Aureus sensitive to Levofloxacin


BRYANT on CKD ... resolved.


hyperkalemia resolved. 





discharge planning.





- Plan


Plan: 





will order cmp this AM





renal consult -- Dr. Bajwa





sacral decubitus ... will order Gen Surgical consult -- Dr. Jones for possible 

wound debridement





Gout ... continue allopurinol 100mg PO daily





continue wound vac. 





continue hydrocodone PO for pain control. 





change Levofloxacin to PO





discharge planning/placement





Nutritional Asmnt/Malnutr-PDOC





- Dietary Evaluation


Malnutrition Findings (Please click <Entered> for more info): 








Nutritional Asmnt/Malnutrition                             Start:  18 17:

10


Text:                                                      Status: Complete    

  


Freq:                                                                          

  


 Document     18 17:13  [facility]  (Rec: 18 17:25  [id number]


 Nutritional Asmnt/Malnutrition


     Patient General Information


      Nutritional Screening                      High Risk


                                                 Consult


      Diagnosis                                  pressure ulcer, hyperglycemia,


                                                 dehydration


      Pertinent Medical Hx/Surgical Hx           DM, dementia, depression,


                                                 psychosis, schizophrenia,


                                                 chronic renal insuff


      Subjective Information                     Consult received for


                                                 unstageble pressure ulcer on , high BG on . Per


                                                 records, PO intake 50-75%. Pt


                                                 was on NPO today for surgery-


                                                 excisional debridement,


                                                 application of wound VAC


      Current Diet Order/ Nutrition Support      pureed, CCHO 60gm


      Pertinent Medications                      novolog


      Pertinent Labs                              Na 152, K 4.6, BUN 56, Cr


                                                 1.8, Glucose 262, -246


                                                 , Mg 2.9


     Nutritional Hx/Data


      Height                                     1.8 m


      Height (Calculated Centimeters)            180.3


      Current Weight (lbs)                       70.76 kg


      Weight (Calculated Kilograms)              70.8


      Weight (Calculated Grams)                  52745.4


      Ideal Body Weight                          172


      % Ideal Body Weight                        91


      Body Mass Index (BMI)                      21.7


      Weight Status                              Approriate


     GI Symptoms


      GI Symptoms                                None


      Last BM                                    


      Difficult in:                              None


      Skin Integrity/Comment:                    scar to right upper back,


                                                 decubitus ulcer to buttocks


      Current %PO                                Fair (50-74%)


     Estimated Nutritional Goals


      BEE in Kcals:                              Using Current wt


      Calories/Kcals/Kg                          27-32


      Kcals Calculated                           9579-2132


      Protein:                                   Using Current wt


      Protein g/k-1.2


      Protein Calculated                         71-85


      Fluid: ml                                  1917-2130ml (1ml/kcal)


     Nutritional Problem


      2. Problem


       Problem                                   altered nutrition related lab


                                                 values


       Etiology                                  hx of DM


       Signs/Symptoms:                           Glucose 262, -246


      1. Problem


       Problem                                   increased nutrition needs (


                                                 calorie and protein)


       Etiology                                  increased metabolic demand for


                                                 wound healing


       Signs/Symptoms:                           decubitus ulcer and surgery


     Intervention/Recommendation


      Comments                                   1. Continue with current diet


                                                 as ordered. If PO intake low,


                                                 will consider nutrition


                                                 supplements


                                                 2. MD to consider vit C and


                                                 zinc for wound healing


                                                 3. Monitor PO intake, wt, labs


                                                 and skin integrity


                                                 4. F/U as moderate risk in 3-5


                                                 days, 3/2-3/4, PO check 3/


     Expected Outcomes/Goals


      Expected Outcomes/Goals                    1. PO intake to meet at least


                                                 75% of nutritional needs.


                                                 2. Wt stability, skin to


                                                 remain intact, labs to


                                                 approach WNL.

## 2018-03-15 NOTE — INFECTIOUS DISEASE PROG NOTE
Infectious Disease Subjective





- Review of Systems


Service Date: 03/15/18


Subjective: 





no new change.





Infectious Disease Objective





- Results


Result Diagrams: 


 18 08:40





 18 08:40


Recent Labs: 


 Laboratory Last Values











WBC  6.2 Th/cmm (4.8-10.8)   18  08:40    


 


RBC  4.30 Mil/cmm (3.80-5.80)   18  08:40    


 


Hgb  12.7 gm/dL (12-16)   18  08:40    


 


Hct  38.4 % (41.0-60)  L  18  08:40    


 


MCV  89.2 fl (80-99)   18  08:40    


 


MCH  29.4 pg (27.0-31.0)   18  08:40    


 


MCHC Differential  33.0 pg (28.0-36.0)   18  08:40    


 


RDW  16.5 % (11.5-20.0)   18  08:40    


 


Plt Count  264 Th/cmm (150-400)   18  08:40    


 


MPV  8.6 fl  18  08:40    


 


Neutrophils %  66.2 % (40.0-80.0)   18  08:40    


 


Lymphocytes %  26.6 % (20.0-50.0)   18  08:40    


 


Monocytes %  5.9 % (2.0-10.0)   18  08:40    


 


Eosinophils %  0.7 % (0.0-5.0)   18  08:40    


 


Basophils %  0.6 % (0.0-2.0)   18  08:40    


 


Eos Smear Source  URINE   18  03:00    


 


Eos Smear Total Cells  FEW EOSINOPHILS SEEN  (NONE SEEN)   18  03:00    


 


PT  10.5 SECONDS (9.5-11.5)   18  06:00    


 


INR  1.01  (0.5-1.4)   18  06:00    


 


PTT (Actin FS)  25.3 SECONDS (26.0-38.0)  L  18  06:00    


 


Sodium  137 mEq/L (136-145)   18  08:40    


 


Potassium  3.9 mEq/L (3.5-5.1)   18  08:40    


 


Chloride  103 mEq/L ()   18  08:40    


 


Carbon Dioxide  22.0 mEq/L (21.0-31.0)   18  08:40    


 


Anion Gap  15.9  (7.0-16.0)   18  08:40    


 


BUN  17 mg/dL (7-25)   18  08:40    


 


Creatinine  1.1 mg/dL (0.7-1.3)   18  08:40    


 


Est GFR ( Amer)  TNP   18  08:40    


 


Est GFR (Non-Af Amer)  TNP   18  08:40    


 


BUN/Creatinine Ratio  15.5   18  08:40    


 


Glucose  191 mg/dL ()  H  18  08:40    


 


POC Glucose  155 MG/DL (70 - 105)  H  03/15/18  06:03    


 


Hemoglobin A1c %  10.5 % (4.0-6.0)  H  18  05:26    


 


Uric Acid  5.6 mg/dL (4.4-7.6)   18  09:10    


 


Calcium  8.9 mg/dL (8.6-10.3)   18  08:40    


 


Phosphorus  4.1 mg/dL (2.5-5.0)   18  06:00    


 


Magnesium  2.0 mg/dL (1.9-2.7)   18  05:05    


 


Total Bilirubin  0.7 mg/dL (0.3-1.0)   18  08:40    


 


AST  18 U/L (13-39)   18  08:40    


 


ALT  16 U/L (7-52)   18  08:40    


 


Alkaline Phosphatase  67 U/L ()   18  08:40    


 


Total Protein  6.8 gm/dL (6.0-8.3)   18  08:40    


 


Albumin  3.0 gm/dL (4.2-5.5)  L  18  08:40    


 


Globulin  3.8 gm/dL  18  08:40    


 


Albumin/Globulin Ratio  0.8  (1.0-1.8)  L  18  08:40    


 


TSH  0.28 uIU/ml (0.34-5.60)  L  18  06:00    


 


Urine Source  RAVI PORT   18  14:00    


 


Urine Color  ORANGE   18  14:00    


 


Urine Clarity  CLOUDY  (CLEAR)   18  14:00    


 


Urine pH  5.5  (4.6 - 8.0)   18  14:00    


 


Ur Specific Gravity  >= 1.030  (1.005-1.030)   18  14:00    


 


Urine Protein  30 mg/dL (NEGATIVE)  H  18  14:00    


 


Urine Glucose (UA)  NEGATIVE mg/dL (NEGATIVE)   18  14:00    


 


Urine Ketones  TRACE mg/dL (NEGATIVE)   18  14:00    


 


Urine Blood  MODERATE  (NEGATIVE)  H  18  14:00    


 


Urine Nitrate  NEGATIVE  (NEGATIVE)   18  14:00    


 


Urine Bilirubin  MODERATE  (NEGATIVE)  H  18  14:00    


 


Urine Ictotest  NEGATIVE  (NEGATIVE)   18  03:00    


 


Urine Urobilinogen  1.0 E.U./dL (0.2 - 1.0)   18  14:00    


 


Ur Leukocyte Esterase  SMALL  (NEGATIVE)  H  18  14:00    


 


Urine RBC  10-25 /hpf (0-5)  H  18  14:00    


 


Urine WBC  6-10 /hpf (0-5)   18  14:00    


 


Ur Epithelial Cells  FEW /lpf (FEW)   18  14:00    


 


Urine Bacteria  FEW /hpf (NONE SEEN)   18  14:00    


 


Urine Yeast  MANY /hpf (NONE SEEN)  H  18  14:00    


 


Urine Osmolality  749 mOsmol/kg  18  03:00    


 


Ur Random Sodium  39 mmol/L  18  03:00    


 


Urine Creatinine  136.0 mg/dl (39.0-259.0)   18  03:00    














- Physical Exam


Vitals and I&O: 


 Vital Signs











Temp  97.0 F   03/15/18 04:00


 


Pulse  103   03/15/18 04:00


 


Resp  18   03/15/18 08:00


 


BP  140/84   03/15/18 04:00


 


Pulse Ox  97   03/15/18 04:00








 Intake & Output











 03/14/18 03/15/18 03/15/18





 18:59 06:59 18:59


 


Intake Total 800  


 


Output Total 400 500 


 


Balance 400 -500 


 


Weight (lbs) 77.111 kg 77.111 kg 


 


Intake:   


 


  Oral 800  


 


Output:   


 


  Urine 400 500 


 


Other:   


 


  # Bowel Movements 1 1 











Active Medications: 


Current Medications





Acetaminophen (Tylenol)  650 mg PO Q6HR PRN


   PRN Reason: mild to moderate pain


   Stop: 18 19:27


   Last Admin: 18 00:25 Dose:  650 mg


Acetaminophen/Hydrocodone Bitart (Norco 5mg/325mg)  1 tab PO Q6H PRN


   PRN Reason: Pain (Moderate)


   Stop: 18 08:21


   Last Admin: 18 15:59 Dose:  1 tab


Allopurinol (Zyloprim)  100 mg PO DAILY [facility]


   Stop: 18 08:59


   Last Admin: 03/15/18 11:12 Dose:  Not Given


Castor Oil/Polish Balsam/Trypsin (Venelex)  1 appl TP DAILY [facility]


   Stop: 18 15:59


   Last Admin: 03/15/18 15:51 Dose:  1 appl


Clonazepam (Klonopin)  1 mg PO BID [facility]


   PRN Reason: Protocol


   Stop: 18 08:59


   Last Admin: 03/15/18 11:12 Dose:  Not Given


Famotidine (Pepcid)  20 mg PO BID [facility]


   Stop: 18 08:59


   Last Admin: 03/15/18 11:13 Dose:  Not Given


Fluconazole (Diflucan)  100 mg PO DAILY [facility]


   Stop: 18 08:59


   Last Admin: 03/15/18 11:13 Dose:  Not Given


Heparin Sodium (Porcine) (Heparin)  5,000 units SUBQ Q12HR [facility]


   PRN Reason: Protocol


   Stop: 18 20:59


   Last Admin: 03/15/18 11:13 Dose:  Not Given


Levofloxacin (Levaquin Pb)  500 mg in 100 mls @ 100 mls/hr IV Q24HR [facility]


   Stop: 18 14:44


   Last Admin: 03/15/18 15:48 Dose:  100 mls/hr


Insulin Aspart (Novolog Insulin Sliding Scale)  0 units SUBQ ACHS [facility]


   PRN Reason: Protocol


   Stop: 18 20:59


   Last Admin: 03/15/18 15:24 Dose:  Not Given


Insulin Detemir (Levemir Insulin)  18 units SUBQ DAILY [facility]


   PRN Reason: Protocol


   Stop: 18 08:13


   Last Admin: 03/15/18 11:14 Dose:  Not Given


Lorazepam (Ativan)  0.5 mg PO Q6HR PRN; Protocol


   PRN Reason: agitation 


   Stop: 18 19:27


   Last Admin: 18 15:59 Dose:  0.5 mg


Magnesium Hydroxide (Milk Of Magnesia)  30 ml PO HS PRN


   PRN Reason: Constipation


   Stop: 18 19:27


Metoprolol Succinate (Toprol Xl)  25 mg PO BID [facility]


   Stop: 18 16:59


   Last Admin: 03/15/18 15:25 Dose:  Not Given


Olanzapine (Zyprexa Zydis)  15 mg PO BID [facility]


   PRN Reason: Protocol


   Stop: 18 06:50


   Last Admin: 03/15/18 15:25 Dose:  Not Given


Zolpidem Tartrate (Ambien)  5 mg PO HS PRN


   PRN Reason: insomnia


   Stop: 18 19:27


   Last Admin: 18 21:25 Dose:  5 mg








General: no acute distress, well developed, well nourished


HEENT: atraumatic, normocephalic, PERRLA, EOMI


Neck: supple, no thyromegaly, no lymphadenopathy


Cardiovascular: S1S2, regular


Lungs: clear to auscultation bilaterally, clear to percussion


Abdomen: soft, no tender, no distended


Extremities: no cyanosis, no clubbing, no edema


Neurological: awake, alert, oriented


Skin: other (sacral stage 4 wound.)





- Procedures


Procedures: 


 Procedures











Procedure Code Date


 


KEV MUSC/FASCIA 20 SQ CM/< 23514 18


 


EXCISION OF RIGHT HIP MUSCLE, OPEN APPROACH [id number]














Infectious Disease Assmt/Plan





- Assessment


Assessment: 


1.  Sacral stage III decubitus ulcer, infected.


2.  Status post I&D.


3.  Diabetes mellitus type .


4.  Dementia.  


5.   Depression.


6.  psychosis.  


7.  Schizophrenia.


8. candiduria.


 














- Plan


Plan: 





CPM.  


Start diflucan po for 7 days. dc levaquin.


wound care.





Nutritional Asmnt/Malnutr-PDOC





- Dietary Evaluation


Malnutrition Findings (Please click <Entered> for more info): 








Nutritional Asmnt/Malnutrition                             Start:  18 17:

10


Text:                                                      Status: Complete    

  


Freq:                                                                          

  


 Document     18 17:13  MAGEN  (Rec: 18 17:25  MAGEN  [id number])


 Nutritional Asmnt/Malnutrition


     Patient General Information


      Nutritional Screening                      High Risk


                                                 Consult


      Diagnosis                                  pressure ulcer, hyperglycemia,


                                                 dehydration


      Pertinent Medical Hx/Surgical Hx           DM, dementia, depression,


                                                 psychosis, schizophrenia,


                                                 chronic renal insuff


      Subjective Information                     Consult received for


                                                 unstageble pressure ulcer on , high BG on . Per


                                                 records, PO intake 50-75%. Pt


                                                 was on NPO today for surgery-


                                                 excisional debridement,


                                                 application of wound VAC


      Current Diet Order/ Nutrition Support      pureed, CCHO 60gm


      Pertinent Medications                      novolog


      Pertinent Labs                              Na 152, K 4.6, BUN 56, Cr


                                                 1.8, Glucose 262, -246


                                                 , Mg 2.9


     Nutritional Hx/Data


      Height                                     1.8 m


      Height (Calculated Centimeters)            180.3


      Current Weight (lbs)                       70.76 kg


      Weight (Calculated Kilograms)              70.8


      Weight (Calculated Grams)                  59967.4


      Ideal Body Weight                          172


      % Ideal Body Weight                        91


      Body Mass Index (BMI)                      21.7


      Weight Status                              Approriate


     GI Symptoms


      GI Symptoms                                None


      Last BM                                    


      Difficult in:                              None


      Skin Integrity/Comment:                    scar to right upper back,


                                                 decubitus ulcer to buttocks


      Current %PO                                Fair (50-74%)


     Estimated Nutritional Goals


      BEE in Kcals:                              Using Current wt


      Calories/Kcals/Kg                          27-32


      Kcals Calculated                           8097-9981


      Protein:                                   Using Current wt


      Protein g/k-1.2


      Protein Calculated                         71-85


      Fluid: ml                                  1917-2130ml (1ml/kcal)


     Nutritional Problem


      2. Problem


       Problem                                   altered nutrition related lab


                                                 values


       Etiology                                  hx of DM


       Signs/Symptoms:                           Glucose 262, -246


      1. Problem


       Problem                                   increased nutrition needs (


                                                 calorie and protein)


       Etiology                                  increased metabolic demand for


                                                 wound healing


       Signs/Symptoms:                           decubitus ulcer and surgery


     Intervention/Recommendation


      Comments                                   1. Continue with current diet


                                                 as ordered. If PO intake low,


                                                 will consider nutrition


                                                 supplements


                                                 2. MD to consider vit C and


                                                 zinc for wound healing


                                                 3. Monitor PO intake, wt, labs


                                                 and skin integrity


                                                 4. F/U as moderate risk in 3-5


                                                 days, 3/2-3/4, PO check 3/


     Expected Outcomes/Goals


      Expected Outcomes/Goals                    1. PO intake to meet at least


                                                 75% of nutritional needs.


                                                 2. Wt stability, skin to


                                                 remain intact, labs to


                                                 approach WNL.

## 2018-03-15 NOTE — GENERAL PROGRESS NOTE
Subjective





- Review of Systems


Service Date: 03/15/18


Subjective: 





alert, comfortable, periods of confusion





Objective





- Results


Result Diagrams: 


 18 08:40





 18 08:40


Recent Labs: 


 Laboratory Last Values











WBC  6.2 Th/cmm (4.8-10.8)   18  08:40    


 


RBC  4.30 Mil/cmm (3.80-5.80)   18  08:40    


 


Hgb  12.7 gm/dL (12-16)   18  08:40    


 


Hct  38.4 % (41.0-60)  L  18  08:40    


 


MCV  89.2 fl (80-99)   18  08:40    


 


MCH  29.4 pg (27.0-31.0)   18  08:40    


 


MCHC Differential  33.0 pg (28.0-36.0)   18  08:40    


 


RDW  16.5 % (11.5-20.0)   18  08:40    


 


Plt Count  264 Th/cmm (150-400)   18  08:40    


 


MPV  8.6 fl  18  08:40    


 


Neutrophils %  66.2 % (40.0-80.0)   18  08:40    


 


Lymphocytes %  26.6 % (20.0-50.0)   18  08:40    


 


Monocytes %  5.9 % (2.0-10.0)   18  08:40    


 


Eosinophils %  0.7 % (0.0-5.0)   18  08:40    


 


Basophils %  0.6 % (0.0-2.0)   18  08:40    


 


Eos Smear Source  URINE   18  03:00    


 


Eos Smear Total Cells  FEW EOSINOPHILS SEEN  (NONE SEEN)   18  03:00    


 


PT  10.5 SECONDS (9.5-11.5)   18  06:00    


 


INR  1.01  (0.5-1.4)   18  06:00    


 


PTT (Actin FS)  25.3 SECONDS (26.0-38.0)  L  18  06:00    


 


Sodium  137 mEq/L (136-145)   18  08:40    


 


Potassium  3.9 mEq/L (3.5-5.1)   18  08:40    


 


Chloride  103 mEq/L ()   18  08:40    


 


Carbon Dioxide  22.0 mEq/L (21.0-31.0)   18  08:40    


 


Anion Gap  15.9  (7.0-16.0)   18  08:40    


 


BUN  17 mg/dL (7-25)   18  08:40    


 


Creatinine  1.1 mg/dL (0.7-1.3)   18  08:40    


 


Est GFR ( Amer)  TNP   18  08:40    


 


Est GFR (Non-Af Amer)  TNP   18  08:40    


 


BUN/Creatinine Ratio  15.5   18  08:40    


 


Glucose  191 mg/dL ()  H  18  08:40    


 


POC Glucose  155 MG/DL (70 - 105)  H  03/15/18  06:03    


 


Hemoglobin A1c %  10.5 % (4.0-6.0)  H  18  05:26    


 


Uric Acid  5.6 mg/dL (4.4-7.6)   18  09:10    


 


Calcium  8.9 mg/dL (8.6-10.3)   18  08:40    


 


Phosphorus  4.1 mg/dL (2.5-5.0)   18  06:00    


 


Magnesium  2.0 mg/dL (1.9-2.7)   18  05:05    


 


Total Bilirubin  0.7 mg/dL (0.3-1.0)   18  08:40    


 


AST  18 U/L (13-39)   18  08:40    


 


ALT  16 U/L (7-52)   18  08:40    


 


Alkaline Phosphatase  67 U/L ()   18  08:40    


 


Total Protein  6.8 gm/dL (6.0-8.3)   18  08:40    


 


Albumin  3.0 gm/dL (4.2-5.5)  L  18  08:40    


 


Globulin  3.8 gm/dL  18  08:40    


 


Albumin/Globulin Ratio  0.8  (1.0-1.8)  L  18  08:40    


 


TSH  0.28 uIU/ml (0.34-5.60)  L  18  06:00    


 


Urine Source  RAVI PORT   18  14:00    


 


Urine Color  ORANGE   18  14:00    


 


Urine Clarity  CLOUDY  (CLEAR)   18  14:00    


 


Urine pH  5.5  (4.6 - 8.0)   18  14:00    


 


Ur Specific Gravity  >= 1.030  (1.005-1.030)   18  14:00    


 


Urine Protein  30 mg/dL (NEGATIVE)  H  18  14:00    


 


Urine Glucose (UA)  NEGATIVE mg/dL (NEGATIVE)   18  14:00    


 


Urine Ketones  TRACE mg/dL (NEGATIVE)   18  14:00    


 


Urine Blood  MODERATE  (NEGATIVE)  H  18  14:00    


 


Urine Nitrate  NEGATIVE  (NEGATIVE)   18  14:00    


 


Urine Bilirubin  MODERATE  (NEGATIVE)  H  18  14:00    


 


Urine Ictotest  NEGATIVE  (NEGATIVE)   18  03:00    


 


Urine Urobilinogen  1.0 E.U./dL (0.2 - 1.0)   18  14:00    


 


Ur Leukocyte Esterase  SMALL  (NEGATIVE)  H  18  14:00    


 


Urine RBC  10-25 /hpf (0-5)  H  18  14:00    


 


Urine WBC  6-10 /hpf (0-5)   18  14:00    


 


Ur Epithelial Cells  FEW /lpf (FEW)   18  14:00    


 


Urine Bacteria  FEW /hpf (NONE SEEN)   18  14:00    


 


Urine Yeast  MANY /hpf (NONE SEEN)  H  18  14:00    


 


Urine Osmolality  749 mOsmol/kg  18  03:00    


 


Ur Random Sodium  39 mmol/L  18  03:00    


 


Urine Creatinine  136.0 mg/dl (39.0-259.0)   18  03:00    














- Physical Exam


Vitals and I&O: 


 Vital Signs











Temp  97.0 F   03/15/18 04:00


 


Pulse  103   03/15/18 04:00


 


Resp  18   03/15/18 08:00


 


BP  140/84   03/15/18 04:00


 


Pulse Ox  97   03/15/18 04:00








 Intake & Output











 03/14/18 03/15/18 03/15/18





 18:59 06:59 18:59


 


Intake Total 800  


 


Output Total 400 500 


 


Balance 400 -500 


 


Weight (lbs) 77.111 kg 77.111 kg 


 


Intake:   


 


  Oral 800  


 


Output:   


 


  Urine 400 500 


 


Other:   


 


  # Bowel Movements 1 1 











Active Medications: 


Current Medications





Acetaminophen (Tylenol)  650 mg PO Q6HR PRN


   PRN Reason: mild to moderate pain


   Stop: 18 19:27


   Last Admin: 18 00:25 Dose:  650 mg


Acetaminophen/Hydrocodone Bitart (Norco 5mg/325mg)  1 tab PO Q6H PRN


   PRN Reason: Pain (Moderate)


   Stop: 18 08:21


   Last Admin: 18 15:59 Dose:  1 tab


Allopurinol (Zyloprim)  100 mg PO DAILY [facility]


   Stop: 18 08:59


   Last Admin: 03/15/18 11:12 Dose:  Not Given


Castor Oil/Irish Balsam/Trypsin (Venelex)  1 appl TP DAILY [facility]


   Stop: 18 15:59


   Last Admin: 18 10:04 Dose:  1 appl


Clonazepam (Klonopin)  1 mg PO BID [facility]


   PRN Reason: Protocol


   Stop: 18 08:59


   Last Admin: 03/15/18 11:12 Dose:  Not Given


Famotidine (Pepcid)  20 mg PO BID [facility]


   Stop: 18 08:59


   Last Admin: 03/15/18 11:13 Dose:  Not Given


Fluconazole (Diflucan)  100 mg PO DAILY [facility]


   Stop: 18 08:59


   Last Admin: 03/15/18 11:13 Dose:  Not Given


Heparin Sodium (Porcine) (Heparin)  5,000 units SUBQ Q12HR [facility]


   PRN Reason: Protocol


   Stop: 18 20:59


   Last Admin: 03/15/18 11:13 Dose:  Not Given


Levofloxacin (Levaquin Pb)  500 mg in 100 mls @ 100 mls/hr IV Q24HR [facility]


   Stop: 18 14:44


Insulin Aspart (Novolog Insulin Sliding Scale)  0 units SUBQ ACHS [facility]


   PRN Reason: Protocol


   Stop: 18 20:59


   Last Admin: 18 21:20 Dose:  4 units


Insulin Detemir (Levemir Insulin)  18 units SUBQ DAILY [facility]


   PRN Reason: Protocol


   Stop: 18 08:13


   Last Admin: 03/15/18 11:14 Dose:  Not Given


Lorazepam (Ativan)  0.5 mg PO Q6HR PRN; Protocol


   PRN Reason: agitation 


   Stop: 18 19:27


   Last Admin: 18 15:59 Dose:  0.5 mg


Magnesium Hydroxide (Milk Of Magnesia)  30 ml PO HS PRN


   PRN Reason: Constipation


   Stop: 18 19:27


Metoprolol Succinate (Toprol Xl)  25 mg PO BID [facility]


   Stop: 18 16:59


   Last Admin: 18 15:59 Dose:  25 mg


Olanzapine (Zyprexa Zydis)  15 mg PO BID [facility]


   PRN Reason: Protocol


   Stop: 18 06:50


Zolpidem Tartrate (Ambien)  5 mg PO HS PRN


   PRN Reason: insomnia


   Stop: 18 19:27


   Last Admin: 18 21:25 Dose:  5 mg








General: Alert, No acute distress


HEENT: Atraumatic, PERRLA


Neck: Supple


Cardiovascular: Regular rate, Normal S1, Normal S2


Lungs: Clear to auscultation


Abdomen: Bowel sounds, Soft


Extremities: no Clubbing, no Cyanosis, no Edema


Neurological: Sensation intact


Skin: no Rash


Psych/Mental Status: Mood NL





- Procedures


Procedures: 


 Procedures











Procedure Code Date


 


KVE MUSC/FASCIA 20 SQ CM/< 54806 18


 


EXCISION OF RIGHT HIP MUSCLE, OPEN APPROACH [id number]














Assessment/Plan





- Assessment


Assessment: 





BRYANT on CKD


Stage 4 decub ulcer S/P debridement


Severe dehydration


Ess HTN


Type 2 DM


Psychosis


MSSA/Yeast UTI





- Plan


Plan: 


Lab - Result Diagrams





 18 06:00 





 18 06:00 





Current Medications





Acetaminophen (Tylenol)  650 mg PO Q6HR PRN


   PRN Reason: mild to moderate pain


   Stop: 18 19:27


Allopurinol (Zyloprim)  100 mg PO DAILY [facility]


   Stop: 18 08:59


   Last Admin: 18 10:00 Dose:  Not Given


Castor Oil/Irish Balsam/Trypsin (Venelex)  1 appl TP DAILY [facility]


   Stop: 18 15:59


   Last Admin: 18 10:00 Dose:  1 appl


Famotidine (Pepcid)  20 mg PO BID [facility]


   Stop: 18 08:59


   Last Admin: 18 18:22 Dose:  20 mg


Heparin Sodium (Porcine) (Heparin)  5,000 units SUBQ Q12HR [facility]


   PRN Reason: Protocol


   Stop: 18 20:59


   Last Admin: 18 07:50 Dose:  Not Given


Ceftriaxone Sodium 1 gm/ (Sodium Chloride)  50 mls @ 100 mls/hr IV Q24HR [facility]


   Stop: 18 16:44


   Last Admin: 18 17:45 Dose:  100 mls/hr


Insulin Aspart (Novolog Insulin Sliding Scale)  0 units SUBQ ACHS [facility]


   PRN Reason: Protocol


   Stop: 18 20:59


   Last Admin: 18 18:23 Dose:  6 units


Lorazepam (Ativan)  0.5 mg PO Q6HR PRN; Protocol


   PRN Reason: agitation 


   Stop: 18 19:27


Magnesium Hydroxide (Milk Of Magnesia)  30 ml PO HS PRN


   PRN Reason: Constipation


   Stop: 18 19:27


Metoprolol Succinate (Toprol Xl)  25 mg PO BID [facility]


   Stop: 18 16:59


   Last Admin: 18 18:23 Dose:  25 mg


Miscellaneous (Clinical Monitoring)  1 ea MC PRN PRN


   PRN Reason: RENAL


   Stop: 18 10:33


Olanzapine (Zyprexa Zydis)  5 mg PO BID [facility]


   PRN Reason: Protocol


   Stop: 18 16:59


Zolpidem Tartrate (Ambien)  5 mg PO HS PRN


   PRN Reason: insomnia


   Stop: 18 


Lab - Result Diagrams





 18 08:40 





 18 08:40 









































Na down to 137


Kidney fnc gradually improving


agree w/ Allopurinol


f/u electrolytes, 


continue hydration


BS better control


increase Detemir


replace K


Urine grew MSSA, Yeast


add Diflucan to Levaquin


placement in progress

















Nutritional Asmnt/Malnutr-PDOC





- Dietary Evaluation


Malnutrition Findings (Please click <Entered> for more info): 








Nutritional Asmnt/Malnutrition                             Start:  18 17:

10


Text:                                                      Status: Complete    

  


Freq:                                                                          

  


 Document     18 17:13  MAGEN  (Rec: 18 17:25  MAGEN  [id number])


 Nutritional Asmnt/Malnutrition


     Patient General Information


      Nutritional Screening                      High Risk


                                                 Consult


      Diagnosis                                  pressure ulcer, hyperglycemia,


                                                 dehydration


      Pertinent Medical Hx/Surgical Hx           DM, dementia, depression,


                                                 psychosis, schizophrenia,


                                                 chronic renal insuff


      Subjective Information                     Consult received for


                                                 unstageble pressure ulcer on , high BG on . Per


                                                 records, PO intake 50-75%. Pt


                                                 was on NPO today for surgery-


                                                 excisional debridement,


                                                 application of wound VAC


      Current Diet Order/ Nutrition Support      pureed, CCHO 60gm


      Pertinent Medications                      novolog


      Pertinent Labs                              Na 152, K 4.6, BUN 56, Cr


                                                 1.8, Glucose 262, -246


                                                 , Mg 2.9


     Nutritional Hx/Data


      Height                                     1.8 m


      Height (Calculated Centimeters)            180.3


      Current Weight (lbs)                       70.76 kg


      Weight (Calculated Kilograms)              70.8


      Weight (Calculated Grams)                  94638.4


      Ideal Body Weight                          172


      % Ideal Body Weight                        91


      Body Mass Index (BMI)                      21.7


      Weight Status                              Approriate


     GI Symptoms


      GI Symptoms                                None


      Last BM                                    


      Difficult in:                              None


      Skin Integrity/Comment:                    scar to right upper back,


                                                 decubitus ulcer to buttocks


      Current %PO                                Fair (50-74%)


     Estimated Nutritional Goals


      BEE in Kcals:                              Using Current wt


      Calories/Kcals/Kg                          27-32


      Kcals Calculated                           0113-2527


      Protein:                                   Using Current wt


      Protein g/k-1.2


      Protein Calculated                         71-85


      Fluid: ml                                  1917-2130ml (1ml/kcal)


     Nutritional Problem


      2. Problem


       Problem                                   altered nutrition related lab


                                                 values


       Etiology                                  hx of DM


       Signs/Symptoms:                           Glucose 262, -246


      1. Problem


       Problem                                   increased nutrition needs (


                                                 calorie and protein)


       Etiology                                  increased metabolic demand for


                                                 wound healing


       Signs/Symptoms:                           decubitus ulcer and surgery


     Intervention/Recommendation


      Comments                                   1. Continue with current diet


                                                 as ordered. If PO intake low,


                                                 will consider nutrition


                                                 supplements


                                                 2. MD to consider vit C and


                                                 zinc for wound healing


                                                 3. Monitor PO intake, wt, labs


                                                 and skin integrity


                                                 4. F/U as moderate risk in 3-5


                                                 days, 3/2-3/4, PO check 3/2


     Expected Outcomes/Goals


      Expected Outcomes/Goals                    1. PO intake to meet at least


                                                 75% of nutritional needs.


                                                 2. Wt stability, skin to


                                                 remain intact, labs to


                                                 approach WNL.

## 2018-03-15 NOTE — PROGRESS NOTES
DATE:  



SUBJECTIVE:  Chart reviewed and the patient interviewed.  Also discussed the

patient's condition with the staff and reviewed records and labs.  The patient

is still having episodes of agitation and irritability.  The patient also did

not sleep most of last night and according to staff, he was kicking and trying

to hit others.  The patient also is still easily agitated.  Otherwise, the

patient is compliant with taking his medications with no side effects of

medications.



ASSESSMENT:  The patient is still agitated and still needs close monitoring.



TREATMENT PLAN:  Continue to monitor his behavior and his condition closely. 

Also, we will increase Zyprexa to 15 mg twice a day and we will continue to

follow up closely.





DD: 03/15/2018 06:52

DT: 03/15/2018 10:31

[facility]# [id number]  [id number]

## 2018-03-16 ENCOUNTER — HOSPITAL ENCOUNTER (INPATIENT)
Dept: HOSPITAL 36 - MSI | Age: 78
LOS: 21 days | Discharge: NURSING, REHAB OR LONG-TERM CARE | DRG: 592 | End: 2018-04-06
Attending: FAMILY MEDICINE | Admitting: FAMILY MEDICINE
Payer: MEDICARE

## 2018-03-16 DIAGNOSIS — B95.62: ICD-10-CM

## 2018-03-16 DIAGNOSIS — N39.0: ICD-10-CM

## 2018-03-16 DIAGNOSIS — L89.153: Primary | ICD-10-CM

## 2018-03-16 DIAGNOSIS — K59.00: ICD-10-CM

## 2018-03-16 DIAGNOSIS — F23: ICD-10-CM

## 2018-03-16 DIAGNOSIS — G30.9: ICD-10-CM

## 2018-03-16 DIAGNOSIS — E11.65: ICD-10-CM

## 2018-03-16 DIAGNOSIS — F02.81: ICD-10-CM

## 2018-03-16 PROCEDURE — X6118: HCPCS

## 2018-03-16 PROCEDURE — X3401: HCPCS

## 2018-03-16 PROCEDURE — Z7610: HCPCS

## 2018-03-16 RX ADMIN — OLANZAPINE SCH MG: 5 TABLET, ORALLY DISINTEGRATING ORAL at 10:09

## 2018-03-16 RX ADMIN — INSULIN ASPART SCH: 100 INJECTION, SOLUTION INTRAVENOUS; SUBCUTANEOUS at 18:36

## 2018-03-16 RX ADMIN — INSULIN DETEMIR SCH UNITS: 100 INJECTION, SOLUTION SUBCUTANEOUS at 10:16

## 2018-03-16 RX ADMIN — OLANZAPINE SCH MG: 5 TABLET, ORALLY DISINTEGRATING ORAL at 18:19

## 2018-03-16 RX ADMIN — CASTOR OIL AND BALSAM, PERU SCH APPL: 788; 87 OINTMENT TOPICAL at 10:09

## 2018-03-16 RX ADMIN — INSULIN ASPART SCH UNITS: 100 INJECTION, SOLUTION INTRAVENOUS; SUBCUTANEOUS at 12:46

## 2018-03-16 RX ADMIN — LEVOFLOXACIN SCH MLS/HR: 5 INJECTION INTRAVENOUS at 15:58

## 2018-03-16 RX ADMIN — INSULIN ASPART SCH: 100 INJECTION, SOLUTION INTRAVENOUS; SUBCUTANEOUS at 07:30

## 2018-03-16 NOTE — PROGRESS NOTES
DATE:  



SUBJECTIVE:  Chart reviewed and the patient interviewed.  Also discussed the

patient's condition with the staff and reviewed records and labs.  The patient

is still agitated, but seems to be less than before.  The patient waved with me,

greeting whenever he saw me and he was happier than yesterday.  The patient also

is able to follow directions and seems to be less agitated since I increased his

Zyprexa to 15 mg twice a day.



ASSESSMENT:  The patient seems to be less psychotic and less agitated.



TREATMENT PLAN:  Continue Zyprexa same dose.  Also, continue to monitor his

behavior and continue to follow up.





DD: 03/16/2018 09:52

DT: 03/16/2018 11:07

JOB# [id number]  [id number]

## 2018-03-16 NOTE — GENERAL PROGRESS NOTE
Subjective





- Review of Systems


Service Date: 18


Subjective: 





alert, comfortable, periods of confusion





Objective





- Results


Result Diagrams: 


 18 08:40





 18 08:40


Recent Labs: 


 Laboratory Last Values











WBC  6.2 Th/cmm (4.8-10.8)   18  08:40    


 


RBC  4.30 Mil/cmm (3.80-5.80)   18  08:40    


 


Hgb  12.7 gm/dL (12-16)   18  08:40    


 


Hct  38.4 % (41.0-60)  L  18  08:40    


 


MCV  89.2 fl (80-99)   18  08:40    


 


MCH  29.4 pg (27.0-31.0)   18  08:40    


 


MCHC Differential  33.0 pg (28.0-36.0)   18  08:40    


 


RDW  16.5 % (11.5-20.0)   18  08:40    


 


Plt Count  264 Th/cmm (150-400)   18  08:40    


 


MPV  8.6 fl  18  08:40    


 


Neutrophils %  66.2 % (40.0-80.0)   18  08:40    


 


Lymphocytes %  26.6 % (20.0-50.0)   18  08:40    


 


Monocytes %  5.9 % (2.0-10.0)   18  08:40    


 


Eosinophils %  0.7 % (0.0-5.0)   18  08:40    


 


Basophils %  0.6 % (0.0-2.0)   18  08:40    


 


Eos Smear Source  URINE   18  03:00    


 


Eos Smear Total Cells  FEW EOSINOPHILS SEEN  (NONE SEEN)   18  03:00    


 


PT  10.5 SECONDS (9.5-11.5)   18  06:00    


 


INR  1.01  (0.5-1.4)   18  06:00    


 


PTT (Actin FS)  25.3 SECONDS (26.0-38.0)  L  18  06:00    


 


Sodium  137 mEq/L (136-145)   18  08:40    


 


Potassium  3.9 mEq/L (3.5-5.1)   18  08:40    


 


Chloride  103 mEq/L ()   18  08:40    


 


Carbon Dioxide  22.0 mEq/L (21.0-31.0)   18  08:40    


 


Anion Gap  15.9  (7.0-16.0)   18  08:40    


 


BUN  17 mg/dL (7-25)   18  08:40    


 


Creatinine  1.1 mg/dL (0.7-1.3)   18  08:40    


 


Est GFR ( Amer)  TNP   18  08:40    


 


Est GFR (Non-Af Amer)  TNP   18  08:40    


 


BUN/Creatinine Ratio  15.5   18  08:40    


 


Glucose  191 mg/dL ()  H  18  08:40    


 


POC Glucose  239 MG/DL (70 - 105)  H  18  12:36    


 


Hemoglobin A1c %  10.5 % (4.0-6.0)  H  18  05:26    


 


Uric Acid  5.6 mg/dL (4.4-7.6)   18  09:10    


 


Calcium  8.9 mg/dL (8.6-10.3)   18  08:40    


 


Phosphorus  4.1 mg/dL (2.5-5.0)   18  06:00    


 


Magnesium  2.0 mg/dL (1.9-2.7)   18  05:05    


 


Total Bilirubin  0.7 mg/dL (0.3-1.0)   18  08:40    


 


AST  18 U/L (13-39)   18  08:40    


 


ALT  16 U/L (7-52)   18  08:40    


 


Alkaline Phosphatase  67 U/L ()   18  08:40    


 


Total Protein  6.8 gm/dL (6.0-8.3)   18  08:40    


 


Albumin  3.0 gm/dL (4.2-5.5)  L  18  08:40    


 


Globulin  3.8 gm/dL  18  08:40    


 


Albumin/Globulin Ratio  0.8  (1.0-1.8)  L  18  08:40    


 


TSH  0.28 uIU/ml (0.34-5.60)  L  18  06:00    


 


Urine Source  RAVI PORT   18  14:00    


 


Urine Color  ORANGE   18  14:00    


 


Urine Clarity  CLOUDY  (CLEAR)   18  14:00    


 


Urine pH  5.5  (4.6 - 8.0)   18  14:00    


 


Ur Specific Gravity  >= 1.030  (1.005-1.030)   18  14:00    


 


Urine Protein  30 mg/dL (NEGATIVE)  H  18  14:00    


 


Urine Glucose (UA)  NEGATIVE mg/dL (NEGATIVE)   18  14:00    


 


Urine Ketones  TRACE mg/dL (NEGATIVE)   18  14:00    


 


Urine Blood  MODERATE  (NEGATIVE)  H  18  14:00    


 


Urine Nitrate  NEGATIVE  (NEGATIVE)   18  14:00    


 


Urine Bilirubin  MODERATE  (NEGATIVE)  H  18  14:00    


 


Urine Ictotest  NEGATIVE  (NEGATIVE)   18  03:00    


 


Urine Urobilinogen  1.0 E.U./dL (0.2 - 1.0)   18  14:00    


 


Ur Leukocyte Esterase  SMALL  (NEGATIVE)  H  18  14:00    


 


Urine RBC  10-25 /hpf (0-5)  H  18  14:00    


 


Urine WBC  6-10 /hpf (0-5)   18  14:00    


 


Ur Epithelial Cells  FEW /lpf (FEW)   18  14:00    


 


Urine Bacteria  FEW /hpf (NONE SEEN)   18  14:00    


 


Urine Yeast  MANY /hpf (NONE SEEN)  H  18  14:00    


 


Urine Osmolality  749 mOsmol/kg  18  03:00    


 


Ur Random Sodium  39 mmol/L  18  03:00    


 


Urine Creatinine  136.0 mg/dl (39.0-259.0)   18  03:00    














- Physical Exam


Vitals and I&O: 


 Vital Signs











Temp  98.8 F   18 06:07


 


Pulse  78   18 10:06


 


Resp  18   18 06:07


 


BP  130/87   18 10:06


 


Pulse Ox  96   03/16/18 06:07








 Intake & Output











 03/15/18 03/16/18 03/16/18





 18:59 06:59 18:59


 


Intake Total 800  


 


Output Total 350 1150 


 


Balance 450 -1150 


 


Weight (lbs) 77.111 kg 77.111 kg 


 


Intake:   


 


  Oral 800  


 


Output:   


 


  Urine 350 1150 


 


Other:   


 


  # Bowel Movements 1 0 











Active Medications: 


Current Medications





Acetaminophen (Tylenol)  650 mg PO Q6HR PRN


   PRN Reason: mild to moderate pain


   Stop: 18 19:27


   Last Admin: 18 00:25 Dose:  650 mg


Acetaminophen/Hydrocodone Bitart (Norco 5mg/325mg)  1 tab PO Q6H PRN


   PRN Reason: Pain (Moderate)


   Stop: 18 08:21


   Last Admin: 18 15:59 Dose:  1 tab


Allopurinol (Zyloprim)  100 mg PO DAILY [facility]


   Stop: 18 08:59


   Last Admin: 18 10:10 Dose:  100 mg


Castor Oil/Stateless Balsam/Trypsin (Venelex)  1 appl TP DAILY [facility]


   Stop: 18 15:59


   Last Admin: 18 10:09 Dose:  1 appl


Clonazepam (Klonopin)  1 mg PO BID [facility]


   PRN Reason: Protocol


   Stop: 18 08:59


   Last Admin: 18 10:06 Dose:  1 mg


Famotidine (Pepcid)  20 mg PO BID [facility]


   Stop: 18 08:59


   Last Admin: 18 10:00 Dose:  20 mg


Fluconazole (Diflucan)  100 mg PO DAILY [facility]


   Stop: 18 08:59


   Last Admin: 18 10:06 Dose:  100 mg


Heparin Sodium (Porcine) (Heparin)  5,000 units SUBQ Q12HR [facility]


   PRN Reason: Protocol


   Stop: 18 20:59


   Last Admin: 18 10:03 Dose:  5,000 units


Levofloxacin (Levaquin Pb)  500 mg in 100 mls @ 100 mls/hr IV Q24HR [facility]


   Stop: 18 14:44


   Last Admin: 03/15/18 15:48 Dose:  100 mls/hr


Insulin Aspart (Novolog Insulin Sliding Scale)  0 units SUBQ ACHS [facility]


   PRN Reason: Protocol


   Stop: 18 20:59


   Last Admin: 18 12:46 Dose:  4 units


Insulin Detemir (Levemir Insulin)  18 units SUBQ DAILY [facility]


   PRN Reason: Protocol


   Stop: 18 08:13


   Last Admin: 18 10:16 Dose:  18 units


Lorazepam (Ativan)  0.5 mg PO Q6HR PRN; Protocol


   PRN Reason: agitation 


   Stop: 18 19:27


   Last Admin: 18 15:59 Dose:  0.5 mg


Magnesium Hydroxide (Milk Of Magnesia)  30 ml PO HS PRN


   PRN Reason: Constipation


   Stop: 18 19:27


Metoprolol Succinate (Toprol Xl)  25 mg PO BID [facility]


   Stop: 18 16:59


   Last Admin: 18 10:06 Dose:  25 mg


Olanzapine (Zyprexa Zydis)  15 mg PO BID [facility]


   PRN Reason: Protocol


   Stop: 18 06:50


   Last Admin: 18 10:09 Dose:  15 mg


Zolpidem Tartrate (Ambien)  5 mg PO HS PRN


   PRN Reason: insomnia


   Stop: 18 19:27


   Last Admin: 18 21:25 Dose:  5 mg








General: Alert, No acute distress


HEENT: Atraumatic, PERRLA


Neck: Supple


Cardiovascular: Regular rate, Normal S1, Normal S2


Lungs: Clear to auscultation


Abdomen: Bowel sounds, Soft


Extremities: no Clubbing, no Cyanosis, no Edema


Neurological: Sensation intact


Skin: no Rash


Psych/Mental Status: Mood NL





- Procedures


Procedures: 


 Procedures











Procedure Code Date


 


KEV MUSC/FASCIA 20 SQ CM/< 09252 18


 


EXCISION OF RIGHT HIP MUSCLE, OPEN APPROACH [id number] 18














Assessment/Plan





- Assessment


Assessment: 





BRYANT on CKD


Stage 4 decub ulcer S/P debridement


Severe dehydration


Ess HTN


Type 2 DM


Psychosis


MSSA/Yeast UTI





- Plan


Plan: 


Lab - Result Diagrams





 18 06:00 





 18 06:00 





Current Medications





Acetaminophen (Tylenol)  650 mg PO Q6HR PRN


   PRN Reason: mild to moderate pain


   Stop: 18 19:27


Allopurinol (Zyloprim)  100 mg PO DAILY [facility]


   Stop: 18 08:59


   Last Admin: 18 10:00 Dose:  Not Given


Castor Oil/Stateless Balsam/Trypsin (Venelex)  1 appl TP DAILY [facility]


   Stop: 18 15:59


   Last Admin: 18 10:00 Dose:  1 appl


Famotidine (Pepcid)  20 mg PO BID [facility]


   Stop: 18 08:59


   Last Admin: 18 18:22 Dose:  20 mg


Heparin Sodium (Porcine) (Heparin)  5,000 units SUBQ Q12HR [facility]


   PRN Reason: Protocol


   Stop: 18 20:59


   Last Admin: 18 07:50 Dose:  Not Given


Ceftriaxone Sodium 1 gm/ (Sodium Chloride)  50 mls @ 100 mls/hr IV Q24HR [facility]


   Stop: 18 16:44


   Last Admin: 18 17:45 Dose:  100 mls/hr


Insulin Aspart (Novolog Insulin Sliding Scale)  0 units SUBQ ACHS [facility]


   PRN Reason: Protocol


   Stop: 18 20:59


   Last Admin: 18 18:23 Dose:  6 units


Lorazepam (Ativan)  0.5 mg PO Q6HR PRN; Protocol


   PRN Reason: agitation 


   Stop: 18 19:27


Magnesium Hydroxide (Milk Of Magnesia)  30 ml PO HS PRN


   PRN Reason: Constipation


   Stop: 18 19:27


Metoprolol Succinate (Toprol Xl)  25 mg PO BID [facility]


   Stop: 18 16:59


   Last Admin: 18 18:23 Dose:  25 mg


Miscellaneous (Clinical Monitoring)  1 ea MC PRN PRN


   PRN Reason: RENAL


   Stop: 18 10:33


Olanzapine (Zyprexa Zydis)  5 mg PO BID [facility]


   PRN Reason: Protocol


   Stop: 18 16:59


Zolpidem Tartrate (Ambien)  5 mg PO HS PRN


   PRN Reason: insomnia


   Stop: 18 


Lab - Result Diagrams





 18 08:40 





 18 08:40 





 












































Na down to 137


Kidney fnc gradually improving


agree w/ Allopurinol


f/u electrolytes, 


continue hydration


BS better control


increase Detemir


replace K


Urine grew MSSA, Yeast


add Diflucan to Levaquin


placement in progress

















Nutritional Asmnt/Malnutr-PDOC





- Dietary Evaluation


Malnutrition Findings (Please click <Entered> for more info): 








Nutritional Asmnt/Malnutrition                             Start:  18 17:

10


Text:                                                      Status: Complete    

  


Freq:                                                                          

  


 Document     18 17:13  [id number]  (Rec: 18 17:25  LCHENG  [id number])


 Nutritional Asmnt/Malnutrition


     Patient General Information


      Nutritional Screening                      High Risk


                                                 Consult


      Diagnosis                                  pressure ulcer, hyperglycemia,


                                                 dehydration


      Pertinent Medical Hx/Surgical Hx           DM, dementia, depression,


                                                 psychosis, schizophrenia,


                                                 chronic renal insuff


      Subjective Information                     Consult received for


                                                 unstageble pressure ulcer on , high BG on . Per


                                                 records, PO intake 50-75%. Pt


                                                 was on NPO today for surgery-


                                                 excisional debridement,


                                                 application of wound VAC


      Current Diet Order/ Nutrition Support      pureed, CCHO 60gm


      Pertinent Medications                      novolog


      Pertinent Labs                              Na 152, K 4.6, BUN 56, Cr


                                                 1.8, Glucose 262, -246


                                                 , Mg 2.9


     Nutritional Hx/Data


      Height                                     1.8 m


      Height (Calculated Centimeters)            180.3


      Current Weight (lbs)                       70.76 kg


      Weight (Calculated Kilograms)              70.8


      Weight (Calculated Grams)                  30394.4


      Ideal Body Weight                          172


      % Ideal Body Weight                        91


      Body Mass Index (BMI)                      21.7


      Weight Status                              Approriate


     GI Symptoms


      GI Symptoms                                None


      Last BM                                    


      Difficult in:                              None


      Skin Integrity/Comment:                    scar to right upper back,


                                                 decubitus ulcer to buttocks


      Current %PO                                Fair (50-74%)


     Estimated Nutritional Goals


      BEE in Kcals:                              Using Current wt


      Calories/Kcals/Kg                          27-32


      Kcals Calculated                           0512-3171


      Protein:                                   Using Current wt


      Protein g/k-1.2


      Protein Calculated                         71-85


      Fluid: ml                                  1917-2130ml (1ml/kcal)


     Nutritional Problem


      2. Problem


       Problem                                   altered nutrition related lab


                                                 values


       Etiology                                  hx of DM


       Signs/Symptoms:                           Glucose 262, -246


      1. Problem


       Problem                                   increased nutrition needs (


                                                 calorie and protein)


       Etiology                                  increased metabolic demand for


                                                 wound healing


       Signs/Symptoms:                           decubitus ulcer and surgery


     Intervention/Recommendation


      Comments                                   1. Continue with current diet


                                                 as ordered. If PO intake low,


                                                 will consider nutrition


                                                 supplements


                                                 2. MD to consider vit C and


                                                 zinc for wound healing


                                                 3. Monitor PO intake, wt, labs


                                                 and skin integrity


                                                 4. F/U as moderate risk in 3-5


                                                 days, 3/2-3/4, PO check 3/


     Expected Outcomes/Goals


      Expected Outcomes/Goals                    1. PO intake to meet at least


                                                 75% of nutritional needs.


                                                 2. Wt stability, skin to


                                                 remain intact, labs to


                                                 approach WNL.

## 2018-03-17 LAB
ANION GAP SERPL CALC-SCNC: 6.6 MMOL/L (ref 7–16)
BUN SERPL-MCNC: 9 MG/DL (ref 7–25)
CALCIUM SERPL-MCNC: 8.8 MG/DL (ref 8.6–10.3)
CHLORIDE SERPL-SCNC: 105 MEQ/L (ref 98–107)
CO2 SERPL-SCNC: 28.6 MEQ/L (ref 21–31)
CREAT SERPL-MCNC: 0.9 MG/DL (ref 0.7–1.3)
GLUCOSE SERPL-MCNC: 169 MG/DL (ref 70–105)
POTASSIUM SERPL-SCNC: 4.2 MEQ/L (ref 3.5–5.1)
SODIUM SERPL-SCNC: 136 MEQ/L (ref 136–145)

## 2018-03-17 RX ADMIN — CASTOR OIL AND BALSAM, PERU SCH: 788; 87 OINTMENT TOPICAL at 03:33

## 2018-03-17 RX ADMIN — INSULIN ASPART SCH: 100 INJECTION, SOLUTION INTRAVENOUS; SUBCUTANEOUS at 20:25

## 2018-03-17 RX ADMIN — INSULIN DETEMIR SCH UNITS: 100 INJECTION, SOLUTION SUBCUTANEOUS at 10:56

## 2018-03-17 RX ADMIN — CASTOR OIL AND BALSAM, PERU SCH APPL: 788; 87 OINTMENT TOPICAL at 11:02

## 2018-03-17 RX ADMIN — OLANZAPINE SCH MG: 5 TABLET, ORALLY DISINTEGRATING ORAL at 13:14

## 2018-03-17 RX ADMIN — INSULIN ASPART SCH: 100 INJECTION, SOLUTION INTRAVENOUS; SUBCUTANEOUS at 13:31

## 2018-03-17 RX ADMIN — INSULIN ASPART SCH: 100 INJECTION, SOLUTION INTRAVENOUS; SUBCUTANEOUS at 18:05

## 2018-03-17 RX ADMIN — LEVOFLOXACIN SCH MLS/HR: 5 INJECTION INTRAVENOUS at 13:16

## 2018-03-17 RX ADMIN — INSULIN DETEMIR SCH: 100 INJECTION, SOLUTION SUBCUTANEOUS at 03:16

## 2018-03-17 RX ADMIN — INSULIN ASPART SCH: 100 INJECTION, SOLUTION INTRAVENOUS; SUBCUTANEOUS at 13:35

## 2018-03-17 RX ADMIN — INSULIN ASPART SCH: 100 INJECTION, SOLUTION INTRAVENOUS; SUBCUTANEOUS at 03:34

## 2018-03-17 RX ADMIN — INSULIN ASPART SCH: 100 INJECTION, SOLUTION INTRAVENOUS; SUBCUTANEOUS at 20:21

## 2018-03-17 RX ADMIN — INSULIN ASPART SCH UNITS: 100 INJECTION, SOLUTION INTRAVENOUS; SUBCUTANEOUS at 10:45

## 2018-03-17 NOTE — GENERAL PROGRESS NOTE
Subjective





- Review of Systems


Service Date: 03/17/18


Subjective: 





Patient was discharged yesterday to an outside facility but unfortunately the 

patient return last night. Facility was unable to accept the patient. Thus 

patient was readmitted. Will resume previous orders. 





Objective





- Results


Recent Labs: 


 Laboratory Last Values











POC Glucose  130 MG/DL (70 - 105)  H  03/17/18  03:04    














- Physical Exam


Vitals and I&O: 


 Vital Signs











Temp  96.4 F   03/17/18 04:00


 


Pulse  90   03/17/18 04:00


 


Resp  18   03/17/18 04:00


 


BP  113/70   03/17/18 04:00


 


Pulse Ox  98   03/17/18 04:00








 Intake & Output











 03/16/18 03/16/18 03/17/18





 06:59 18:59 06:59


 


Weight (lbs)  77.111 kg 


 


Other:   


 


  Stool Characteristics   Soft











Active Medications: 


Current Medications





Acetaminophen (Tylenol)  650 mg PO Q6H PRN


   PRN Reason: mild to moderate pain


   Stop: 05/15/18 23:24


Acetaminophen/Hydrocodone Bitart (Norco 5mg/325mg)  1 tab PO Q6H PRN


   PRN Reason: Pain (Moderate)


   Stop: 05/15/18 23:24


Allopurinol (Zyloprim)  100 mg PO DAILY [facility]


   Stop: 05/15/18 23:24


   Last Admin: 03/17/18 03:33 Dose:  Not Given


Castor Oil/[place] Balsam/Trypsin (Venelex)  1 appl TP DAILY [facility]


   Stop: 05/15/18 23:24


   Last Admin: 03/17/18 03:33 Dose:  Not Given


Clonazepam (Klonopin)  1 mg PO BID [facility]


   PRN Reason: Protocol


   Stop: 05/15/18 23:24


Famotidine (Pepcid)  20 mg PO BID [facility]


   Stop: 05/15/18 23:24


   Last Admin: 03/17/18 03:33 Dose:  Not Given


Fluconazole (Diflucan)  100 mg PO DAILY [facility]


   Stop: 05/15/18 23:24


   Last Admin: 03/17/18 03:33 Dose:  Not Given


Heparin Sodium (Porcine) (Heparin)  5,000 units SUBQ Q12HR [facility]


   PRN Reason: Protocol


   Stop: 05/15/18 23:24


   Last Admin: 03/17/18 03:33 Dose:  Not Given


Insulin Aspart (Novolog Insulin Sliding Scale)  0 units SUBQ ACHS [facility]


   PRN Reason: Protocol


   Stop: 05/15/18 23:24


   Last Admin: 03/17/18 03:34 Dose:  Not Given


Insulin Detemir (Levemir Insulin)  18 units SUBQ DAILY [facility]


   PRN Reason: Protocol


   Stop: 05/15/18 23:24


   Last Admin: 03/17/18 03:16 Dose:  Not Given


Lorazepam (Ativan)  0.5 mg PO Q6HR PRN; Protocol


   PRN Reason: agitation 


   Stop: 05/15/18 23:24


Magnesium Hydroxide (Milk Of Magnesia)  30 ml PO HS PRN


   PRN Reason: Constipation


   Stop: 05/15/18 23:24


Metformin HCl (Glucophage)  1,000 mg PO DAILY [facility]


   Stop: 05/15/18 23:24


Metoprolol Succinate (Toprol Xl)  25 mg PO DAILY [facility]


   Stop: 05/15/18 23:24


   Last Admin: 03/17/18 03:34 Dose:  Not Given


Miscellaneous (Levofloxacin 500mg/100ml [Levaquin Pb])  500 mg IV Q24HR [facility]


   Stop: 05/16/18 14:44


Olanzapine (Zyprexa Zydis)  5 mg PO DAILY [facility]


   PRN Reason: Protocol


   Stop: 05/15/18 23:24


Zolpidem Tartrate (Ambien)  5 mg PO HS PRN


   PRN Reason: insomnia


   Stop: 05/15/18 23:24








HEENT: Atraumatic, PERRLA, EOMI


Neck: Supple, JVD


Cardiovascular: Regular rate, Normal S1, Normal S2


Lungs: Clear to auscultation


Abdomen: Bowel sounds, Soft


Extremities: no Clubbing, no Cyanosis, no Edema


Skin: Other (s/p wound debridement)





- Procedures


Procedures: 


 Procedures











Procedure Code Date


 


KEV MUSC/FASCIA 20 SQ CM/< 38900 02/25/18


 


EXCISION OF RIGHT HIP MUSCLE, OPEN APPROACH [id number] 02/25/18














Assessment/Plan





- Assessment


Assessment: 





psychosis


diabetes mellitus


s/p wound debridement


UTI


hyperglycemia








- Plan


Plan: 





continue current orders

## 2018-03-17 NOTE — GENERAL PROGRESS NOTE
Subjective





- Review of Systems


Service Date: 03/17/18


Subjective: 





sleeping, comfortable





Objective





- Results


Result Diagrams: 


 03/17/18 06:48


Recent Labs: 


 Laboratory Last Values











Sodium  136 mEq/L (136-145)   03/17/18  06:48    


 


Potassium  4.2 mEq/L (3.5-5.1)   03/17/18  06:48    


 


Chloride  105 mEq/L ()   03/17/18  06:48    


 


Carbon Dioxide  28.6 mEq/L (21.0-31.0)   03/17/18  06:48    


 


Anion Gap  6.6  (7.0-16.0)  L  03/17/18  06:48    


 


BUN  9 mg/dL (7-25)   03/17/18  06:48    


 


Creatinine  0.9 mg/dL (0.7-1.3)   03/17/18  06:48    


 


Est GFR ( Amer)  TNP   03/17/18  06:48    


 


Est GFR (Non-Af Amer)  TNP   03/17/18  06:48    


 


BUN/Creatinine Ratio  10.0   03/17/18  06:48    


 


Glucose  169 mg/dL ()  H  03/17/18  06:48    


 


POC Glucose  130 MG/DL (70 - 105)  H  03/17/18  03:04    


 


Calcium  8.8 mg/dL (8.6-10.3)   03/17/18  06:48    














- Physical Exam


Vitals and I&O: 


 Vital Signs











Temp  96.4 F   03/17/18 04:00


 


Pulse  90   03/17/18 10:55


 


Resp  18   03/17/18 04:00


 


BP  118/67   03/17/18 10:55


 


Pulse Ox  98   03/17/18 04:00








 Intake & Output











 03/16/18 03/17/18 03/17/18





 18:59 06:59 18:59


 


Weight (lbs) 77.111 kg  


 


Other:   


 


  Stool Characteristics  Soft 











Active Medications: 


Current Medications





Acetaminophen (Tylenol)  650 mg PO Q6H PRN


   PRN Reason: mild to moderate pain


   Stop: 05/15/18 23:24


Acetaminophen/Hydrocodone Bitart (Norco 5mg/325mg)  1 tab PO Q6H PRN


   PRN Reason: Pain (Moderate)


   Stop: 05/15/18 23:24


Allopurinol (Zyloprim)  100 mg PO DAILY [facility]


   Stop: 05/15/18 23:24


   Last Admin: 03/17/18 10:54 Dose:  100 mg


Castor Oil/[place] Balsam/Trypsin (Venelex)  1 appl TP DAILY [facility]


   Stop: 05/15/18 23:24


   Last Admin: 03/17/18 11:02 Dose:  1 appl


Clonazepam (Klonopin)  1 mg PO BID [facility]


   PRN Reason: Protocol


   Stop: 05/16/18 09:59


Famotidine (Pepcid)  20 mg PO BID [facility]


   Stop: 05/15/18 23:24


   Last Admin: 03/17/18 10:54 Dose:  20 mg


Fluconazole (Diflucan)  100 mg PO DAILY [facility]


   Stop: 05/15/18 23:24


   Last Admin: 03/17/18 11:02 Dose:  100 mg


Heparin Sodium (Porcine) (Heparin)  5,000 units SUBQ Q12HR [facility]


   PRN Reason: Protocol


   Stop: 05/15/18 23:24


   Last Admin: 03/17/18 10:59 Dose:  5,000 units


Levofloxacin (Levaquin Pb)  500 mg in 100 mls @ 100 mls/hr IV Q24H [facility]


   Stop: 05/16/18 11:59


Insulin Aspart (Novolog Insulin Sliding Scale)  0 units SUBQ ACHS [facility]


   PRN Reason: Protocol


   Stop: 05/15/18 23:24


   Last Admin: 03/17/18 10:45 Dose:  2 units


Insulin Aspart (Novolog Insulin Sliding Scale)  0 units SUBQ ACHS [facility]


   PRN Reason: Protocol


   Stop: 05/16/18 07:29


Insulin Detemir (Levemir Insulin)  18 units SUBQ DAILY [facility]


   PRN Reason: Protocol


   Stop: 05/15/18 23:24


   Last Admin: 03/17/18 10:56 Dose:  18 units


Lorazepam (Ativan)  0.5 mg PO Q6HR PRN; Protocol


   PRN Reason: agitation 


   Stop: 05/15/18 23:24


Magnesium Hydroxide (Milk Of Magnesia)  30 ml PO HS PRN


   PRN Reason: Constipation


   Stop: 05/15/18 23:24


Metformin HCl (Glucophage)  1,000 mg PO DAILY [facility]


   Stop: 05/16/18 09:59


Metoprolol Succinate (Toprol Xl)  25 mg PO DAILY [facility]


   Stop: 05/15/18 23:24


   Last Admin: 03/17/18 10:55 Dose:  25 mg


Miscellaneous (Vte Chemical Prophylaxis Screen/ Admission)  1 ea [facility] PRN PRN


   PRN Reason: PROTOCOL


   Stop: 05/16/18 10:29


Olanzapine (Zyprexa Zydis)  5 mg PO DAILY [facility]


   PRN Reason: Protocol


   Stop: 05/16/18 09:59


Zolpidem Tartrate (Ambien)  5 mg PO HS PRN


   PRN Reason: insomnia


   Stop: 05/15/18 23:24








General: No acute distress


HEENT: Atraumatic, PERRLA, EOMI


Neck: Supple, JVD


Cardiovascular: Regular rate, Normal S1, Normal S2


Lungs: Clear to auscultation


Abdomen: Bowel sounds, Soft


Extremities: no Clubbing, no Cyanosis, no Edema


Neurological: Sensation intact


Skin: no Rash





- Procedures


Procedures: 


 Procedures











Procedure Code Date


 


KEV MUSC/FASCIA 20 SQ CM/< 99918 02/25/18


 


EXCISION OF RIGHT HIP MUSCLE, OPEN APPROACH [id number] 02/25/18














Assessment/Plan





- Assessment


Assessment: 





S/P BRYANT


S/P electrolyte imbalance


Acute decomp of Psychosis


Type 2 DM


Hx UTI








- Plan


Plan: 


Lab - Result Diagrams





 03/17/18 06:48 





Current Medications





Acetaminophen (Tylenol)  650 mg PO Q6H PRN


   PRN Reason: mild to moderate pain


   Stop: 05/15/18 23:24


Acetaminophen/Hydrocodone Bitart (Norco 5mg/325mg)  1 tab PO Q6H PRN


   PRN Reason: Pain (Moderate)


   Stop: 05/15/18 23:24


Allopurinol (Zyloprim)  100 mg PO DAILY [facility]


   Stop: 05/15/18 23:24


   Last Admin: 03/17/18 10:54 Dose:  100 mg


Castor Oil/[place] Balsam/Trypsin (Venelex)  1 appl TP DAILY [facility]


   Stop: 05/15/18 23:24


   Last Admin: 03/17/18 11:02 Dose:  1 appl


Clonazepam (Klonopin)  1 mg PO BID [facility]


   PRN Reason: Protocol


   Stop: 05/16/18 09:59


Famotidine (Pepcid)  20 mg PO BID [facility]


   Stop: 05/15/18 23:24


   Last Admin: 03/17/18 10:54 Dose:  20 mg


Fluconazole (Diflucan)  100 mg PO DAILY [facility]


   Stop: 05/15/18 23:24


   Last Admin: 03/17/18 11:02 Dose:  100 mg


Heparin Sodium (Porcine) (Heparin)  5,000 units SUBQ Q12HR [facility]


   PRN Reason: Protocol


   Stop: 05/15/18 23:24


   Last Admin: 03/17/18 10:59 Dose:  5,000 units


Levofloxacin (Levaquin Pb)  500 mg in 100 mls @ 100 mls/hr IV Q24H [facility]


   Stop: 05/16/18 11:59


Insulin Aspart (Novolog Insulin Sliding Scale)  0 units SUBQ ACHS [facility]


   PRN Reason: Protocol


   Stop: 05/15/18 23:24


   Last Admin: 03/17/18 10:45 Dose:  2 units


Insulin Aspart (Novolog Insulin Sliding Scale)  0 units SUBQ ACHS [facility]


   PRN Reason: Protocol


   Stop: 05/16/18 07:29


Insulin Detemir (Levemir Insulin)  18 units SUBQ DAILY [facility]


   PRN Reason: Protocol


   Stop: 05/15/18 23:24


   Last Admin: 03/17/18 10:56 Dose:  18 units


Lorazepam (Ativan)  0.5 mg PO Q6HR PRN; Protocol


   PRN Reason: agitation 


   Stop: 05/15/18 23:24


Magnesium Hydroxide (Milk Of Magnesia)  30 ml PO HS PRN


   PRN Reason: Constipation


   Stop: 05/15/18 23:24


Metformin HCl (Glucophage)  1,000 mg PO DAILY [facility]


   Stop: 05/16/18 09:59


Metoprolol Succinate (Toprol Xl)  25 mg PO DAILY [facility]


   Stop: 05/15/18 23:24


   Last Admin: 03/17/18 10:55 Dose:  25 mg


Miscellaneous (Vte Chemical Prophylaxis Screen/ Admission)  1 ea [facility] PRN PRN


   PRN Reason: PROTOCOL


   Stop: 05/16/18 10:29


Olanzapine (Zyprexa Zydis)  5 mg PO DAILY [facility]


   PRN Reason: Protocol


   Stop: 05/16/18 09:59


Zolpidem Tartrate (Ambien)  5 mg PO HS PRN


   PRN Reason: insomnia


   Stop: 05/15/18 23:24





Lab - Result Diagrams





 03/17/18 06:48 





Pt. DC'ed yesterday but back to ER after 2 hrs


monitor kidney fnc


encourage po intake

## 2018-03-18 RX ADMIN — INSULIN ASPART SCH: 100 INJECTION, SOLUTION INTRAVENOUS; SUBCUTANEOUS at 08:01

## 2018-03-18 RX ADMIN — INSULIN ASPART SCH: 100 INJECTION, SOLUTION INTRAVENOUS; SUBCUTANEOUS at 08:02

## 2018-03-18 RX ADMIN — INSULIN ASPART SCH: 100 INJECTION, SOLUTION INTRAVENOUS; SUBCUTANEOUS at 20:42

## 2018-03-18 RX ADMIN — INSULIN ASPART SCH: 100 INJECTION, SOLUTION INTRAVENOUS; SUBCUTANEOUS at 20:41

## 2018-03-18 RX ADMIN — INSULIN DETEMIR SCH: 100 INJECTION, SOLUTION SUBCUTANEOUS at 09:52

## 2018-03-18 RX ADMIN — INSULIN ASPART SCH: 100 INJECTION, SOLUTION INTRAVENOUS; SUBCUTANEOUS at 13:37

## 2018-03-18 RX ADMIN — INSULIN ASPART SCH: 100 INJECTION, SOLUTION INTRAVENOUS; SUBCUTANEOUS at 17:10

## 2018-03-18 RX ADMIN — CASTOR OIL AND BALSAM, PERU SCH: 788; 87 OINTMENT TOPICAL at 09:52

## 2018-03-18 RX ADMIN — LEVOFLOXACIN SCH MLS/HR: 5 INJECTION INTRAVENOUS at 13:25

## 2018-03-18 RX ADMIN — OLANZAPINE SCH: 5 TABLET, ORALLY DISINTEGRATING ORAL at 09:43

## 2018-03-18 NOTE — GENERAL PROGRESS NOTE
Subjective





- Review of Systems


Service Date: 03/18/18


Subjective: 





Patient still confused, irritable. Behavior still an issue. relying on 

psychiatry for guidance. unable to transfer to [facility]. 





Objective





- Results


Result Diagrams: 


 03/17/18 06:48


Recent Labs: 


 Laboratory Last Values











Sodium  136 mEq/L (136-145)   03/17/18  06:48    


 


Potassium  4.2 mEq/L (3.5-5.1)   03/17/18  06:48    


 


Chloride  105 mEq/L ()   03/17/18  06:48    


 


Carbon Dioxide  28.6 mEq/L (21.0-31.0)   03/17/18  06:48    


 


Anion Gap  6.6  (7.0-16.0)  L  03/17/18  06:48    


 


BUN  9 mg/dL (7-25)   03/17/18  06:48    


 


Creatinine  0.9 mg/dL (0.7-1.3)   03/17/18  06:48    


 


Est GFR ( Amer)  TNP   03/17/18  06:48    


 


Est GFR (Non-Af Amer)  TNP   03/17/18  06:48    


 


BUN/Creatinine Ratio  10.0   03/17/18  06:48    


 


Glucose  169 mg/dL ()  H  03/17/18  06:48    


 


POC Glucose  131 MG/DL (70 - 105)  H  03/17/18  20:07    


 


Calcium  8.8 mg/dL (8.6-10.3)   03/17/18  06:48    














- Physical Exam


Vitals and I&O: 


 Vital Signs











Temp  98.8 F   03/18/18 03:43


 


Pulse  114   03/18/18 03:43


 


Resp  18   03/18/18 03:43


 


BP  154/84   03/18/18 03:43


 


Pulse Ox  98   03/18/18 03:43








 Intake & Output











 03/17/18 03/17/18 03/18/18





 06:59 18:59 06:59


 


Intake Total   0


 


Balance   0


 


Weight (lbs)  77.111 kg 77.111 kg


 


Intake:   


 


  Oral   0


 


Other:   


 


  # Bowel Movements   0


 


  Stool Characteristics Soft  











Active Medications: 


Current Medications





Acetaminophen (Tylenol)  650 mg PO Q6H PRN


   PRN Reason: mild to moderate pain


   Stop: 05/15/18 23:24


Acetaminophen/Hydrocodone Bitart (Norco 5mg/325mg)  1 tab PO Q6H PRN


   PRN Reason: Pain (Moderate)


   Stop: 05/15/18 23:24


Allopurinol (Zyloprim)  100 mg PO DAILY [facility]


   Stop: 05/15/18 23:24


   Last Admin: 03/17/18 10:54 Dose:  100 mg


Castor Oil/[place] Balsam/Trypsin (Venelex)  1 appl TP DAILY [facility]


   Stop: 05/15/18 23:24


   Last Admin: 03/17/18 11:02 Dose:  1 appl


Clonazepam (Klonopin)  1 mg PO BID [facility]


   PRN Reason: Protocol


   Stop: 05/16/18 09:59


   Last Admin: 03/17/18 18:07 Dose:  1 mg


Famotidine (Pepcid)  20 mg PO BID [facility]


   Stop: 05/15/18 23:24


   Last Admin: 03/17/18 18:07 Dose:  20 mg


Fluconazole (Diflucan)  100 mg PO DAILY [facility]


   Stop: 05/15/18 23:24


   Last Admin: 03/17/18 11:02 Dose:  100 mg


Heparin Sodium (Porcine) (Heparin)  5,000 units SUBQ Q12HR [facility]


   PRN Reason: Protocol


   Stop: 05/15/18 23:24


   Last Admin: 03/17/18 20:20 Dose:  5,000 units


Levofloxacin (Levaquin Pb)  500 mg in 100 mls @ 100 mls/hr IV Q24H [facility]


   Stop: 05/16/18 11:59


   Last Admin: 03/17/18 13:16 Dose:  100 mls/hr


Insulin Aspart (Novolog Insulin Sliding Scale)  0 units SUBQ ACHS [facility]


   PRN Reason: Protocol


   Stop: 05/15/18 23:24


   Last Admin: 03/17/18 20:25 Dose:  Not Given


Insulin Aspart (Novolog Insulin Sliding Scale)  0 units SUBQ ACHS [facility]


   PRN Reason: Protocol


   Stop: 05/16/18 07:29


   Last Admin: 03/17/18 20:21 Dose:  Not Given


Insulin Detemir (Levemir Insulin)  18 units SUBQ DAILY [facility]


   PRN Reason: Protocol


   Stop: 05/15/18 23:24


   Last Admin: 03/17/18 10:56 Dose:  18 units


Lorazepam (Ativan)  0.5 mg PO Q6HR PRN; Protocol


   PRN Reason: agitation 


   Stop: 05/15/18 23:24


Magnesium Hydroxide (Milk Of Magnesia)  30 ml PO HS PRN


   PRN Reason: Constipation


   Stop: 05/15/18 23:24


Metformin HCl (Glucophage)  1,000 mg PO DAILY [facility]


   Stop: 05/16/18 09:59


   Last Admin: 03/17/18 13:18 Dose:  1,000 mg


Metoprolol Succinate (Toprol Xl)  25 mg PO DAILY [facility]


   Stop: 05/15/18 23:24


   Last Admin: 03/17/18 10:55 Dose:  25 mg


Miscellaneous (Vte Chemical Prophylaxis Screen/ Admission)  1 ea [facility] PRN PRN


   PRN Reason: PROTOCOL


   Stop: 05/16/18 10:29


Olanzapine (Zyprexa Zydis)  5 mg PO DAILY [facility]


   PRN Reason: Protocol


   Stop: 05/16/18 09:59


   Last Admin: 03/17/18 13:14 Dose:  5 mg


Zolpidem Tartrate (Ambien)  5 mg PO HS PRN


   PRN Reason: insomnia


   Stop: 05/15/18 23:24








General: No acute distress


HEENT: Atraumatic, PERRLA, EOMI


Neck: Supple, JVD


Cardiovascular: Regular rate, Normal S1, Normal S2


Lungs: Clear to auscultation


Abdomen: Bowel sounds, Soft


Extremities: no Clubbing, no Cyanosis, no Edema


Neurological: Sensation intact


Skin: no Rash





- Procedures


Procedures: 


 Procedures











Procedure Code Date


 


KEV MUSC/FASCIA 20 SQ CM/< 07413 02/25/18


 


EXCISION OF RIGHT HIP MUSCLE, OPEN APPROACH [id number] 02/25/18














Assessment/Plan





- Assessment


Assessment: 





psychosis


diabetes mellitus


s/p wound debridement


UTI


hyperglycemia








- Plan


Plan: 





discharge planning.

## 2018-03-18 NOTE — GENERAL PROGRESS NOTE
Subjective





- Review of Systems


Service Date: 03/18/18


Subjective: 





sleeping, comfortable, still periods of agitation





Objective





- Results


Result Diagrams: 


 03/17/18 06:48


Recent Labs: 


 Laboratory Last Values











Sodium  136 mEq/L (136-145)   03/17/18  06:48    


 


Potassium  4.2 mEq/L (3.5-5.1)   03/17/18  06:48    


 


Chloride  105 mEq/L ()   03/17/18  06:48    


 


Carbon Dioxide  28.6 mEq/L (21.0-31.0)   03/17/18  06:48    


 


Anion Gap  6.6  (7.0-16.0)  L  03/17/18  06:48    


 


BUN  9 mg/dL (7-25)   03/17/18  06:48    


 


Creatinine  0.9 mg/dL (0.7-1.3)   03/17/18  06:48    


 


Est GFR ( Amer)  TNP   03/17/18  06:48    


 


Est GFR (Non-Af Amer)  TNP   03/17/18  06:48    


 


BUN/Creatinine Ratio  10.0   03/17/18  06:48    


 


Glucose  169 mg/dL ()  H  03/17/18  06:48    


 


POC Glucose  126 MG/DL (70 - 105)  H  03/18/18  12:13    


 


Calcium  8.8 mg/dL (8.6-10.3)   03/17/18  06:48    














- Physical Exam


Vitals and I&O: 


 Vital Signs











Temp  97.3 F   03/18/18 08:00


 


Pulse  93   03/18/18 09:41


 


Resp  20   03/18/18 08:00


 


BP  122/79   03/18/18 09:41


 


Pulse Ox  98   03/18/18 08:00








 Intake & Output











 03/17/18 03/18/18 03/18/18





 18:59 06:59 18:59


 


Intake Total  0 


 


Balance  0 


 


Weight (lbs) 77.111 kg 77.111 kg 


 


Intake:   


 


  Oral  0 


 


Other:   


 


  # Bowel Movements  0 











Active Medications: 


Current Medications





Acetaminophen (Tylenol)  650 mg PO Q6H PRN


   PRN Reason: mild to moderate pain


   Stop: 05/15/18 23:24


Acetaminophen/Hydrocodone Bitart (Norco 5mg/325mg)  1 tab PO Q6H PRN


   PRN Reason: Pain (Moderate)


   Stop: 05/15/18 23:24


Allopurinol (Zyloprim)  100 mg PO DAILY [facility]


   Stop: 05/15/18 23:24


   Last Admin: 03/18/18 09:43 Dose:  Not Given


Castor Oil/[place] Balsam/Trypsin (Venelex)  1 appl TP DAILY [facility]


   Stop: 05/15/18 23:24


   Last Admin: 03/18/18 09:52 Dose:  Not Given


Clonazepam (Klonopin)  1 mg PO BID [facility]


   PRN Reason: Protocol


   Stop: 05/16/18 09:59


   Last Admin: 03/18/18 09:43 Dose:  Not Given


Famotidine (Pepcid)  20 mg PO BID [facility]


   Stop: 05/15/18 23:24


   Last Admin: 03/18/18 09:43 Dose:  Not Given


Fluconazole (Diflucan)  100 mg PO DAILY [facility]


   Stop: 05/15/18 23:24


   Last Admin: 03/18/18 09:43 Dose:  Not Given


Heparin Sodium (Porcine) (Heparin)  5,000 units SUBQ Q12HR [facility]


   PRN Reason: Protocol


   Stop: 05/15/18 23:24


   Last Admin: 03/18/18 09:52 Dose:  Not Given


Levofloxacin (Levaquin Pb)  500 mg in 100 mls @ 100 mls/hr IV Q24H [facility]


   Stop: 05/16/18 11:59


   Last Admin: 03/17/18 13:16 Dose:  100 mls/hr


Insulin Aspart (Novolog Insulin Sliding Scale)  0 units SUBQ ACHS [facility]


   PRN Reason: Protocol


   Stop: 05/15/18 23:24


   Last Admin: 03/18/18 08:02 Dose:  Not Given


Insulin Aspart (Novolog Insulin Sliding Scale)  0 units SUBQ ACHS [facility]


   PRN Reason: Protocol


   Stop: 05/16/18 07:29


   Last Admin: 03/18/18 08:01 Dose:  Not Given


Insulin Detemir (Levemir Insulin)  18 units SUBQ DAILY [facility]


   PRN Reason: Protocol


   Stop: 05/15/18 23:24


   Last Admin: 03/18/18 09:52 Dose:  Not Given


Magnesium Hydroxide (Milk Of Magnesia)  30 ml PO HS PRN


   PRN Reason: Constipation


   Stop: 05/15/18 23:24


Metformin HCl (Glucophage)  1,000 mg PO DAILY [facility]


   Stop: 05/16/18 09:59


   Last Admin: 03/18/18 09:41 Dose:  Not Given


Metoprolol Succinate (Toprol Xl)  25 mg PO DAILY [facility]


   Stop: 05/15/18 23:24


   Last Admin: 03/18/18 09:41 Dose:  Not Given


Miscellaneous (Vte Chemical Prophylaxis Screen/ Admission)  1 ea [facility] PRN PRN


   PRN Reason: PROTOCOL


   Stop: 05/16/18 10:29


Olanzapine (Zyprexa Zydis)  5 mg PO DAILY [facility]


   PRN Reason: Protocol


   Stop: 05/16/18 09:59


   Last Admin: 03/18/18 09:43 Dose:  Not Given


Zolpidem Tartrate (Ambien)  5 mg PO HS PRN


   PRN Reason: insomnia


   Stop: 05/15/18 23:24








General: No acute distress


HEENT: Atraumatic, PERRLA, EOMI


Neck: Supple, JVD


Cardiovascular: Regular rate, Normal S1, Normal S2


Lungs: Clear to auscultation


Abdomen: Bowel sounds, Soft


Extremities: no Clubbing, no Cyanosis, no Edema


Neurological: Sensation intact


Skin: no Rash





- Procedures


Procedures: 


 Procedures











Procedure Code Date


 


KEV MUSC/FASCIA 20 SQ CM/< 79980 02/25/18


 


EXCISION OF RIGHT HIP MUSCLE, OPEN APPROACH [id number] 02/25/18














Assessment/Plan





- Assessment


Assessment: 





S/P BRYANT


S/P electrolyte imbalance


Acute decomp of Psychosis


Type 2 DM


Hx UTI








- Plan


Plan: 


Lab - Result Diagrams





 03/17/18 06:48 





Current Medications





Acetaminophen (Tylenol)  650 mg PO Q6H PRN


   PRN Reason: mild to moderate pain


   Stop: 05/15/18 23:24


Acetaminophen/Hydrocodone Bitart (Norco 5mg/325mg)  1 tab PO Q6H PRN


   PRN Reason: Pain (Moderate)


   Stop: 05/15/18 23:24


Allopurinol (Zyloprim)  100 mg PO DAILY [facility]


   Stop: 05/15/18 23:24


   Last Admin: 03/17/18 10:54 Dose:  100 mg


Castor Oil/[place]guill[place] Balsam/Trypsin (Venelex)  1 appl TP DAILY [facility]


   Stop: 05/15/18 23:24


   Last Admin: 03/17/18 11:02 Dose:  1 appl


Clonazepam (Klonopin)  1 mg PO BID [facility]


   PRN Reason: Protocol


   Stop: 05/16/18 09:59


Famotidine (Pepcid)  20 mg PO BID [facility]


   Stop: 05/15/18 23:24


   Last Admin: 03/17/18 10:54 Dose:  20 mg


Fluconazole (Diflucan)  100 mg PO DAILY [facility]


   Stop: 05/15/18 23:24


   Last Admin: 03/17/18 11:02 Dose:  100 mg


Heparin Sodium (Porcine) (Heparin)  5,000 units SUBQ Q12HR [facility]


   PRN Reason: Protocol


   Stop: 05/15/18 23:24


   Last Admin: 03/17/18 10:59 Dose:  5,000 units


Levofloxacin (Levaquin Pb)  500 mg in 100 mls @ 100 mls/hr IV Q24H [facility]


   Stop: 05/16/18 11:59


Insulin Aspart (Novolog Insulin Sliding Scale)  0 units SUBQ ACHS [facility]


   PRN Reason: Protocol


   Stop: 05/15/18 23:24


   Last Admin: 03/17/18 10:45 Dose:  2 units


Insulin Aspart (Novolog Insulin Sliding Scale)  0 units SUBQ ACHS [facility]


   PRN Reason: Protocol


   Stop: 05/16/18 07:29


Insulin Detemir (Levemir Insulin)  18 units SUBQ DAILY [facility]


   PRN Reason: Protocol


   Stop: 05/15/18 23:24


   Last Admin: 03/17/18 10:56 Dose:  18 units


Lorazepam (Ativan)  0.5 mg PO Q6HR PRN; Protocol


   PRN Reason: agitation 


   Stop: 05/15/18 23:24


Magnesium Hydroxide (Milk Of Magnesia)  30 ml PO HS PRN


   PRN Reason: Constipation


   Stop: 05/15/18 23:24


Metformin HCl (Glucophage)  1,000 mg PO DAILY [facility]


   Stop: 05/16/18 09:59


Metoprolol Succinate (Toprol Xl)  25 mg PO DAILY [facility]


   Stop: 05/15/18 23:24


   Last Admin: 03/17/18 10:55 Dose:  25 mg


Miscellaneous (Vte Chemical Prophylaxis Screen/ Admission)  1 ea [facility] PRN PRN


   PRN Reason: PROTOCOL


   Stop: 05/16/18 10:29


Olanzapine (Zyprexa Zydis)  5 mg PO DAILY [facility]


   PRN Reason: Protocol


   Stop: 05/16/18 09:59


Zolpidem Tartrate (Ambien)  5 mg PO HS PRN


   PRN Reason: insomnia


   Stop: 05/15/18 23:24





Lab - Result Diagrams





 03/17/18 06:48 





kidney fnc remain stable


encourage po intake

## 2018-03-18 NOTE — INFECTIOUS DISEASE PROG NOTE
Infectious Disease Subjective





- Review of Systems


Service Date: 03/18/18


Events since last encounter: 





Patient was discharge and transfer back again for the aggressive behavior.  

Patient was receiving Levaquin for UTI.  Patient has no IV access and the staff 

is unable to put the IV access because of his aggressive behavior.  Patient isn'

t on Levaquin IV for UTI.  ID consult was called for antibiotic management.  

Patient was receiving antibiotic, he has sacral decubitus.


Subjective: 





No fever





Infectious Disease Objective





- Results


Result Diagrams: 


 03/17/18 06:48


Recent Labs: 


 Laboratory Last Values











Sodium  136 mEq/L (136-145)   03/17/18  06:48    


 


Potassium  4.2 mEq/L (3.5-5.1)   03/17/18  06:48    


 


Chloride  105 mEq/L ()   03/17/18  06:48    


 


Carbon Dioxide  28.6 mEq/L (21.0-31.0)   03/17/18  06:48    


 


Anion Gap  6.6  (7.0-16.0)  L  03/17/18  06:48    


 


BUN  9 mg/dL (7-25)   03/17/18  06:48    


 


Creatinine  0.9 mg/dL (0.7-1.3)   03/17/18  06:48    


 


Est GFR ( Amer)  TNP   03/17/18  06:48    


 


Est GFR (Non-Af Amer)  TNP   03/17/18  06:48    


 


BUN/Creatinine Ratio  10.0   03/17/18  06:48    


 


Glucose  169 mg/dL ()  H  03/17/18  06:48    


 


POC Glucose  150 MG/DL (70 - 105)  H  03/18/18  16:53    


 


Calcium  8.8 mg/dL (8.6-10.3)   03/17/18  06:48    














- Physical Exam


Vitals and I&O: 


 Vital Signs











Temp  98.3 F   03/18/18 12:00


 


Pulse  76   03/18/18 12:00


 


Resp  20   03/18/18 12:00


 


BP  126/81   03/18/18 12:00


 


Pulse Ox  97   03/18/18 12:00








 Intake & Output











 03/17/18 03/18/18 03/18/18





 18:59 06:59 18:59


 


Intake Total 100 0 


 


Balance 100 0 


 


Weight (lbs) 77.111 kg 77.111 kg 


 


Intake:   


 


  Intake, IV Amount 100  


 


    Levofloxacin 500mg/100mL 100  





    500 mg In 100 ml @ 100   





    mls/hr IV Q24H Counts include 234 beds at the [facility] Rx#:   





    [id number]   


 


  Oral  0 


 


Other:   


 


  # Bowel Movements  0 











Active Medications: 


Current Medications





Acetaminophen (Tylenol)  650 mg PO Q6H PRN


   PRN Reason: mild to moderate pain


   Stop: 05/15/18 23:24


Acetaminophen/Hydrocodone Bitart (Norco 5mg/325mg)  1 tab PO Q6H PRN


   PRN Reason: Pain (Moderate)


   Stop: 05/15/18 23:24


Allopurinol (Zyloprim)  100 mg PO DAILY Counts include 234 beds at the [facility]


   Stop: 05/15/18 23:24


   Last Admin: 03/18/18 09:43 Dose:  Not Given


Castor Oil/[place]iqu[place] Balsam/Trypsin (Venelex)  1 appl TP DAILY Counts include 234 beds at the [facility]


   Stop: 05/15/18 23:24


   Last Admin: 03/18/18 09:52 Dose:  Not Given


Clonazepam (Klonopin)  1 mg PO BID [facility]


   PRN Reason: Protocol


   Stop: 05/16/18 09:59


   Last Admin: 03/18/18 16:55 Dose:  1 mg


Famotidine (Pepcid)  20 mg PO BID Counts include 234 beds at the [facility]


   Stop: 05/15/18 23:24


   Last Admin: 03/18/18 16:56 Dose:  20 mg


Fluconazole (Diflucan)  100 mg PO DAILY Counts include 234 beds at the [facility]


   Stop: 05/15/18 23:24


   Last Admin: 03/18/18 09:43 Dose:  Not Given


Heparin Sodium (Porcine) (Heparin)  5,000 units SUBQ Q12HR [facility]


   PRN Reason: Protocol


   Stop: 05/15/18 23:24


   Last Admin: 03/18/18 09:52 Dose:  Not Given


Insulin Aspart (Novolog Insulin Sliding Scale)  0 units SUBQ ACHS [facility]


   PRN Reason: Protocol


   Stop: 05/15/18 23:24


   Last Admin: 03/18/18 17:10 Dose:  Not Given


Insulin Aspart (Novolog Insulin Sliding Scale)  0 units SUBQ ACHS [facility]


   PRN Reason: Protocol


   Stop: 05/16/18 07:29


   Last Admin: 03/18/18 17:10 Dose:  Not Given


Insulin Detemir (Levemir Insulin)  18 units SUBQ DAILY [facility]


   PRN Reason: Protocol


   Stop: 05/15/18 23:24


   Last Admin: 03/18/18 09:52 Dose:  Not Given


Magnesium Hydroxide (Milk Of Magnesia)  30 ml PO HS PRN


   PRN Reason: Constipation


   Stop: 05/15/18 23:24


Metformin HCl (Glucophage)  1,000 mg PO DAILY [facility]


   Stop: 05/16/18 09:59


   Last Admin: 03/18/18 09:41 Dose:  Not Given


Metoprolol Succinate (Toprol Xl)  25 mg PO DAILY [facility]


   Stop: 05/15/18 23:24


   Last Admin: 03/18/18 09:41 Dose:  Not Given


Miscellaneous (Vte Chemical Prophylaxis Screen/ Admission)  1 ea MC PRN PRN


   PRN Reason: PROTOCOL


   Stop: 05/16/18 10:29


Mupirocin (Bactroban Oint)  1 appl NS BID Counts include 234 beds at the [facility]


   Stop: 03/23/18 09:01


Olanzapine (Zyprexa Zydis)  5 mg PO DAILY [facility]


   PRN Reason: Protocol


   Stop: 05/16/18 09:59


   Last Admin: 03/18/18 09:43 Dose:  Not Given


Zolpidem Tartrate (Ambien)  5 mg PO HS PRN


   PRN Reason: insomnia


   Stop: 05/15/18 23:24








General: no acute distress, well developed, well nourished


HEENT: atraumatic, normocephalic, PERRLA


Neck: supple, no thyromegaly, no lymphadenopathy


Cardiovascular: S1S2, regular


Lungs: clear to auscultation bilaterally, clear to percussion


Abdomen: soft, no tender, no distended, no mass, no rebound, no splenomegaly


Extremities: no cyanosis, no clubbing, no edema


Neurological: other


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





- Procedures


Procedures: 


 Procedures











Procedure Code Date


 


KEV MUSC/FASCIA 20 SQ CM/< 23138 02/25/18


 


EXCISION OF RIGHT HIP MUSCLE, OPEN APPROACH [id number] 02/25/18














Infectious Disease Assmt/Plan





- Assessment


Assessment: 





1.  Sacral decubitus ulcer.


2.  UTI.  Treated


3.  Dementia.





- Plan


Plan: 





Oral antibiotic and wound care.

## 2018-03-19 RX ADMIN — OLANZAPINE SCH MG: 5 TABLET, ORALLY DISINTEGRATING ORAL at 18:41

## 2018-03-19 RX ADMIN — INSULIN ASPART SCH UNITS: 100 INJECTION, SOLUTION INTRAVENOUS; SUBCUTANEOUS at 17:31

## 2018-03-19 RX ADMIN — INSULIN ASPART SCH UNITS: 100 INJECTION, SOLUTION INTRAVENOUS; SUBCUTANEOUS at 20:02

## 2018-03-19 RX ADMIN — INSULIN ASPART SCH: 100 INJECTION, SOLUTION INTRAVENOUS; SUBCUTANEOUS at 20:14

## 2018-03-19 RX ADMIN — INSULIN ASPART SCH UNITS: 100 INJECTION, SOLUTION INTRAVENOUS; SUBCUTANEOUS at 18:44

## 2018-03-19 RX ADMIN — OLANZAPINE SCH: 5 TABLET, ORALLY DISINTEGRATING ORAL at 10:38

## 2018-03-19 RX ADMIN — INSULIN ASPART SCH: 100 INJECTION, SOLUTION INTRAVENOUS; SUBCUTANEOUS at 15:19

## 2018-03-19 RX ADMIN — INSULIN ASPART SCH: 100 INJECTION, SOLUTION INTRAVENOUS; SUBCUTANEOUS at 09:31

## 2018-03-19 RX ADMIN — INSULIN ASPART SCH: 100 INJECTION, SOLUTION INTRAVENOUS; SUBCUTANEOUS at 15:18

## 2018-03-19 RX ADMIN — CASTOR OIL AND BALSAM, PERU SCH APPL: 788; 87 OINTMENT TOPICAL at 15:19

## 2018-03-19 RX ADMIN — INSULIN ASPART SCH: 100 INJECTION, SOLUTION INTRAVENOUS; SUBCUTANEOUS at 09:30

## 2018-03-19 RX ADMIN — INSULIN DETEMIR SCH: 100 INJECTION, SOLUTION SUBCUTANEOUS at 09:32

## 2018-03-19 NOTE — PROGRESS NOTES
DATE:  03/18/2018



Covering for Dr. Ramirez.



Case was discussed with staff of the patient, reviewed records.  The patient

continues to be unpredictable, impulsive, acting aggressive at times, demanding.

 Apparently, he was discharged and the team that was trying to take him back to

the nursing home and they ____ take him.  He continues to have poor insight,

easily agitated, irritable.  He was restarted back on his medication with no

side effects, no sedation, no nausea, no extrapyramidal symptoms.



Thank you very much for allowing me to participate in the care of this most

interesting gentleman.





DD: 03/18/2018 12:55

DT: 03/19/2018 03:45

JOB# [id number]  [id number]

## 2018-03-19 NOTE — PROGRESS NOTES
DATE:  03/19/2018



SUBJECTIVE:  Chart reviewed and the patient interviewed.  I also discussed the

patient's condition with the staff and reviewed records and labs.  The patient

is still anxious and confused, but his affect is brighter.  The patient also is

interacting more with peers, but he still needs lots of redirections.  The

patient also is restless and is still slightly confused.  Otherwise, the patient

is compliant with taking his medications with no side effects of medications.



ASSESSMENT:  The patient is still confused and agitated.



TREATMENT PLAN:  Continue current medications.  Also continue to monitor his

behavior and continue to follow up.





DD: 03/19/2018 06:43

DT: 03/19/2018 07:49

JOB# [id number]  [id number]

## 2018-03-19 NOTE — GENERAL PROGRESS NOTE
Subjective





- Review of Systems


Service Date: 03/19/18


Subjective: 





up on chair still, periods of agitation





Objective





- Results


Result Diagrams: 


 03/17/18 06:48


Recent Labs: 


 Laboratory Last Values











Sodium  136 mEq/L (136-145)   03/17/18  06:48    


 


Potassium  4.2 mEq/L (3.5-5.1)   03/17/18  06:48    


 


Chloride  105 mEq/L ()   03/17/18  06:48    


 


Carbon Dioxide  28.6 mEq/L (21.0-31.0)   03/17/18  06:48    


 


Anion Gap  6.6  (7.0-16.0)  L  03/17/18  06:48    


 


BUN  9 mg/dL (7-25)   03/17/18  06:48    


 


Creatinine  0.9 mg/dL (0.7-1.3)   03/17/18  06:48    


 


Est GFR ( Amer)  TNP   03/17/18  06:48    


 


Est GFR (Non-Af Amer)  TNP   03/17/18  06:48    


 


BUN/Creatinine Ratio  10.0   03/17/18  06:48    


 


Glucose  169 mg/dL ()  H  03/17/18  06:48    


 


POC Glucose  150 MG/DL (70 - 105)  H  03/18/18  16:53    


 


Calcium  8.8 mg/dL (8.6-10.3)   03/17/18  06:48    














- Physical Exam


Vitals and I&O: 


 Vital Signs











Temp  98.6 F   03/19/18 12:00


 


Pulse  104   03/19/18 12:00


 


Resp  18   03/19/18 12:00


 


BP  138/75   03/19/18 12:00


 


Pulse Ox  98   03/19/18 12:00








 Intake & Output











 03/18/18 03/19/18 03/19/18





 18:59 06:59 18:59


 


Intake Total 500 100 


 


Output Total 300 400 


 


Balance 200 -300 


 


Weight (lbs) 77.111 kg 77.111 kg 


 


Intake:   


 


  Oral 500 100 


 


Output:   


 


  Urine 300 400 


 


Other:   


 


  # Bowel Movements  1 











Active Medications: 


Current Medications





Acetaminophen (Tylenol)  650 mg PO Q6H PRN


   PRN Reason: mild to moderate pain


   Stop: 05/15/18 23:24


Acetaminophen/Hydrocodone Bitart (Norco 5mg/325mg)  1 tab PO Q6H PRN


   PRN Reason: Pain (Moderate)


   Stop: 05/15/18 23:24


Allopurinol (Zyloprim)  100 mg PO DAILY [facility]


   Stop: 05/15/18 23:24


   Last Admin: 03/19/18 09:30 Dose:  Not Given


Castor Oil/[place] Balsam/Trypsin (Venelex)  1 appl TP DAILY [facility]


   Stop: 05/15/18 23:24


   Last Admin: 03/18/18 09:52 Dose:  Not Given


Clonazepam (Klonopin)  1 mg PO BID [facility]


   PRN Reason: Protocol


   Stop: 05/16/18 09:59


   Last Admin: 03/19/18 10:36 Dose:  1 mg


Famotidine (Pepcid)  20 mg PO BID [facility]


   Stop: 05/15/18 23:24


   Last Admin: 03/19/18 09:32 Dose:  Not Given


Fluconazole (Diflucan)  100 mg PO DAILY [facility]


   Stop: 05/15/18 23:24


   Last Admin: 03/19/18 10:37 Dose:  100 mg


Heparin Sodium (Porcine) (Heparin)  5,000 units SUBQ Q12HR [facility]


   PRN Reason: Protocol


   Stop: 05/15/18 23:24


   Last Admin: 03/19/18 09:32 Dose:  Not Given


Insulin Aspart (Novolog Insulin Sliding Scale)  0 units SUBQ ACHS [facility]


   PRN Reason: Protocol


   Stop: 05/15/18 23:24


   Last Admin: 03/19/18 09:31 Dose:  Not Given


Insulin Aspart (Novolog Insulin Sliding Scale)  0 units SUBQ ACHS [facility]


   PRN Reason: Protocol


   Stop: 05/16/18 07:29


   Last Admin: 03/19/18 09:30 Dose:  Not Given


Insulin Detemir (Levemir Insulin)  18 units SUBQ DAILY [facility]


   PRN Reason: Protocol


   Stop: 05/15/18 23:24


   Last Admin: 03/19/18 09:32 Dose:  Not Given


Magnesium Hydroxide (Milk Of Magnesia)  30 ml PO HS PRN


   PRN Reason: Constipation


   Stop: 05/15/18 23:24


Metformin HCl (Glucophage)  1,000 mg PO DAILY [facility]


   Stop: 05/16/18 09:59


   Last Admin: 03/19/18 09:32 Dose:  Not Given


Metoprolol Succinate (Toprol Xl)  25 mg PO DAILY [facility]


   Stop: 05/15/18 23:24


   Last Admin: 03/19/18 09:32 Dose:  Not Given


Miscellaneous (Vte Chemical Prophylaxis Screen/ Admission)  1 ea MC PRN PRN


   PRN Reason: PROTOCOL


   Stop: 05/16/18 10:29


Mupirocin (Bactroban Oint)  1 appl NS BID [facility]


   Stop: 03/23/18 09:01


   Last Admin: 03/19/18 10:37 Dose:  Not Given


Olanzapine (Zyprexa Zydis)  5 mg PO BID [facility]


   PRN Reason: Protocol


   Stop: 05/18/18 08:59


   Last Admin: 03/19/18 10:38 Dose:  Not Given


Zolpidem Tartrate (Ambien)  5 mg PO HS PRN


   PRN Reason: insomnia


   Stop: 05/15/18 23:24








General: No acute distress


HEENT: Atraumatic, PERRLA, EOMI


Neck: Supple, JVD


Cardiovascular: Regular rate, Normal S1, Normal S2


Lungs: Clear to auscultation


Abdomen: Bowel sounds, Soft


Extremities: no Clubbing, no Cyanosis, no Edema


Neurological: Sensation intact


Skin: no Rash





- Procedures


Procedures: 


 Procedures











Procedure Code Date


 


KEV MUSC/FASCIA 20 SQ CM/< 23865 02/25/18


 


EXCISION OF RIGHT HIP MUSCLE, OPEN APPROACH [id number] 02/25/18














Assessment/Plan





- Assessment


Assessment: 





S/P BRYANT


S/P electrolyte imbalance


Acute decomp of Psychosis


Type 2 DM


Hx UTI








- Plan


Plan: 


Lab - Result Diagrams





 03/17/18 06:48 





Current Medications





Acetaminophen (Tylenol)  650 mg PO Q6H PRN


   PRN Reason: mild to moderate pain


   Stop: 05/15/18 23:24


Acetaminophen/Hydrocodone Bitart (Norco 5mg/325mg)  1 tab PO Q6H PRN


   PRN Reason: Pain (Moderate)


   Stop: 05/15/18 23:24


Allopurinol (Zyloprim)  100 mg PO DAILY [facility]


   Stop: 05/15/18 23:24


   Last Admin: 03/17/18 10:54 Dose:  100 mg


Castor Oil/[place] Balsam/Trypsin (Venelex)  1 appl TP DAILY [facility]


   Stop: 05/15/18 23:24


   Last Admin: 03/17/18 11:02 Dose:  1 appl


Clonazepam (Klonopin)  1 mg PO BID [facility]


   PRN Reason: Protocol


   Stop: 05/16/18 09:59


Famotidine (Pepcid)  20 mg PO BID [facility]


   Stop: 05/15/18 23:24


   Last Admin: 03/17/18 10:54 Dose:  20 mg


Fluconazole (Diflucan)  100 mg PO DAILY [facility]


   Stop: 05/15/18 23:24


   Last Admin: 03/17/18 11:02 Dose:  100 mg


Heparin Sodium (Porcine) (Heparin)  5,000 units SUBQ Q12HR [facility]


   PRN Reason: Protocol


   Stop: 05/15/18 23:24


   Last Admin: 03/17/18 10:59 Dose:  5,000 units


Levofloxacin (Levaquin Pb)  500 mg in 100 mls @ 100 mls/hr IV Q24H [facility]


   Stop: 05/16/18 11:59


Insulin Aspart (Novolog Insulin Sliding Scale)  0 units SUBQ ACHS [facility]


   PRN Reason: Protocol


   Stop: 05/15/18 23:24


   Last Admin: 03/17/18 10:45 Dose:  2 units


Insulin Aspart (Novolog Insulin Sliding Scale)  0 units SUBQ ACHS [facility]TA


   PRN Reason: Protocol


   Stop: 05/16/18 07:29


Insulin Detemir (Levemir Insulin)  18 units SUBQ DAILY [facility]


   PRN Reason: Protocol


   Stop: 05/15/18 23:24


   Last Admin: 03/17/18 10:56 Dose:  18 units


Lorazepam (Ativan)  0.5 mg PO Q6HR PRN; Protocol


   PRN Reason: agitation 


   Stop: 05/15/18 23:24


Magnesium Hydroxide (Milk Of Magnesia)  30 ml PO HS PRN


   PRN Reason: Constipation


   Stop: 05/15/18 23:24


Metformin HCl (Glucophage)  1,000 mg PO DAILY [facility]


   Stop: 05/16/18 09:59


Metoprolol Succinate (Toprol Xl)  25 mg PO DAILY [facility]


   Stop: 05/15/18 23:24


   Last Admin: 03/17/18 10:55 Dose:  25 mg


Miscellaneous (Vte Chemical Prophylaxis Screen/ Admission)  1 ea MC PRN PRN


   PRN Reason: PROTOCOL


   Stop: 05/16/18 10:29


Olanzapine (Zyprexa Zydis)  5 mg PO DAILY [facility]


   PRN Reason: Protocol


   Stop: 05/16/18 09:59


Zolpidem Tartrate (Ambien)  5 mg PO HS PRN


   PRN Reason: insomnia


   Stop: 05/15/18 23:24





Lab - Result Diagrams





 03/17/18 06:48 





kidney fnc remain stable


encourage po intake


psych meds

## 2018-03-19 NOTE — GENERAL PROGRESS NOTE
Subjective





- Review of Systems


Service Date: 03/19/18


Subjective: 





Patient still confused, irritable. Behavior still an issue. relying on 

psychiatry for guidance. unable to transfer to [facility]. Patient currently on 

Klonopin 1mg PO BID. In restraints and in a otilia-chair. 





Objective





- Results


Result Diagrams: 


 03/17/18 06:48


Recent Labs: 


 Laboratory Last Values











Sodium  136 mEq/L (136-145)   03/17/18  06:48    


 


Potassium  4.2 mEq/L (3.5-5.1)   03/17/18  06:48    


 


Chloride  105 mEq/L ()   03/17/18  06:48    


 


Carbon Dioxide  28.6 mEq/L (21.0-31.0)   03/17/18  06:48    


 


Anion Gap  6.6  (7.0-16.0)  L  03/17/18  06:48    


 


BUN  9 mg/dL (7-25)   03/17/18  06:48    


 


Creatinine  0.9 mg/dL (0.7-1.3)   03/17/18  06:48    


 


Est GFR ( Amer)  TNP   03/17/18  06:48    


 


Est GFR (Non-Af Amer)  TNP   03/17/18  06:48    


 


BUN/Creatinine Ratio  10.0   03/17/18  06:48    


 


Glucose  169 mg/dL ()  H  03/17/18  06:48    


 


POC Glucose  150 MG/DL (70 - 105)  H  03/18/18  16:53    


 


Calcium  8.8 mg/dL (8.6-10.3)   03/17/18  06:48    














- Physical Exam


Vitals and I&O: 


 Vital Signs











Temp  97.4 F   03/19/18 04:00


 


Pulse  104   03/19/18 04:00


 


Resp  18   03/19/18 04:00


 


BP  131/80   03/19/18 04:00


 


Pulse Ox  98   03/19/18 04:00








 Intake & Output











 03/18/18 03/19/18 03/19/18





 18:59 06:59 18:59


 


Intake Total 500 100 


 


Output Total 300 400 


 


Balance 200 -300 


 


Weight (lbs) 77.111 kg 77.111 kg 


 


Intake:   


 


  Oral 500 100 


 


Output:   


 


  Urine 300 400 


 


Other:   


 


  # Bowel Movements  1 











Active Medications: 


Current Medications





Acetaminophen (Tylenol)  650 mg PO Q6H PRN


   PRN Reason: mild to moderate pain


   Stop: 05/15/18 23:24


Acetaminophen/Hydrocodone Bitart (Norco 5mg/325mg)  1 tab PO Q6H PRN


   PRN Reason: Pain (Moderate)


   Stop: 05/15/18 23:24


Allopurinol (Zyloprim)  100 mg PO DAILY [facility]


   Stop: 05/15/18 23:24


   Last Admin: 03/18/18 09:43 Dose:  Not Given


Castor Oil/[place]aur[place] Balsam/Trypsin (Venelex)  1 appl TP DAILY [facility]


   Stop: 05/15/18 23:24


   Last Admin: 03/18/18 09:52 Dose:  Not Given


Clonazepam (Klonopin)  1 mg PO BID [facility]TA


   PRN Reason: Protocol


   Stop: 05/16/18 09:59


   Last Admin: 03/18/18 16:55 Dose:  1 mg


Famotidine (Pepcid)  20 mg PO BID [facility]


   Stop: 05/15/18 23:24


   Last Admin: 03/18/18 16:56 Dose:  20 mg


Fluconazole (Diflucan)  100 mg PO DAILY [facility]


   Stop: 05/15/18 23:24


   Last Admin: 03/18/18 09:43 Dose:  Not Given


Heparin Sodium (Porcine) (Heparin)  5,000 units SUBQ Q12HR [facility]


   PRN Reason: Protocol


   Stop: 05/15/18 23:24


   Last Admin: 03/18/18 20:42 Dose:  Not Given


Insulin Aspart (Novolog Insulin Sliding Scale)  0 units SUBQ ACHS R[facility]TA


   PRN Reason: Protocol


   Stop: 05/15/18 23:24


   Last Admin: 03/18/18 20:42 Dose:  Not Given


Insulin Aspart (Novolog Insulin Sliding Scale)  0 units SUBQ ACHS RENETTA


   PRN Reason: Protocol


   Stop: 05/16/18 07:29


   Last Admin: 03/18/18 20:41 Dose:  Not Given


Insulin Detemir (Levemir Insulin)  18 units SUBQ DAILY [facility]


   PRN Reason: Protocol


   Stop: 05/15/18 23:24


   Last Admin: 03/18/18 09:52 Dose:  Not Given


Magnesium Hydroxide (Milk Of Magnesia)  30 ml PO HS PRN


   PRN Reason: Constipation


   Stop: 05/15/18 23:24


Metformin HCl (Glucophage)  1,000 mg PO DAILY [facility]


   Stop: 05/16/18 09:59


   Last Admin: 03/18/18 09:41 Dose:  Not Given


Metoprolol Succinate (Toprol Xl)  25 mg PO DAILY [facility]


   Stop: 05/15/18 23:24


   Last Admin: 03/18/18 09:41 Dose:  Not Given


Miscellaneous (Vte Chemical Prophylaxis Screen/ Admission)  1 ea MC PRN PRN


   PRN Reason: PROTOCOL


   Stop: 05/16/18 10:29


Mupirocin (Bactroban Oint)  1 appl NS BID [facility]


   Stop: 03/23/18 09:01


   Last Admin: 03/18/18 17:53 Dose:  1 appl


Olanzapine (Zyprexa Zydis)  5 mg PO BID [facility]


   PRN Reason: Protocol


   Stop: 05/18/18 08:59


Zolpidem Tartrate (Ambien)  5 mg PO HS PRN


   PRN Reason: insomnia


   Stop: 05/15/18 23:24








General: No acute distress


HEENT: Atraumatic, PERRLA, EOMI


Neck: Supple, JVD


Cardiovascular: Regular rate, Normal S1, Normal S2


Lungs: Clear to auscultation


Abdomen: Bowel sounds, Soft


Extremities: no Clubbing, no Cyanosis, no Edema


Neurological: Sensation intact


Skin: no Rash





- Procedures


Procedures: 


 Procedures











Procedure Code Date


 


KEV MUSC/FASCIA 20 SQ CM/< 27803 02/25/18


 


EXCISION OF RIGHT HIP MUSCLE, OPEN APPROACH [id number] 02/25/18














Assessment/Plan





- Assessment


Assessment: 





psychosis


diabetes mellitus


s/p wound debridement of sacral decubiti


UTI


hyperglycemia








- Plan


Plan: 





continue current medications.


discharge planning.

## 2018-03-19 NOTE — INFECTIOUS DISEASE PROG NOTE
Infectious Disease Subjective





- Review of Systems


Service Date: 03/19/18


Subjective: 





No fever





Infectious Disease Objective





- Results


Result Diagrams: 


 03/17/18 06:48


Recent Labs: 


 Laboratory Last Values











Sodium  136 mEq/L (136-145)   03/17/18  06:48    


 


Potassium  4.2 mEq/L (3.5-5.1)   03/17/18  06:48    


 


Chloride  105 mEq/L ()   03/17/18  06:48    


 


Carbon Dioxide  28.6 mEq/L (21.0-31.0)   03/17/18  06:48    


 


Anion Gap  6.6  (7.0-16.0)  L  03/17/18  06:48    


 


BUN  9 mg/dL (7-25)   03/17/18  06:48    


 


Creatinine  0.9 mg/dL (0.7-1.3)   03/17/18  06:48    


 


Est GFR ( Amer)  TNP   03/17/18  06:48    


 


Est GFR (Non-Af Amer)  TNP   03/17/18  06:48    


 


BUN/Creatinine Ratio  10.0   03/17/18  06:48    


 


Glucose  169 mg/dL ()  H  03/17/18  06:48    


 


POC Glucose  150 MG/DL (70 - 105)  H  03/18/18  16:53    


 


Calcium  8.8 mg/dL (8.6-10.3)   03/17/18  06:48    














- Physical Exam


Vitals and I&O: 


 Vital Signs











Temp  98.6 F   03/19/18 12:00


 


Pulse  104   03/19/18 12:00


 


Resp  18   03/19/18 12:00


 


BP  138/75   03/19/18 12:00


 


Pulse Ox  98   03/19/18 12:00








 Intake & Output











 03/18/18 03/19/18 03/19/18





 18:59 06:59 18:59


 


Intake Total 500 100 


 


Output Total 300 400 


 


Balance 200 -300 


 


Weight (lbs) 77.111 kg 77.111 kg 


 


Intake:   


 


  Oral 500 100 


 


Output:   


 


  Urine 300 400 


 


Other:   


 


  # Bowel Movements  1 











Active Medications: 


Current Medications





Acetaminophen (Tylenol)  650 mg PO Q6H PRN


   PRN Reason: mild to moderate pain


   Stop: 05/15/18 23:24


Acetaminophen/Hydrocodone Bitart (Norco 5mg/325mg)  1 tab PO Q6H PRN


   PRN Reason: Pain (Moderate)


   Stop: 05/15/18 23:24


Allopurinol (Zyloprim)  100 mg PO DAILY [facility]


   Stop: 05/15/18 23:24


   Last Admin: 03/19/18 09:30 Dose:  Not Given


Castor Oil/[place] Balsam/Trypsin (Venelex)  1 appl TP DAILY [facility]


   Stop: 05/15/18 23:24


   Last Admin: 03/18/18 09:52 Dose:  Not Given


Clonazepam (Klonopin)  1 mg PO BID [facility]


   PRN Reason: Protocol


   Stop: 05/16/18 09:59


   Last Admin: 03/19/18 10:36 Dose:  1 mg


Famotidine (Pepcid)  20 mg PO BID [facility]


   Stop: 05/15/18 23:24


   Last Admin: 03/19/18 09:32 Dose:  Not Given


Fluconazole (Diflucan)  100 mg PO DAILY [facility]


   Stop: 05/15/18 23:24


   Last Admin: 03/19/18 10:37 Dose:  100 mg


Heparin Sodium (Porcine) (Heparin)  5,000 units SUBQ Q12HR [facility]


   PRN Reason: Protocol


   Stop: 05/15/18 23:24


   Last Admin: 03/19/18 09:32 Dose:  Not Given


Insulin Aspart (Novolog Insulin Sliding Scale)  0 units SUBQ ACHS [facility]


   PRN Reason: Protocol


   Stop: 05/15/18 23:24


   Last Admin: 03/19/18 09:31 Dose:  Not Given


Insulin Aspart (Novolog Insulin Sliding Scale)  0 units SUBQ ACHS [facility]


   PRN Reason: Protocol


   Stop: 05/16/18 07:29


   Last Admin: 03/19/18 09:30 Dose:  Not Given


Insulin Detemir (Levemir Insulin)  18 units SUBQ DAILY [facility]


   PRN Reason: Protocol


   Stop: 05/15/18 23:24


   Last Admin: 03/19/18 09:32 Dose:  Not Given


Magnesium Hydroxide (Milk Of Magnesia)  30 ml PO HS PRN


   PRN Reason: Constipation


   Stop: 05/15/18 23:24


Metformin HCl (Glucophage)  1,000 mg PO DAILY [facility]


   Stop: 05/16/18 09:59


   Last Admin: 03/19/18 09:32 Dose:  Not Given


Metoprolol Succinate (Toprol Xl)  25 mg PO DAILY [facility]


   Stop: 05/15/18 23:24


   Last Admin: 03/19/18 09:32 Dose:  Not Given


Miscellaneous (Vte Chemical Prophylaxis Screen/ Admission)  1 ea MC PRN PRN


   PRN Reason: PROTOCOL


   Stop: 05/16/18 10:29


Mupirocin (Bactroban Oint)  1 appl NS BID [facility]


   Stop: 03/23/18 09:01


   Last Admin: 03/19/18 10:37 Dose:  Not Given


Olanzapine (Zyprexa Zydis)  5 mg PO BID [facility]


   PRN Reason: Protocol


   Stop: 05/18/18 08:59


   Last Admin: 03/19/18 10:38 Dose:  Not Given


Zolpidem Tartrate (Ambien)  5 mg PO HS PRN


   PRN Reason: insomnia


   Stop: 05/15/18 23:24








General: no acute distress, well developed, well nourished


HEENT: atraumatic, normocephalic, PERRLA


Neck: supple, no thyromegaly


Cardiovascular: S1S2, regular


Lungs: no clear to auscultation bilaterally, no clear to percussion, no crackles


Abdomen: soft, no tender, no distended


Extremities: no cyanosis, no clubbing, no edema


Neurological: awake, alert, oriented


Skin: intact





- Procedures


Procedures: 


 Procedures











Procedure Code Date


 


KEV MUSC/FASCIA 20 SQ CM/< 62391 02/25/18


 


EXCISION OF RIGHT HIP MUSCLE, OPEN APPROACH [id number] 02/25/18














Infectious Disease Assmt/Plan





- Assessment


Assessment: 





1.  Sacral decubitus ulcer.


2.  UTI.  Treated


3.  Dementia.





- Plan


Plan: 





Off antibiotic and wound care.

## 2018-03-20 RX ADMIN — INSULIN ASPART SCH: 100 INJECTION, SOLUTION INTRAVENOUS; SUBCUTANEOUS at 20:46

## 2018-03-20 RX ADMIN — OLANZAPINE SCH: 5 TABLET, ORALLY DISINTEGRATING ORAL at 10:53

## 2018-03-20 RX ADMIN — INSULIN ASPART SCH: 100 INJECTION, SOLUTION INTRAVENOUS; SUBCUTANEOUS at 17:04

## 2018-03-20 RX ADMIN — INSULIN ASPART SCH: 100 INJECTION, SOLUTION INTRAVENOUS; SUBCUTANEOUS at 10:51

## 2018-03-20 RX ADMIN — INSULIN DETEMIR SCH: 100 INJECTION, SOLUTION SUBCUTANEOUS at 10:53

## 2018-03-20 RX ADMIN — INSULIN ASPART SCH: 100 INJECTION, SOLUTION INTRAVENOUS; SUBCUTANEOUS at 13:39

## 2018-03-20 RX ADMIN — INSULIN ASPART SCH: 100 INJECTION, SOLUTION INTRAVENOUS; SUBCUTANEOUS at 10:54

## 2018-03-20 RX ADMIN — OLANZAPINE SCH MG: 5 TABLET, ORALLY DISINTEGRATING ORAL at 17:13

## 2018-03-20 RX ADMIN — CASTOR OIL AND BALSAM, PERU SCH APPL: 788; 87 OINTMENT TOPICAL at 09:35

## 2018-03-20 NOTE — GENERAL PROGRESS NOTE
Subjective





- Review of Systems


Service Date: 03/20/18


Subjective: 





aggressive, received anxiolytic, more calm





Objective





- Results


Result Diagrams: 


 03/17/18 06:48


Recent Labs: 


 Laboratory Last Values











Sodium  136 mEq/L (136-145)   03/17/18  06:48    


 


Potassium  4.2 mEq/L (3.5-5.1)   03/17/18  06:48    


 


Chloride  105 mEq/L ()   03/17/18  06:48    


 


Carbon Dioxide  28.6 mEq/L (21.0-31.0)   03/17/18  06:48    


 


Anion Gap  6.6  (7.0-16.0)  L  03/17/18  06:48    


 


BUN  9 mg/dL (7-25)   03/17/18  06:48    


 


Creatinine  0.9 mg/dL (0.7-1.3)   03/17/18  06:48    


 


Est GFR ( Amer)  TNP   03/17/18  06:48    


 


Est GFR (Non-Af Amer)  TNP   03/17/18  06:48    


 


BUN/Creatinine Ratio  10.0   03/17/18  06:48    


 


Glucose  169 mg/dL ()  H  03/17/18  06:48    


 


POC Glucose  181 MG/DL (70 - 105)  H  03/19/18  19:59    


 


Calcium  8.8 mg/dL (8.6-10.3)   03/17/18  06:48    














- Physical Exam


Vitals and I&O: 


 Vital Signs











Temp  97.4 F   03/19/18 16:00


 


Pulse  82   03/20/18 09:34


 


Resp  20   03/20/18 08:00


 


BP  128/72   03/20/18 09:34


 


Pulse Ox  98   03/19/18 16:00








 Intake & Output











 03/19/18 03/20/18 03/20/18





 18:59 06:59 18:59


 


Intake Total 800 100 240


 


Output Total 300 300 


 


Balance 500 -200 240


 


Weight (lbs) 77.111 kg 77.111 kg 77.111 kg


 


Intake:   


 


  Oral 800 100 240


 


Output:   


 


  Urine 300 300 


 


Other:   


 


  # Bowel Movements 1 0 











Active Medications: 


Current Medications





Acetaminophen (Tylenol)  650 mg PO Q6H PRN


   PRN Reason: mild to moderate pain


   Stop: 05/15/18 23:24


Acetaminophen/Hydrocodone Bitart (Norco 5mg/325mg)  1 tab PO Q6H PRN


   PRN Reason: Pain (Moderate)


   Stop: 05/15/18 23:24


Allopurinol (Zyloprim)  100 mg PO DAILY [facility]


   Stop: 05/15/18 23:24


   Last Admin: 03/20/18 10:52 Dose:  Not Given


Castor Oil/Thai Balsam/Trypsin (Venelex)  1 appl TP DAILY [facility]


   Stop: 05/15/18 23:24


   Last Admin: 03/20/18 09:35 Dose:  1 appl


Clonazepam (Klonopin)  1 mg PO BID [facility]


   PRN Reason: Protocol


   Stop: 05/16/18 09:59


   Last Admin: 03/20/18 09:35 Dose:  1 mg


Famotidine (Pepcid)  20 mg PO BID [facility]


   Stop: 05/15/18 23:24


   Last Admin: 03/20/18 09:33 Dose:  20 mg


Fluconazole (Diflucan)  100 mg PO DAILY [facility]


   Stop: 05/15/18 23:24


   Last Admin: 03/20/18 09:33 Dose:  100 mg


Heparin Sodium (Porcine) (Heparin)  5,000 units SUBQ Q12HR [facility]


   PRN Reason: Protocol


   Stop: 05/15/18 23:24


   Last Admin: 03/20/18 09:35 Dose:  5,000 units


Insulin Aspart (Novolog Insulin Sliding Scale)  0 units SUBQ ACHS [facility]


   PRN Reason: Protocol


   Stop: 05/15/18 23:24


   Last Admin: 03/20/18 10:51 Dose:  Not Given


Insulin Aspart (Novolog Insulin Sliding Scale)  0 units SUBQ ACHS [facility]


   PRN Reason: Protocol


   Stop: 05/16/18 07:29


   Last Admin: 03/20/18 10:54 Dose:  Not Given


Insulin Detemir (Levemir Insulin)  18 units SUBQ DAILY [facility]


   PRN Reason: Protocol


   Stop: 05/15/18 23:24


   Last Admin: 03/20/18 10:53 Dose:  Not Given


Magnesium Hydroxide (Milk Of Magnesia)  30 ml PO  PRN


   PRN Reason: Constipation


   Stop: 05/15/18 23:24


Metformin HCl (Glucophage)  1,000 mg PO DAILY [facility]


   Stop: 05/16/18 09:59


   Last Admin: 03/20/18 09:33 Dose:  1,000 mg


Metoprolol Succinate (Toprol Xl)  25 mg PO DAILY [facility]


   Stop: 05/15/18 23:24


   Last Admin: 03/20/18 09:34 Dose:  25 mg


Miscellaneous (Vte Chemical Prophylaxis Screen/ Admission)  1 ea MC PRN PRN


   PRN Reason: PROTOCOL


   Stop: 05/16/18 10:29


Mupirocin (Bactroban Oint)  1 appl NS BID [facility]


   Stop: 03/23/18 09:01


   Last Admin: 03/20/18 09:35 Dose:  1 appl


Olanzapine (Zyprexa Zydis)  5 mg PO BID [facility]


   PRN Reason: Protocol


   Stop: 05/18/18 08:59


   Last Admin: 03/20/18 10:53 Dose:  Not Given


Zolpidem Tartrate (Ambien)  5 mg PO HS PRN


   PRN Reason: insomnia


   Stop: 05/15/18 23:24


   Last Admin: 03/19/18 19:55 Dose:  5 mg








General: No acute distress


HEENT: Atraumatic, PERRLA, EOMI


Neck: Supple, JVD


Cardiovascular: Regular rate, Normal S1, Normal S2


Lungs: Clear to auscultation


Abdomen: Bowel sounds, Soft


Extremities: no Clubbing, no Cyanosis, no Edema


Neurological: Sensation intact


Skin: no Rash





- Procedures


Procedures: 


 Procedures











Procedure Code Date


 


KEV MUSC/FASCIA 20 SQ CM/< 61550 02/25/18


 


EXCISION OF RIGHT HIP MUSCLE, OPEN APPROACH [id number] 02/25/18


 


INTRODUCTION OF SERUM/TOX/VACCINE INTO MUSCLE, PERC APPROACH [id number] 02/25/18














Assessment/Plan





- Assessment


Assessment: 





S/P BRYANT


S/P electrolyte imbalance


Acute decomp of Psychosis


Type 2 DM


Hx UTI








- Plan


Plan: 


Lab - Result Diagrams





 03/17/18 06:48 





Current Medications





Acetaminophen (Tylenol)  650 mg PO Q6H PRN


   PRN Reason: mild to moderate pain


   Stop: 05/15/18 23:24


Acetaminophen/Hydrocodone Bitart (Norco 5mg/325mg)  1 tab PO Q6H PRN


   PRN Reason: Pain (Moderate)


   Stop: 05/15/18 23:24


Allopurinol (Zyloprim)  100 mg PO DAILY [facility]


   Stop: 05/15/18 23:24


   Last Admin: 03/17/18 10:54 Dose:  100 mg


Castor Oil/Thai Balsam/Trypsin (Venelex)  1 appl TP DAILY [facility]


   Stop: 05/15/18 23:24


   Last Admin: 03/17/18 11:02 Dose:  1 appl


Clonazepam (Klonopin)  1 mg PO BID [facility]


   PRN Reason: Protocol


   Stop: 05/16/18 09:59


Famotidine (Pepcid)  20 mg PO BID [facility]


   Stop: 05/15/18 23:24


   Last Admin: 03/17/18 10:54 Dose:  20 mg


Fluconazole (Diflucan)  100 mg PO DAILY [facility]


   Stop: 05/15/18 23:24


   Last Admin: 03/17/18 11:02 Dose:  100 mg


Heparin Sodium (Porcine) (Heparin)  5,000 units SUBQ Q12HR [facility]


   PRN Reason: Protocol


   Stop: 05/15/18 23:24


   Last Admin: 03/17/18 10:59 Dose:  5,000 units


Levofloxacin (Levaquin Pb)  500 mg in 100 mls @ 100 mls/hr IV Q24H [facility]


   Stop: 05/16/18 11:59


Insulin Aspart (Novolog Insulin Sliding Scale)  0 units SUBQ ACHS [facility]


   PRN Reason: Protocol


   Stop: 05/15/18 23:24


   Last Admin: 03/17/18 10:45 Dose:  2 units


Insulin Aspart (Novolog Insulin Sliding Scale)  0 units SUBQ ACHS [facility]


   PRN Reason: Protocol


   Stop: 05/16/18 07:29


Insulin Detemir (Levemir Insulin)  18 units SUBQ DAILY [facility]


   PRN Reason: Protocol


   Stop: 05/15/18 23:24


   Last Admin: 03/17/18 10:56 Dose:  18 units


Lorazepam (Ativan)  0.5 mg PO Q6HR PRN; Protocol


   PRN Reason: agitation 


   Stop: 05/15/18 23:24


Magnesium Hydroxide (Milk Of Magnesia)  30 ml PO HS PRN


   PRN Reason: Constipation


   Stop: 05/15/18 23:24


Metformin HCl (Glucophage)  1,000 mg PO DAILY [facility]


   Stop: 05/16/18 09:59


Metoprolol Succinate (Toprol Xl)  25 mg PO DAILY [facility]


   Stop: 05/15/18 23:24


   Last Admin: 03/17/18 10:55 Dose:  25 mg


Miscellaneous (Vte Chemical Prophylaxis Screen/ Admission)  1 ea MC PRN PRN


   PRN Reason: PROTOCOL


   Stop: 05/16/18 10:29


Olanzapine (Zyprexa Zydis)  5 mg PO DAILY [facility]


   PRN Reason: Protocol


   Stop: 05/16/18 09:59


Zolpidem Tartrate (Ambien)  5 mg PO HS PRN


   PRN Reason: insomnia


   Stop: 05/15/18 23:24





Lab - Result Diagrams





 03/17/18 06:48 





kidney fnc remain stable


encourage po intake


psych meds

## 2018-03-20 NOTE — INFECTIOUS DISEASE PROG NOTE
Infectious Disease Subjective





- Review of Systems


Service Date: 03/20/18


Subjective: 





No fever





Infectious Disease Objective





- Results


Result Diagrams: 


 03/17/18 06:48


Recent Labs: 


 Laboratory Last Values











Sodium  136 mEq/L (136-145)   03/17/18  06:48    


 


Potassium  4.2 mEq/L (3.5-5.1)   03/17/18  06:48    


 


Chloride  105 mEq/L ()   03/17/18  06:48    


 


Carbon Dioxide  28.6 mEq/L (21.0-31.0)   03/17/18  06:48    


 


Anion Gap  6.6  (7.0-16.0)  L  03/17/18  06:48    


 


BUN  9 mg/dL (7-25)   03/17/18  06:48    


 


Creatinine  0.9 mg/dL (0.7-1.3)   03/17/18  06:48    


 


Est GFR ( Amer)  TNP   03/17/18  06:48    


 


Est GFR (Non-Af Amer)  TNP   03/17/18  06:48    


 


BUN/Creatinine Ratio  10.0   03/17/18  06:48    


 


Glucose  169 mg/dL ()  H  03/17/18  06:48    


 


POC Glucose  181 MG/DL (70 - 105)  H  03/19/18  19:59    


 


Calcium  8.8 mg/dL (8.6-10.3)   03/17/18  06:48    














- Physical Exam


Vitals and I&O: 


 Vital Signs











Temp  98.9 F   03/20/18 14:00


 


Pulse  93   03/20/18 14:00


 


Resp  20   03/20/18 20:00


 


BP  149/79   03/20/18 14:00


 


Pulse Ox  100   03/20/18 14:00








 Intake & Output











 03/20/18 03/20/18 03/21/18





 06:59 18:59 06:59


 


Intake Total 100 240 


 


Output Total 300  


 


Balance -200 240 


 


Weight (lbs) 77.111 kg 77.111 kg 


 


Intake:   


 


  Oral 100 240 


 


Output:   


 


  Urine 300  


 


Other:   


 


  # Bowel Movements 0  











Active Medications: 


Current Medications





Acetaminophen (Tylenol)  650 mg PO Q6H PRN


   PRN Reason: mild to moderate pain


   Stop: 05/15/18 23:24


Acetaminophen/Hydrocodone Bitart (Norco 5mg/325mg)  1 tab PO Q6H PRN


   PRN Reason: Pain (Moderate)


   Stop: 05/15/18 23:24


Allopurinol (Zyloprim)  100 mg PO DAILY [facility]


   Stop: 05/15/18 23:24


   Last Admin: 03/20/18 10:52 Dose:  Not Given


Castor Oil/[place] Balsam/Trypsin (Venelex)  1 appl TP DAILY [facility]


   Stop: 05/15/18 23:24


   Last Admin: 03/20/18 09:35 Dose:  1 appl


Clonazepam (Klonopin)  1 mg PO BID [facility]


   PRN Reason: Protocol


   Stop: 05/16/18 09:59


   Last Admin: 03/20/18 17:13 Dose:  1 mg


Famotidine (Pepcid)  20 mg PO BID [facility]


   Stop: 05/15/18 23:24


   Last Admin: 03/20/18 17:13 Dose:  20 mg


Fluconazole (Diflucan)  100 mg PO DAILY [facility]


   Stop: 05/15/18 23:24


   Last Admin: 03/20/18 09:33 Dose:  100 mg


Heparin Sodium (Porcine) (Heparin)  5,000 units SUBQ Q12HR [facility]


   PRN Reason: Protocol


   Stop: 05/15/18 23:24


   Last Admin: 03/20/18 20:46 Dose:  Not Given


Insulin Aspart (Novolog Insulin Sliding Scale)  0 units SUBQ ACHS [facility]


   PRN Reason: Protocol


   Stop: 05/15/18 23:24


   Last Admin: 03/20/18 20:46 Dose:  Not Given


Insulin Aspart (Novolog Insulin Sliding Scale)  0 units SUBQ ACHS [facility]


   PRN Reason: Protocol


   Stop: 05/16/18 07:29


   Last Admin: 03/20/18 10:54 Dose:  Not Given


Insulin Detemir (Levemir Insulin)  18 units SUBQ DAILY [facility]


   PRN Reason: Protocol


   Stop: 05/15/18 23:24


   Last Admin: 03/20/18 10:53 Dose:  Not Given


Magnesium Hydroxide (Milk Of Magnesia)  30 ml PO  PRN


   PRN Reason: Constipation


   Stop: 05/15/18 23:24


Metformin HCl (Glucophage)  1,000 mg PO DAILY [facility]


   Stop: 05/16/18 09:59


   Last Admin: 03/20/18 09:33 Dose:  1,000 mg


Metoprolol Succinate (Toprol Xl)  25 mg PO DAILY [facility]


   Stop: 05/15/18 23:24


   Last Admin: 03/20/18 09:34 Dose:  25 mg


Miscellaneous (Vte Chemical Prophylaxis Screen/ Admission)  1 ea MC PRN PRN


   PRN Reason: PROTOCOL


   Stop: 05/16/18 10:29


Mupirocin (Bactroban Oint)  1 appl NS BID [facility]


   Stop: 03/23/18 09:01


   Last Admin: 03/20/18 17:14 Dose:  1 appl


Olanzapine (Zyprexa Zydis)  5 mg PO BID [facility]


   PRN Reason: Protocol


   Stop: 05/18/18 08:59


   Last Admin: 03/20/18 17:13 Dose:  5 mg


Zolpidem Tartrate (Ambien)  5 mg PO HS PRN


   PRN Reason: insomnia


   Stop: 05/15/18 23:24


   Last Admin: 03/19/18 19:55 Dose:  5 mg








General: no acute distress, well developed, well nourished, cachectic


HEENT: atraumatic, PERRLA, EOMI, no normocephalic


Neck: supple, no thyromegaly, no lymphadenopathy


Cardiovascular: S1S2, regular, no irregular


Lungs: clear to auscultation bilaterally, clear to percussion


Abdomen: soft, tender, bowel sounds, no distended, no rebound, no ascites


Extremities: no cyanosis, no clubbing, no edema, no lines


Neurological: awake, alert, other (Confused)


Skin: other (sacral wound)





- Procedures


Procedures: 


 Procedures











Procedure Code Date


 


KEV MUSC/FASCIA 20 SQ CM/< 01657 02/25/18


 


EXCISION OF RIGHT HIP MUSCLE, OPEN APPROACH [id number] 02/25/18


 


INTRODUCTION OF SERUM/TOX/VACCINE INTO MUSCLE, PERC APPROACH [id number] 02/25/18














Infectious Disease Assmt/Plan





- Assessment


Assessment: 





1.  Sacral decubitus ulcer.


2.  UTI.  Treated


3.  Dementia.





- Plan


Plan: 





Off antibiotic and wound care.

## 2018-03-20 NOTE — PROGRESS NOTES
DATE:  



Chart reviewed and the patient interviewed.  Also discussed the patient's

condition with the staff and reviewed records and labs.  The patient is calm and

easier to redirect him, but he still has episodes of agitation and irritability.

 The patient also still needs redirections that he is still confused.  Also,

during my interview, the patient seems to be in elated mood and continued to

wave with his both hands in the air in a confused state.  Otherwise, decreased

behavioral problems and decreased agitation.  Also, is compliant with taking his

medications with no side effects of medications.



ASSESSMENT:  The patient is still psychotic, but showing improvement.



TREATMENT PLAN:  We will continue to monitor his behavior and his condition

closely.  Also, continue adjusting psychotropic medications and working on

behavioral modification.





DD: 03/20/2018 08:18

DT: 03/20/2018 08:29

JOB# [id number]  [id number]

## 2018-03-20 NOTE — DISCHARGE SUMMARY
DATE OF DISCHARGE:  02/25/2018



PATIENT'S AGE:  77-year-old.



SEX:  Male.



PHYSICIAN:  Leda Ramirez MD, MPH



FINAL DIAGNOSIS/PRIMARY DIAGNOSIS:  Unspecified psychosis.



SECONDARY DIAGNOSIS:  Dementia, moderate to severe, with psychotic features.



REASON FOR HOSPITALIZATION:  The patient was admitted to the hospital because of

confusion and increased agitation and irritability.



HOSPITAL COURSE:  The patient continued to be confused and was rambling in

British language.  The patient also was not able to follow any directions and he

was easily agitated.  The patient also was getting more angry, was yelling and

screaming, asking for "agua, agua meaning water, water."  The patient also was

given Zyprexa and the dose was adjusted to 5 mg every day.  The patient had drop

in his oxygen level and the patient was transferred to the medical floor for

stabilization of his medical condition.



PHYSICAL EXAMINATION:  Showed that the patient has insulin-dependent diabetes

and has also gastroesophageal reflux disease.



AFTER DISCHARGE PLANS:  The patient transferred to med/surg floor and plans for

followup there and stabilizing both medical and psychiatric condition there.



EXPECTED OUTCOME AFTER DISCHARGE:  Depends on hospital course in the medical

floor.





DD: 03/20/2018 20:07

DT: 03/20/2018 23:31

JOB# [id number]  [id number]

## 2018-03-20 NOTE — GENERAL PROGRESS NOTE
Subjective





- Review of Systems


Service Date: 03/20/18


Subjective: 





Patient still confused, irritable. Behavior still an issue. relying on 

psychiatry for guidance. unable to transfer to [facility]. Patient currently on 

Klonopin 1mg PO BID. In restraints and in a otilia-chair. 





Objective





- Results


Result Diagrams: 


 03/17/18 06:48


Recent Labs: 


 Laboratory Last Values











Sodium  136 mEq/L (136-145)   03/17/18  06:48    


 


Potassium  4.2 mEq/L (3.5-5.1)   03/17/18  06:48    


 


Chloride  105 mEq/L ()   03/17/18  06:48    


 


Carbon Dioxide  28.6 mEq/L (21.0-31.0)   03/17/18  06:48    


 


Anion Gap  6.6  (7.0-16.0)  L  03/17/18  06:48    


 


BUN  9 mg/dL (7-25)   03/17/18  06:48    


 


Creatinine  0.9 mg/dL (0.7-1.3)   03/17/18  06:48    


 


Est GFR ( Amer)  TNP   03/17/18  06:48    


 


Est GFR (Non-Af Amer)  TNP   03/17/18  06:48    


 


BUN/Creatinine Ratio  10.0   03/17/18  06:48    


 


Glucose  169 mg/dL ()  H  03/17/18  06:48    


 


POC Glucose  181 MG/DL (70 - 105)  H  03/19/18  19:59    


 


Calcium  8.8 mg/dL (8.6-10.3)   03/17/18  06:48    














- Physical Exam


Vitals and I&O: 


 Vital Signs











Temp  97.4 F   03/19/18 16:00


 


Pulse  102   03/20/18 02:08


 


Resp  21   03/20/18 02:08


 


BP  103/65   03/20/18 02:08


 


Pulse Ox  98   03/19/18 16:00








 Intake & Output











 03/19/18 03/19/18 03/20/18





 06:59 18:59 06:59


 


Intake Total 100 800 


 


Output Total 400 300 


 


Balance -300 500 


 


Weight (lbs) 77.111 kg 77.111 kg 


 


Intake:   


 


  Oral 100 800 


 


Output:   


 


  Urine 400 300 


 


Other:   


 


  # Bowel Movements 1 1 











Active Medications: 


Current Medications





Acetaminophen (Tylenol)  650 mg PO Q6H PRN


   PRN Reason: mild to moderate pain


   Stop: 05/15/18 23:24


Acetaminophen/Hydrocodone Bitart (Norco 5mg/325mg)  1 tab PO Q6H PRN


   PRN Reason: Pain (Moderate)


   Stop: 05/15/18 23:24


Allopurinol (Zyloprim)  100 mg PO DAILY [facility]


   Stop: 05/15/18 23:24


   Last Admin: 03/19/18 09:30 Dose:  Not Given


Castor Oil/[place] Balsam/Trypsin (Venelex)  1 appl TP DAILY [facility]


   Stop: 05/15/18 23:24


   Last Admin: 03/19/18 15:19 Dose:  1 appl


Clonazepam (Klonopin)  1 mg PO BID [facility]


   PRN Reason: Protocol


   Stop: 05/16/18 09:59


   Last Admin: 03/19/18 18:41 Dose:  1 mg


Famotidine (Pepcid)  20 mg PO BID [facility]


   Stop: 05/15/18 23:24


   Last Admin: 03/19/18 18:41 Dose:  20 mg


Fluconazole (Diflucan)  100 mg PO DAILY [facility]


   Stop: 05/15/18 23:24


   Last Admin: 03/19/18 10:37 Dose:  100 mg


Heparin Sodium (Porcine) (Heparin)  5,000 units SUBQ Q12HR [facility]


   PRN Reason: Protocol


   Stop: 05/15/18 23:24


   Last Admin: 03/19/18 20:01 Dose:  5,000 units


Insulin Aspart (Novolog Insulin Sliding Scale)  0 units SUBQ ACHS [facility]


   PRN Reason: Protocol


   Stop: 05/15/18 23:24


   Last Admin: 03/19/18 20:02 Dose:  2 units


Insulin Aspart (Novolog Insulin Sliding Scale)  0 units SUBQ ACHS [facility]


   PRN Reason: Protocol


   Stop: 05/16/18 07:29


   Last Admin: 03/19/18 20:14 Dose:  Not Given


Insulin Detemir (Levemir Insulin)  18 units SUBQ DAILY [facility]


   PRN Reason: Protocol


   Stop: 05/15/18 23:24


   Last Admin: 03/19/18 09:32 Dose:  Not Given


Magnesium Hydroxide (Milk Of Magnesia)  30 ml PO HS PRN


   PRN Reason: Constipation


   Stop: 05/15/18 23:24


Metformin HCl (Glucophage)  1,000 mg PO DAILY [facility]


   Stop: 05/16/18 09:59


   Last Admin: 03/19/18 09:32 Dose:  Not Given


Metoprolol Succinate (Toprol Xl)  25 mg PO DAILY [facility]


   Stop: 05/15/18 23:24


   Last Admin: 03/19/18 09:32 Dose:  Not Given


Miscellaneous (Vte Chemical Prophylaxis Screen/ Admission)  1 ea MC PRN PRN


   PRN Reason: PROTOCOL


   Stop: 05/16/18 10:29


Mupirocin (Bactroban Oint)  1 appl NS BID [facility]


   Stop: 03/23/18 09:01


   Last Admin: 03/19/18 18:41 Dose:  Not Given


Olanzapine (Zyprexa Zydis)  5 mg PO BID [facility]


   PRN Reason: Protocol


   Stop: 05/18/18 08:59


   Last Admin: 03/19/18 18:41 Dose:  5 mg


Zolpidem Tartrate (Ambien)  5 mg PO HS PRN


   PRN Reason: insomnia


   Stop: 05/15/18 23:24


   Last Admin: 03/19/18 19:55 Dose:  5 mg








General: No acute distress


HEENT: Atraumatic, PERRLA, EOMI


Neck: Supple, JVD


Cardiovascular: Regular rate, Normal S1, Normal S2


Lungs: Clear to auscultation


Abdomen: Bowel sounds, Soft


Extremities: no Clubbing, no Cyanosis, no Edema


Neurological: Sensation intact


Skin: no Rash





- Procedures


Procedures: 


 Procedures











Procedure Code Date


 


KEV MUSC/FASCIA 20 SQ CM/< 26713 02/25/18


 


EXCISION OF RIGHT HIP MUSCLE, OPEN APPROACH [id number] 02/25/18


 


INTRODUCTION OF SERUM/TOX/VACCINE INTO MUSCLE, PERC APPROACH [id number] 02/25/18














Assessment/Plan





- Assessment


Assessment: 





psychosis


diabetes mellitus


s/p wound debridement of sacral decubiti


UTI


hyperglycemia








- Plan


Plan: 





continue current medications.


discharge planning.

## 2018-03-21 LAB
INR PPP: 0.91 (ref 0.5–1.4)
PROTHROMBIN TIME: 9.5 SECONDS (ref 9.5–11.5)

## 2018-03-21 RX ADMIN — CASTOR OIL AND BALSAM, PERU SCH APPL: 788; 87 OINTMENT TOPICAL at 10:25

## 2018-03-21 RX ADMIN — INSULIN ASPART SCH UNITS: 100 INJECTION, SOLUTION INTRAVENOUS; SUBCUTANEOUS at 12:27

## 2018-03-21 RX ADMIN — INSULIN ASPART SCH UNITS: 100 INJECTION, SOLUTION INTRAVENOUS; SUBCUTANEOUS at 21:29

## 2018-03-21 RX ADMIN — INSULIN ASPART SCH: 100 INJECTION, SOLUTION INTRAVENOUS; SUBCUTANEOUS at 10:29

## 2018-03-21 RX ADMIN — OLANZAPINE SCH MG: 5 TABLET, ORALLY DISINTEGRATING ORAL at 10:24

## 2018-03-21 RX ADMIN — OLANZAPINE SCH MG: 5 TABLET, ORALLY DISINTEGRATING ORAL at 17:55

## 2018-03-21 RX ADMIN — INSULIN DETEMIR SCH: 100 INJECTION, SOLUTION SUBCUTANEOUS at 17:47

## 2018-03-21 NOTE — GENERAL PROGRESS NOTE
Subjective





- Review of Systems


Service Date: 03/21/18


Subjective: 





Patient still confused, irritable. Behavior still an issue. relying on 

psychiatry for guidance. unable to transfer to [facility]. Patient currently on 

Klonopin 1mg PO BID. In restraints and in a otilia-chair. 





Objective





- Results


Result Diagrams: 


 03/17/18 06:48


Recent Labs: 


 Laboratory Last Values











Sodium  136 mEq/L (136-145)   03/17/18  06:48    


 


Potassium  4.2 mEq/L (3.5-5.1)   03/17/18  06:48    


 


Chloride  105 mEq/L ()   03/17/18  06:48    


 


Carbon Dioxide  28.6 mEq/L (21.0-31.0)   03/17/18  06:48    


 


Anion Gap  6.6  (7.0-16.0)  L  03/17/18  06:48    


 


BUN  9 mg/dL (7-25)   03/17/18  06:48    


 


Creatinine  0.9 mg/dL (0.7-1.3)   03/17/18  06:48    


 


Est GFR ( Amer)  TNP   03/17/18  06:48    


 


Est GFR (Non-Af Amer)  TNP   03/17/18  06:48    


 


BUN/Creatinine Ratio  10.0   03/17/18  06:48    


 


Glucose  169 mg/dL ()  H  03/17/18  06:48    


 


POC Glucose  181 MG/DL (70 - 105)  H  03/19/18  19:59    


 


Calcium  8.8 mg/dL (8.6-10.3)   03/17/18  06:48    














- Physical Exam


Vitals and I&O: 


 Vital Signs











Temp  97.2 F   03/21/18 04:00


 


Pulse  105   03/21/18 04:00


 


Resp  16   03/21/18 04:00


 


BP  133/81   03/21/18 04:00


 


Pulse Ox  97   03/21/18 04:00








 Intake & Output











 03/20/18 03/21/18 03/21/18





 18:59 06:59 18:59


 


Intake Total 240  


 


Output Total  1000 


 


Balance 240 -1000 


 


Weight (lbs) 77.111 kg 77.111 kg 


 


Intake:   


 


  Oral 240  


 


Output:   


 


  Urine  1000 


 


Other:   


 


  # Bowel Movements  0 











Active Medications: 


Current Medications





Acetaminophen (Tylenol)  650 mg PO Q6H PRN


   PRN Reason: mild to moderate pain


   Stop: 05/15/18 23:24


Acetaminophen/Hydrocodone Bitart (Norco 5mg/325mg)  1 tab PO Q6H PRN


   PRN Reason: Pain (Moderate)


   Stop: 05/15/18 23:24


Allopurinol (Zyloprim)  100 mg PO DAILY [facility]


   Stop: 05/15/18 23:24


   Last Admin: 03/20/18 10:52 Dose:  Not Given


Castor Oil/[place] Balsam/Trypsin (Venelex)  1 appl TP DAILY [facility]


   Stop: 05/15/18 23:24


   Last Admin: 03/20/18 09:35 Dose:  1 appl


Clonazepam (Klonopin)  1 mg PO BID [facility]


   PRN Reason: Protocol


   Stop: 05/16/18 09:59


   Last Admin: 03/20/18 17:13 Dose:  1 mg


Famotidine (Pepcid)  20 mg PO BID [facility]


   Stop: 05/15/18 23:24


   Last Admin: 03/20/18 17:13 Dose:  20 mg


Fluconazole (Diflucan)  100 mg PO DAILY [facility]


   Stop: 05/15/18 23:24


   Last Admin: 03/20/18 09:33 Dose:  100 mg


Heparin Sodium (Porcine) (Heparin)  5,000 units SUBQ Q12HR [facility]


   PRN Reason: Protocol


   Stop: 05/15/18 23:24


   Last Admin: 03/20/18 20:46 Dose:  Not Given


Insulin Aspart (Novolog Insulin Sliding Scale)  0 units SUBQ ACHS [facility]


   PRN Reason: Protocol


   Stop: 05/15/18 23:24


   Last Admin: 03/20/18 20:46 Dose:  Not Given


Insulin Aspart (Novolog Insulin Sliding Scale)  0 units SUBQ ACHS [facility]


   PRN Reason: Protocol


   Stop: 05/16/18 07:29


   Last Admin: 03/20/18 10:54 Dose:  Not Given


Insulin Detemir (Levemir Insulin)  18 units SUBQ DAILY [facility]


   PRN Reason: Protocol


   Stop: 05/15/18 23:24


   Last Admin: 03/20/18 10:53 Dose:  Not Given


Magnesium Hydroxide (Milk Of Magnesia)  30 ml PO HS PRN


   PRN Reason: Constipation


   Stop: 05/15/18 23:24


Metformin HCl (Glucophage)  1,000 mg PO DAILY [facility]


   Stop: 05/16/18 09:59


   Last Admin: 03/20/18 09:33 Dose:  1,000 mg


Metoprolol Succinate (Toprol Xl)  25 mg PO DAILY [facility]


   Stop: 05/15/18 23:24


   Last Admin: 03/20/18 09:34 Dose:  25 mg


Miscellaneous (Vte Chemical Prophylaxis Screen/ Admission)  1 ea [facility] PRN PRN


   PRN Reason: PROTOCOL


   Stop: 05/16/18 10:29


Mupirocin (Bactroban Oint)  1 appl NS BID [facility]


   Stop: 03/23/18 09:01


   Last Admin: 03/20/18 17:14 Dose:  1 appl


Olanzapine (Zyprexa Zydis)  5 mg PO BID [facility]


   PRN Reason: Protocol


   Stop: 05/18/18 08:59


   Last Admin: 03/20/18 17:13 Dose:  5 mg


Zolpidem Tartrate (Ambien)  5 mg PO HS PRN


   PRN Reason: insomnia


   Stop: 05/15/18 23:24


   Last Admin: 03/19/18 19:55 Dose:  5 mg








General: Alert, No acute distress


HEENT: Atraumatic, PERRLA, EOMI


Neck: Supple, JVD


Cardiovascular: Regular rate, Normal S1, Normal S2


Lungs: Clear to auscultation


Abdomen: Bowel sounds, Soft


Extremities: no Clubbing, no Cyanosis, no Edema


Neurological: Sensation intact


Skin: no Rash





- Procedures


Procedures: 


 Procedures











Procedure Code Date


 


KEV MUSC/FASCIA 20 SQ CM/< 23560 02/25/18


 


EXCISION OF RIGHT HIP MUSCLE, OPEN APPROACH [id number] 02/25/18


 


INTRODUCTION OF SERUM/TOX/VACCINE INTO MUSCLE, PERC APPROACH [id number] 02/25/18














Assessment/Plan





- Assessment


Assessment: 





psychosis


diabetes mellitus


s/p wound debridement of sacral decubiti


UTI


hyperglycemia








- Plan


Plan: 





continue current medications.


discharge planning.

## 2018-03-21 NOTE — GENERAL PROGRESS NOTE
Subjective





- Review of Systems


Service Date: 03/21/18


Subjective: 





lying in bed, calm





Objective





- Results


Result Diagrams: 


 03/17/18 06:48


Recent Labs: 


 Laboratory Last Values











Sodium  136 mEq/L (136-145)   03/17/18  06:48    


 


Potassium  4.2 mEq/L (3.5-5.1)   03/17/18  06:48    


 


Chloride  105 mEq/L ()   03/17/18  06:48    


 


Carbon Dioxide  28.6 mEq/L (21.0-31.0)   03/17/18  06:48    


 


Anion Gap  6.6  (7.0-16.0)  L  03/17/18  06:48    


 


BUN  9 mg/dL (7-25)   03/17/18  06:48    


 


Creatinine  0.9 mg/dL (0.7-1.3)   03/17/18  06:48    


 


Est GFR ( Amer)  TNP   03/17/18  06:48    


 


Est GFR (Non-Af Amer)  TNP   03/17/18  06:48    


 


BUN/Creatinine Ratio  10.0   03/17/18  06:48    


 


Glucose  169 mg/dL ()  H  03/17/18  06:48    


 


POC Glucose  225 MG/DL (70 - 105)  H  03/21/18  11:55    


 


Calcium  8.8 mg/dL (8.6-10.3)   03/17/18  06:48    














- Physical Exam


Vitals and I&O: 


 Vital Signs











Temp  97.2 F   03/21/18 04:00


 


Pulse  91   03/21/18 10:23


 


Resp  16   03/21/18 04:00


 


BP  144/89   03/21/18 10:23


 


Pulse Ox  97   03/21/18 04:00








 Intake & Output











 03/20/18 03/21/18 03/21/18





 18:59 06:59 18:59


 


Intake Total 240  


 


Output Total  1000 


 


Balance 240 -1000 


 


Weight (lbs) 77.111 kg 77.111 kg 77.111 kg


 


Intake:   


 


  Oral 240  


 


Output:   


 


  Urine  1000 


 


Other:   


 


  # Bowel Movements  0 











Active Medications: 


Current Medications





Acetaminophen (Tylenol)  650 mg PO Q6H PRN


   PRN Reason: mild to moderate pain


   Stop: 05/15/18 23:24


Acetaminophen/Hydrocodone Bitart (Norco 5mg/325mg)  1 tab PO Q6H PRN


   PRN Reason: Pain (Moderate)


   Stop: 05/15/18 23:24


Allopurinol (Zyloprim)  100 mg PO DAILY [facility]


   Stop: 05/15/18 23:24


   Last Admin: 03/21/18 10:24 Dose:  100 mg


Castor Oil/[place]iger[place] Balsam/Trypsin (Venelex)  1 appl TP DAILY [facility]


   Stop: 05/15/18 23:24


   Last Admin: 03/21/18 10:25 Dose:  1 appl


Clonazepam (Klonopin)  1 mg PO BID [facility]


   PRN Reason: Protocol


   Stop: 05/16/18 09:59


   Last Admin: 03/21/18 10:24 Dose:  1 mg


Donepezil HCl (Aricept)  5 mg PO DAILY [facility]


   Stop: 05/20/18 08:59


   Last Admin: 03/21/18 10:23 Dose:  5 mg


Famotidine (Pepcid)  20 mg PO BID [facility]


   Stop: 05/15/18 23:24


   Last Admin: 03/21/18 10:23 Dose:  20 mg


Fluconazole (Diflucan)  100 mg PO DAILY [facility]


   Stop: 05/15/18 23:24


   Last Admin: 03/21/18 10:23 Dose:  100 mg


Heparin Sodium (Porcine) (Heparin)  5,000 units SUBQ Q12HR [facility]


   PRN Reason: Protocol


   Stop: 05/15/18 23:24


   Last Admin: 03/21/18 10:30 Dose:  5,000 units


Insulin Aspart (Novolog Insulin Sliding Scale)  0 units SUBQ ACHS [facility]


   PRN Reason: Protocol


   Stop: 05/15/18 23:24


   Last Admin: 03/21/18 12:27 Dose:  4 units


Insulin Aspart (Novolog Insulin Sliding Scale)  0 units SUBQ ACHS [facility]


   PRN Reason: Protocol


   Stop: 05/16/18 07:29


   Last Admin: 03/20/18 10:54 Dose:  Not Given


Insulin Detemir (Levemir Insulin)  18 units SUBQ DAILY [facility]


   PRN Reason: Protocol


   Stop: 05/15/18 23:24


   Last Admin: 03/20/18 10:53 Dose:  Not Given


Magnesium Hydroxide (Milk Of Magnesia)  30 ml PO HS PRN


   PRN Reason: Constipation


   Stop: 05/15/18 23:24


Metformin HCl (Glucophage)  1,000 mg PO DAILY [facility]


   Stop: 05/16/18 09:59


   Last Admin: 03/21/18 10:23 Dose:  1,000 mg


Metoprolol Succinate (Toprol Xl)  25 mg PO DAILY [facility]


   Stop: 05/15/18 23:24


   Last Admin: 03/21/18 10:23 Dose:  25 mg


Miscellaneous (Vte Chemical Prophylaxis Screen/ Admission)  1 ea [facility] PRN PRN


   PRN Reason: PROTOCOL


   Stop: 05/16/18 10:29


Mupirocin (Bactroban Oint)  1 appl NS BID [facility]


   Stop: 03/23/18 09:01


   Last Admin: 03/21/18 10:25 Dose:  1 appl


Olanzapine (Zyprexa Zydis)  5 mg PO BID [facility]


   PRN Reason: Protocol


   Stop: 05/18/18 08:59


   Last Admin: 03/21/18 10:24 Dose:  5 mg


Zolpidem Tartrate (Ambien)  5 mg PO HS PRN


   PRN Reason: insomnia


   Stop: 05/15/18 23:24


   Last Admin: 03/19/18 19:55 Dose:  5 mg








General: Alert, No acute distress


HEENT: Atraumatic, PERRLA, EOMI


Neck: Supple, JVD


Cardiovascular: Regular rate, Normal S1, Normal S2


Lungs: Clear to auscultation


Abdomen: Bowel sounds, Soft


Extremities: no Clubbing, no Cyanosis, no Edema


Neurological: Sensation intact


Skin: no Rash





- Procedures


Procedures: 


 Procedures











Procedure Code Date


 


KEV MUSC/FASCIA 20 SQ CM/< 25724 02/25/18


 


EXCISION OF RIGHT HIP MUSCLE, OPEN APPROACH [id number] 02/25/18


 


INTRODUCTION OF SERUM/TOX/VACCINE INTO MUSCLE, PERC APPROACH [id number] 02/25/18














Assessment/Plan





- Assessment


Assessment: 





S/P BRYANT


S/P electrolyte imbalance


Acute decomp of Psychosis


Type 2 DM


Hx UTI








- Plan


Plan: 


Lab - Result Diagrams





 03/17/18 06:48 





Current Medications





Acetaminophen (Tylenol)  650 mg PO Q6H PRN


   PRN Reason: mild to moderate pain


   Stop: 05/15/18 23:24


Acetaminophen/Hydrocodone Bitart (Norco 5mg/325mg)  1 tab PO Q6H PRN


   PRN Reason: Pain (Moderate)


   Stop: 05/15/18 23:24


Allopurinol (Zyloprim)  100 mg PO DAILY [facility]


   Stop: 05/15/18 23:24


   Last Admin: 03/17/18 10:54 Dose:  100 mg


Castor Oil/[place]igerien Balsam/Trypsin (Venelex)  1 appl TP DAILY [facility]


   Stop: 05/15/18 23:24


   Last Admin: 03/17/18 11:02 Dose:  1 appl


Clonazepam (Klonopin)  1 mg PO BID [facility]


   PRN Reason: Protocol


   Stop: 05/16/18 09:59


Famotidine (Pepcid)  20 mg PO BID [facility]


   Stop: 05/15/18 23:24


   Last Admin: 03/17/18 10:54 Dose:  20 mg


Fluconazole (Diflucan)  100 mg PO DAILY [facility]


   Stop: 05/15/18 23:24


   Last Admin: 03/17/18 11:02 Dose:  100 mg


Heparin Sodium (Porcine) (Heparin)  5,000 units SUBQ Q12HR [facility]


   PRN Reason: Protocol


   Stop: 05/15/18 23:24


   Last Admin: 03/17/18 10:59 Dose:  5,000 units


Levofloxacin (Levaquin Pb)  500 mg in 100 mls @ 100 mls/hr IV Q24H [facility]


   Stop: 05/16/18 11:59


Insulin Aspart (Novolog Insulin Sliding Scale)  0 units SUBQ ACHS [facility]


   PRN Reason: Protocol


   Stop: 05/15/18 23:24


   Last Admin: 03/17/18 10:45 Dose:  2 units


Insulin Aspart (Novolog Insulin Sliding Scale)  0 units SUBQ ACHS [facility]


   PRN Reason: Protocol


   Stop: 05/16/18 07:29


Insulin Detemir (Levemir Insulin)  18 units SUBQ DAILY [facility]


   PRN Reason: Protocol


   Stop: 05/15/18 23:24


   Last Admin: 03/17/18 10:56 Dose:  18 units


Lorazepam (Ativan)  0.5 mg PO Q6HR PRN; Protocol


   PRN Reason: agitation 


   Stop: 05/15/18 23:24


Magnesium Hydroxide (Milk Of Magnesia)  30 ml PO HS PRN


   PRN Reason: Constipation


   Stop: 05/15/18 23:24


Metformin HCl (Glucophage)  1,000 mg PO DAILY [facility]


   Stop: 05/16/18 09:59


Metoprolol Succinate (Toprol Xl)  25 mg PO DAILY [facility]


   Stop: 05/15/18 23:24


   Last Admin: 03/17/18 10:55 Dose:  25 mg


Miscellaneous (Vte Chemical Prophylaxis Screen/ Admission)  1 ea [facility] PRN PRN


   PRN Reason: PROTOCOL


   Stop: 05/16/18 10:29


Olanzapine (Zyprexa Zydis)  5 mg PO DAILY [facility]


   PRN Reason: Protocol


   Stop: 05/16/18 09:59


Zolpidem Tartrate (Ambien)  5 mg PO HS PRN


   PRN Reason: insomnia


   Stop: 05/15/18 23:24





Lab - Result Diagrams





 03/17/18 06:48 





kidney fnc remain stable


encourage po intake


psych meds

## 2018-03-21 NOTE — INFECTIOUS DISEASE PROG NOTE
Infectious Disease Subjective





- Review of Systems


Service Date: 03/21/18


Subjective: 





No fever





Infectious Disease Objective





- Results


Result Diagrams: 


 03/17/18 06:48


Recent Labs: 


 Laboratory Last Values











Sodium  136 mEq/L (136-145)   03/17/18  06:48    


 


Potassium  4.2 mEq/L (3.5-5.1)   03/17/18  06:48    


 


Chloride  105 mEq/L ()   03/17/18  06:48    


 


Carbon Dioxide  28.6 mEq/L (21.0-31.0)   03/17/18  06:48    


 


Anion Gap  6.6  (7.0-16.0)  L  03/17/18  06:48    


 


BUN  9 mg/dL (7-25)   03/17/18  06:48    


 


Creatinine  0.9 mg/dL (0.7-1.3)   03/17/18  06:48    


 


Est GFR ( Amer)  TNP   03/17/18  06:48    


 


Est GFR (Non-Af Amer)  TNP   03/17/18  06:48    


 


BUN/Creatinine Ratio  10.0   03/17/18  06:48    


 


Glucose  169 mg/dL ()  H  03/17/18  06:48    


 


POC Glucose  225 MG/DL (70 - 105)  H  03/21/18  11:55    


 


Calcium  8.8 mg/dL (8.6-10.3)   03/17/18  06:48    














- Physical Exam


Vitals and I&O: 


 Vital Signs











Temp  97.2 F   03/21/18 04:00


 


Pulse  91   03/21/18 10:23


 


Resp  16   03/21/18 04:00


 


BP  144/89   03/21/18 10:23


 


Pulse Ox  97   03/21/18 04:00








 Intake & Output











 03/20/18 03/21/18 03/21/18





 18:59 06:59 18:59


 


Intake Total 240  


 


Output Total  1000 


 


Balance 240 -1000 


 


Weight (lbs) 77.111 kg 77.111 kg 77.111 kg


 


Intake:   


 


  Oral 240  


 


Output:   


 


  Urine  1000 


 


Other:   


 


  # Bowel Movements  0 











Active Medications: 


Current Medications





Acetaminophen (Tylenol)  650 mg PO Q6H PRN


   PRN Reason: mild to moderate pain


   Stop: 05/15/18 23:24


Acetaminophen/Hydrocodone Bitart (Norco 5mg/325mg)  1 tab PO Q6H PRN


   PRN Reason: Pain (Moderate)


   Stop: 05/15/18 23:24


Allopurinol (Zyloprim)  100 mg PO DAILY [facility]


   Stop: 05/15/18 23:24


   Last Admin: 03/21/18 10:24 Dose:  100 mg


Castor Oil/[place] Balsam/Trypsin (Venelex)  1 appl TP DAILY [facility]


   Stop: 05/15/18 23:24


   Last Admin: 03/21/18 10:25 Dose:  1 appl


Clonazepam (Klonopin)  1 mg PO BID [facility]


   PRN Reason: Protocol


   Stop: 05/16/18 09:59


   Last Admin: 03/21/18 10:24 Dose:  1 mg


Donepezil HCl (Aricept)  5 mg PO DAILY [facility]


   Stop: 05/20/18 08:59


   Last Admin: 03/21/18 10:23 Dose:  5 mg


Famotidine (Pepcid)  20 mg PO BID [facility]


   Stop: 05/15/18 23:24


   Last Admin: 03/21/18 10:23 Dose:  20 mg


Fluconazole (Diflucan)  100 mg PO DAILY [facility]


   Stop: 05/15/18 23:24


   Last Admin: 03/21/18 10:23 Dose:  100 mg


Heparin Sodium (Porcine) (Heparin)  5,000 units SUBQ Q12HR [facility]


   PRN Reason: Protocol


   Stop: 05/15/18 23:24


   Last Admin: 03/21/18 10:30 Dose:  5,000 units


Insulin Aspart (Novolog Insulin Sliding Scale)  0 units SUBQ ACHS [facility]


   PRN Reason: Protocol


   Stop: 05/15/18 23:24


   Last Admin: 03/21/18 12:27 Dose:  4 units


Insulin Aspart (Novolog Insulin Sliding Scale)  0 units SUBQ ACHS [facility]


   PRN Reason: Protocol


   Stop: 05/16/18 07:29


   Last Admin: 03/20/18 10:54 Dose:  Not Given


Insulin Detemir (Levemir Insulin)  18 units SUBQ DAILY [facility]


   PRN Reason: Protocol


   Stop: 05/15/18 23:24


   Last Admin: 03/20/18 10:53 Dose:  Not Given


Magnesium Hydroxide (Milk Of Magnesia)  30 ml PO HS PRN


   PRN Reason: Constipation


   Stop: 05/15/18 23:24


Metformin HCl (Glucophage)  1,000 mg PO DAILY [facility]


   Stop: 05/16/18 09:59


   Last Admin: 03/21/18 10:23 Dose:  1,000 mg


Metoprolol Succinate (Toprol Xl)  25 mg PO DAILY [facility]


   Stop: 05/15/18 23:24


   Last Admin: 03/21/18 10:23 Dose:  25 mg


Miscellaneous (Vte Chemical Prophylaxis Screen/ Admission)  1 ea [facility] PRN PRN


   PRN Reason: PROTOCOL


   Stop: 05/16/18 10:29


Mupirocin (Bactroban Oint)  1 appl NS BID [facility]


   Stop: 03/23/18 09:01


   Last Admin: 03/21/18 10:25 Dose:  1 appl


Olanzapine (Zyprexa Zydis)  5 mg PO BID [facility]


   PRN Reason: Protocol


   Stop: 05/18/18 08:59


   Last Admin: 03/21/18 10:24 Dose:  5 mg


Zolpidem Tartrate (Ambien)  5 mg PO HS PRN


   PRN Reason: insomnia


   Stop: 05/15/18 23:24


   Last Admin: 03/19/18 19:55 Dose:  5 mg








General: no acute distress, well developed, well nourished


HEENT: atraumatic, normocephalic, moist mucous membrane


Neck: supple, no thyromegaly


Cardiovascular: S1S2, regular


Lungs: clear to auscultation bilaterally, clear to percussion


Abdomen: soft, no tender, no distended


Extremities: no cyanosis, no clubbing, no edema


Neurological: awake, alert, other


Skin: other (SACRAL WOUND)





- Procedures


Procedures: 


 Procedures











Procedure Code Date


 


KEV MUSC/FASCIA 20 SQ CM/< 20369 02/25/18


 


EXCISION OF RIGHT HIP MUSCLE, OPEN APPROACH [id number] 02/25/18


 


INTRODUCTION OF SERUM/TOX/VACCINE INTO MUSCLE, PERC APPROACH [id number] 02/25/18














Infectious Disease Assmt/Plan





- Assessment


Assessment: 





1.  Sacral decubitus ulcer.


2.  UTI.  Treated


3.  Dementia.





- Plan


Plan: 





wound care.

## 2018-03-21 NOTE — PROGRESS NOTES
DATE:  



SUBJECTIVE:  Chart reviewed and the patient interviewed.  Also discussed the

patient's condition with the staff and reviewed records and labs.  The patient

is still restless and agitated.  The patient also still using foul language and

tries to hit staff when helping him with his ADLs, but staff is able to control

his behavior.  Also is still suspicious and paranoid and keeps rambling in

[place] language.  Otherwise, the patient is compliant with taking medication

with no side effects of medications.



ASSESSMENT:  The patient has been aggressive and agitated.



TREATMENT PLAN:  Continue to monitor his behavior and his condition closely and

continue to follow up.





DD: 03/21/2018 07:38

DT: 03/21/2018 07:51

JOB# [id number]  [id number]

## 2018-03-22 RX ADMIN — INSULIN ASPART SCH: 100 INJECTION, SOLUTION INTRAVENOUS; SUBCUTANEOUS at 17:28

## 2018-03-22 RX ADMIN — OLANZAPINE SCH MG: 5 TABLET, ORALLY DISINTEGRATING ORAL at 17:26

## 2018-03-22 RX ADMIN — OLANZAPINE SCH MG: 5 TABLET, ORALLY DISINTEGRATING ORAL at 10:10

## 2018-03-22 RX ADMIN — INSULIN ASPART SCH: 100 INJECTION, SOLUTION INTRAVENOUS; SUBCUTANEOUS at 17:30

## 2018-03-22 RX ADMIN — INSULIN ASPART SCH: 100 INJECTION, SOLUTION INTRAVENOUS; SUBCUTANEOUS at 10:05

## 2018-03-22 RX ADMIN — CASTOR OIL AND BALSAM, PERU SCH APPL: 788; 87 OINTMENT TOPICAL at 10:06

## 2018-03-22 RX ADMIN — INSULIN ASPART SCH UNITS: 100 INJECTION, SOLUTION INTRAVENOUS; SUBCUTANEOUS at 21:45

## 2018-03-22 RX ADMIN — INSULIN ASPART SCH UNITS: 100 INJECTION, SOLUTION INTRAVENOUS; SUBCUTANEOUS at 13:11

## 2018-03-22 RX ADMIN — INSULIN DETEMIR SCH: 100 INJECTION, SOLUTION SUBCUTANEOUS at 17:29

## 2018-03-22 NOTE — GENERAL PROGRESS NOTE
Subjective





- Review of Systems


Service Date: 18


Subjective: 





lying in bed, periods of agitation





Objective





- Results


Result Diagrams: 


 18 06:48


Recent Labs: 


 Laboratory Last Values











PT  9.5 SECONDS (9.5-11.5)   18  21:35    


 


INR  0.91  (0.5-1.4)   18  21:35    


 


Sodium  136 mEq/L (136-145)   18  06:48    


 


Potassium  4.2 mEq/L (3.5-5.1)   18  06:48    


 


Chloride  105 mEq/L ()   18  06:48    


 


Carbon Dioxide  28.6 mEq/L (21.0-31.0)   18  06:48    


 


Anion Gap  6.6  (7.0-16.0)  L  18  06:48    


 


BUN  9 mg/dL (7-25)   18  06:48    


 


Creatinine  0.9 mg/dL (0.7-1.3)   18  06:48    


 


Est GFR ( Amer)  TNP   18  06:48    


 


Est GFR (Non-Af Amer)  TNP   18  06:48    


 


BUN/Creatinine Ratio  10.0   18  06:48    


 


Glucose  169 mg/dL ()  H  18  06:48    


 


POC Glucose  259 MG/DL (70 - 105)  H  18  11:30    


 


Calcium  8.8 mg/dL (8.6-10.3)   18  06:48    














- Physical Exam


Vitals and I&O: 


 Vital Signs











Temp  98.0 F   18 08:00


 


Pulse  78   18 10:07


 


Resp  20   18 08:00


 


BP  125/62   18 10:07


 


Pulse Ox  97   18 04:00








 Intake & Output











 18





 18:59 06:59 18:59


 


Intake Total  400 120


 


Output Total  500 


 


Balance  -100 120


 


Weight (lbs) 77.111 kg 77.564 kg 76.657 kg


 


Intake:   


 


  Oral  400 120


 


Output:   


 


  Urine  500 











Active Medications: 


Current Medications





Acetaminophen (Tylenol)  650 mg PO Q6H PRN


   PRN Reason: mild to moderate pain


   Stop: 05/15/18 23:24


Acetaminophen/Hydrocodone Bitart (Norco 5mg/325mg)  1 tab PO Q6H PRN


   PRN Reason: Pain (Moderate)


   Stop: 05/15/18 23:24


Allopurinol (Zyloprim)  100 mg PO DAILY [facility]


   Stop: 05/15/18 23:24


   Last Admin: 18 10:09 Dose:  100 mg


Castor Oil/Jordanian Balsam/Trypsin (Venelex)  1 appl TP DAILY [facility]


   Stop: 05/15/18 23:24


   Last Admin: 18 10:06 Dose:  1 appl


Clonazepam (Klonopin)  1 mg PO BID [facility]


   PRN Reason: Protocol


   Stop: 18 09:59


   Last Admin: 18 10:09 Dose:  1 mg


Donepezil HCl (Aricept)  5 mg PO DAILY [facility]


   Stop: 18 08:59


   Last Admin: 18 10:10 Dose:  5 mg


Famotidine (Pepcid)  20 mg PO BID [facility]


   Stop: 05/15/18 23:24


   Last Admin: 18 10:09 Dose:  20 mg


Fluconazole (Diflucan)  100 mg PO DAILY [facility]


   Stop: 05/15/18 23:24


   Last Admin: 18 10:09 Dose:  100 mg


Heparin Sodium (Porcine) (Heparin)  5,000 units SUBQ Q12HR [facility]


   PRN Reason: Protocol


   Stop: 05/15/18 23:24


   Last Admin: 18 10:11 Dose:  Not Given


Insulin Aspart (Novolog Insulin Sliding Scale)  0 units SUBQ ACHS [facility]


   PRN Reason: Protocol


   Stop: 05/15/18 23:24


   Last Admin: 18 21:29 Dose:  8 units


Insulin Aspart (Novolog Insulin Sliding Scale)  0 units SUBQ ACHS [facility]


   PRN Reason: Protocol


   Stop: 18 07:29


   Last Admin: 18 10:05 Dose:  Not Given


Insulin Detemir (Levemir Insulin)  18 units SUBQ DAILY [facility]


   PRN Reason: Protocol


   Stop: 05/15/18 23:24


   Last Admin: 18 17:47 Dose:  Not Given


Magnesium Hydroxide (Milk Of Magnesia)  30 ml PO HS PRN


   PRN Reason: Constipation


   Stop: 05/15/18 23:24


Metformin HCl (Glucophage)  1,000 mg PO DAILY [facility]


   Stop: 18 09:59


   Last Admin: 18 10:10 Dose:  1,000 mg


Metoprolol Succinate (Toprol Xl)  25 mg PO DAILY [facility]


   Stop: 05/15/18 23:24


   Last Admin: 18 10:07 Dose:  25 mg


Miscellaneous (Vte Chemical Prophylaxis Screen/ Admission)  1 ea MC PRN PRN


   PRN Reason: PROTOCOL


   Stop: 18 10:29


Mupirocin (Bactroban Oint)  1 appl NS BID [facility]


   Stop: 18 09:01


   Last Admin: 18 10:06 Dose:  1 appl


Olanzapine (Zyprexa Zydis)  10 mg PO BID [facility]


   PRN Reason: Protocol


   Stop: 18 07:53


   Last Admin: 18 10:10 Dose:  10 mg


Zolpidem Tartrate (Ambien)  5 mg PO HS PRN


   PRN Reason: insomnia


   Stop: 05/15/18 23:24


   Last Admin: 18 19:55 Dose:  5 mg








General: Alert, No acute distress


HEENT: Atraumatic, PERRLA, EOMI


Neck: Supple, JVD


Cardiovascular: Regular rate, Normal S1, Normal S2


Lungs: Clear to auscultation


Abdomen: Bowel sounds, Soft


Extremities: no Clubbing, no Cyanosis, no Edema


Neurological: Sensation intact


Skin: no Rash





- Procedures


Procedures: 


 Procedures











Procedure Code Date


 


KEV MUSC/FASCIA 20 SQ CM/< 74040 18


 


EXCISION OF RIGHT HIP MUSCLE, OPEN APPROACH [id number] 18


 


INTRODUCTION OF SERUM/TOX/VACCINE INTO MUSCLE, PERC APPROACH [id number] 18














Assessment/Plan





- Assessment


Assessment: 





S/P BRYANT


S/P electrolyte imbalance


Acute decomp of Psychosis


Type 2 DM


Hx UTI








- Plan


Plan: 


Lab - Result Diagrams





 18 06:48 





Current Medications





Acetaminophen (Tylenol)  650 mg PO Q6H PRN


   PRN Reason: mild to moderate pain


   Stop: 05/15/18 23:24


Acetaminophen/Hydrocodone Bitart (Norco 5mg/325mg)  1 tab PO Q6H PRN


   PRN Reason: Pain (Moderate)


   Stop: 05/15/18 23:24


Allopurinol (Zyloprim)  100 mg PO DAILY [facility]


   Stop: 05/15/18 23:24


   Last Admin: 18 10:54 Dose:  100 mg


Castor Oil/Jordanian Balsam/Trypsin (Venelex)  1 appl TP DAILY [facility]


   Stop: 05/15/18 23:24


   Last Admin: 18 11:02 Dose:  1 appl


Clonazepam (Klonopin)  1 mg PO BID [facility]


   PRN Reason: Protocol


   Stop: 18 09:59


Famotidine (Pepcid)  20 mg PO BID [facility]


   Stop: 05/15/18 23:24


   Last Admin: 18 10:54 Dose:  20 mg


Fluconazole (Diflucan)  100 mg PO DAILY [facility]


   Stop: 05/15/18 23:24


   Last Admin: 18 11:02 Dose:  100 mg


Heparin Sodium (Porcine) (Heparin)  5,000 units SUBQ Q12HR [facility]


   PRN Reason: Protocol


   Stop: 05/15/18 23:24


   Last Admin: 18 10:59 Dose:  5,000 units


Levofloxacin (Levaquin Pb)  500 mg in 100 mls @ 100 mls/hr IV Q24H [facility]


   Stop: 18 11:59


Insulin Aspart (Novolog Insulin Sliding Scale)  0 units SUBQ ACHS [facility]


   PRN Reason: Protocol


   Stop: 05/15/18 23:24


   Last Admin: 18 10:45 Dose:  2 units


Insulin Aspart (Novolog Insulin Sliding Scale)  0 units SUBQ ACHS [facility]


   PRN Reason: Protocol


   Stop: 18 07:29


Insulin Detemir (Levemir Insulin)  18 units SUBQ DAILY [facility]


   PRN Reason: Protocol


   Stop: 05/15/18 23:24


   Last Admin: 18 10:56 Dose:  18 units


Lorazepam (Ativan)  0.5 mg PO Q6HR PRN; Protocol


   PRN Reason: agitation 


   Stop: 05/15/18 23:24


Magnesium Hydroxide (Milk Of Magnesia)  30 ml PO HS PRN


   PRN Reason: Constipation


   Stop: 05/15/18 23:24


Metformin HCl (Glucophage)  1,000 mg PO DAILY [facility]


   Stop: 18 09:59


Metoprolol Succinate (Toprol Xl)  25 mg PO DAILY [facility]


   Stop: 05/15/18 23:24


   Last Admin: 18 10:55 Dose:  25 mg


Miscellaneous (Vte Chemical Prophylaxis Screen/ Admission)  1 ea MC PRN PRN


   PRN Reason: PROTOCOL


   Stop: 18 10:29


Olanzapine (Zyprexa Zydis)  5 mg PO DAILY [facility]


   PRN Reason: Protocol


   Stop: 18 09:59


Zolpidem Tartrate (Ambien)  5 mg PO HS PRN


   PRN Reason: insomnia


   Stop: 05/15/18 23:24





Lab - Result Diagrams





 18 06:48 





kidney fnc remain stable


encourage po intake


psych meds








Nutritional Asmnt/Malnutr-PDOC





- Dietary Evaluation


Malnutrition Findings (Please click <Entered> for more info): 








Nutritional Asmnt/Malnutrition                             Start:  18 16:

25


Text:                                                      Status: Complete    

  


Freq:                                                                          

  


 Document     18 16:25  MAGEN  (Rec: 18 16:31  MAGEN  [email or web address])


 Nutritional Asmnt/Malnutrition


     Patient General Information


      Nutritional Screening                      Moderate Risk


      Diagnosis                                  dehydration


      Pertinent Medical Hx/Surgical Hx           DM, dementia, depression,


                                                 psychosis, schizophrenia,


                                                 chronic renal insuff


      Subjective Information                     Pt is agitated, on 1:1 sitter.


                                                 Per EMR, PO intake 25-75%,


                                                 avg 50%.


      Current Diet Order/ Nutrition Support      pureed, CCHO 60gm


      Pertinent Medications                      novolov, levemir, glucophage


      Pertinent Labs                             3/19- -316


     Nutritional Hx/Data


      Height                                     1.8 m


      Height (Calculated Centimeters)            180.3


      Current Weight (lbs)                       77.111 kg


      Weight (Calculated Kilograms)              77.1


      Weight (Calculated Grams)                  85699.7


      Ideal Body Weight                          172


      Body Mass Index (BMI)                      23.7


      Weight Status                              Approriate


     GI Symptoms


      GI Symptoms                                None


      Last BM                                    3/19


      Difficult in:                              None


      Skin Integrity/Comment:                    pressure ulcer, decubitus


      Current %PO                                Fair (50-74%)


     Estimated Nutritional Goals


      BEE in Kcals:                              Using Current wt


      Calories/Kcals/Kg                          25-30


      Kcals Calculated                           1463-6531


      Protein:                                   Using Current wt


      Protein g/k-1.2


      Protein Calculated                         77-92


      Fluid: ml                                  1925-2310ml (1ml/kcal)


     Nutritional Problem


      1. Problem


       Problem                                   altered nutrition related lab


                                                 values


       Etiology                                  hx of DM


       Signs/Symptoms:                           glucose 169, -316


     Malnutrition Alert


      Protein-Calorie Malnutrition               N/A


      Is there a minimum of two criteria         No


       selected?                                 


       Query Text:Check all the applicable       


       criteria. A minimum of two criteria are   


       recommended for diagnosis of either       


       severe or non-severe malnutrition.        


     Intervention/Recommendation


      Comments                                   1. Continue with current diet


                                                 as ordered.


                                                 2. Monitor PO intake, wt, labs


                                                 and skin integrity


                                                 3. F/U as moderate risk in 3-5


                                                 days, 3/24-3/26


     Expected Outcomes/Goals


      Expected Outcomes/Goals                    1. PO intake to meet at least


                                                 75% of nutritional needs.


                                                 2. Wt stability, skin to


                                                 remain intact, labs to


                                                 approach WNL.

## 2018-03-22 NOTE — PROGRESS NOTES
DATE:  



SUBJECTIVE:  Chart reviewed and the patient interviewed.  Also discussed the

patient's condition with the staff and reviewed records and labs.  The patient

continued to be confused, restless and easily agitated.  The patient also is

still at times talking to himself in [place] language.  On the other hand,

slightly easier to redirect him.  He also is compliant with taking his

medications with no side effects of medications.



ASSESSMENT:  The patient is still confused and psychotic.



TREATMENT PLAN:  We will continue monitoring his behavior and continue Zyprexa 5

mg twice a day.  Also because of his periods of agitation and irritability, we

will increase Zyprexa to 10 mg twice a day.  Also, continue to follow up his

behavior and his condition closely.  Also, it seems that  is still

working on placement issue.





DD: 03/22/2018 07:54

DT: 03/22/2018 08:29

JOB# [id number]  [id number]

## 2018-03-22 NOTE — INFECTIOUS DISEASE PROG NOTE
Infectious Disease Subjective





- Review of Systems


Service Date: 18


Subjective: 





No fever





Infectious Disease Objective





- Results


Result Diagrams: 


 18 06:48


Recent Labs: 


 Laboratory Last Values











PT  9.5 SECONDS (9.5-11.5)   18  21:35    


 


INR  0.91  (0.5-1.4)   18  21:35    


 


Sodium  136 mEq/L (136-145)   18  06:48    


 


Potassium  4.2 mEq/L (3.5-5.1)   18  06:48    


 


Chloride  105 mEq/L ()   18  06:48    


 


Carbon Dioxide  28.6 mEq/L (21.0-31.0)   18  06:48    


 


Anion Gap  6.6  (7.0-16.0)  L  18  06:48    


 


BUN  9 mg/dL (7-25)   18  06:48    


 


Creatinine  0.9 mg/dL (0.7-1.3)   18  06:48    


 


Est GFR ( Amer)  TNP   18  06:48    


 


Est GFR (Non-Af Amer)  TNP   18  06:48    


 


BUN/Creatinine Ratio  10.0   18  06:48    


 


Glucose  169 mg/dL ()  H  18  06:48    


 


POC Glucose  259 MG/DL (70 - 105)  H  18  11:30    


 


Calcium  8.8 mg/dL (8.6-10.3)   18  06:48    














- Physical Exam


Vitals and I&O: 


 Vital Signs











Temp  98.0 F   18 08:00


 


Pulse  78   18 10:07


 


Resp  20   18 08:00


 


BP  125/62   18 10:07


 


Pulse Ox  97   18 04:00








 Intake & Output











 18





 18:59 06:59 18:59


 


Intake Total  400 120


 


Output Total  500 


 


Balance  -100 120


 


Weight (lbs) 77.111 kg 77.564 kg 76.657 kg


 


Intake:   


 


  Oral  400 120


 


Output:   


 


  Urine  500 











Active Medications: 


Current Medications





Acetaminophen (Tylenol)  650 mg PO Q6H PRN


   PRN Reason: mild to moderate pain


   Stop: 05/15/18 23:24


Acetaminophen/Hydrocodone Bitart (Norco 5mg/325mg)  1 tab PO Q6H PRN


   PRN Reason: Pain (Moderate)


   Stop: 05/15/18 23:24


Allopurinol (Zyloprim)  100 mg PO DAILY [facility]


   Stop: 05/15/18 23:24


   Last Admin: 18 10:09 Dose:  100 mg


Castor Oil/Malaysian Balsam/Trypsin (Venelex)  1 appl TP DAILY [facility]


   Stop: 05/15/18 23:24


   Last Admin: 18 10:06 Dose:  1 appl


Clonazepam (Klonopin)  1 mg PO BID [facility]


   PRN Reason: Protocol


   Stop: 18 09:59


   Last Admin: 18 10:09 Dose:  1 mg


Donepezil HCl (Aricept)  5 mg PO DAILY [facility]


   Stop: 18 08:59


   Last Admin: 18 10:10 Dose:  5 mg


Famotidine (Pepcid)  20 mg PO BID [facility]


   Stop: 05/15/18 23:24


   Last Admin: 18 10:09 Dose:  20 mg


Fluconazole (Diflucan)  100 mg PO DAILY [facility]


   Stop: 05/15/18 23:24


   Last Admin: 18 10:09 Dose:  100 mg


Heparin Sodium (Porcine) (Heparin)  5,000 units SUBQ Q12HR [facility]


   PRN Reason: Protocol


   Stop: 05/15/18 23:24


   Last Admin: 18 10:11 Dose:  Not Given


Insulin Aspart (Novolog Insulin Sliding Scale)  0 units SUBQ ACHS [facility]


   PRN Reason: Protocol


   Stop: 05/15/18 23:24


   Last Admin: 18 21:29 Dose:  8 units


Insulin Aspart (Novolog Insulin Sliding Scale)  0 units SUBQ ACHS [facility]


   PRN Reason: Protocol


   Stop: 18 07:29


   Last Admin: 18 13:11 Dose:  6 units


Insulin Detemir (Levemir Insulin)  18 units SUBQ DAILY [facility]


   PRN Reason: Protocol


   Stop: 05/15/18 23:24


   Last Admin: 18 17:47 Dose:  Not Given


Magnesium Hydroxide (Milk Of Magnesia)  30 ml PO HS PRN


   PRN Reason: Constipation


   Stop: 05/15/18 23:24


Metformin HCl (Glucophage)  1,000 mg PO DAILY [facility]


   Stop: 18 09:59


   Last Admin: 18 10:10 Dose:  1,000 mg


Metoprolol Succinate (Toprol Xl)  25 mg PO DAILY [facility]


   Stop: 05/15/18 23:24


   Last Admin: 18 10:07 Dose:  25 mg


Miscellaneous (Vte Chemical Prophylaxis Screen/ Admission)  1 ea MC PRN PRN


   PRN Reason: PROTOCOL


   Stop: 18 10:29


Mupirocin (Bactroban Oint)  1 appl NS BID [facility]


   Stop: 18 09:01


   Last Admin: 18 10:06 Dose:  1 appl


Olanzapine (Zyprexa Zydis)  10 mg PO BID [facility]


   PRN Reason: Protocol


   Stop: 18 07:53


   Last Admin: 18 10:10 Dose:  10 mg


Zolpidem Tartrate (Ambien)  5 mg PO HS PRN


   PRN Reason: insomnia


   Stop: 05/15/18 23:24


   Last Admin: 18 19:55 Dose:  5 mg








General: no acute distress, well developed, well nourished


HEENT: atraumatic, normocephalic, PERRLA, EOMI


Neck: supple, no thyromegaly


Cardiovascular: S1S2, regular


Lungs: clear to auscultation bilaterally, clear to percussion


Abdomen: soft, no tender, no distended, no mass


Extremities: no cyanosis, no clubbing


Neurological: awake, alert


Skin: other (sacral decubiti.)





- Procedures


Procedures: 


 Procedures











Procedure Code Date


 


KEV MUSC/FASCIA 20 SQ CM/< 35842 18


 


EXCISION OF RIGHT HIP MUSCLE, OPEN APPROACH [id number] 18


 


INTRODUCTION OF SERUM/TOX/VACCINE INTO MUSCLE, PERC APPROACH [id number] 18














Infectious Disease Assmt/Plan





- Assessment


Assessment: 





1.  Sacral decubitus ulcer.


2.  UTI.  Treated


3.  Dementia.





- Plan


Plan: 





wound care.





Nutritional Asmnt/Malnutr-PDOC





- Dietary Evaluation


Malnutrition Findings (Please click <Entered> for more info): 








Nutritional Asmnt/Malnutrition                             Start:  18 16:

25


Text:                                                      Status: Complete    

  


Freq:                                                                          

  


 Document     18 16:25  LCHENG  (Rec: 18 16:31  LCHENG  [email or web address])


 Nutritional Asmnt/Malnutrition


     Patient General Information


      Nutritional Screening                      Moderate Risk


      Diagnosis                                  dehydration


      Pertinent Medical Hx/Surgical Hx           DM, dementia, depression,


                                                 psychosis, schizophrenia,


                                                 chronic renal insuff


      Subjective Information                     Pt is agitated, on 1:1 sitter.


                                                 Per EMR, PO intake 25-75%,


                                                 avg 50%.


      Current Diet Order/ Nutrition Support      pureed, CCHO 60gm


      Pertinent Medications                      novolov, levemir, glucophage


      Pertinent Labs                             3/19- -316


     Nutritional Hx/Data


      Height                                     1.8 m


      Height (Calculated Centimeters)            180.3


      Current Weight (lbs)                       77.111 kg


      Weight (Calculated Kilograms)              77.1


      Weight (Calculated Grams)                  66892.7


      Ideal Body Weight                          172


      Body Mass Index (BMI)                      23.7


      Weight Status                              Approriate


     GI Symptoms


      GI Symptoms                                None


      Last BM                                    3/19


      Difficult in:                              None


      Skin Integrity/Comment:                    pressure ulcer, decubitus


      Current %PO                                Fair (50-74%)


     Estimated Nutritional Goals


      BEE in Kcals:                              Using Current wt


      Calories/Kcals/Kg                          25-30


      Kcals Calculated                           6104-3788


      Protein:                                   Using Current wt


      Protein g/k-1.2


      Protein Calculated                         77-92


      Fluid: ml                                  1925-2310ml (1ml/kcal)


     Nutritional Problem


      1. Problem


       Problem                                   altered nutrition related lab


                                                 values


       Etiology                                  hx of DM


       Signs/Symptoms:                           glucose 169, -316


     Malnutrition Alert


      Protein-Calorie Malnutrition               N/A


      Is there a minimum of two criteria         No


       selected?                                 


       Query Text:Check all the applicable       


       criteria. A minimum of two criteria are   


       recommended for diagnosis of either       


       severe or non-severe malnutrition.        


     Intervention/Recommendation


      Comments                                   1. Continue with current diet


                                                 as ordered.


                                                 2. Monitor PO intake, wt, labs


                                                 and skin integrity


                                                 3. F/U as moderate risk in 3-5


                                                 days, 3/24-3/26


     Expected Outcomes/Goals


      Expected Outcomes/Goals                    1. PO intake to meet at least


                                                 75% of nutritional needs.


                                                 2. Wt stability, skin to


                                                 remain intact, labs to


                                                 approach WNL.

## 2018-03-22 NOTE — GENERAL PROGRESS NOTE
Subjective





- Review of Systems


Service Date: 18


Subjective: 





Patient still confused, irritable. Behavior still an issue. Contacted family 

members for this patient to consider referral to hospice evaluation. 





Objective





- Results


Result Diagrams: 


 18 06:48


Recent Labs: 


 Laboratory Last Values











PT  9.5 SECONDS (9.5-11.5)   18  21:35    


 


INR  0.91  (0.5-1.4)   18  21:35    


 


Sodium  136 mEq/L (136-145)   18  06:48    


 


Potassium  4.2 mEq/L (3.5-5.1)   18  06:48    


 


Chloride  105 mEq/L ()   18  06:48    


 


Carbon Dioxide  28.6 mEq/L (21.0-31.0)   18  06:48    


 


Anion Gap  6.6  (7.0-16.0)  L  18  06:48    


 


BUN  9 mg/dL (7-25)   18  06:48    


 


Creatinine  0.9 mg/dL (0.7-1.3)   18  06:48    


 


Est GFR ( Amer)  TNP   18  06:48    


 


Est GFR (Non-Af Amer)  TNP   18  06:48    


 


BUN/Creatinine Ratio  10.0   18  06:48    


 


Glucose  169 mg/dL ()  H  18  06:48    


 


POC Glucose  137 MG/DL (70 - 105)  H  18  06:32    


 


Calcium  8.8 mg/dL (8.6-10.3)   18  06:48    














- Physical Exam


Vitals and I&O: 


 Vital Signs











Temp  97.6 F   18 00:00


 


Pulse  85   18 00:00


 


Resp  20   18 00:00


 


BP  138/79   18 00:00


 


Pulse Ox  97   18 04:00








 Intake & Output











 18





 18:59 06:59 18:59


 


Weight (lbs) 77.111 kg  











Active Medications: 


Current Medications





Acetaminophen (Tylenol)  650 mg PO Q6H PRN


   PRN Reason: mild to moderate pain


   Stop: 05/15/18 23:24


Acetaminophen/Hydrocodone Bitart (Norco 5mg/325mg)  1 tab PO Q6H PRN


   PRN Reason: Pain (Moderate)


   Stop: 05/15/18 23:24


Allopurinol (Zyloprim)  100 mg PO DAILY [facility]


   Stop: 05/15/18 23:24


   Last Admin: 18 10:24 Dose:  100 mg


Castor Oil/Pakistani Balsam/Trypsin (Venelex)  1 appl TP DAILY [facility]


   Stop: 05/15/18 23:24


   Last Admin: 18 10:25 Dose:  1 appl


Clonazepam (Klonopin)  1 mg PO BID [facility]


   PRN Reason: Protocol


   Stop: 18 09:59


   Last Admin: 18 17:54 Dose:  1 mg


Donepezil HCl (Aricept)  5 mg PO DAILY [facility]


   Stop: 18 08:59


   Last Admin: 18 10:23 Dose:  5 mg


Famotidine (Pepcid)  20 mg PO BID [facility]


   Stop: 05/15/18 23:24


   Last Admin: 18 17:54 Dose:  20 mg


Fluconazole (Diflucan)  100 mg PO DAILY [facility]


   Stop: 05/15/18 23:24


   Last Admin: 18 10:23 Dose:  100 mg


Heparin Sodium (Porcine) (Heparin)  5,000 units SUBQ Q12HR [facility]


   PRN Reason: Protocol


   Stop: 05/15/18 23:24


   Last Admin: 18 21:30 Dose:  Not Given


Insulin Aspart (Novolog Insulin Sliding Scale)  0 units SUBQ ACHS [facility]


   PRN Reason: Protocol


   Stop: 05/15/18 23:24


   Last Admin: 18 21:29 Dose:  8 units


Insulin Aspart (Novolog Insulin Sliding Scale)  0 units SUBQ ACHS [facility]


   PRN Reason: Protocol


   Stop: 18 07:29


   Last Admin: 18 10:54 Dose:  Not Given


Insulin Detemir (Levemir Insulin)  18 units SUBQ DAILY [facility]


   PRN Reason: Protocol


   Stop: 05/15/18 23:24


   Last Admin: 18 17:47 Dose:  Not Given


Magnesium Hydroxide (Milk Of Magnesia)  30 ml PO HS PRN


   PRN Reason: Constipation


   Stop: 05/15/18 23:24


Metformin HCl (Glucophage)  1,000 mg PO DAILY [facility]


   Stop: 18 09:59


   Last Admin: 18 10:23 Dose:  1,000 mg


Metoprolol Succinate (Toprol Xl)  25 mg PO DAILY [facility]


   Stop: 05/15/18 23:24


   Last Admin: 18 10:23 Dose:  25 mg


Miscellaneous (Vte Chemical Prophylaxis Screen/ Admission)  1 ea MC PRN PRN


   PRN Reason: PROTOCOL


   Stop: 18 10:29


Mupirocin (Bactroban Oint)  1 appl NS BID [facility]


   Stop: 18 09:01


   Last Admin: 18 17:55 Dose:  Not Given


Olanzapine (Zyprexa Zydis)  10 mg PO BID [facility]


   PRN Reason: Protocol


   Stop: 18 07:53


Zolpidem Tartrate (Ambien)  5 mg PO HS PRN


   PRN Reason: insomnia


   Stop: 05/15/18 23:24


   Last Admin: 18 19:55 Dose:  5 mg








General: Alert, No acute distress


HEENT: Atraumatic, PERRLA, EOMI


Neck: Supple, JVD


Cardiovascular: Regular rate, Normal S1, Normal S2


Lungs: Clear to auscultation


Abdomen: Bowel sounds, Soft


Extremities: no Clubbing, no Cyanosis, no Edema


Neurological: Sensation intact


Skin: no Rash





- Procedures


Procedures: 


 Procedures











Procedure Code Date


 


KEV MUSC/FASCIA 20 SQ CM/< 96483 18


 


EXCISION OF RIGHT HIP MUSCLE, OPEN APPROACH [id number] 18


 


INTRODUCTION OF SERUM/TOX/VACCINE INTO MUSCLE, PERC APPROACH [id number] 18














Assessment/Plan





- Assessment


Assessment: 





psychosis


dementia


Alzheimer's dementia


diabetes mellitus


s/p wound debridement of sacral decubiti


UTI


hyperglycemia








- Plan


Plan: 





continue current medications.


discharge planning. 


family considering hospice eval





Nutritional Asmnt/Malnutr-PDOC





- Dietary Evaluation


Malnutrition Findings (Please click <Entered> for more info): 








Nutritional Asmnt/Malnutrition                             Start:  18 16:

25


Text:                                                      Status: Complete    

  


Freq:                                                                          

  


 Document     18 16:25  MAGEN  (Rec: 18 16:31  LCPAT  [email or web address])


 Nutritional Asmnt/Malnutrition


     Patient General Information


      Nutritional Screening                      Moderate Risk


      Diagnosis                                  dehydration


      Pertinent Medical Hx/Surgical Hx           DM, dementia, depression,


                                                 psychosis, schizophrenia,


                                                 chronic renal insuff


      Subjective Information                     Pt is agitated, on 1:1 sitter.


                                                 Per EMR, PO intake 25-75%,


                                                 avg 50%.


      Current Diet Order/ Nutrition Support      pureed, CCHO 60gm


      Pertinent Medications                      novolov, levemir, glucophage


      Pertinent Labs                             3/19- -316


     Nutritional Hx/Data


      Height                                     1.8 m


      Height (Calculated Centimeters)            180.3


      Current Weight (lbs)                       77.111 kg


      Weight (Calculated Kilograms)              77.1


      Weight (Calculated Grams)                  01242.7


      Ideal Body Weight                          172


      Body Mass Index (BMI)                      23.7


      Weight Status                              Approriate


     GI Symptoms


      GI Symptoms                                None


      Last BM                                    3/19


      Difficult in:                              None


      Skin Integrity/Comment:                    pressure ulcer, decubitus


      Current %PO                                Fair (50-74%)


     Estimated Nutritional Goals


      BEE in Kcals:                              Using Current wt


      Calories/Kcals/Kg                          25-30


      Kcals Calculated                           8857-1552


      Protein:                                   Using Current wt


      Protein g/k-1.2


      Protein Calculated                         77-92


      Fluid: ml                                  1925-2310ml (1ml/kcal)


     Nutritional Problem


      1. Problem


       Problem                                   altered nutrition related lab


                                                 values


       Etiology                                  hx of DM


       Signs/Symptoms:                           glucose 169, -316


     Malnutrition Alert


      Protein-Calorie Malnutrition               N/A


      Is there a minimum of two criteria         No


       selected?                                 


       Query Text:Check all the applicable       


       criteria. A minimum of two criteria are   


       recommended for diagnosis of either       


       severe or non-severe malnutrition.        


     Intervention/Recommendation


      Comments                                   1. Continue with current diet


                                                 as ordered.


                                                 2. Monitor PO intake, wt, labs


                                                 and skin integrity


                                                 3. F/U as moderate risk in 3-5


                                                 days, 3/24-3/26


     Expected Outcomes/Goals


      Expected Outcomes/Goals                    1. PO intake to meet at least


                                                 75% of nutritional needs.


                                                 2. Wt stability, skin to


                                                 remain intact, labs to


                                                 approach WNL.

## 2018-03-23 RX ADMIN — INSULIN ASPART SCH UNITS: 100 INJECTION, SOLUTION INTRAVENOUS; SUBCUTANEOUS at 21:35

## 2018-03-23 RX ADMIN — INSULIN DETEMIR SCH: 100 INJECTION, SOLUTION SUBCUTANEOUS at 08:41

## 2018-03-23 RX ADMIN — OLANZAPINE SCH MG: 5 TABLET, ORALLY DISINTEGRATING ORAL at 09:34

## 2018-03-23 RX ADMIN — INSULIN ASPART SCH: 100 INJECTION, SOLUTION INTRAVENOUS; SUBCUTANEOUS at 18:20

## 2018-03-23 RX ADMIN — OLANZAPINE SCH: 5 TABLET, ORALLY DISINTEGRATING ORAL at 18:18

## 2018-03-23 RX ADMIN — CASTOR OIL AND BALSAM, PERU SCH APPL: 788; 87 OINTMENT TOPICAL at 09:36

## 2018-03-23 NOTE — GENERAL PROGRESS NOTE
Subjective





- Review of Systems


Service Date: 18


Subjective: 





lying in bed, periods of agitation





Objective





- Results


Result Diagrams: 


 18 06:48


Recent Labs: 


 Laboratory Last Values











PT  9.5 SECONDS (9.5-11.5)   18  21:35    


 


INR  0.91  (0.5-1.4)   18  21:35    


 


Sodium  136 mEq/L (136-145)   18  06:48    


 


Potassium  4.2 mEq/L (3.5-5.1)   18  06:48    


 


Chloride  105 mEq/L ()   18  06:48    


 


Carbon Dioxide  28.6 mEq/L (21.0-31.0)   18  06:48    


 


Anion Gap  6.6  (7.0-16.0)  L  18  06:48    


 


BUN  9 mg/dL (7-25)   18  06:48    


 


Creatinine  0.9 mg/dL (0.7-1.3)   18  06:48    


 


Est GFR ( Amer)  TNP   18  06:48    


 


Est GFR (Non-Af Amer)  TNP   18  06:48    


 


BUN/Creatinine Ratio  10.0   18  06:48    


 


Glucose  169 mg/dL ()  H  18  06:48    


 


POC Glucose  224 MG/DL (70 - 105)  H  18  06:09    


 


Calcium  8.8 mg/dL (8.6-10.3)   18  06:48    














- Physical Exam


Vitals and I&O: 


 Vital Signs











Temp  98.8 F   18 04:00


 


Pulse  85   18 09:31


 


Resp  16   18 04:00


 


BP  140/60   18 09:31


 


Pulse Ox  97   18 04:00








 Intake & Output











 18





 18:59 06:59 18:59


 


Intake Total 120 500 


 


Output Total  350 


 


Balance 120 150 


 


Weight (lbs) 76.657 kg 77.111 kg 77.111 kg


 


Intake:   


 


  Oral 120 500 


 


Output:   


 


  Urine  350 


 


Other:   


 


  # Bowel Movements  3 











Active Medications: 


Current Medications





Acetaminophen (Tylenol)  650 mg PO Q6H PRN


   PRN Reason: mild to moderate pain


   Stop: 05/15/18 23:24


Acetaminophen/Hydrocodone Bitart (Norco 5mg/325mg)  1 tab PO Q6H PRN


   PRN Reason: Pain (Moderate)


   Stop: 05/15/18 23:24


Allopurinol (Zyloprim)  100 mg PO DAILY [facility]


   Stop: 05/15/18 23:24


   Last Admin: 18 09:34 Dose:  100 mg


Castor Oil/[place] Balsam/Trypsin (Venelex)  1 appl TP DAILY [facility]


   Stop: 05/15/18 23:24


   Last Admin: 18 09:36 Dose:  1 appl


Clonazepam (Klonopin)  1 mg PO BID [facility]


   PRN Reason: Protocol


   Stop: 18 09:59


   Last Admin: 18 09:34 Dose:  1 mg


Donepezil HCl (Aricept)  5 mg PO DAILY [facility]


   Stop: 18 08:59


   Last Admin: 18 09:33 Dose:  5 mg


Famotidine (Pepcid)  20 mg PO BID [facility]


   Stop: 05/15/18 23:24


   Last Admin: 18 09:40 Dose:  20 mg


Fluconazole (Diflucan)  100 mg PO DAILY [facility]


   Stop: 05/15/18 23:24


   Last Admin: 18 09:31 Dose:  100 mg


Heparin Sodium (Porcine) (Heparin)  5,000 units SUBQ Q12HR [facility]


   PRN Reason: Protocol


   Stop: 05/15/18 23:24


   Last Admin: 18 09:37 Dose:  Not Given


Insulin Aspart (Novolog Insulin Sliding Scale)  0 units SUBQ ACHS [facility]


   PRN Reason: Protocol


   Stop: 05/15/18 23:24


   Last Admin: 18 21:45 Dose:  6 units


Insulin Aspart (Novolog Insulin Sliding Scale)  0 units SUBQ ACHS [facility]


   PRN Reason: Protocol


   Stop: 18 07:29


   Last Admin: 18 13:11 Dose:  6 units


Insulin Detemir (Levemir Insulin)  18 units SUBQ DAILY [facility]


   PRN Reason: Protocol


   Stop: 05/15/18 23:24


   Last Admin: 18 08:41 Dose:  Not Given


Magnesium Hydroxide (Milk Of Magnesia)  30 ml PO HS PRN


   PRN Reason: Constipation


   Stop: 05/15/18 23:24


Metformin HCl (Glucophage)  1,000 mg PO DAILY [facility]


   Stop: 18 09:59


   Last Admin: 18 09:35 Dose:  1,000 mg


Metoprolol Succinate (Toprol Xl)  25 mg PO DAILY [facility]


   Stop: 05/15/18 23:24


   Last Admin: 18 09:31 Dose:  25 mg


Miscellaneous (Vte Chemical Prophylaxis Screen/ Admission)  1 ea MC PRN PRN


   PRN Reason: PROTOCOL


   Stop: 18 10:29


Olanzapine (Zyprexa Zydis)  10 mg PO BID [facility]


   PRN Reason: Protocol


   Stop: 18 07:53


   Last Admin: 18 09:34 Dose:  10 mg


Zolpidem Tartrate (Ambien)  5 mg PO HS PRN


   PRN Reason: insomnia


   Stop: 05/15/18 23:24


   Last Admin: 18 19:55 Dose:  5 mg








General: Alert, No acute distress


HEENT: Atraumatic, PERRLA, EOMI


Neck: Supple, JVD


Cardiovascular: Regular rate, Normal S1, Normal S2


Lungs: Clear to auscultation


Abdomen: Bowel sounds, Soft


Extremities: no Clubbing, no Cyanosis, no Edema


Neurological: Sensation intact


Skin: no Rash


Psych/Mental Status: Other (psychosis)





- Procedures


Procedures: 


 Procedures











Procedure Code Date


 


KEV MUSC/FASCIA 20 SQ CM/< 36987 18


 


EXCISION OF RIGHT HIP MUSCLE, OPEN APPROACH [id number] 18


 


INTRODUCTION OF SERUM/TOX/VACCINE INTO MUSCLE, PERC APPROACH [id number] 18














Assessment/Plan





- Assessment


Assessment: 





S/P BRYANT


S/P electrolyte imbalance


Acute decomp of Psychosis


Type 2 DM


Hx UTI








- Plan


Plan: 


Lab - Result Diagrams





 18 06:48 





Current Medications





Acetaminophen (Tylenol)  650 mg PO Q6H PRN


   PRN Reason: mild to moderate pain


   Stop: 05/15/18 23:24


Acetaminophen/Hydrocodone Bitart (Norco 5mg/325mg)  1 tab PO Q6H PRN


   PRN Reason: Pain (Moderate)


   Stop: 05/15/18 23:24


Allopurinol (Zyloprim)  100 mg PO DAILY [facility]


   Stop: 05/15/18 23:24


   Last Admin: 18 10:54 Dose:  100 mg


Castor Oil/Grenadian Balsam/Trypsin (Venelex)  1 appl TP DAILY [facility]


   Stop: 05/15/18 23:24


   Last Admin: 18 11:02 Dose:  1 appl


Clonazepam (Klonopin)  1 mg PO BID [facility]


   PRN Reason: Protocol


   Stop: 18 09:59


Famotidine (Pepcid)  20 mg PO BID [facility]


   Stop: 05/15/18 23:24


   Last Admin: 18 10:54 Dose:  20 mg


Fluconazole (Diflucan)  100 mg PO DAILY [facility]


   Stop: 05/15/18 23:24


   Last Admin: 18 11:02 Dose:  100 mg


Heparin Sodium (Porcine) (Heparin)  5,000 units SUBQ Q12HR [facility]


   PRN Reason: Protocol


   Stop: 05/15/18 23:24


   Last Admin: 18 10:59 Dose:  5,000 units


Levofloxacin (Levaquin Pb)  500 mg in 100 mls @ 100 mls/hr IV Q24H [facility]


   Stop: 18 11:59


Insulin Aspart (Novolog Insulin Sliding Scale)  0 units SUBQ ACHS [facility]


   PRN Reason: Protocol


   Stop: 05/15/18 23:24


   Last Admin: 18 10:45 Dose:  2 units


Insulin Aspart (Novolog Insulin Sliding Scale)  0 units SUBQ ACHS [facility]


   PRN Reason: Protocol


   Stop: 18 07:29


Insulin Detemir (Levemir Insulin)  18 units SUBQ DAILY [facility]


   PRN Reason: Protocol


   Stop: 05/15/18 23:24


   Last Admin: 18 10:56 Dose:  18 units


Lorazepam (Ativan)  0.5 mg PO Q6HR PRN; Protocol


   PRN Reason: agitation 


   Stop: 05/15/18 23:24


Magnesium Hydroxide (Milk Of Magnesia)  30 ml PO HS PRN


   PRN Reason: Constipation


   Stop: 05/15/18 23:24


Metformin HCl (Glucophage)  1,000 mg PO DAILY [facility]


   Stop: 18 09:59


Metoprolol Succinate (Toprol Xl)  25 mg PO DAILY [facility]


   Stop: 05/15/18 23:24


   Last Admin: 18 10:55 Dose:  25 mg


Miscellaneous (Vte Chemical Prophylaxis Screen/ Admission)  1 ea [facility] PRN PRN


   PRN Reason: PROTOCOL


   Stop: 18 10:29


Olanzapine (Zyprexa Zydis)  5 mg PO DAILY [facility]


   PRN Reason: Protocol


   Stop: 18 09:59


Zolpidem Tartrate (Ambien)  5 mg PO HS PRN


   PRN Reason: insomnia


   Stop: 05/15/18 23:24





Lab - Result Diagrams





 18 06:48 





kidney fnc remain stable


encourage po intake


psych meds








Nutritional Asmnt/Malnutr-PDOC





- Dietary Evaluation


Malnutrition Findings (Please click <Entered> for more info): 








Nutritional Asmnt/Malnutrition                             Start:  18 16:

25


Text:                                                      Status: Complete    

  


Freq:                                                                          

  


 Document     18 16:25  MAGEN  (Rec: 18 16:31  MAGEN  [email or web address]-[email or web address])


 Nutritional Asmnt/Malnutrition


     Patient General Information


      Nutritional Screening                      Moderate Risk


      Diagnosis                                  dehydration


      Pertinent Medical Hx/Surgical Hx           DM, dementia, depression,


                                                 psychosis, schizophrenia,


                                                 chronic renal insuff


      Subjective Information                     Pt is agitated, on 1:1 sitter.


                                                 Per EMR, PO intake 25-75%,


                                                 avg 50%.


      Current Diet Order/ Nutrition Support      pureed, CCHO 60gm


      Pertinent Medications                      novolov, levemir, glucophage


      Pertinent Labs                             3/19- -316


     Nutritional Hx/Data


      Height                                     1.8 m


      Height (Calculated Centimeters)            180.3


      Current Weight (lbs)                       77.111 kg


      Weight (Calculated Kilograms)              77.1


      Weight (Calculated Grams)                  76286.7


      Ideal Body Weight                          172


      Body Mass Index (BMI)                      23.7


      Weight Status                              Approriate


     GI Symptoms


      GI Symptoms                                None


      Last BM                                    3/19


      Difficult in:                              None


      Skin Integrity/Comment:                    pressure ulcer, decubitus


      Current %PO                                Fair (50-74%)


     Estimated Nutritional Goals


      BEE in Kcals:                              Using Current wt


      Calories/Kcals/Kg                          25-30


      Kcals Calculated                           3604-8614


      Protein:                                   Using Current wt


      Protein g/k-1.2


      Protein Calculated                         77-92


      Fluid: ml                                  1925-2310ml (1ml/kcal)


     Nutritional Problem


      1. Problem


       Problem                                   altered nutrition related lab


                                                 values


       Etiology                                  hx of DM


       Signs/Symptoms:                           glucose 169, -316


     Malnutrition Alert


      Protein-Calorie Malnutrition               N/A


      Is there a minimum of two criteria         No


       selected?                                 


       Query Text:Check all the applicable       


       criteria. A minimum of two criteria are   


       recommended for diagnosis of either       


       severe or non-severe malnutrition.        


     Intervention/Recommendation


      Comments                                   1. Continue with current diet


                                                 as ordered.


                                                 2. Monitor PO intake, wt, labs


                                                 and skin integrity


                                                 3. F/U as moderate risk in 3-5


                                                 days, 3/24-3/26


     Expected Outcomes/Goals


      Expected Outcomes/Goals                    1. PO intake to meet at least


                                                 75% of nutritional needs.


                                                 2. Wt stability, skin to


                                                 remain intact, labs to


                                                 approach WNL.

## 2018-03-23 NOTE — INFECTIOUS DISEASE PROG NOTE
Infectious Disease Subjective





- Review of Systems


Service Date: 18


Subjective: 





No fever





Infectious Disease Objective





- Results


Result Diagrams: 


 18 06:48


Recent Labs: 


 Laboratory Last Values











PT  9.5 SECONDS (9.5-11.5)   18  21:35    


 


INR  0.91  (0.5-1.4)   18  21:35    


 


Sodium  136 mEq/L (136-145)   18  06:48    


 


Potassium  4.2 mEq/L (3.5-5.1)   18  06:48    


 


Chloride  105 mEq/L ()   18  06:48    


 


Carbon Dioxide  28.6 mEq/L (21.0-31.0)   18  06:48    


 


Anion Gap  6.6  (7.0-16.0)  L  18  06:48    


 


BUN  9 mg/dL (7-25)   18  06:48    


 


Creatinine  0.9 mg/dL (0.7-1.3)   18  06:48    


 


Est GFR ( Amer)  TNP   18  06:48    


 


Est GFR (Non-Af Amer)  TNP   18  06:48    


 


BUN/Creatinine Ratio  10.0   18  06:48    


 


Glucose  169 mg/dL ()  H  18  06:48    


 


POC Glucose  224 MG/DL (70 - 105)  H  18  06:09    


 


Calcium  8.8 mg/dL (8.6-10.3)   18  06:48    














- Physical Exam


Vitals and I&O: 


 Vital Signs











Temp  98.8 F   18 04:00


 


Pulse  85   18 09:31


 


Resp  16   18 04:00


 


BP  140/60   18 09:31


 


Pulse Ox  97   18 04:00








 Intake & Output











 18





 18:59 06:59 18:59


 


Intake Total 120 500 


 


Output Total  350 


 


Balance 120 150 


 


Weight (lbs) 76.657 kg 77.111 kg 77.111 kg


 


Intake:   


 


  Oral 120 500 


 


Output:   


 


  Urine  350 


 


Other:   


 


  # Bowel Movements  3 











Active Medications: 


Current Medications





Acetaminophen (Tylenol)  650 mg PO Q6H PRN


   PRN Reason: mild to moderate pain


   Stop: 05/15/18 23:24


Acetaminophen/Hydrocodone Bitart (Norco 5mg/325mg)  1 tab PO Q6H PRN


   PRN Reason: Pain (Moderate)


   Stop: 05/15/18 23:24


Allopurinol (Zyloprim)  100 mg PO DAILY [facility]


   Stop: 05/15/18 23:24


   Last Admin: 18 09:34 Dose:  100 mg


Castor Oil/[place] Balsam/Trypsin (Venelex)  1 appl TP DAILY [facility]


   Stop: 05/15/18 23:24


   Last Admin: 18 09:36 Dose:  1 appl


Clonazepam (Klonopin)  1 mg PO BID [facility]


   PRN Reason: Protocol


   Stop: 18 09:59


   Last Admin: 18 09:34 Dose:  1 mg


Donepezil HCl (Aricept)  5 mg PO DAILY [facility]


   Stop: 18 08:59


   Last Admin: 18 09:33 Dose:  5 mg


Famotidine (Pepcid)  20 mg PO BID [facility]


   Stop: 05/15/18 23:24


   Last Admin: 18 09:40 Dose:  20 mg


Fluconazole (Diflucan)  100 mg PO DAILY [facility]


   Stop: 05/15/18 23:24


   Last Admin: 18 09:31 Dose:  100 mg


Heparin Sodium (Porcine) (Heparin)  5,000 units SUBQ Q12HR [facility]


   PRN Reason: Protocol


   Stop: 05/15/18 23:24


   Last Admin: 18 09:37 Dose:  Not Given


Insulin Aspart (Novolog Insulin Sliding Scale)  0 units SUBQ ACHS [facility]


   PRN Reason: Protocol


   Stop: 05/15/18 23:24


   Last Admin: 18 21:45 Dose:  6 units


Insulin Aspart (Novolog Insulin Sliding Scale)  0 units SUBQ ACHS [facility]


   PRN Reason: Protocol


   Stop: 18 07:29


   Last Admin: 18 13:11 Dose:  6 units


Insulin Detemir (Levemir Insulin)  18 units SUBQ DAILY [facility]


   PRN Reason: Protocol


   Stop: 05/15/18 23:24


   Last Admin: 18 08:41 Dose:  Not Given


Magnesium Hydroxide (Milk Of Magnesia)  30 ml PO HS PRN


   PRN Reason: Constipation


   Stop: 05/15/18 23:24


Metformin HCl (Glucophage)  1,000 mg PO DAILY [facility]


   Stop: 18 09:59


   Last Admin: 18 09:35 Dose:  1,000 mg


Metoprolol Succinate (Toprol Xl)  25 mg PO DAILY [facility]


   Stop: 05/15/18 23:24


   Last Admin: 18 09:31 Dose:  25 mg


Miscellaneous (Vte Chemical Prophylaxis Screen/ Admission)  1 ea MC PRN PRN


   PRN Reason: PROTOCOL


   Stop: 18 10:29


Olanzapine (Zyprexa Zydis)  10 mg PO BID [facility]


   PRN Reason: Protocol


   Stop: 18 07:53


   Last Admin: 18 09:34 Dose:  10 mg


Zolpidem Tartrate (Ambien)  5 mg PO HS PRN


   PRN Reason: insomnia


   Stop: 05/15/18 23:24


   Last Admin: 18 19:55 Dose:  5 mg








General: no acute distress, well developed, well nourished


HEENT: atraumatic, normocephalic, PERRLA, EOMI


Neck: supple, no thyromegaly


Cardiovascular: S1S2, regular


Lungs: clear to auscultation bilaterally, clear to percussion


Abdomen: soft, no tender, no distended, no rebound


Extremities: no cyanosis, no clubbing


Neurological: awake, alert


Skin: other (sacral wound.)





- Procedures


Procedures: 


 Procedures











Procedure Code Date


 


KEV MUSC/FASCIA 20 SQ CM/< 92641 18


 


EXCISION OF RIGHT HIP MUSCLE, OPEN APPROACH [id number] 18


 


INTRODUCTION OF SERUM/TOX/VACCINE INTO MUSCLE, PERC APPROACH [id number] 18














Infectious Disease Assmt/Plan





- Assessment


Assessment: 





1.  Sacral decubitus ulcer.


2.  UTI.  Treated


3.  Dementia.





- Plan


Plan: 





wound care.





Nutritional Asmnt/Malnutr-PDOC





- Dietary Evaluation


Malnutrition Findings (Please click <Entered> for more info): 








Nutritional Asmnt/Malnutrition                             Start:  18 16:

25


Text:                                                      Status: Complete    

  


Freq:                                                                          

  


 Document     18 16:25  MAGEN  (Rec: 18 16:31  MAGEN  [facility])


 Nutritional Asmnt/Malnutrition


     Patient General Information


      Nutritional Screening                      Moderate Risk


      Diagnosis                                  dehydration


      Pertinent Medical Hx/Surgical Hx           DM, dementia, depression,


                                                 psychosis, schizophrenia,


                                                 chronic renal insuff


      Subjective Information                     Pt is agitated, on 1:1 sitter.


                                                 Per EMR, PO intake 25-75%,


                                                 avg 50%.


      Current Diet Order/ Nutrition Support      pureed, CCHO 60gm


      Pertinent Medications                      novolov, levemir, glucophage


      Pertinent Labs                             3/19- -316


     Nutritional Hx/Data


      Height                                     1.8 m


      Height (Calculated Centimeters)            180.3


      Current Weight (lbs)                       77.111 kg


      Weight (Calculated Kilograms)              77.1


      Weight (Calculated Grams)                  89629.7


      Ideal Body Weight                          172


      Body Mass Index (BMI)                      23.7


      Weight Status                              Approriate


     GI Symptoms


      GI Symptoms                                None


      Last BM                                    3/19


      Difficult in:                              None


      Skin Integrity/Comment:                    pressure ulcer, decubitus


      Current %PO                                Fair (50-74%)


     Estimated Nutritional Goals


      BEE in Kcals:                              Using Current wt


      Calories/Kcals/Kg                          25-30


      Kcals Calculated                           1964-3066


      Protein:                                   Using Current wt


      Protein g/k-1.2


      Protein Calculated                         77-92


      Fluid: ml                                  1925-2310ml (1ml/kcal)


     Nutritional Problem


      1. Problem


       Problem                                   altered nutrition related lab


                                                 values


       Etiology                                  hx of DM


       Signs/Symptoms:                           glucose 169, -316


     Malnutrition Alert


      Protein-Calorie Malnutrition               N/A


      Is there a minimum of two criteria         No


       selected?                                 


       Query Text:Check all the applicable       


       criteria. A minimum of two criteria are   


       recommended for diagnosis of either       


       severe or non-severe malnutrition.        


     Intervention/Recommendation


      Comments                                   1. Continue with current diet


                                                 as ordered.


                                                 2. Monitor PO intake, wt, labs


                                                 and skin integrity


                                                 3. F/U as moderate risk in 3-5


                                                 days, 3/24-3/26


     Expected Outcomes/Goals


      Expected Outcomes/Goals                    1. PO intake to meet at least


                                                 75% of nutritional needs.


                                                 2. Wt stability, skin to


                                                 remain intact, labs to


                                                 approach WNL.

## 2018-03-23 NOTE — GENERAL PROGRESS NOTE
Subjective





- Review of Systems


Service Date: 18


Subjective: 





Patient resting comfortably in bed. no acute distress. restraints discontinued 

since yesterday.  





Objective





- Results


Result Diagrams: 


 18 06:48


Recent Labs: 


 Laboratory Last Values











PT  9.5 SECONDS (9.5-11.5)   18  21:35    


 


INR  0.91  (0.5-1.4)   18  21:35    


 


Sodium  136 mEq/L (136-145)   18  06:48    


 


Potassium  4.2 mEq/L (3.5-5.1)   18  06:48    


 


Chloride  105 mEq/L ()   18  06:48    


 


Carbon Dioxide  28.6 mEq/L (21.0-31.0)   18  06:48    


 


Anion Gap  6.6  (7.0-16.0)  L  18  06:48    


 


BUN  9 mg/dL (7-25)   18  06:48    


 


Creatinine  0.9 mg/dL (0.7-1.3)   18  06:48    


 


Est GFR ( Amer)  TNP   18  06:48    


 


Est GFR (Non-Af Amer)  TNP   18  06:48    


 


BUN/Creatinine Ratio  10.0   18  06:48    


 


Glucose  169 mg/dL ()  H  18  06:48    


 


POC Glucose  224 MG/DL (70 - 105)  H  18  06:09    


 


Calcium  8.8 mg/dL (8.6-10.3)   18  06:48    














- Physical Exam


Vitals and I&O: 


 Vital Signs











Temp  98.8 F   18 04:00


 


Pulse  90   18 04:00


 


Resp  16   18 04:00


 


BP  120/61   18 04:00


 


Pulse Ox  97   18 04:00








 Intake & Output











 18





 18:59 06:59 18:59


 


Intake Total 120 500 


 


Output Total  350 


 


Balance 120 150 


 


Weight (lbs) 76.657 kg 77.111 kg 


 


Intake:   


 


  Oral 120 500 


 


Output:   


 


  Urine  350 


 


Other:   


 


  # Bowel Movements  3 











Active Medications: 


Current Medications





Acetaminophen (Tylenol)  650 mg PO Q6H PRN


   PRN Reason: mild to moderate pain


   Stop: 05/15/18 23:24


Acetaminophen/Hydrocodone Bitart (Norco 5mg/325mg)  1 tab PO Q6H PRN


   PRN Reason: Pain (Moderate)


   Stop: 05/15/18 23:24


Allopurinol (Zyloprim)  100 mg PO DAILY [facility]


   Stop: 05/15/18 23:24


   Last Admin: 18 10:09 Dose:  100 mg


Castor Oil/[place] Balsam/Trypsin (Venelex)  1 appl TP DAILY [facility]


   Stop: 05/15/18 23:24


   Last Admin: 18 10:06 Dose:  1 appl


Clonazepam (Klonopin)  1 mg PO BID [facility]


   PRN Reason: Protocol


   Stop: 18 09:59


   Last Admin: 18 17:25 Dose:  1 mg


Donepezil HCl (Aricept)  5 mg PO DAILY [facility]


   Stop: 18 08:59


   Last Admin: 18 10:10 Dose:  5 mg


Famotidine (Pepcid)  20 mg PO BID [facility]


   Stop: 05/15/18 23:24


   Last Admin: 18 17:25 Dose:  20 mg


Fluconazole (Diflucan)  100 mg PO DAILY [facility]


   Stop: 05/15/18 23:24


   Last Admin: 18 10:09 Dose:  100 mg


Heparin Sodium (Porcine) (Heparin)  5,000 units SUBQ Q12HR [facility]


   PRN Reason: Protocol


   Stop: 05/15/18 23:24


   Last Admin: 18 21:35 Dose:  5,000 units


Insulin Aspart (Novolog Insulin Sliding Scale)  0 units SUBQ ACHS [facility]


   PRN Reason: Protocol


   Stop: 05/15/18 23:24


   Last Admin: 18 21:45 Dose:  6 units


Insulin Aspart (Novolog Insulin Sliding Scale)  0 units SUBQ ACHS [facility]


   PRN Reason: Protocol


   Stop: 18 07:29


   Last Admin: 18 13:11 Dose:  6 units


Insulin Detemir (Levemir Insulin)  18 units SUBQ DAILY [facility]


   PRN Reason: Protocol


   Stop: 05/15/18 23:24


   Last Admin: 18 17:29 Dose:  Not Given


Magnesium Hydroxide (Milk Of Magnesia)  30 ml PO HS PRN


   PRN Reason: Constipation


   Stop: 05/15/18 23:24


Metformin HCl (Glucophage)  1,000 mg PO DAILY [facility]


   Stop: 18 09:59


   Last Admin: 18 10:10 Dose:  1,000 mg


Metoprolol Succinate (Toprol Xl)  25 mg PO DAILY [facility]


   Stop: 05/15/18 23:24


   Last Admin: 18 10:07 Dose:  25 mg


Miscellaneous (Vte Chemical Prophylaxis Screen/ Admission)  1 ea MC PRN PRN


   PRN Reason: PROTOCOL


   Stop: 18 10:29


Mupirocin (Bactroban Oint)  1 appl NS BID [facility]


   Stop: 18 09:01


   Last Admin: 18 17:30 Dose:  Not Given


Olanzapine (Zyprexa Zydis)  10 mg PO BID [facility]


   PRN Reason: Protocol


   Stop: 18 07:53


   Last Admin: 18 17:26 Dose:  10 mg


Zolpidem Tartrate (Ambien)  5 mg PO HS PRN


   PRN Reason: insomnia


   Stop: 05/15/18 23:24


   Last Admin: 18 19:55 Dose:  5 mg








General: Alert, No acute distress


HEENT: Atraumatic, PERRLA, EOMI


Neck: Supple, JVD


Cardiovascular: Regular rate, Normal S1, Normal S2


Lungs: Clear to auscultation


Abdomen: Bowel sounds, Soft


Extremities: no Clubbing, no Cyanosis, no Edema


Neurological: Sensation intact


Skin: no Rash





- Procedures


Procedures: 


 Procedures











Procedure Code Date


 


KEV MUSC/FASCIA 20 SQ CM/< 34390 18


 


EXCISION OF RIGHT HIP MUSCLE, OPEN APPROACH [id number] 18


 


INTRODUCTION OF SERUM/TOX/VACCINE INTO MUSCLE, PERC APPROACH [id number] 18














Assessment/Plan





- Assessment


Assessment: 





psychosis


dementia


Alzheimer's dementia


diabetes mellitus


s/p wound debridement of sacral decubiti


UTI


hyperglycemia








- Plan


Plan: 





continue current medications.


discharge planning. 





Nutritional Asmnt/Malnutr-PDOC





- Dietary Evaluation


Malnutrition Findings (Please click <Entered> for more info): 








Nutritional Asmnt/Malnutrition                             Start:  18 16:

25


Text:                                                      Status: Complete    

  


Freq:                                                                          

  


 Document     18 16:25  MAGEN  (Rec: 18 16:31  HENG  [facility])


 Nutritional Asmnt/Malnutrition


     Patient General Information


      Nutritional Screening                      Moderate Risk


      Diagnosis                                  dehydration


      Pertinent Medical Hx/Surgical Hx           DM, dementia, depression,


                                                 psychosis, schizophrenia,


                                                 chronic renal insuff


      Subjective Information                     Pt is agitated, on 1:1 sitter.


                                                 Per EMR, PO intake 25-75%,


                                                 avg 50%.


      Current Diet Order/ Nutrition Support      pureed, CCHO 60gm


      Pertinent Medications                      novolov, levemir, glucophage


      Pertinent Labs                             3/19- -316


     Nutritional Hx/Data


      Height                                     1.8 m


      Height (Calculated Centimeters)            180.3


      Current Weight (lbs)                       77.111 kg


      Weight (Calculated Kilograms)              77.1


      Weight (Calculated Grams)                  46932.7


      Ideal Body Weight                          172


      Body Mass Index (BMI)                      23.7


      Weight Status                              Approriate


     GI Symptoms


      GI Symptoms                                None


      Last BM                                    3/19


      Difficult in:                              None


      Skin Integrity/Comment:                    pressure ulcer, decubitus


      Current %PO                                Fair (50-74%)


     Estimated Nutritional Goals


      BEE in Kcals:                              Using Current wt


      Calories/Kcals/Kg                          25-30


      Kcals Calculated                           5741-1162


      Protein:                                   Using Current wt


      Protein g/k-1.2


      Protein Calculated                         77-92


      Fluid: ml                                  1925-2310ml (1ml/kcal)


     Nutritional Problem


      1. Problem


       Problem                                   altered nutrition related lab


                                                 values


       Etiology                                  hx of DM


       Signs/Symptoms:                           glucose 169, -316


     Malnutrition Alert


      Protein-Calorie Malnutrition               N/A


      Is there a minimum of two criteria         No


       selected?                                 


       Query Text:Check all the applicable       


       criteria. A minimum of two criteria are   


       recommended for diagnosis of either       


       severe or non-severe malnutrition.        


     Intervention/Recommendation


      Comments                                   1. Continue with current diet


                                                 as ordered.


                                                 2. Monitor PO intake, wt, labs


                                                 and skin integrity


                                                 3. F/U as moderate risk in 3-5


                                                 days, 3/24-3/26


     Expected Outcomes/Goals


      Expected Outcomes/Goals                    1. PO intake to meet at least


                                                 75% of nutritional needs.


                                                 2. Wt stability, skin to


                                                 remain intact, labs to


                                                 approach WNL.

## 2018-03-24 RX ADMIN — INSULIN ASPART SCH: 100 INJECTION, SOLUTION INTRAVENOUS; SUBCUTANEOUS at 20:25

## 2018-03-24 RX ADMIN — OLANZAPINE SCH MG: 5 TABLET, ORALLY DISINTEGRATING ORAL at 17:22

## 2018-03-24 RX ADMIN — INSULIN ASPART SCH: 100 INJECTION, SOLUTION INTRAVENOUS; SUBCUTANEOUS at 17:06

## 2018-03-24 RX ADMIN — OLANZAPINE SCH MG: 5 TABLET, ORALLY DISINTEGRATING ORAL at 11:01

## 2018-03-24 RX ADMIN — INSULIN ASPART SCH: 100 INJECTION, SOLUTION INTRAVENOUS; SUBCUTANEOUS at 07:02

## 2018-03-24 RX ADMIN — CASTOR OIL AND BALSAM, PERU SCH APPL: 788; 87 OINTMENT TOPICAL at 10:54

## 2018-03-24 RX ADMIN — INSULIN ASPART SCH UNITS: 100 INJECTION, SOLUTION INTRAVENOUS; SUBCUTANEOUS at 14:19

## 2018-03-24 RX ADMIN — INSULIN DETEMIR SCH: 100 INJECTION, SOLUTION SUBCUTANEOUS at 09:00

## 2018-03-24 RX ADMIN — INSULIN ASPART SCH: 100 INJECTION, SOLUTION INTRAVENOUS; SUBCUTANEOUS at 17:04

## 2018-03-24 NOTE — GENERAL PROGRESS NOTE
Subjective





- Review of Systems


Service Date: 18


Subjective: 





lying in bed, periods of agitation





Objective





- Results


Result Diagrams: 


 18 06:48


Recent Labs: 


 Laboratory Last Values











PT  9.5 SECONDS (9.5-11.5)   18  21:35    


 


INR  0.91  (0.5-1.4)   18  21:35    


 


Sodium  136 mEq/L (136-145)   18  06:48    


 


Potassium  4.2 mEq/L (3.5-5.1)   18  06:48    


 


Chloride  105 mEq/L ()   18  06:48    


 


Carbon Dioxide  28.6 mEq/L (21.0-31.0)   18  06:48    


 


Anion Gap  6.6  (7.0-16.0)  L  18  06:48    


 


BUN  9 mg/dL (7-25)   18  06:48    


 


Creatinine  0.9 mg/dL (0.7-1.3)   18  06:48    


 


Est GFR ( Amer)  TNP   18  06:48    


 


Est GFR (Non-Af Amer)  TNP   18  06:48    


 


BUN/Creatinine Ratio  10.0   18  06:48    


 


Glucose  169 mg/dL ()  H  18  06:48    


 


POC Glucose  235 MG/DL (70 - 105)  H  18  13:37    


 


Calcium  8.8 mg/dL (8.6-10.3)   18  06:48    














- Physical Exam


Vitals and I&O: 


 Vital Signs











Temp  98.2 F   18 04:00


 


Pulse  87   18 10:54


 


Resp  18   18 04:00


 


BP  125/70   18 10:54


 


Pulse Ox  99   18 04:00








 Intake & Output











 18





 18:59 06:59 18:59


 


Intake Total  300 


 


Balance  300 


 


Weight (lbs) 77.111 kg 77.111 kg 77.111 kg


 


Intake:   


 


  Oral  300 


 


Other:   


 


  Weight Source Estimated Bedscale Bedscale











Active Medications: 


Current Medications





Acetaminophen (Tylenol)  650 mg PO Q6H PRN


   PRN Reason: mild to moderate pain


   Stop: 05/15/18 23:24


Acetaminophen/Hydrocodone Bitart (Norco 5mg/325mg)  1 tab PO Q6H PRN


   PRN Reason: Pain (Moderate)


   Stop: 05/15/18 23:24


Allopurinol (Zyloprim)  100 mg PO DAILY [facility]


   Stop: 05/15/18 23:24


   Last Admin: 18 10:49 Dose:  100 mg


Castor Oil/[place] Balsam/Trypsin (Venelex)  1 appl TP DAILY [facility]


   Stop: 05/15/18 23:24


   Last Admin: 18 10:54 Dose:  1 appl


Clonazepam (Klonopin)  1 mg PO BID [facility]


   PRN Reason: Protocol


   Stop: 18 09:59


   Last Admin: 18 10:48 Dose:  1 mg


Donepezil HCl (Aricept)  5 mg PO DAILY [facility]


   Stop: 18 08:59


   Last Admin: 18 10:54 Dose:  5 mg


Famotidine (Pepcid)  20 mg PO BID [facility]


   Stop: 05/15/18 23:24


   Last Admin: 18 18:18 Dose:  Not Given


Fluconazole (Diflucan)  100 mg PO DAILY [facility]


   Stop: 05/15/18 23:24


   Last Admin: 18 10:49 Dose:  100 mg


Heparin Sodium (Porcine) (Heparin)  5,000 units SUBQ Q12HR [facility]


   PRN Reason: Protocol


   Stop: 05/15/18 23:24


   Last Admin: 18 10:52 Dose:  5,000 units


Insulin Aspart (Novolog Insulin Sliding Scale)  0 units SUBQ ACHS [facility]


   PRN Reason: Protocol


   Stop: 05/15/18 23:24


   Last Admin: 18 14:19 Dose:  6 units


Insulin Aspart (Novolog Insulin Sliding Scale)  0 units SUBQ ACHS [facility]


   PRN Reason: Protocol


   Stop: 18 07:29


   Last Admin: 18 13:11 Dose:  6 units


Insulin Detemir (Levemir Insulin)  18 units SUBQ DAILY [facility]


   PRN Reason: Protocol


   Stop: 05/15/18 23:24


   Last Admin: 18 08:41 Dose:  Not Given


Magnesium Hydroxide (Milk Of Magnesia)  30 ml PO  PRN


   PRN Reason: Constipation


   Stop: 05/15/18 23:24


Metformin HCl (Glucophage)  1,000 mg PO DAILY [facility]


   Stop: 18 09:59


   Last Admin: 18 10:48 Dose:  1,000 mg


Metoprolol Succinate (Toprol Xl)  25 mg PO DAILY [facility]


   Stop: 05/15/18 23:24


   Last Admin: 18 10:54 Dose:  25 mg


Miscellaneous (Vte Chemical Prophylaxis Screen/ Admission)  1 ea [facility] PRN PRN


   PRN Reason: PROTOCOL


   Stop: 18 10:29


Olanzapine (Zyprexa Zydis)  10 mg PO BID [facility]


   PRN Reason: Protocol


   Stop: 18 07:53


   Last Admin: 18 11:01 Dose:  10 mg


Zolpidem Tartrate (Ambien)  5 mg PO HS PRN


   PRN Reason: insomnia


   Stop: 05/15/18 23:24


   Last Admin: 18 19:55 Dose:  5 mg








General: Alert, No acute distress


HEENT: Atraumatic, PERRLA, EOMI


Neck: Supple, JVD, +2 carotid pulse wo bruit


Cardiovascular: Regular rate, Normal S1, Normal S2


Lungs: Clear to auscultation


Abdomen: Bowel sounds, Soft


Extremities: no Clubbing, no Cyanosis, no Edema


Neurological: Sensation intact


Skin: no Rash


Psych/Mental Status: Other (psychosis)





- Procedures


Procedures: 


 Procedures











Procedure Code Date


 


KEV MUSC/FASCIA 20 SQ CM/< 10642 18


 


EXCISION OF RIGHT HIP MUSCLE, OPEN APPROACH [id number] 18


 


INTRODUCTION OF SERUM/TOX/VACCINE INTO MUSCLE, PERC APPROACH [id number] 18














Assessment/Plan





- Assessment


Assessment: 





S/P BRYANT


S/P electrolyte imbalance


Acute decomp of Psychosis


Type 2 DM


MRSA, Yeast UTI








- Plan


Plan: 


Lab - Result Diagrams





 18 06:48 





Current Medications





Acetaminophen (Tylenol)  650 mg PO Q6H PRN


   PRN Reason: mild to moderate pain


   Stop: 05/15/18 23:24


Acetaminophen/Hydrocodone Bitart (Norco 5mg/325mg)  1 tab PO Q6H PRN


   PRN Reason: Pain (Moderate)


   Stop: 05/15/18 23:24


Allopurinol (Zyloprim)  100 mg PO DAILY [facility]


   Stop: 05/15/18 23:24


   Last Admin: 03/17/18 10:54 Dose:  100 mg


Castor Oil/[place] Balsam/Trypsin (Venelex)  1 appl TP DAILY [facility]


   Stop: 05/15/18 23:24


   Last Admin: 18 11:02 Dose:  1 appl


Clonazepam (Klonopin)  1 mg PO BID [facility]


   PRN Reason: Protocol


   Stop: 18 09:59


Famotidine (Pepcid)  20 mg PO BID [facility]


   Stop: 05/15/18 23:24


   Last Admin: 18 10:54 Dose:  20 mg


Fluconazole (Diflucan)  100 mg PO DAILY [facility]


   Stop: 05/15/18 23:24


   Last Admin: 18 11:02 Dose:  100 mg


Heparin Sodium (Porcine) (Heparin)  5,000 units SUBQ Q12HR [facility]


   PRN Reason: Protocol


   Stop: 05/15/18 23:24


   Last Admin: 18 10:59 Dose:  5,000 units


Levofloxacin (Levaquin Pb)  500 mg in 100 mls @ 100 mls/hr IV Q24H [facility]


   Stop: 18 11:59


Insulin Aspart (Novolog Insulin Sliding Scale)  0 units SUBQ ACHS [facility]


   PRN Reason: Protocol


   Stop: 05/15/18 23:24


   Last Admin: 18 10:45 Dose:  2 units


Insulin Aspart (Novolog Insulin Sliding Scale)  0 units SUBQ ACHS [facility]


   PRN Reason: Protocol


   Stop: 18 07:29


Insulin Detemir (Levemir Insulin)  18 units SUBQ DAILY [facility]


   PRN Reason: Protocol


   Stop: 05/15/18 23:24


   Last Admin: 18 10:56 Dose:  18 units


Lorazepam (Ativan)  0.5 mg PO Q6HR PRN; Protocol


   PRN Reason: agitation 


   Stop: 05/15/18 23:24


Magnesium Hydroxide (Milk Of Magnesia)  30 ml PO HS PRN


   PRN Reason: Constipation


   Stop: 05/15/18 23:24


Metformin HCl (Glucophage)  1,000 mg PO DAILY [facility]


   Stop: 18 09:59


Metoprolol Succinate (Toprol Xl)  25 mg PO DAILY [facility]


   Stop: 05/15/18 23:24


   Last Admin: 18 10:55 Dose:  25 mg


Miscellaneous (Vte Chemical Prophylaxis Screen/ Admission)  1 ea [facility] PRN PRN


   PRN Reason: PROTOCOL


   Stop: 18 10:29


Olanzapine (Zyprexa Zydis)  5 mg PO DAILY [facility]


   PRN Reason: Protocol


   Stop: 18 09:59


Zolpidem Tartrate (Ambien)  5 mg PO HS PRN


   PRN Reason: insomnia


   Stop: 05/15/18 23:24





Lab - Result Diagrams





 18 06:48 





kidney fnc remain stable


encourage po intake


psych meds


start Vanco W/ Diflucan








Nutritional Asmnt/Malnutr-PDOC





- Dietary Evaluation


Malnutrition Findings (Please click <Entered> for more info): 








Nutritional Asmnt/Malnutrition                             Start:  18 16:

25


Text:                                                      Status: Complete    

  


Freq:                                                                          

  


 Document     18 16:25  MAGEN  (Rec: 18 16:31  MAGEN  [email or web address])


 Nutritional Asmnt/Malnutrition


     Patient General Information


      Nutritional Screening                      Moderate Risk


      Diagnosis                                  dehydration


      Pertinent Medical Hx/Surgical Hx           DM, dementia, depression,


                                                 psychosis, schizophrenia,


                                                 chronic renal insuff


      Subjective Information                     Pt is agitated, on 1:1 sitter.


                                                 Per EMR, PO intake 25-75%,


                                                 avg 50%.


      Current Diet Order/ Nutrition Support      pureed, CCHO 60gm


      Pertinent Medications                      novolov, levemir, glucophage


      Pertinent Labs                             3/19- -316


     Nutritional Hx/Data


      Height                                     1.8 m


      Height (Calculated Centimeters)            180.3


      Current Weight (lbs)                       77.111 kg


      Weight (Calculated Kilograms)              77.1


      Weight (Calculated Grams)                  42485.7


      Ideal Body Weight                          172


      Body Mass Index (BMI)                      23.7


      Weight Status                              Approriate


     GI Symptoms


      GI Symptoms                                None


      Last BM                                    3/19


      Difficult in:                              None


      Skin Integrity/Comment:                    pressure ulcer, decubitus


      Current %PO                                Fair (50-74%)


     Estimated Nutritional Goals


      BEE in Kcals:                              Using Current wt


      Calories/Kcals/Kg                          25-30


      Kcals Calculated                           8968-3184


      Protein:                                   Using Current wt


      Protein g/k-1.2


      Protein Calculated                         77-92


      Fluid: ml                                  1925-2310ml (1ml/kcal)


     Nutritional Problem


      1. Problem


       Problem                                   altered nutrition related lab


                                                 values


       Etiology                                  hx of DM


       Signs/Symptoms:                           glucose 169, -316


     Malnutrition Alert


      Protein-Calorie Malnutrition               N/A


      Is there a minimum of two criteria         No


       selected?                                 


       Query Text:Check all the applicable       


       criteria. A minimum of two criteria are   


       recommended for diagnosis of either       


       severe or non-severe malnutrition.        


     Intervention/Recommendation


      Comments                                   1. Continue with current diet


                                                 as ordered.


                                                 2. Monitor PO intake, wt, labs


                                                 and skin integrity


                                                 3. F/U as moderate risk in 3-5


                                                 days, 3/24-3/26


     Expected Outcomes/Goals


      Expected Outcomes/Goals                    1. PO intake to meet at least


                                                 75% of nutritional needs.


                                                 2. Wt stability, skin to


                                                 remain intact, labs to


                                                 approach WNL.

## 2018-03-24 NOTE — GENERAL PROGRESS NOTE
Subjective





- Review of Systems


Service Date: 18


Subjective: 





Patient resting comfortably in bed. no acute distress. restraints discontinued 

since yesterday. Elevated blood sugar noted. 





Objective





- Results


Result Diagrams: 


 18 06:48


Recent Labs: 


 Laboratory Last Values











PT  9.5 SECONDS (9.5-11.5)   18  21:35    


 


INR  0.91  (0.5-1.4)   18  21:35    


 


Sodium  136 mEq/L (136-145)   18  06:48    


 


Potassium  4.2 mEq/L (3.5-5.1)   18  06:48    


 


Chloride  105 mEq/L ()   18  06:48    


 


Carbon Dioxide  28.6 mEq/L (21.0-31.0)   18  06:48    


 


Anion Gap  6.6  (7.0-16.0)  L  18  06:48    


 


BUN  9 mg/dL (7-25)   18  06:48    


 


Creatinine  0.9 mg/dL (0.7-1.3)   18  06:48    


 


Est GFR ( Amer)  TNP   18  06:48    


 


Est GFR (Non-Af Amer)  TNP   18  06:48    


 


BUN/Creatinine Ratio  10.0   18  06:48    


 


Glucose  169 mg/dL ()  H  18  06:48    


 


POC Glucose  291 MG/DL (70 - 105)  H  18  20:38    


 


Calcium  8.8 mg/dL (8.6-10.3)   18  06:48    














- Physical Exam


Vitals and I&O: 


 Vital Signs











Temp  98.5 F   18 20:00


 


Pulse  82   18 20:00


 


Resp  18   18 20:00


 


BP  136/70   18 20:00


 


Pulse Ox  98   18 20:00








 Intake & Output











 18





 06:59 18:59 06:59


 


Intake Total 500  300


 


Output Total 350  


 


Balance 150  300


 


Weight (lbs) 77.111 kg 77.111 kg 77.111 kg


 


Intake:   


 


  Oral 500  300


 


Output:   


 


  Urine 350  


 


Other:   


 


  # Bowel Movements 3  


 


  Weight Source Bedscale Estimated Bedscale











Active Medications: 


Current Medications





Acetaminophen (Tylenol)  650 mg PO Q6H PRN


   PRN Reason: mild to moderate pain


   Stop: 05/15/18 23:24


Acetaminophen/Hydrocodone Bitart (Norco 5mg/325mg)  1 tab PO Q6H PRN


   PRN Reason: Pain (Moderate)


   Stop: 05/15/18 23:24


Allopurinol (Zyloprim)  100 mg PO DAILY [facility]


   Stop: 05/15/18 23:24


   Last Admin: 18 09:34 Dose:  100 mg


Castor Oil/[place] Balsam/Trypsin (Venelex)  1 appl TP DAILY [facility]


   Stop: 05/15/18 23:24


   Last Admin: 18 09:36 Dose:  1 appl


Clonazepam (Klonopin)  1 mg PO BID [facility]


   PRN Reason: Protocol


   Stop: 18 09:59


   Last Admin: 18 18:18 Dose:  Not Given


Donepezil HCl (Aricept)  5 mg PO DAILY [facility]


   Stop: 18 08:59


   Last Admin: 18 09:33 Dose:  5 mg


Famotidine (Pepcid)  20 mg PO BID [facility]


   Stop: 05/15/18 23:24


   Last Admin: 18 18:18 Dose:  Not Given


Fluconazole (Diflucan)  100 mg PO DAILY [facility]


   Stop: 05/15/18 23:24


   Last Admin: 18 09:31 Dose:  100 mg


Heparin Sodium (Porcine) (Heparin)  5,000 units SUBQ Q12HR [facility]


   PRN Reason: Protocol


   Stop: 05/15/18 23:24


   Last Admin: 18 21:34 Dose:  5,000 units


Insulin Aspart (Novolog Insulin Sliding Scale)  0 units SUBQ ACHS [facility]


   PRN Reason: Protocol


   Stop: 05/15/18 23:24


   Last Admin: 18 21:35 Dose:  6 units


Insulin Aspart (Novolog Insulin Sliding Scale)  0 units SUBQ ACHS [facility]


   PRN Reason: Protocol


   Stop: 18 07:29


   Last Admin: 18 13:11 Dose:  6 units


Insulin Detemir (Levemir Insulin)  18 units SUBQ DAILY [facility]


   PRN Reason: Protocol


   Stop: 05/15/18 23:24


   Last Admin: 18 08:41 Dose:  Not Given


Magnesium Hydroxide (Milk Of Magnesia)  30 ml PO HS PRN


   PRN Reason: Constipation


   Stop: 05/15/18 23:24


Metformin HCl (Glucophage)  1,000 mg PO DAILY [facility]


   Stop: 18 09:59


   Last Admin: 18 09:35 Dose:  1,000 mg


Metoprolol Succinate (Toprol Xl)  25 mg PO DAILY [facility]


   Stop: 05/15/18 23:24


   Last Admin: 18 09:31 Dose:  25 mg


Miscellaneous (Vte Chemical Prophylaxis Screen/ Admission)  1 ea [facility] PRN PRN


   PRN Reason: PROTOCOL


   Stop: 18 10:29


Olanzapine (Zyprexa Zydis)  10 mg PO BID [facility]


   PRN Reason: Protocol


   Stop: 18 07:53


   Last Admin: 18 18:18 Dose:  Not Given


Zolpidem Tartrate (Ambien)  5 mg PO HS PRN


   PRN Reason: insomnia


   Stop: 05/15/18 23:24


   Last Admin: 18 19:55 Dose:  5 mg








General: Alert, No acute distress


HEENT: Atraumatic, PERRLA, EOMI


Neck: Supple, JVD


Cardiovascular: Regular rate, Normal S1, Normal S2


Lungs: Clear to auscultation


Abdomen: Bowel sounds, Soft


Extremities: no Clubbing, no Cyanosis, no Edema


Neurological: Sensation intact


Skin: no Rash


Psych/Mental Status: Other (psychosis)





- Procedures


Procedures: 


 Procedures











Procedure Code Date


 


KEV MUSC/FASCIA 20 SQ CM/< 47723 18


 


EXCISION OF RIGHT HIP MUSCLE, OPEN APPROACH [id number] 18


 


INTRODUCTION OF SERUM/TOX/VACCINE INTO MUSCLE, PERC APPROACH [id number] 18














Assessment/Plan





- Assessment


Assessment: 





psychosis


dementia


Alzheimer's dementia


diabetes mellitus not controlled. 


s/p wound debridement of sacral decubiti


UTI


hyperglycemia








- Plan


Plan: 





continue current medications.


discharge planning. 





Nutritional Asmnt/Malnutr-PDOC





- Dietary Evaluation


Malnutrition Findings (Please click <Entered> for more info): 








Nutritional Asmnt/Malnutrition                             Start:  18 16:

25


Text:                                                      Status: Complete    

  


Freq:                                                                          

  


 Document     18 16:25  LCHENG  (Rec: 18 16:31  LCHENG  [email or web address])


 Nutritional Asmnt/Malnutrition


     Patient General Information


      Nutritional Screening                      Moderate Risk


      Diagnosis                                  dehydration


      Pertinent Medical Hx/Surgical Hx           DM, dementia, depression,


                                                 psychosis, schizophrenia,


                                                 chronic renal insuff


      Subjective Information                     Pt is agitated, on 1:1 sitter.


                                                 Per EMR, PO intake 25-75%,


                                                 avg 50%.


      Current Diet Order/ Nutrition Support      pureed, CCHO 60gm


      Pertinent Medications                      novolov, levemir, glucophage


      Pertinent Labs                             3/19- -316


     Nutritional Hx/Data


      Height                                     1.8 m


      Height (Calculated Centimeters)            180.3


      Current Weight (lbs)                       77.111 kg


      Weight (Calculated Kilograms)              77.1


      Weight (Calculated Grams)                  03337.7


      Ideal Body Weight                          172


      Body Mass Index (BMI)                      23.7


      Weight Status                              Approriate


     GI Symptoms


      GI Symptoms                                None


      Last BM                                    3/19


      Difficult in:                              None


      Skin Integrity/Comment:                    pressure ulcer, decubitus


      Current %PO                                Fair (50-74%)


     Estimated Nutritional Goals


      BEE in Kcals:                              Using Current wt


      Calories/Kcals/Kg                          25-30


      Kcals Calculated                           9397-9391


      Protein:                                   Using Current wt


      Protein g/k-1.2


      Protein Calculated                         77-92


      Fluid: ml                                  1925-2310ml (1ml/kcal)


     Nutritional Problem


      1. Problem


       Problem                                   altered nutrition related lab


                                                 values


       Etiology                                  hx of DM


       Signs/Symptoms:                           glucose 169, -316


     Malnutrition Alert


      Protein-Calorie Malnutrition               N/A


      Is there a minimum of two criteria         No


       selected?                                 


       Query Text:Check all the applicable       


       criteria. A minimum of two criteria are   


       recommended for diagnosis of either       


       severe or non-severe malnutrition.        


     Intervention/Recommendation


      Comments                                   1. Continue with current diet


                                                 as ordered.


                                                 2. Monitor PO intake, wt, labs


                                                 and skin integrity


                                                 3. F/U as moderate risk in 3-5


                                                 days, 3/24-3/26


     Expected Outcomes/Goals


      Expected Outcomes/Goals                    1. PO intake to meet at least


                                                 75% of nutritional needs.


                                                 2. Wt stability, skin to


                                                 remain intact, labs to


                                                 approach WNL.

## 2018-03-24 NOTE — INFECTIOUS DISEASE PROG NOTE
Infectious Disease Subjective





- Review of Systems


Service Date: 18


Subjective: 





No fever





Infectious Disease Objective





- Results


Result Diagrams: 


 18 06:48


Recent Labs: 


 Laboratory Last Values











PT  9.5 SECONDS (9.5-11.5)   18  21:35    


 


INR  0.91  (0.5-1.4)   18  21:35    


 


Sodium  136 mEq/L (136-145)   18  06:48    


 


Potassium  4.2 mEq/L (3.5-5.1)   18  06:48    


 


Chloride  105 mEq/L ()   18  06:48    


 


Carbon Dioxide  28.6 mEq/L (21.0-31.0)   18  06:48    


 


Anion Gap  6.6  (7.0-16.0)  L  18  06:48    


 


BUN  9 mg/dL (7-25)   18  06:48    


 


Creatinine  0.9 mg/dL (0.7-1.3)   18  06:48    


 


Est GFR ( Amer)  TNP   18  06:48    


 


Est GFR (Non-Af Amer)  TNP   18  06:48    


 


BUN/Creatinine Ratio  10.0   18  06:48    


 


Glucose  169 mg/dL ()  H  18  06:48    


 


POC Glucose  168 MG/DL (70 - 105)  H  18  20:22    


 


Calcium  8.8 mg/dL (8.6-10.3)   18  06:48    














- Physical Exam


Vitals and I&O: 


 Vital Signs











Temp  97.8 F   18 20:00


 


Pulse  83   18 20:00


 


Resp  18   18 20:00


 


BP  141/90   18 20:00


 


Pulse Ox  98   18 20:00








 Intake & Output











 18





 06:59 18:59 06:59


 


Intake Total 300  600


 


Balance 300  600


 


Weight (lbs) 77.111 kg 77.111 kg 77.111 kg


 


Intake:   


 


  Oral 300  600


 


Other:   


 


  # Voids   4


 


  Weight Source Bedscale Bedscale Bedscale











Active Medications: 


Current Medications





Acetaminophen (Tylenol)  650 mg PO Q6H PRN


   PRN Reason: mild to moderate pain


   Stop: 05/15/18 23:24


Acetaminophen/Hydrocodone Bitart (Norco 5mg/325mg)  1 tab PO Q6H PRN


   PRN Reason: Pain (Moderate)


   Stop: 05/15/18 23:24


Allopurinol (Zyloprim)  100 mg PO DAILY [facility]


   Stop: 05/15/18 23:24


   Last Admin: 18 10:49 Dose:  100 mg


Castor Oil/[place] Balsam/Trypsin (Venelex)  1 appl TP DAILY [facility]


   Stop: 05/15/18 23:24


   Last Admin: 18 10:54 Dose:  1 appl


Clonazepam (Klonopin)  1 mg PO BID [facility]


   PRN Reason: Protocol


   Stop: 18 09:59


   Last Admin: 18 17:22 Dose:  1 mg


Donepezil HCl (Aricept)  5 mg PO DAILY [facility]


   Stop: 18 08:59


   Last Admin: 18 10:54 Dose:  5 mg


Famotidine (Pepcid)  20 mg PO BID [facility]


   Stop: 05/15/18 23:24


   Last Admin: 18 17:23 Dose:  20 mg


Fluconazole (Diflucan)  100 mg PO DAILY [facility]


   Stop: 05/15/18 23:24


   Last Admin: 18 10:49 Dose:  100 mg


Heparin Sodium (Porcine) (Heparin)  5,000 units SUBQ Q12HR [facility]


   PRN Reason: Protocol


   Stop: 05/15/18 23:24


   Last Admin: 18 20:26 Dose:  Not Given


Vancomycin HCl 500 mg/ Sodium (Chloride)  100 mls @ 100 mls/hr IV Q8HR [facility]


   Stop: 18 22:29


   Last Admin: 18 22:30 Dose:  Not Given


Insulin Aspart (Novolog Insulin Sliding Scale)  0 units SUBQ ACHS [facility]


   PRN Reason: Protocol


   Stop: 05/15/18 23:24


   Last Admin: 18 20:25 Dose:  Not Given


Insulin Aspart (Novolog Insulin Sliding Scale)  0 units SUBQ ACHS [facility]


   PRN Reason: Protocol


   Stop: 18 07:29


   Last Admin: 18 20:25 Dose:  Not Given


Insulin Detemir (Levemir Insulin)  18 units SUBQ DAILY [facility]


   PRN Reason: Protocol


   Stop: 05/15/18 23:24


   Last Admin: 18 09:00 Dose:  Not Given


Magnesium Hydroxide (Milk Of Magnesia)  30 ml PO HS PRN


   PRN Reason: Constipation


   Stop: 05/15/18 23:24


Metformin HCl (Glucophage)  1,000 mg PO DAILY [facility]


   Stop: 18 09:59


   Last Admin: 18 10:48 Dose:  1,000 mg


Metoprolol Succinate (Toprol Xl)  25 mg PO DAILY [facility]


   Stop: 05/15/18 23:24


   Last Admin: 18 10:54 Dose:  25 mg


Miscellaneous (Vte Chemical Prophylaxis Screen/ Admission)  1 ea  PRN PRN


   PRN Reason: PROTOCOL


   Stop: 18 10:29


Miscellaneous (Vancomycin Iv Per Pharmacy)  1 ea  PRN PRN


   PRN Reason: PROTOCOL


   Stop: 18 16:10


Olanzapine (Zyprexa Zydis)  10 mg PO BID [facility]


   PRN Reason: Protocol


   Stop: 18 07:53


   Last Admin: 18 17:22 Dose:  10 mg


Zolpidem Tartrate (Ambien)  5 mg PO HS PRN


   PRN Reason: insomnia


   Stop: 05/15/18 23:24


   Last Admin: 18 19:55 Dose:  5 mg








General: no acute distress, well developed, well nourished


HEENT: atraumatic, normocephalic, PERRLA


Neck: supple, no thyromegaly


Cardiovascular: S1S2, regular


Lungs: clear to auscultation bilaterally, clear to percussion


Abdomen: soft, no tender, no distended, no mass


Extremities: no cyanosis, no clubbing


Neurological: awake, alert, oriented


Skin: other (sacral wound.)





- Procedures


Procedures: 


 Procedures











Procedure Code Date


 


KEV MUSC/FASCIA 20 SQ CM/< 79976 18


 


EXCISION OF RIGHT HIP MUSCLE, OPEN APPROACH [id number] 18


 


INTRODUCTION OF SERUM/TOX/VACCINE INTO MUSCLE, PERC APPROACH [id number] 18














Infectious Disease Assmt/Plan





- Assessment


Assessment: 





1.  Sacral decubitus ulcer.


2.  UTI.  Treated


3.  Dementia.





- Plan


Plan: 





wound care.





Nutritional Asmnt/Malnutr-PDOC





- Dietary Evaluation


Malnutrition Findings (Please click <Entered> for more info): 








Nutritional Asmnt/Malnutrition                             Start:  18 16:

25


Text:                                                      Status: Complete    

  


Freq:                                                                          

  


 Document     18 16:25  MELISSAPAT  (Rec: 18 16:31  MELISSAPAT BENDER-[email or web address])


 Nutritional Asmnt/Malnutrition


     Patient General Information


      Nutritional Screening                      Moderate Risk


      Diagnosis                                  dehydration


      Pertinent Medical Hx/Surgical Hx           DM, dementia, depression,


                                                 psychosis, schizophrenia,


                                                 chronic renal insuff


      Subjective Information                     Pt is agitated, on 1:1 sitter.


                                                 Per EMR, PO intake 25-75%,


                                                 avg 50%.


      Current Diet Order/ Nutrition Support      pureed, CCHO 60gm


      Pertinent Medications                      novolov, levemir, glucophage


      Pertinent Labs                             3/19- -316


     Nutritional Hx/Data


      Height                                     1.8 m


      Height (Calculated Centimeters)            180.3


      Current Weight (lbs)                       77.111 kg


      Weight (Calculated Kilograms)              77.1


      Weight (Calculated Grams)                  33305.7


      Ideal Body Weight                          172


      Body Mass Index (BMI)                      23.7


      Weight Status                              Approriate


     GI Symptoms


      GI Symptoms                                None


      Last BM                                    3/19


      Difficult in:                              None


      Skin Integrity/Comment:                    pressure ulcer, decubitus


      Current %PO                                Fair (50-74%)


     Estimated Nutritional Goals


      BEE in Kcals:                              Using Current wt


      Calories/Kcals/Kg                          25-30


      Kcals Calculated                           9285-0024


      Protein:                                   Using Current wt


      Protein g/k-1.2


      Protein Calculated                         77-92


      Fluid: ml                                  1925-2310ml (1ml/kcal)


     Nutritional Problem


      1. Problem


       Problem                                   altered nutrition related lab


                                                 values


       Etiology                                  hx of DM


       Signs/Symptoms:                           glucose 169, -316


     Malnutrition Alert


      Protein-Calorie Malnutrition               N/A


      Is there a minimum of two criteria         No


       selected?                                 


       Query Text:Check all the applicable       


       criteria. A minimum of two criteria are   


       recommended for diagnosis of either       


       severe or non-severe malnutrition.        


     Intervention/Recommendation


      Comments                                   1. Continue with current diet


                                                 as ordered.


                                                 2. Monitor PO intake, wt, labs


                                                 and skin integrity


                                                 3. F/U as moderate risk in 3-5


                                                 days, 3/24-3/26


     Expected Outcomes/Goals


      Expected Outcomes/Goals                    1. PO intake to meet at least


                                                 75% of nutritional needs.


                                                 2. Wt stability, skin to


                                                 remain intact, labs to


                                                 approach WNL.

## 2018-03-25 LAB
ANION GAP SERPL CALC-SCNC: 14.1 MMOL/L (ref 7–16)
BUN SERPL-MCNC: 9 MG/DL (ref 7–25)
CALCIUM SERPL-MCNC: 9.3 MG/DL (ref 8.6–10.3)
CHLORIDE SERPL-SCNC: 99 MEQ/L (ref 98–107)
CO2 SERPL-SCNC: 25.3 MEQ/L (ref 21–31)
CREAT SERPL-MCNC: 0.9 MG/DL (ref 0.7–1.3)
GLUCOSE SERPL-MCNC: 240 MG/DL (ref 70–105)
POTASSIUM SERPL-SCNC: 4.4 MEQ/L (ref 3.5–5.1)
SODIUM SERPL-SCNC: 134 MEQ/L (ref 136–145)

## 2018-03-25 RX ADMIN — INSULIN ASPART SCH: 100 INJECTION, SOLUTION INTRAVENOUS; SUBCUTANEOUS at 10:04

## 2018-03-25 RX ADMIN — INSULIN DETEMIR SCH: 100 INJECTION, SOLUTION SUBCUTANEOUS at 10:03

## 2018-03-25 RX ADMIN — OLANZAPINE SCH: 5 TABLET, ORALLY DISINTEGRATING ORAL at 10:03

## 2018-03-25 RX ADMIN — INSULIN ASPART SCH UNITS: 100 INJECTION, SOLUTION INTRAVENOUS; SUBCUTANEOUS at 18:17

## 2018-03-25 RX ADMIN — INSULIN ASPART SCH UNITS: 100 INJECTION, SOLUTION INTRAVENOUS; SUBCUTANEOUS at 18:13

## 2018-03-25 RX ADMIN — INSULIN ASPART SCH UNITS: 100 INJECTION, SOLUTION INTRAVENOUS; SUBCUTANEOUS at 21:00

## 2018-03-25 RX ADMIN — INSULIN ASPART SCH UNITS: 100 INJECTION, SOLUTION INTRAVENOUS; SUBCUTANEOUS at 22:21

## 2018-03-25 RX ADMIN — CASTOR OIL AND BALSAM, PERU SCH APPL: 788; 87 OINTMENT TOPICAL at 10:54

## 2018-03-25 RX ADMIN — INSULIN ASPART SCH UNITS: 100 INJECTION, SOLUTION INTRAVENOUS; SUBCUTANEOUS at 13:53

## 2018-03-25 RX ADMIN — OLANZAPINE SCH MG: 5 TABLET, ORALLY DISINTEGRATING ORAL at 16:28

## 2018-03-25 NOTE — GENERAL PROGRESS NOTE
Subjective





- Review of Systems


Service Date: 18


Subjective: 





lying in bed, still periods of agitation





Objective





- Results


Result Diagrams: 


 18 05:00


Recent Labs: 


 Laboratory Last Values











PT  9.5 SECONDS (9.5-11.5)   18  21:35    


 


INR  0.91  (0.5-1.4)   18  21:35    


 


Sodium  134 mEq/L (136-145)  L  18  05:00    


 


Potassium  4.4 mEq/L (3.5-5.1)   18  05:00    


 


Chloride  99 mEq/L ()   18  05:00    


 


Carbon Dioxide  25.3 mEq/L (21.0-31.0)   18  05:00    


 


Anion Gap  14.1  (7.0-16.0)   18  05:00    


 


BUN  9 mg/dL (7-25)   18  05:00    


 


Creatinine  0.9 mg/dL (0.7-1.3)   18  05:00    


 


Est GFR ( Amer)  TNP   18  05:00    


 


Est GFR (Non-Af Amer)  TNP   18  05:00    


 


BUN/Creatinine Ratio  10.0   18  05:00    


 


Glucose  240 mg/dL ()  H  18  05:00    


 


POC Glucose  234 MG/DL (70 - 105)  H  18  13:19    


 


Calcium  9.3 mg/dL (8.6-10.3)   18  05:00    


 


Vancomycin Trough  5.5 ug/mL (10-20)  L  18  12:07    














- Physical Exam


Vitals and I&O: 


 Vital Signs











Temp  98.0 F   18 04:00


 


Pulse  94   18 10:02


 


Resp  18   18 04:00


 


BP  188/88   18 10:02


 


Pulse Ox  96   18 04:00








 Intake & Output











 18





 18:59 06:59 18:59


 


Intake Total  940 


 


Output Total  600 


 


Balance  340 


 


Weight (lbs) 77.111 kg 77.111 kg 


 


Intake:   


 


  Intake, IV Amount  100 


 


    Vancomycin HCl 500 mg In  100 





    Sodium Chloride 0.9% 100   





    ml @ 100 mls/hr IV Q8HR   





    [facility] Rx#:[id number]   


 


  Oral  840 


 


Output:   


 


  Urine  600 


 


Other:   


 


  # Voids  4 


 


  Weight Source Bedscale Bedscale 











Active Medications: 


Current Medications





Acetaminophen (Tylenol)  650 mg PO Q6H PRN


   PRN Reason: mild to moderate pain


   Stop: 05/15/18 23:24


Acetaminophen/Hydrocodone Bitart (Norco 5mg/325mg)  1 tab PO Q6H PRN


   PRN Reason: Pain (Moderate)


   Stop: 05/15/18 23:24


Allopurinol (Zyloprim)  100 mg PO DAILY [facility]


   Stop: 05/15/18 23:24


   Last Admin: 18 10:00 Dose:  Not Given


Castor Oil/[place]ian Balsam/Trypsin (Venelex)  1 appl TP DAILY [facility]


   Stop: 05/15/18 23:24


   Last Admin: 18 10:54 Dose:  1 appl


Clonazepam (Klonopin)  1 mg PO BID [facility]


   PRN Reason: Protocol


   Stop: 18 09:59


   Last Admin: 18 10:01 Dose:  Not Given


Donepezil HCl (Aricept)  5 mg PO DAILY [facility]


   Stop: 18 08:59


   Last Admin: 18 10:01 Dose:  Not Given


Famotidine (Pepcid)  20 mg PO BID [facility]


   Stop: 05/15/18 23:24


   Last Admin: 18 10:02 Dose:  Not Given


Fluconazole (Diflucan)  100 mg PO DAILY [facility]


   Stop: 05/15/18 23:24


   Last Admin: 18 10:02 Dose:  Not Given


Heparin Sodium (Porcine) (Heparin)  5,000 units SUBQ Q12HR [facility]


   PRN Reason: Protocol


   Stop: 05/15/18 23:24


   Last Admin: 18 10:04 Dose:  Not Given


Vancomycin HCl 1 gm/ Dextrose  250 mls @ 165 mls/hr IV Q12H [facility]


   Stop: 18 13:59


Insulin Aspart (Novolog Insulin Sliding Scale)  0 units SUBQ ACHS [facility]


   PRN Reason: Protocol


   Stop: 18 07:29


   Last Admin: 18 13:53 Dose:  4 units


Insulin Detemir (Levemir Insulin)  18 units SUBQ DAILY [facility]


   PRN Reason: Protocol


   Stop: 05/15/18 23:24


   Last Admin: 18 10:03 Dose:  Not Given


Magnesium Hydroxide (Milk Of Magnesia)  30 ml PO HS PRN


   PRN Reason: Constipation


   Stop: 05/15/18 23:24


Metformin HCl (Glucophage)  1,000 mg PO DAILY [facility]


   Stop: 18 09:59


   Last Admin: 18 10:02 Dose:  Not Given


Metoprolol Succinate (Toprol Xl)  25 mg PO DAILY [facility]


   Stop: 05/15/18 23:24


   Last Admin: 18 10:02 Dose:  Not Given


Miscellaneous (Vte Chemical Prophylaxis Screen/ Admission)  1 ea  PRN PRN


   PRN Reason: PROTOCOL


   Stop: 18 10:29


Miscellaneous (Vancomycin Iv Per Pharmacy)  1 [facility] PRN PRN


   PRN Reason: PROTOCOL


   Stop: 18 16:10


Olanzapine (Zyprexa Zydis)  10 mg PO BID [facility]


   PRN Reason: Protocol


   Stop: 18 07:53


   Last Admin: 18 10:03 Dose:  Not Given


Zolpidem Tartrate (Ambien)  5 mg PO HS PRN


   PRN Reason: insomnia


   Stop: 05/15/18 23:24


   Last Admin: 18 19:55 Dose:  5 mg








General: Alert, No acute distress


HEENT: Atraumatic, PERRLA, EOMI


Neck: Supple, JVD, +2 carotid pulse wo bruit


Cardiovascular: Regular rate, Normal S1, Normal S2


Lungs: Clear to auscultation


Abdomen: Bowel sounds, Soft


Extremities: no Clubbing, no Cyanosis, no Edema


Neurological: Sensation intact


Skin: no Rash


Psych/Mental Status: Other (psychosis)





- Procedures


Procedures: 


 Procedures











Procedure Code Date


 


KEV MUSC/FASCIA 20 SQ CM/< 24564 18


 


EXCISION OF RIGHT HIP MUSCLE, OPEN APPROACH [id number] 18


 


INTRODUCTION OF SERUM/TOX/VACCINE INTO MUSCLE, PERC APPROACH 8V3080A 18














Assessment/Plan





- Assessment


Assessment: 





S/P BRYANT


S/P electrolyte imbalance


Acute decomp of Psychosis


Type 2 DM


MRSA, Yeast UTI








- Plan


Plan: 


Lab - Result Diagrams





 18 06:48 





Current Medications





Acetaminophen (Tylenol)  650 mg PO Q6H PRN


   PRN Reason: mild to moderate pain


   Stop: 05/15/18 23:24


Acetaminophen/Hydrocodone Bitart (Norco 5mg/325mg)  1 tab PO Q6H PRN


   PRN Reason: Pain (Moderate)


   Stop: 05/15/18 23:24


Allopurinol (Zyloprim)  100 mg PO DAILY [facility]


   Stop: 05/15/18 23:24


   Last Admin: 18 10:54 Dose:  100 mg


Castor Oil/[place]ian Balsam/Trypsin (Venelex)  1 appl TP DAILY [facility]


   Stop: 05/15/18 23:24


   Last Admin: 18 11:02 Dose:  1 appl


Clonazepam (Klonopin)  1 mg PO BID [facility]


   PRN Reason: Protocol


   Stop: 18 09:59


Famotidine (Pepcid)  20 mg PO BID [facility]


   Stop: 05/15/18 23:24


   Last Admin: 18 10:54 Dose:  20 mg


Fluconazole (Diflucan)  100 mg PO DAILY [facility]


   Stop: 05/15/18 23:24


   Last Admin: 18 11:02 Dose:  100 mg


Heparin Sodium (Porcine) (Heparin)  5,000 units SUBQ Q12HR [facility]


   PRN Reason: Protocol


   Stop: 05/15/18 23:24


   Last Admin: 18 10:59 Dose:  5,000 units


Levofloxacin (Levaquin Pb)  500 mg in 100 mls @ 100 mls/hr IV Q24H [facility]


   Stop: 18 11:59


Insulin Aspart (Novolog Insulin Sliding Scale)  0 units SUBQ ACHS [facility]


   PRN Reason: Protocol


   Stop: 05/15/18 23:24


   Last Admin: 18 10:45 Dose:  2 units


Insulin Aspart (Novolog Insulin Sliding Scale)  0 units SUBQ ACHS [facility]


   PRN Reason: Protocol


   Stop: 18 07:29


Insulin Detemir (Levemir Insulin)  18 units SUBQ DAILY [facility]


   PRN Reason: Protocol


   Stop: 05/15/18 23:24


   Last Admin: 18 10:56 Dose:  18 units


Lorazepam (Ativan)  0.5 mg PO Q6HR PRN; Protocol


   PRN Reason: agitation 


   Stop: 05/15/18 23:24


Magnesium Hydroxide (Milk Of Magnesia)  30 ml PO HS PRN


   PRN Reason: Constipation


   Stop: 05/15/18 23:24


Metformin HCl (Glucophage)  1,000 mg PO DAILY [facility]


   Stop: 18 09:59


Metoprolol Succinate (Toprol Xl)  25 mg PO DAILY [facility]


   Stop: 05/15/18 23:24


   Last Admin: 18 10:55 Dose:  25 mg


Miscellaneous (Vte Chemical Prophylaxis Screen/ Admission)  1 ea [facility] PRN PRN


   PRN Reason: PROTOCOL


   Stop: 18 10:29


Olanzapine (Zyprexa Zydis)  5 mg PO DAILY [facility]


   PRN Reason: Protocol


   Stop: 18 09:59


Zolpidem Tartrate (Ambien)  5 mg PO HS PRN


   PRN Reason: insomnia


   Stop: 05/15/18 23:24





Lab - Result Diagrams





 18 05:00 





 





kidney fnc remain stable


encourage po intake


psych meds


start Vanco W/ Diflucan








Nutritional Asmnt/Malnutr-PDOC





- Dietary Evaluation


Malnutrition Findings (Please click <Entered> for more info): 








Nutritional Asmnt/Malnutrition                             Start:  18 16:

25


Text:                                                      Status: Complete    

  


Freq:                                                                          

  


 Document     18 16:25  LCHENG  (Rec: 18 16:31  LCHENG  NAPOLEON-FNS1)


 Nutritional Asmnt/Malnutrition


     Patient General Information


      Nutritional Screening                      Moderate Risk


      Diagnosis                                  dehydration


      Pertinent Medical Hx/Surgical Hx           DM, dementia, depression,


                                                 psychosis, schizophrenia,


                                                 chronic renal insuff


      Subjective Information                     Pt is agitated, on 1:1 sitter.


                                                 Per EMR, PO intake 25-75%,


                                                 avg 50%.


      Current Diet Order/ Nutrition Support      pureed, CCHO 60gm


      Pertinent Medications                      novolov, levemir, glucophage


      Pertinent Labs                             3/19- -316


     Nutritional Hx/Data


      Height                                     1.8 m


      Height (Calculated Centimeters)            180.3


      Current Weight (lbs)                       77.111 kg


      Weight (Calculated Kilograms)              77.1


      Weight (Calculated Grams)                  06303.7


      Ideal Body Weight                          172


      Body Mass Index (BMI)                      23.7


      Weight Status                              Approriate


     GI Symptoms


      GI Symptoms                                None


      Last BM                                    3/19


      Difficult in:                              None


      Skin Integrity/Comment:                    pressure ulcer, decubitus


      Current %PO                                Fair (50-74%)


     Estimated Nutritional Goals


      BEE in Kcals:                              Using Current wt


      Calories/Kcals/Kg                          25-30


      Kcals Calculated                           6732-5615


      Protein:                                   Using Current wt


      Protein g/k-1.2


      Protein Calculated                         77-92


      Fluid: ml                                  1925-2310ml (1ml/kcal)


     Nutritional Problem


      1. Problem


       Problem                                   altered nutrition related lab


                                                 values


       Etiology                                  hx of DM


       Signs/Symptoms:                           glucose 169, -316


     Malnutrition Alert


      Protein-Calorie Malnutrition               N/A


      Is there a minimum of two criteria         No


       selected?                                 


       Query Text:Check all the applicable       


       criteria. A minimum of two criteria are   


       recommended for diagnosis of either       


       severe or non-severe malnutrition.        


     Intervention/Recommendation


      Comments                                   1. Continue with current diet


                                                 as ordered.


                                                 2. Monitor PO intake, wt, labs


                                                 and skin integrity


                                                 3. F/U as moderate risk in 3-5


                                                 days, 3/24-3/26


     Expected Outcomes/Goals


      Expected Outcomes/Goals                    1. PO intake to meet at least


                                                 75% of nutritional needs.


                                                 2. Wt stability, skin to


                                                 remain intact, labs to


                                                 approach WNL.

## 2018-03-25 NOTE — INFECTIOUS DISEASE PROG NOTE
Infectious Disease Subjective





- Review of Systems


Service Date: 18


Subjective: 





No fever





Infectious Disease Objective





- Results


Result Diagrams: 


 18 05:00


Recent Labs: 


 Laboratory Last Values











PT  9.5 SECONDS (9.5-11.5)   18  21:35    


 


INR  0.91  (0.5-1.4)   18  21:35    


 


Sodium  134 mEq/L (136-145)  L  18  05:00    


 


Potassium  4.4 mEq/L (3.5-5.1)   18  05:00    


 


Chloride  99 mEq/L ()   18  05:00    


 


Carbon Dioxide  25.3 mEq/L (21.0-31.0)   18  05:00    


 


Anion Gap  14.1  (7.0-16.0)   18  05:00    


 


BUN  9 mg/dL (7-25)   18  05:00    


 


Creatinine  0.9 mg/dL (0.7-1.3)   18  05:00    


 


Est GFR ( Amer)  TNP   18  05:00    


 


Est GFR (Non-Af Amer)  TNP   18  05:00    


 


BUN/Creatinine Ratio  10.0   18  05:00    


 


Glucose  240 mg/dL ()  H  18  05:00    


 


POC Glucose  246 MG/DL (70 - 105)  H  18  21:02    


 


Calcium  9.3 mg/dL (8.6-10.3)   18  05:00    


 


Vancomycin Trough  5.5 ug/mL (10-20)  L  18  12:07    














- Physical Exam


Vitals and I&O: 


 Vital Signs











Temp  96.4 F   18 16:00


 


Pulse  92   18 16:00


 


Resp  19   18 16:00


 


BP  158/79   18 16:00


 


Pulse Ox  95   18 16:00








 Intake & Output











 18





 06:59 18:59 06:59


 


Intake Total 940 400 250


 


Output Total 600 500 


 


Balance 340 -100 250


 


Weight (lbs) 77.111 kg 77.111 kg 


 


Intake:   


 


  Intake, IV Amount 100  250


 


    Vancomycin HCl 1 gm In   250





    Dextrose 5% 250 ml @ 165   





    mls/hr IV Q12H [facility] Rx#:   





    [id number]   


 


    Vancomycin HCl 500 mg In 100  





    Sodium Chloride 0.9% 100   





    ml @ 100 mls/hr IV Q8HR   





    [facility] Rx#:[id number]   


 


  Oral 840 400 


 


Output:   


 


  Urine 600 500 


 


Other:   


 


  # Voids 4  


 


  # Bowel Movements  0 


 


  Weight Source Bedscale Bedscale 











Active Medications: 


Current Medications





Acetaminophen (Tylenol)  650 mg PO Q6H PRN


   PRN Reason: mild to moderate pain


   Stop: 05/15/18 23:24


Acetaminophen/Hydrocodone Bitart (Norco 5mg/325mg)  1 tab PO Q6H PRN


   PRN Reason: Pain (Moderate)


   Stop: 05/15/18 23:24


Allopurinol (Zyloprim)  100 mg PO DAILY [facility]


   Stop: 05/15/18 23:24


   Last Admin: 18 10:00 Dose:  Not Given


Castor Oil/[place] Balsam/Trypsin (Venelex)  1 appl TP DAILY [facility]


   Stop: 05/15/18 23:24


   Last Admin: 18 10:54 Dose:  1 appl


Clonazepam (Klonopin)  1 mg PO BID [facility]


   PRN Reason: Protocol


   Stop: 18 09:59


   Last Admin: 18 16:28 Dose:  1 mg


Donepezil HCl (Aricept)  5 mg PO DAILY [facility]


   Stop: 18 08:59


   Last Admin: 18 10:01 Dose:  Not Given


Famotidine (Pepcid)  20 mg PO BID [facility]


   Stop: 05/15/18 23:24


   Last Admin: 18 16:28 Dose:  20 mg


Fluconazole (Diflucan)  100 mg PO DAILY [facility]


   Stop: 05/15/18 23:24


   Last Admin: 18 10:02 Dose:  Not Given


Heparin Sodium (Porcine) (Heparin)  5,000 units SUBQ Q12HR [facility]


   PRN Reason: Protocol


   Stop: 05/15/18 23:24


   Last Admin: 18 22:22 Dose:  5,000 units


Vancomycin HCl 1 gm/ Dextrose  250 mls @ 165 mls/hr IV Q12H [facility]


   Stop: 18 13:59


   Last Infusion: 18 20:02 Dose:  Infused


Insulin Aspart (Novolog Insulin Sliding Scale)  0 units SUBQ ACHS [facility]


   PRN Reason: Protocol


   Stop: 18 07:29


   Last Admin: 18 22:21 Dose:  4 units


Insulin Detemir (Levemir Insulin)  18 units SUBQ DAILY [facility]


   PRN Reason: Protocol


   Stop: 05/15/18 23:24


   Last Admin: 18 10:03 Dose:  Not Given


Magnesium Hydroxide (Milk Of Magnesia)  30 ml PO HS PRN


   PRN Reason: Constipation


   Stop: 05/15/18 23:24


Metformin HCl (Glucophage)  1,000 mg PO DAILY [facility]


   Stop: 18 09:59


   Last Admin: 18 10:02 Dose:  Not Given


Metoprolol Succinate (Toprol Xl)  25 mg PO DAILY [facility]


   Stop: 05/15/18 23:24


   Last Admin: 18 10:02 Dose:  Not Given


Miscellaneous (Vte Chemical Prophylaxis Screen/ Admission)  1 [facility] PRN PRN


   PRN Reason: PROTOCOL


   Stop: 18 10:29


Miscellaneous (Vancomycin Iv Per Pharmacy)  1 [facility] PRN PRN


   PRN Reason: PROTOCOL


   Stop: 18 16:10


Olanzapine (Zyprexa Zydis)  10 mg PO BID [facility]


   PRN Reason: Protocol


   Stop: 18 07:53


   Last Admin: 18 16:28 Dose:  10 mg


Zolpidem Tartrate (Ambien)  5 mg PO HS PRN


   PRN Reason: insomnia


   Stop: 05/15/18 23:24


   Last Admin: 18 19:55 Dose:  5 mg








HEENT: atraumatic, normocephalic, PERRLA, EOMI


Neck: supple


Cardiovascular: S1S2, regular


Lungs: clear to auscultation bilaterally, clear to percussion


Neurological: awake, alert


Skin: other (sacral wound)





- Procedures


Procedures: 


 Procedures











Procedure Code Date


 


KEV MUSC/FASCIA 20 SQ CM/< 78615 18


 


EXCISION OF RIGHT HIP MUSCLE, OPEN APPROACH [id number] 18


 


INTRODUCTION OF SERUM/TOX/VACCINE INTO MUSCLE, PERC APPROACH 5Q9157Q 18














Infectious Disease Assmt/Plan





- Assessment


Assessment: 





1.  Sacral decubitus ulcer.


2.  UTI.  Treated


3.  Dementia.





- Plan


Plan: 





wound care.





Nutritional Asmnt/Malnutr-PDOC





- Dietary Evaluation


Malnutrition Findings (Please click <Entered> for more info): 








Nutritional Asmnt/Malnutrition                             Start:  18 16:

25


Text:                                                      Status: Complete    

  


Freq:                                                                          

  


 Document     18 16:25  MAGEN  (Rec: 18 16:31  MAGEN  [email or web address])


 Nutritional Asmnt/Malnutrition


     Patient General Information


      Nutritional Screening                      Moderate Risk


      Diagnosis                                  dehydration


      Pertinent Medical Hx/Surgical Hx           DM, dementia, depression,


                                                 psychosis, schizophrenia,


                                                 chronic renal insuff


      Subjective Information                     Pt is agitated, on 1:1 sitter.


                                                 Per EMR, PO intake 25-75%,


                                                 avg 50%.


      Current Diet Order/ Nutrition Support      pureed, CCHO 60gm


      Pertinent Medications                      novolov, levemir, glucophage


      Pertinent Labs                             3/19-21 -316


     Nutritional Hx/Data


      Height                                     1.8 m


      Height (Calculated Centimeters)            180.3


      Current Weight (lbs)                       77.111 kg


      Weight (Calculated Kilograms)              77.1


      Weight (Calculated Grams)                  99431.7


      Ideal Body Weight                          172


      Body Mass Index (BMI)                      23.7


      Weight Status                              Approriate


     GI Symptoms


      GI Symptoms                                None


      Last BM                                    3/19


      Difficult in:                              None


      Skin Integrity/Comment:                    pressure ulcer, decubitus


      Current %PO                                Fair (50-74%)


     Estimated Nutritional Goals


      BEE in Kcals:                              Using Current wt


      Calories/Kcals/Kg                          25-30


      Kcals Calculated                           1752-4551


      Protein:                                   Using Current wt


      Protein g/k-1.2


      Protein Calculated                         77-92


      Fluid: ml                                  1925-2310ml (1ml/kcal)


     Nutritional Problem


      1. Problem


       Problem                                   altered nutrition related lab


                                                 values


       Etiology                                  hx of DM


       Signs/Symptoms:                           glucose 169, -316


     Malnutrition Alert


      Protein-Calorie Malnutrition               N/A


      Is there a minimum of two criteria         No


       selected?                                 


       Query Text:Check all the applicable       


       criteria. A minimum of two criteria are   


       recommended for diagnosis of either       


       severe or non-severe malnutrition.        


     Intervention/Recommendation


      Comments                                   1. Continue with current diet


                                                 as ordered.


                                                 2. Monitor PO intake, wt, labs


                                                 and skin integrity


                                                 3. F/U as moderate risk in 3-5


                                                 days, 3/24-3/26


     Expected Outcomes/Goals


      Expected Outcomes/Goals                    1. PO intake to meet at least


                                                 75% of nutritional needs.


                                                 2. Wt stability, skin to


                                                 remain intact, labs to


                                                 approach WNL.

## 2018-03-25 NOTE — GENERAL PROGRESS NOTE
Subjective





- Review of Systems


Service Date: 18


Subjective: 





Patient resting comfortably in bed. no acute distress. restraints discontinued 

since yesterday. Elevated blood sugar noted. 





Objective





- Results


Result Diagrams: 


 18 06:48


Recent Labs: 


 Laboratory Last Values











PT  9.5 SECONDS (9.5-11.5)   18  21:35    


 


INR  0.91  (0.5-1.4)   18  21:35    


 


Sodium  136 mEq/L (136-145)   18  06:48    


 


Potassium  4.2 mEq/L (3.5-5.1)   18  06:48    


 


Chloride  105 mEq/L ()   18  06:48    


 


Carbon Dioxide  28.6 mEq/L (21.0-31.0)   18  06:48    


 


Anion Gap  6.6  (7.0-16.0)  L  18  06:48    


 


BUN  9 mg/dL (7-25)   18  06:48    


 


Creatinine  0.9 mg/dL (0.7-1.3)   18  06:48    


 


Est GFR ( Amer)  TNP   18  06:48    


 


Est GFR (Non-Af Amer)  TNP   18  06:48    


 


BUN/Creatinine Ratio  10.0   18  06:48    


 


Glucose  169 mg/dL ()  H  18  06:48    


 


POC Glucose  220 MG/DL (70 - 105)  H  18  05:22    


 


Calcium  8.8 mg/dL (8.6-10.3)   18  06:48    














- Physical Exam


Vitals and I&O: 


 Vital Signs











Temp  98.0 F   18 04:00


 


Pulse  96   18 04:00


 


Resp  18   18 04:00


 


BP  136/68   18 04:00


 


Pulse Ox  96   18 04:00








 Intake & Output











 18





 06:59 18:59 06:59


 


Intake Total 300  840


 


Output Total   600


 


Balance 300  240


 


Weight (lbs) 77.111 kg 77.111 kg 77.111 kg


 


Intake:   


 


  Oral 300  840


 


Output:   


 


  Urine   600


 


Other:   


 


  # Voids   4


 


  Weight Source Bedscale Bedscale Bedscale











Active Medications: 


Current Medications





Acetaminophen (Tylenol)  650 mg PO Q6H PRN


   PRN Reason: mild to moderate pain


   Stop: 05/15/18 23:24


Acetaminophen/Hydrocodone Bitart (Norco 5mg/325mg)  1 tab PO Q6H PRN


   PRN Reason: Pain (Moderate)


   Stop: 05/15/18 23:24


Allopurinol (Zyloprim)  100 mg PO DAILY [facility]


   Stop: 05/15/18 23:24


   Last Admin: 18 10:49 Dose:  100 mg


Castor Oil/[place] Balsam/Trypsin (Venelex)  1 appl TP DAILY [facility]


   Stop: 05/15/18 23:24


   Last Admin: 18 10:54 Dose:  1 appl


Clonazepam (Klonopin)  1 mg PO BID [facility]


   PRN Reason: Protocol


   Stop: 18 09:59


   Last Admin: 18 17:22 Dose:  1 mg


Donepezil HCl (Aricept)  5 mg PO DAILY [facility]


   Stop: 18 08:59


   Last Admin: 18 10:54 Dose:  5 mg


Famotidine (Pepcid)  20 mg PO BID [facility]


   Stop: 05/15/18 23:24


   Last Admin: 18 17:23 Dose:  20 mg


Fluconazole (Diflucan)  100 mg PO DAILY [facility]


   Stop: 05/15/18 23:24


   Last Admin: 18 10:49 Dose:  100 mg


Heparin Sodium (Porcine) (Heparin)  5,000 units SUBQ Q12HR [facility]


   PRN Reason: Protocol


   Stop: 05/15/18 23:24


   Last Admin: 18 20:26 Dose:  Not Given


Vancomycin HCl 500 mg/ Sodium (Chloride)  100 mls @ 100 mls/hr IV Q8HR [facility]


   Stop: 18 22:29


   Last Admin: 18 05:03 Dose:  100 mls/hr


Insulin Aspart (Novolog Insulin Sliding Scale)  0 units SUBQ ACHS [facility]


   PRN Reason: Protocol


   Stop: 05/15/18 23:24


   Last Admin: 18 20:25 Dose:  Not Given


Insulin Aspart (Novolog Insulin Sliding Scale)  0 units SUBQ ACHS [facility]


   PRN Reason: Protocol


   Stop: 05/16/18 07:29


   Last Admin: 18 20:25 Dose:  Not Given


Insulin Detemir (Levemir Insulin)  18 units SUBQ DAILY [facility]


   PRN Reason: Protocol


   Stop: 05/15/18 23:24


   Last Admin: 18 09:00 Dose:  Not Given


Magnesium Hydroxide (Milk Of Magnesia)  30 ml PO HS PRN


   PRN Reason: Constipation


   Stop: 05/15/18 23:24


Metformin HCl (Glucophage)  1,000 mg PO DAILY [facility]


   Stop: 18 09:59


   Last Admin: 18 10:48 Dose:  1,000 mg


Metoprolol Succinate (Toprol Xl)  25 mg PO DAILY [facility]


   Stop: 05/15/18 23:24


   Last Admin: 18 10:54 Dose:  25 mg


Miscellaneous (Vte Chemical Prophylaxis Screen/ Admission)  1 [facility]  PRN PRN


   PRN Reason: PROTOCOL


   Stop: 18 10:29


Miscellaneous (Vancomycin Iv Per Pharmacy)  1 [facility] PRN PRN


   PRN Reason: PROTOCOL


   Stop: 18 16:10


Olanzapine (Zyprexa Zydis)  10 mg PO BID [facility]


   PRN Reason: Protocol


   Stop: 18 07:53


   Last Admin: 18 17:22 Dose:  10 mg


Zolpidem Tartrate (Ambien)  5 mg PO HS PRN


   PRN Reason: insomnia


   Stop: 05/15/18 23:24


   Last Admin: 18 19:55 Dose:  5 mg








General: Alert, No acute distress


HEENT: Atraumatic, PERRLA, EOMI


Neck: Supple, JVD, +2 carotid pulse wo bruit


Cardiovascular: Regular rate, Normal S1, Normal S2


Lungs: Clear to auscultation


Abdomen: Bowel sounds, Soft


Extremities: no Clubbing, no Cyanosis, no Edema


Neurological: Sensation intact


Skin: no Rash


Psych/Mental Status: Other (psychosis)





- Procedures


Procedures: 


 Procedures











Procedure Code Date


 


KEV MUSC/FASCIA 20 SQ CM/< 30553 18


 


EXCISION OF RIGHT HIP MUSCLE, OPEN APPROACH [id number] 18


 


INTRODUCTION OF SERUM/TOX/VACCINE INTO MUSCLE, PERC APPROACH [id number] 18














Assessment/Plan





- Assessment


Assessment: 





psychosis


dementia


Alzheimer's dementia


diabetes mellitus not controlled. 


s/p wound debridement of sacral decubiti


UTI


hyperglycemia








- Plan


Plan: 





continue current medications.


discharge planning. 





Nutritional Asmnt/Malnutr-PDOC





- Dietary Evaluation


Malnutrition Findings (Please click <Entered> for more info): 








Nutritional Asmnt/Malnutrition                             Start:  18 16:

25


Text:                                                      Status: Complete    

  


Freq:                                                                          

  


 Document     18 16:25  MAGEN  (Rec: 18 16:31  MAGEN  NAPOLEON-[email or web address])


 Nutritional Asmnt/Malnutrition


     Patient General Information


      Nutritional Screening                      Moderate Risk


      Diagnosis                                  dehydration


      Pertinent Medical Hx/Surgical Hx           DM, dementia, depression,


                                                 psychosis, schizophrenia,


                                                 chronic renal insuff


      Subjective Information                     Pt is agitated, on 1:1 sitter.


                                                 Per EMR, PO intake 25-75%,


                                                 avg 50%.


      Current Diet Order/ Nutrition Support      pureed, CCHO 60gm


      Pertinent Medications                      novolov, levemir, glucophage


      Pertinent Labs                             3/19- -316


     Nutritional Hx/Data


      Height                                     1.8 m


      Height (Calculated Centimeters)            180.3


      Current Weight (lbs)                       77.111 kg


      Weight (Calculated Kilograms)              77.1


      Weight (Calculated Grams)                  34507.7


      Ideal Body Weight                          172


      Body Mass Index (BMI)                      23.7


      Weight Status                              Approriate


     GI Symptoms


      GI Symptoms                                None


      Last BM                                    3/19


      Difficult in:                              None


      Skin Integrity/Comment:                    pressure ulcer, decubitus


      Current %PO                                Fair (50-74%)


     Estimated Nutritional Goals


      BEE in Kcals:                              Using Current wt


      Calories/Kcals/Kg                          25-30


      Kcals Calculated                           1005-7722


      Protein:                                   Using Current wt


      Protein g/k-1.2


      Protein Calculated                         77-92


      Fluid: ml                                  1925-2310ml (1ml/kcal)


     Nutritional Problem


      1. Problem


       Problem                                   altered nutrition related lab


                                                 values


       Etiology                                  hx of DM


       Signs/Symptoms:                           glucose 169, -316


     Malnutrition Alert


      Protein-Calorie Malnutrition               N/A


      Is there a minimum of two criteria         No


       selected?                                 


       Query Text:Check all the applicable       


       criteria. A minimum of two criteria are   


       recommended for diagnosis of either       


       severe or non-severe malnutrition.        


     Intervention/Recommendation


      Comments                                   1. Continue with current diet


                                                 as ordered.


                                                 2. Monitor PO intake, wt, labs


                                                 and skin integrity


                                                 3. F/U as moderate risk in 3-5


                                                 days, 3/24-3/26


     Expected Outcomes/Goals


      Expected Outcomes/Goals                    1. PO intake to meet at least


                                                 75% of nutritional needs.


                                                 2. Wt stability, skin to


                                                 remain intact, labs to


                                                 approach WNL.

## 2018-03-26 RX ADMIN — OLANZAPINE SCH MG: 5 TABLET, ORALLY DISINTEGRATING ORAL at 08:25

## 2018-03-26 RX ADMIN — CASTOR OIL AND BALSAM, PERU SCH APPL: 788; 87 OINTMENT TOPICAL at 08:26

## 2018-03-26 RX ADMIN — INSULIN ASPART SCH: 100 INJECTION, SOLUTION INTRAVENOUS; SUBCUTANEOUS at 21:13

## 2018-03-26 RX ADMIN — INSULIN ASPART SCH: 100 INJECTION, SOLUTION INTRAVENOUS; SUBCUTANEOUS at 16:54

## 2018-03-26 RX ADMIN — INSULIN ASPART SCH UNITS: 100 INJECTION, SOLUTION INTRAVENOUS; SUBCUTANEOUS at 08:05

## 2018-03-26 RX ADMIN — OLANZAPINE SCH MG: 5 TABLET, ORALLY DISINTEGRATING ORAL at 16:55

## 2018-03-26 RX ADMIN — OLANZAPINE SCH: 5 TABLET, ORALLY DISINTEGRATING ORAL at 17:00

## 2018-03-26 RX ADMIN — INSULIN DETEMIR SCH UNITS: 100 INJECTION, SOLUTION SUBCUTANEOUS at 08:28

## 2018-03-26 RX ADMIN — INSULIN ASPART SCH: 100 INJECTION, SOLUTION INTRAVENOUS; SUBCUTANEOUS at 14:25

## 2018-03-26 NOTE — INFECTIOUS DISEASE PROG NOTE
Infectious Disease Subjective





- Review of Systems


Service Date: 18


Subjective: 





No fever





Infectious Disease Objective





- Results


Result Diagrams: 


 18 05:00


Recent Labs: 


 Laboratory Last Values











PT  9.5 SECONDS (9.5-11.5)   18  21:35    


 


INR  0.91  (0.5-1.4)   18  21:35    


 


Sodium  134 mEq/L (136-145)  L  18  05:00    


 


Potassium  4.4 mEq/L (3.5-5.1)   18  05:00    


 


Chloride  99 mEq/L ()   18  05:00    


 


Carbon Dioxide  25.3 mEq/L (21.0-31.0)   18  05:00    


 


Anion Gap  14.1  (7.0-16.0)   18  05:00    


 


BUN  9 mg/dL (7-25)   18  05:00    


 


Creatinine  0.9 mg/dL (0.7-1.3)   18  05:00    


 


Est GFR ( Amer)  TNP   18  05:00    


 


Est GFR (Non-Af Amer)  TNP   18  05:00    


 


BUN/Creatinine Ratio  10.0   18  05:00    


 


Glucose  240 mg/dL ()  H  18  05:00    


 


POC Glucose  149 MG/DL (70 - 105)  H  18  16:25    


 


Calcium  9.3 mg/dL (8.6-10.3)   18  05:00    


 


Vancomycin Trough  13.4 ug/mL (10-20)   18  13:30    














- Physical Exam


Vitals and I&O: 


 Vital Signs











Temp  97.6 F   18 20:00


 


Pulse  77   18 20:00


 


Resp  16   18 20:00


 


BP  110/50   18 20:00


 


Pulse Ox  96   18 20:00








 Intake & Output











 18





 06:59 18:59 06:59


 


Intake Total 1000 650 


 


Output Total 750 450 


 


Balance 250 200 


 


Weight (lbs) 77.111 kg 77.111 kg 


 


Intake:   


 


  Intake, IV Amount 500  


 


    Vancomycin HCl 1 gm In 500  





    Dextrose 5% 250 ml @ 165   





    mls/hr IV Q12H [facility], [facility]1983 Rx#:   





    [id number]   


 


  Oral 500 650 


 


Output:   


 


  Urine 750 450 


 


Other:   


 


  # Bowel Movements  1 


 


  Weight Source Bedscale Bedscale 











Active Medications: 


Current Medications





Acetaminophen (Tylenol)  650 mg PO Q6H PRN


   PRN Reason: mild to moderate pain


   Stop: 05/15/18 23:24


Acetaminophen/Hydrocodone Bitart (Norco 5mg/325mg)  1 tab PO Q6H PRN


   PRN Reason: Pain (Moderate)


   Stop: 05/15/18 23:24


Allopurinol (Zyloprim)  100 mg PO DAILY [facility], [facility]1983


   Stop: 05/15/18 23:24


   Last Admin: 18 08:25 Dose:  100 mg


Castor Oil/[place] Balsam/Trypsin (Venelex)  1 appl TP DAILY [facility], [facility]


   Stop: 05/15/18 23:24


   Last Admin: 18 08:26 Dose:  1 appl


Clonazepam (Klonopin)  1 mg PO BID [facility]


   PRN Reason: Protocol


   Stop: 18 09:59


   Last Admin: 18 17:00 Dose:  Not Given


Donepezil HCl (Aricept)  5 mg PO DAILY [facility], [facility]


   Stop: 18 08:59


   Last Admin: 18 08:26 Dose:  5 mg


Famotidine (Pepcid)  20 mg PO BID [facility], [facility]


   Stop: 05/15/18 23:24


   Last Admin: 18 17:00 Dose:  Not Given


Fluconazole (Diflucan)  100 mg PO DAILY [facility], [facility]


   Stop: 05/15/18 23:24


   Last Admin: 18 08:25 Dose:  100 mg


Heparin Sodium (Porcine) (Heparin)  5,000 units SUBQ Q12HR [facility]


   PRN Reason: Protocol


   Stop: 05/15/18 23:24


   Last Admin: 18 21:13 Dose:  Not Given


Vancomycin HCl 1 gm/ Dextrose  250 mls @ 165 mls/hr IV Q12H [facility], [facility]


   Stop: 18 13:59


   Last Admin: 18 14:25 Dose:  165 mls/hr


Insulin Aspart (Novolog Insulin Sliding Scale)  0 units SUBQ ACHS [facility]


   PRN Reason: Protocol


   Stop: 18 07:29


   Last Admin: 18 21:13 Dose:  Not Given


Insulin Detemir (Levemir Insulin)  18 units SUBQ DAILY [facility]


   PRN Reason: Protocol


   Stop: 05/15/18 23:24


   Last Admin: 18 08:28 Dose:  18 units


Magnesium Hydroxide (Milk Of Magnesia)  30 ml PO HS PRN


   PRN Reason: Constipation


   Stop: 05/15/18 23:24


Metformin HCl (Glucophage)  1,000 mg PO DAILY [facility]


   Stop: 18 09:59


   Last Admin: 18 08:25 Dose:  1,000 mg


Metoprolol Succinate (Toprol Xl)  25 mg PO DAILY [facility]


   Stop: 05/15/18 23:24


   Last Admin: 18 08:26 Dose:  25 mg


Miscellaneous (Vte Chemical Prophylaxis Screen/ Admission)  1 ea  PRN PRN


   PRN Reason: PROTOCOL


   Stop: 18 10:29


Miscellaneous (Vancomycin Iv Per Pharmacy)  1 [facility] PRN PRN


   PRN Reason: PROTOCOL


   Stop: 18 16:10


Olanzapine (Zyprexa Zydis)  10 mg PO BID [facility]


   PRN Reason: Protocol


   Stop: 18 07:53


   Last Admin: 18 17:00 Dose:  Not Given


Zolpidem Tartrate (Ambien)  5 mg PO HS PRN


   PRN Reason: insomnia


   Stop: 05/15/18 23:24


   Last Admin: 18 19:55 Dose:  5 mg








General: no acute distress, well developed, well nourished


HEENT: atraumatic, normocephalic, PERRLA, EOMI, moist mucous membrane


Neck: supple, no thyromegaly


Cardiovascular: S1S2, regular


Lungs: clear to auscultation bilaterally, clear to percussion


Abdomen: soft, no tender, no distended


Extremities: no cyanosis, no clubbing, no edema


Neurological: awake, alert, oriented


Skin: intact





- Procedures


Procedures: 


 Procedures











Procedure Code Date


 


KEV MUSC/FASCIA 20 SQ CM/< 14848 18


 


EXCISION OF RIGHT HIP MUSCLE, OPEN APPROACH [id number] 18


 


INTRODUCTION OF SERUM/TOX/VACCINE INTO MUSCLE, PERC APPROACH 0P2331H 18














Infectious Disease Assmt/Plan





- Assessment


Assessment: 





1.  Sacral decubitus ulcer.


2.  UTI.  Treated


3.  Dementia.





- Plan


Plan: 





wound care.





Nutritional Asmnt/Malnutr-PDOC





- Dietary Evaluation


Malnutrition Findings (Please click <Entered> for more info): 








Nutritional Asmnt/Malnutrition                             Start:  18 16:

25


Text:                                                      Status: Complete    

  


Freq:                                                                          

  


 Document     18 16:25  MELISSAPAT  (Rec: 18 16:31  MAGEN [email or web address])


 Nutritional Asmnt/Malnutrition


     Patient General Information


      Nutritional Screening                      Moderate Risk


      Diagnosis                                  dehydration


      Pertinent Medical Hx/Surgical Hx           DM, dementia, depression,


                                                 psychosis, schizophrenia,


                                                 chronic renal insuff


      Subjective Information                     Pt is agitated, on 1:1 sitter.


                                                 Per EMR, PO intake 25-75%,


                                                 avg 50%.


      Current Diet Order/ Nutrition Support      pureed, CCHO 60gm


      Pertinent Medications                      novolov, levemir, glucophage


      Pertinent Labs                             3/19-21 -316


     Nutritional Hx/Data


      Height                                     1.8 m


      Height (Calculated Centimeters)            180.3


      Current Weight (lbs)                       77.111 kg


      Weight (Calculated Kilograms)              77.1


      Weight (Calculated Grams)                  11081.7


      Ideal Body Weight                          172


      Body Mass Index (BMI)                      23.7


      Weight Status                              Approriate


     GI Symptoms


      GI Symptoms                                None


      Last BM                                    3/19


      Difficult in:                              None


      Skin Integrity/Comment:                    pressure ulcer, decubitus


      Current %PO                                Fair (50-74%)


     Estimated Nutritional Goals


      BEE in Kcals:                              Using Current wt


      Calories/Kcals/Kg                          25-30


      Kcals Calculated                           6542-7533


      Protein:                                   Using Current wt


      Protein g/k-1.2


      Protein Calculated                         77-92


      Fluid: ml                                  1925-2310ml (1ml/kcal)


     Nutritional Problem


      1. Problem


       Problem                                   altered nutrition related lab


                                                 values


       Etiology                                  hx of DM


       Signs/Symptoms:                           glucose 169, -316


     Malnutrition Alert


      Protein-Calorie Malnutrition               N/A


      Is there a minimum of two criteria         No


       selected?                                 


       Query Text:Check all the applicable       


       criteria. A minimum of two criteria are   


       recommended for diagnosis of either       


       severe or non-severe malnutrition.        


     Intervention/Recommendation


      Comments                                   1. Continue with current diet


                                                 as ordered.


                                                 2. Monitor PO intake, wt, labs


                                                 and skin integrity


                                                 3. F/U as moderate risk in 3-5


                                                 days, 3/24-3/26


     Expected Outcomes/Goals


      Expected Outcomes/Goals                    1. PO intake to meet at least


                                                 75% of nutritional needs.


                                                 2. Wt stability, skin to


                                                 remain intact, labs to


                                                 approach WNL.

## 2018-03-26 NOTE — GENERAL PROGRESS NOTE
Subjective





- Review of Systems


Service Date: 18


Subjective: 





lying in bed, still periods of agitation





Objective





- Results


Result Diagrams: 


 18 05:00


Recent Labs: 


 Laboratory Last Values











PT  9.5 SECONDS (9.5-11.5)   18  21:35    


 


INR  0.91  (0.5-1.4)   18  21:35    


 


Sodium  134 mEq/L (136-145)  L  18  05:00    


 


Potassium  4.4 mEq/L (3.5-5.1)   18  05:00    


 


Chloride  99 mEq/L ()   18  05:00    


 


Carbon Dioxide  25.3 mEq/L (21.0-31.0)   18  05:00    


 


Anion Gap  14.1  (7.0-16.0)   18  05:00    


 


BUN  9 mg/dL (7-25)   18  05:00    


 


Creatinine  0.9 mg/dL (0.7-1.3)   18  05:00    


 


Est GFR ( Amer)  TNP   18  05:00    


 


Est GFR (Non-Af Amer)  TNP   18  05:00    


 


BUN/Creatinine Ratio  10.0   18  05:00    


 


Glucose  240 mg/dL ()  H  18  05:00    


 


POC Glucose  264 MG/DL (70 - 105)  H  18  06:28    


 


Calcium  9.3 mg/dL (8.6-10.3)   18  05:00    


 


Vancomycin Trough  5.5 ug/mL (10-20)  L  18  12:07    














- Physical Exam


Vitals and I&O: 


 Vital Signs











Temp  97.6 F   18 04:00


 


Pulse  83   18 08:26


 


Resp  20   18 04:00


 


BP  130/75   18 08:26


 


Pulse Ox  100   18 04:00








 Intake & Output











 18





 18:59 06:59 18:59


 


Intake Total 400 750 


 


Output Total 500 750 


 


Balance -100 0 


 


Weight (lbs) 77.111 kg 77.111 kg 


 


Intake:   


 


  Intake, IV Amount  250 


 


    Vancomycin HCl 1 gm In  250 





    Dextrose 5% 250 ml @ 165   





    mls/hr IV Q12H [facility] Rx#:   





    [id number]   


 


  Oral 400 500 


 


Output:   


 


  Urine 500 750 


 


Other:   


 


  # Bowel Movements 0  


 


  Weight Source Bedscale Bedscale 











Active Medications: 


Current Medications





Acetaminophen (Tylenol)  650 mg PO Q6H PRN


   PRN Reason: mild to moderate pain


   Stop: 05/15/18 23:24


Acetaminophen/Hydrocodone Bitart (Norco 5mg/325mg)  1 tab PO Q6H PRN


   PRN Reason: Pain (Moderate)


   Stop: 05/15/18 23:24


Allopurinol (Zyloprim)  100 mg PO DAILY [facility]


   Stop: 05/15/18 23:24


   Last Admin: 18 08:25 Dose:  100 mg


Castor Oil/[place] Balsam/Trypsin (Venelex)  1 appl TP DAILY [facility]


   Stop: 05/15/18 23:24


   Last Admin: 18 08:26 Dose:  1 appl


Clonazepam (Klonopin)  1 mg PO BID [facility]


   PRN Reason: Protocol


   Stop: 18 09:59


   Last Admin: 18 08:25 Dose:  1 mg


Donepezil HCl (Aricept)  5 mg PO DAILY [facility]


   Stop: 18 08:59


   Last Admin: 18 08:26 Dose:  5 mg


Famotidine (Pepcid)  20 mg PO BID [facility]


   Stop: 05/15/18 23:24


   Last Admin: 18 08:25 Dose:  20 mg


Fluconazole (Diflucan)  100 mg PO DAILY [facility]


   Stop: 05/15/18 23:24


   Last Admin: 18 08:25 Dose:  100 mg


Heparin Sodium (Porcine) (Heparin)  5,000 units SUBQ Q12HR [facility]


   PRN Reason: Protocol


   Stop: 05/15/18 23:24


   Last Admin: 18 08:26 Dose:  5,000 units


Vancomycin HCl 1 gm/ Dextrose  250 mls @ 165 mls/hr IV Q12H [facility]


   Stop: 18 13:59


   Last Admin: 18 02:52 Dose:  165 mls/hr


Insulin Aspart (Novolog Insulin Sliding Scale)  0 units SUBQ ACHS [facility]


   PRN Reason: Protocol


   Stop: 18 07:29


   Last Admin: 03/26/18 08:05 Dose:  6 units


Insulin Detemir (Levemir Insulin)  18 units SUBQ DAILY [facility]


   PRN Reason: Protocol


   Stop: 05/15/18 23:24


   Last Admin: 18 08:28 Dose:  18 units


Magnesium Hydroxide (Milk Of Magnesia)  30 ml PO HS PRN


   PRN Reason: Constipation


   Stop: 05/15/18 23:24


Metformin HCl (Glucophage)  1,000 mg PO DAILY [facility]


   Stop: 18 09:59


   Last Admin: 18 08:25 Dose:  1,000 mg


Metoprolol Succinate (Toprol Xl)  25 mg PO DAILY [facility]


   Stop: 05/15/18 23:24


   Last Admin: 18 08:26 Dose:  25 mg


Miscellaneous (Vte Chemical Prophylaxis Screen/ Admission)  1 ea  PRN PRN


   PRN Reason: PROTOCOL


   Stop: 18 10:29


Miscellaneous (Vancomycin Iv Per Pharmacy)  1 [facility] PRN PRN


   PRN Reason: PROTOCOL


   Stop: 18 16:10


Olanzapine (Zyprexa Zydis)  10 mg PO BID [facility]


   PRN Reason: Protocol


   Stop: 18 07:53


   Last Admin: 18 08:25 Dose:  10 mg


Zolpidem Tartrate (Ambien)  5 mg PO HS PRN


   PRN Reason: insomnia


   Stop: 05/15/18 23:24


   Last Admin: 18 19:55 Dose:  5 mg








General: Alert, No acute distress


HEENT: Atraumatic, PERRLA, EOMI


Neck: Supple, JVD, +2 carotid pulse wo bruit


Cardiovascular: Regular rate, Normal S1, Normal S2


Lungs: Clear to auscultation


Abdomen: Bowel sounds, Soft


Extremities: no Clubbing, no Cyanosis, no Edema


Neurological: Sensation intact


Skin: no Rash


Psych/Mental Status: Other (psychosis)





- Procedures


Procedures: 


 Procedures











Procedure Code Date


 


KEV MUSC/FASCIA 20 SQ CM/< 56745 18


 


EXCISION OF RIGHT HIP MUSCLE, OPEN APPROACH [id number] 18


 


INTRODUCTION OF SERUM/TOX/VACCINE INTO MUSCLE, PERC APPROACH 1R5639P 18














Assessment/Plan





- Assessment


Assessment: 





S/P BRYANT


S/P electrolyte imbalance


Acute decomp of Psychosis


Type 2 DM


MRSA, Yeast UTI








- Plan


Plan: 


Lab - Result Diagrams





 18 06:48 





Current Medications





Acetaminophen (Tylenol)  650 mg PO Q6H PRN


   PRN Reason: mild to moderate pain


   Stop: 05/15/18 23:24


Acetaminophen/Hydrocodone Bitart (Norco 5mg/325mg)  1 tab PO Q6H PRN


   PRN Reason: Pain (Moderate)


   Stop: 05/15/18 23:24


Allopurinol (Zyloprim)  100 mg PO DAILY [facility]


   Stop: 05/15/18 23:24


   Last Admin: 18 10:54 Dose:  100 mg


Castor Oil/[place] Balsam/Trypsin (Venelex)  1 appl TP DAILY [facility]


   Stop: 05/15/18 23:24


   Last Admin: 18 11:02 Dose:  1 appl


Clonazepam (Klonopin)  1 mg PO BID [facility]


   PRN Reason: Protocol


   Stop: 18 09:59


Famotidine (Pepcid)  20 mg PO BID [facility]


   Stop: 05/15/18 23:24


   Last Admin: 18 10:54 Dose:  20 mg


Fluconazole (Diflucan)  100 mg PO DAILY [facility]


   Stop: 05/15/18 23:24


   Last Admin: 18 11:02 Dose:  100 mg


Heparin Sodium (Porcine) (Heparin)  5,000 units SUBQ Q12HR [facility]


   PRN Reason: Protocol


   Stop: 05/15/18 23:24


   Last Admin: 18 10:59 Dose:  5,000 units


Levofloxacin (Levaquin Pb)  500 mg in 100 mls @ 100 mls/hr IV Q24H [facility]


   Stop: 18 11:59


Insulin Aspart (Novolog Insulin Sliding Scale)  0 units SUBQ ACHS [facility]


   PRN Reason: Protocol


   Stop: 05/15/18 23:24


   Last Admin: 18 10:45 Dose:  2 units


Insulin Aspart (Novolog Insulin Sliding Scale)  0 units SUBQ ACHS [facility]


   PRN Reason: Protocol


   Stop: 18 07:29


Insulin Detemir (Levemir Insulin)  18 units SUBQ DAILY [facility]


   PRN Reason: Protocol


   Stop: 05/15/18 23:24


   Last Admin: 18 10:56 Dose:  18 units


Lorazepam (Ativan)  0.5 mg PO Q6HR PRN; Protocol


   PRN Reason: agitation 


   Stop: 05/15/18 23:24


Magnesium Hydroxide (Milk Of Magnesia)  30 ml PO HS PRN


   PRN Reason: Constipation


   Stop: 05/15/18 23:24


Metformin HCl (Glucophage)  1,000 mg PO DAILY [facility]


   Stop: 18 09:59


Metoprolol Succinate (Toprol Xl)  25 mg PO DAILY [facility]


   Stop: 05/15/18 23:24


   Last Admin: 18 10:55 Dose:  25 mg


Miscellaneous (Vte Chemical Prophylaxis Screen/ Admission)  1 ea [facility] PRN PRN


   PRN Reason: PROTOCOL


   Stop: 18 10:29


Olanzapine (Zyprexa Zydis)  5 mg PO DAILY [facility]


   PRN Reason: Protocol


   Stop: 18 09:59


Zolpidem Tartrate (Ambien)  5 mg PO HS PRN


   PRN Reason: insomnia


   Stop: 05/15/18 23:24





Lab - Result Diagrams





 18 05:00 





 





kidney fnc remain stable


encourage po intake


psych meds


start Vanco W/ Diflucan








Nutritional Asmnt/Malnutr-PDOC





- Dietary Evaluation


Malnutrition Findings (Please click <Entered> for more info): 








Nutritional Asmnt/Malnutrition                             Start:  18 16:

25


Text:                                                      Status: Complete    

  


Freq:                                                                          

  


 Document     18 16:25  LCPAT  (Rec: 18 16:31  LCPAT  [facility])


 Nutritional Asmnt/Malnutrition


     Patient General Information


      Nutritional Screening                      Moderate Risk


      Diagnosis                                  dehydration


      Pertinent Medical Hx/Surgical Hx           DM, dementia, depression,


                                                 psychosis, schizophrenia,


                                                 chronic renal insuff


      Subjective Information                     Pt is agitated, on 1:1 sitter.


                                                 Per EMR, PO intake 25-75%,


                                                 avg 50%.


      Current Diet Order/ Nutrition Support      pureed, CCHO 60gm


      Pertinent Medications                      novolov, levemir, glucophage


      Pertinent Labs                             3/19- -316


     Nutritional Hx/Data


      Height                                     1.8 m


      Height (Calculated Centimeters)            180.3


      Current Weight (lbs)                       77.111 kg


      Weight (Calculated Kilograms)              77.1


      Weight (Calculated Grams)                  48389.7


      Ideal Body Weight                          172


      Body Mass Index (BMI)                      23.7


      Weight Status                              Approriate


     GI Symptoms


      GI Symptoms                                None


      Last BM                                    3/19


      Difficult in:                              None


      Skin Integrity/Comment:                    pressure ulcer, decubitus


      Current %PO                                Fair (50-74%)


     Estimated Nutritional Goals


      BEE in Kcals:                              Using Current wt


      Calories/Kcals/Kg                          25-30


      Kcals Calculated                           2798-4744


      Protein:                                   Using Current wt


      Protein g/k-1.2


      Protein Calculated                         77-92


      Fluid: ml                                  1925-2310ml (1ml/kcal)


     Nutritional Problem


      1. Problem


       Problem                                   altered nutrition related lab


                                                 values


       Etiology                                  hx of DM


       Signs/Symptoms:                           glucose 169, -316


     Malnutrition Alert


      Protein-Calorie Malnutrition               N/A


      Is there a minimum of two criteria         No


       selected?                                 


       Query Text:Check all the applicable       


       criteria. A minimum of two criteria are   


       recommended for diagnosis of either       


       severe or non-severe malnutrition.        


     Intervention/Recommendation


      Comments                                   1. Continue with current diet


                                                 as ordered.


                                                 2. Monitor PO intake, wt, labs


                                                 and skin integrity


                                                 3. F/U as moderate risk in 3-5


                                                 days, 3/24-3/26


     Expected Outcomes/Goals


      Expected Outcomes/Goals                    1. PO intake to meet at least


                                                 75% of nutritional needs.


                                                 2. Wt stability, skin to


                                                 remain intact, labs to


                                                 approach WNL.

## 2018-03-26 NOTE — GENERAL PROGRESS NOTE
Subjective





- Review of Systems


Service Date: 18


Subjective: 





Patient resting comfortably in bed. no acute distress. In good spirits today. 

pleasant. cooperative today. eating well





Objective





- Results


Result Diagrams: 


 18 05:00


Recent Labs: 


 Laboratory Last Values











PT  9.5 SECONDS (9.5-11.5)   18  21:35    


 


INR  0.91  (0.5-1.4)   18  21:35    


 


Sodium  134 mEq/L (136-145)  L  18  05:00    


 


Potassium  4.4 mEq/L (3.5-5.1)   18  05:00    


 


Chloride  99 mEq/L ()   18  05:00    


 


Carbon Dioxide  25.3 mEq/L (21.0-31.0)   18  05:00    


 


Anion Gap  14.1  (7.0-16.0)   18  05:00    


 


BUN  9 mg/dL (7-25)   18  05:00    


 


Creatinine  0.9 mg/dL (0.7-1.3)   18  05:00    


 


Est GFR ( Amer)  TNP   18  05:00    


 


Est GFR (Non-Af Amer)  TNP   18  05:00    


 


BUN/Creatinine Ratio  10.0   18  05:00    


 


Glucose  240 mg/dL ()  H  18  05:00    


 


POC Glucose  264 MG/DL (70 - 105)  H  18  06:28    


 


Calcium  9.3 mg/dL (8.6-10.3)   18  05:00    


 


Vancomycin Trough  5.5 ug/mL (10-20)  L  18  12:07    














- Physical Exam


Vitals and I&O: 


 Vital Signs











Temp  97.6 F   18 04:00


 


Pulse  71   18 04:00


 


Resp  20   18 04:00


 


BP  146/66   18 04:00


 


Pulse Ox  100   18 04:00








 Intake & Output











 18





 18:59 06:59 18:59


 


Intake Total 400 750 


 


Output Total 500 750 


 


Balance -100 0 


 


Weight (lbs) 77.111 kg 77.111 kg 


 


Intake:   


 


  Intake, IV Amount  250 


 


    Vancomycin HCl 1 gm In  250 





    Dextrose 5% 250 ml @ 165   





    mls/hr IV Q12H [facility] Rx#:   





    [id number]   


 


  Oral 400 500 


 


Output:   


 


  Urine 500 750 


 


Other:   


 


  # Bowel Movements 0  


 


  Weight Source Bedscale Bedscale 











Active Medications: 


Current Medications





Acetaminophen (Tylenol)  650 mg PO Q6H PRN


   PRN Reason: mild to moderate pain


   Stop: 05/15/18 23:24


Acetaminophen/Hydrocodone Bitart (Norco 5mg/325mg)  1 tab PO Q6H PRN


   PRN Reason: Pain (Moderate)


   Stop: 05/15/18 23:24


Allopurinol (Zyloprim)  100 mg PO DAILY [facility]


   Stop: 05/15/18 23:24


   Last Admin: 18 10:00 Dose:  Not Given


Castor Oil/Fijian Balsam/Trypsin (Venelex)  1 appl TP DAILY [facility]


   Stop: 05/15/18 23:24


   Last Admin: 18 10:54 Dose:  1 appl


Clonazepam (Klonopin)  1 mg PO BID [facility]


   PRN Reason: Protocol


   Stop: 18 09:59


   Last Admin: 18 16:28 Dose:  1 mg


Donepezil HCl (Aricept)  5 mg PO DAILY [facility]


   Stop: 18 08:59


   Last Admin: 18 10:01 Dose:  Not Given


Famotidine (Pepcid)  20 mg PO BID [facility]


   Stop: 05/15/18 23:24


   Last Admin: 18 16:28 Dose:  20 mg


Fluconazole (Diflucan)  100 mg PO DAILY [facility]


   Stop: 05/15/18 23:24


   Last Admin: 18 10:02 Dose:  Not Given


Heparin Sodium (Porcine) (Heparin)  5,000 units SUBQ Q12HR [facility]


   PRN Reason: Protocol


   Stop: 05/15/18 23:24


   Last Admin: 18 22:22 Dose:  5,000 units


Vancomycin HCl 1 gm/ Dextrose  250 mls @ 165 mls/hr IV Q12H [facility]


   Stop: 18 13:59


   Last Admin: 18 02:52 Dose:  165 mls/hr


Insulin Aspart (Novolog Insulin Sliding Scale)  0 units SUBQ ACHS [facility]


   PRN Reason: Protocol


   Stop: 18 07:29


   Last Admin: 18 08:05 Dose:  6 units


Insulin Detemir (Levemir Insulin)  18 units SUBQ DAILY [facility]


   PRN Reason: Protocol


   Stop: 05/15/18 23:24


   Last Admin: 18 10:03 Dose:  Not Given


Magnesium Hydroxide (Milk Of Magnesia)  30 ml PO HS PRN


   PRN Reason: Constipation


   Stop: 05/15/18 23:24


Metformin HCl (Glucophage)  1,000 mg PO DAILY [facility]


   Stop: 18 09:59


   Last Admin: 18 10:02 Dose:  Not Given


Metoprolol Succinate (Toprol Xl)  25 mg PO DAILY [facility]


   Stop: 05/15/18 23:24


   Last Admin: 18 10:02 Dose:  Not Given


Miscellaneous (Vte Chemical Prophylaxis Screen/ Admission)  1 ea  PRN PRN


   PRN Reason: PROTOCOL


   Stop: 18 10:29


Miscellaneous (Vancomycin Iv Per Pharmacy)  1 [facility] PRN PRN


   PRN Reason: PROTOCOL


   Stop: 18 16:10


Olanzapine (Zyprexa Zydis)  10 mg PO BID [facility]


   PRN Reason: Protocol


   Stop: 18 07:53


   Last Admin: 18 16:28 Dose:  10 mg


Zolpidem Tartrate (Ambien)  5 mg PO HS PRN


   PRN Reason: insomnia


   Stop: 05/15/18 23:24


   Last Admin: 18 19:55 Dose:  5 mg








General: Alert, No acute distress


HEENT: Atraumatic, PERRLA, EOMI


Neck: Supple, JVD, +2 carotid pulse wo bruit


Cardiovascular: Regular rate, Normal S1, Normal S2


Lungs: Clear to auscultation


Abdomen: Bowel sounds, Soft


Extremities: no Clubbing, no Cyanosis, no Edema


Neurological: Sensation intact


Skin: no Rash


Psych/Mental Status: Other (psychosis)





- Procedures


Procedures: 


 Procedures











Procedure Code Date


 


KEV MUSC/FASCIA 20 SQ CM/< 11228 18


 


EXCISION OF RIGHT HIP MUSCLE, OPEN APPROACH [id number] 18


 


INTRODUCTION OF SERUM/TOX/VACCINE INTO MUSCLE, PERC APPROACH [id number] 18














Assessment/Plan





- Assessment


Assessment: 





psychosis


dementia


Alzheimer's dementia


diabetes mellitus not controlled. 


s/p wound debridement of sacral decubiti


UTI


hyperglycemia








- Plan


Plan: 





continue current medications.


for possible discharge today





Nutritional Asmnt/Malnutr-PDOC





- Dietary Evaluation


Malnutrition Findings (Please click <Entered> for more info): 








Nutritional Asmnt/Malnutrition                             Start:  18 16:

25


Text:                                                      Status: Complete    

  


Freq:                                                                          

  


 Document     18 16:25  [email or web address]  (Rec: 18 16:31  LCPAT  [email or web address])


 Nutritional Asmnt/Malnutrition


     Patient General Information


      Nutritional Screening                      Moderate Risk


      Diagnosis                                  dehydration


      Pertinent Medical Hx/Surgical Hx           DM, dementia, depression,


                                                 psychosis, schizophrenia,


                                                 chronic renal insuff


      Subjective Information                     Pt is agitated, on 1:1 sitter.


                                                 Per EMR, PO intake 25-75%,


                                                 avg 50%.


      Current Diet Order/ Nutrition Support      pureed, CCHO 60gm


      Pertinent Medications                      novolov, levemir, glucophage


      Pertinent Labs                             3/19-21 -316


     Nutritional Hx/Data


      Height                                     1.8 m


      Height (Calculated Centimeters)            180.3


      Current Weight (lbs)                       77.111 kg


      Weight (Calculated Kilograms)              77.1


      Weight (Calculated Grams)                  64223.7


      Ideal Body Weight                          172


      Body Mass Index (BMI)                      23.7


      Weight Status                              Approriate


     GI Symptoms


      GI Symptoms                                None


      Last BM                                    3/19


      Difficult in:                              None


      Skin Integrity/Comment:                    pressure ulcer, decubitus


      Current %PO                                Fair (50-74%)


     Estimated Nutritional Goals


      BEE in Kcals:                              Using Current wt


      Calories/Kcals/Kg                          25-30


      Kcals Calculated                           2792-5581


      Protein:                                   Using Current wt


      Protein g/k-1.2


      Protein Calculated                         77-92


      Fluid: ml                                  1925-2310ml (1ml/kcal)


     Nutritional Problem


      1. Problem


       Problem                                   altered nutrition related lab


                                                 values


       Etiology                                  hx of DM


       Signs/Symptoms:                           glucose 169, -316


     Malnutrition Alert


      Protein-Calorie Malnutrition               N/A


      Is there a minimum of two criteria         No


       selected?                                 


       Query Text:Check all the applicable       


       criteria. A minimum of two criteria are   


       recommended for diagnosis of either       


       severe or non-severe malnutrition.        


     Intervention/Recommendation


      Comments                                   1. Continue with current diet


                                                 as ordered.


                                                 2. Monitor PO intake, wt, labs


                                                 and skin integrity


                                                 3. F/U as moderate risk in 3-5


                                                 days, 3/24-3/26


     Expected Outcomes/Goals


      Expected Outcomes/Goals                    1. PO intake to meet at least


                                                 75% of nutritional needs.


                                                 2. Wt stability, skin to


                                                 remain intact, labs to


                                                 approach WNL.

## 2018-03-26 NOTE — PROGRESS NOTES
DATE:  03/26/2018



Case was discussed with staff of the patient.  The patient continues to be

irritable, easily agitated, restless.  However, he is easier to redirect.  He is

compliant with the medication.  Dr. Ramirez increased Zyprexa 10 mg twice a day

with no side effects, no sedation, no nausea, no extrapyramidal symptoms.  He is

also on Aricept 5 mg at bedtime.



Thank you very much for allowing me to participate in the care of this most

interesting gentleman..





DD: 03/26/2018 12:12

DT: 03/26/2018 21:44

JOB# [id number]  [id number]

## 2018-03-27 VITALS — SYSTOLIC BLOOD PRESSURE: 150 MMHG | DIASTOLIC BLOOD PRESSURE: 69 MMHG

## 2018-03-27 LAB
APPEARANCE UR: (no result)
BACTERIA #/AREA URNS HPF: (no result) /HPF
BILIRUB UR-MCNC: NEGATIVE MG/DL
COLOR UR: YELLOW
EPI CELLS URNS QL MICRO: (no result) /LPF
GLUCOSE UR STRIP-MCNC: >=1000 MG/DL
KETONES UR STRIP-MCNC: NEGATIVE MG/DL
LEUKOCYTE ESTERASE UR-ACNC: (no result)
MICRO URNS: YES
NITRITE UR QL STRIP: NEGATIVE
PH UR STRIP: 5.5 [PH] (ref 4.6–8)
PROT UR STRIP-MCNC: NEGATIVE MG/DL
RBC # UR STRIP: (no result) /UL
SP GR UR STRIP: 1.01 (ref 1–1.03)
URINALYSIS COMPLETE PNL UR: (no result)
UROBILINOGEN UR STRIP-ACNC: 0.2 E.U./DL (ref 0.2–1)
WBC #/AREA URNS HPF: (no result) /HPF (ref 0–5)
YEAST URNS QL MICRO: (no result) /HPF

## 2018-03-27 RX ADMIN — CASTOR OIL AND BALSAM, PERU SCH APPL: 788; 87 OINTMENT TOPICAL at 08:56

## 2018-03-27 RX ADMIN — INSULIN DETEMIR SCH: 100 INJECTION, SOLUTION SUBCUTANEOUS at 08:57

## 2018-03-27 RX ADMIN — INSULIN ASPART SCH UNITS: 100 INJECTION, SOLUTION INTRAVENOUS; SUBCUTANEOUS at 21:57

## 2018-03-27 RX ADMIN — INSULIN ASPART SCH: 100 INJECTION, SOLUTION INTRAVENOUS; SUBCUTANEOUS at 14:06

## 2018-03-27 RX ADMIN — INSULIN ASPART SCH: 100 INJECTION, SOLUTION INTRAVENOUS; SUBCUTANEOUS at 16:57

## 2018-03-27 RX ADMIN — INSULIN ASPART SCH UNITS: 100 INJECTION, SOLUTION INTRAVENOUS; SUBCUTANEOUS at 21:55

## 2018-03-27 RX ADMIN — INSULIN ASPART SCH: 100 INJECTION, SOLUTION INTRAVENOUS; SUBCUTANEOUS at 21:07

## 2018-03-27 RX ADMIN — OLANZAPINE SCH MG: 5 TABLET, ORALLY DISINTEGRATING ORAL at 16:56

## 2018-03-27 RX ADMIN — HYDROCODONE BITARTRATE AND ACETAMINOPHEN PRN TAB: 5; 325 TABLET ORAL at 16:56

## 2018-03-27 RX ADMIN — INSULIN ASPART SCH: 100 INJECTION, SOLUTION INTRAVENOUS; SUBCUTANEOUS at 08:57

## 2018-03-27 RX ADMIN — OLANZAPINE SCH MG: 5 TABLET, ORALLY DISINTEGRATING ORAL at 08:55

## 2018-03-27 NOTE — PROGRESS NOTES
DATE:  03/27/2018



Case was discussed with staff of the patient.  The patient continues to be

irritable, continues to be hostile at times.  Continues to be unpredictable,

impulsive.  He is compliant with the medication with no side effects, tolerating

increase in Zyprexa.  No sedation, no nausea, no extrapyramidal symptoms.  His

Zyprexa was increased to 10 mg twice a day and Aricept was initiated on

03/21/2018, it was too early to adjust that dose and no side effects with the

medication, no sedation, no nausea.  I will continue to work with the patient in

group therapy, milieu therapy, and adjust medications as needed.





DD: 03/27/2018 11:25

DT: 03/27/2018 23:21

JOB# [id number]  [id number]

## 2018-03-27 NOTE — GENERAL PROGRESS NOTE
Subjective





- Review of Systems


Service Date: 18


Subjective: 





lying in bed, more friendly





Objective





- Results


Result Diagrams: 


 18 05:00


Recent Labs: 


 Laboratory Last Values











PT  9.5 SECONDS (9.5-11.5)   18  21:35    


 


INR  0.91  (0.5-1.4)   18  21:35    


 


Sodium  134 mEq/L (136-145)  L  18  05:00    


 


Potassium  4.4 mEq/L (3.5-5.1)   18  05:00    


 


Chloride  99 mEq/L ()   18  05:00    


 


Carbon Dioxide  25.3 mEq/L (21.0-31.0)   18  05:00    


 


Anion Gap  14.1  (7.0-16.0)   18  05:00    


 


BUN  9 mg/dL (7-25)   18  05:00    


 


Creatinine  0.9 mg/dL (0.7-1.3)   18  05:00    


 


Est GFR ( Amer)  TNP   18  05:00    


 


Est GFR (Non-Af Amer)  TNP   18  05:00    


 


BUN/Creatinine Ratio  10.0   18  05:00    


 


Glucose  240 mg/dL ()  H  18  05:00    


 


POC Glucose  149 MG/DL (70 - 105)  H  18  16:25    


 


Calcium  9.3 mg/dL (8.6-10.3)   18  05:00    


 


Vancomycin Trough  13.4 ug/mL (10-20)   18  13:30    














- Physical Exam


Vitals and I&O: 


 Vital Signs











Temp  97.4 F   18 08:00


 


Pulse  106   18 09:05


 


Resp  18   18 08:00


 


BP  122/97   18 09:05


 


Pulse Ox  98   18 08:00








 Intake & Output











 18





 18:59 06:59 18:59


 


Intake Total 900  


 


Output Total 450  


 


Balance 450  


 


Weight (lbs) 77.111 kg  


 


Intake:   


 


  Intake, IV Amount 250  


 


    Vancomycin HCl 1 gm In 250  





    Dextrose 5% 250 ml @ 165   





    mls/hr IV Q12H [facility] Rx#:   





    [id number]   


 


  Oral 650  


 


Output:   


 


  Urine 450  


 


Other:   


 


  # Bowel Movements 1  


 


  Weight Source Bedscale  











Active Medications: 


Current Medications





Acetaminophen (Tylenol)  650 mg PO Q6H PRN


   PRN Reason: mild to moderate pain


   Stop: 05/15/18 23:24


Acetaminophen/Hydrocodone Bitart (Norco 5mg/325mg)  1 tab PO Q6H PRN


   PRN Reason: Pain (Moderate)


   Stop: 05/15/18 23:24


Allopurinol (Zyloprim)  100 mg PO DAILY [facility]


   Stop: 05/15/18 23:24


   Last Admin: 18 08:54 Dose:  100 mg


Castor Oil/Slovak Balsam/Trypsin (Venelex)  1 appl TP DAILY [facility]


   Stop: 05/15/18 23:24


   Last Admin: 18 08:56 Dose:  1 appl


Clonazepam (Klonopin)  1 mg PO BID [facility]


   PRN Reason: Protocol


   Stop: 18 09:59


   Last Admin: 18 08:54 Dose:  1 mg


Donepezil HCl (Aricept)  5 mg PO DAILY [facility]


   Stop: 18 08:59


   Last Admin: 18 08:55 Dose:  5 mg


Famotidine (Pepcid)  20 mg PO BID [facility]


   Stop: 05/15/18 23:24


   Last Admin: 18 08:55 Dose:  20 mg


Fluconazole (Diflucan)  100 mg PO DAILY [facility]


   Stop: 05/15/18 23:24


   Last Admin: 18 08:54 Dose:  100 mg


Heparin Sodium (Porcine) (Heparin)  5,000 units SUBQ Q12HR [facility]


   PRN Reason: Protocol


   Stop: 05/15/18 23:24


   Last Admin: 18 08:57 Dose:  Not Given


Vancomycin HCl 1 gm/ Sodium (Chloride)  250 mls @ 165 mls/hr IV Q12H [facility]


   Stop: 18 13:59


   Last Admin: 18 14:03 Dose:  165 mls/hr


Insulin Aspart (Novolog Insulin Sliding Scale)  0 units SUBQ ACHS [facility]


   PRN Reason: Protocol


   Stop: 18 07:29


   Last Admin: 18 14:06 Dose:  Not Given


Insulin Detemir (Levemir Insulin)  18 units SUBQ DAILY [facility]


   PRN Reason: Protocol


   Stop: 05/15/18 23:24


   Last Admin: 18 08:57 Dose:  Not Given


Magnesium Hydroxide (Milk Of Magnesia)  30 ml PO HS PRN


   PRN Reason: Constipation


   Stop: 05/15/18 23:24


Metformin HCl (Glucophage)  1,000 mg PO DAILY [facility]


   Stop: 18 09:59


   Last Admin: 18 09:06 Dose:  1,000 mg


Metoprolol Succinate (Toprol Xl)  25 mg PO DAILY [facility]


   Stop: 05/15/18 23:24


   Last Admin: 18 09:05 Dose:  25 mg


Miscellaneous (Vte Chemical Prophylaxis Screen/ Admission)  1 ea  PRN PRN


   PRN Reason: PROTOCOL


   Stop: 18 10:29


Miscellaneous (Vancomycin Iv Per Pharmacy)  1 ea  PRN PRN


   PRN Reason: PROTOCOL


   Stop: 18 16:10


Olanzapine (Zyprexa Zydis)  10 mg PO BID [facility]


   PRN Reason: Protocol


   Stop: 18 07:53


   Last Admin: 18 08:55 Dose:  10 mg


Zolpidem Tartrate (Ambien)  5 mg PO HS PRN


   PRN Reason: insomnia


   Stop: 05/15/18 23:24


   Last Admin: 18 19:55 Dose:  5 mg








General: Alert, No acute distress


HEENT: Atraumatic, PERRLA, EOMI


Neck: Supple, JVD, +2 carotid pulse wo bruit


Cardiovascular: Regular rate, Normal S1, Normal S2


Lungs: Clear to auscultation


Abdomen: Bowel sounds, Soft


Extremities: no Clubbing, no Cyanosis, no Edema


Neurological: Sensation intact


Skin: no Rash


Psych/Mental Status: Other (psychosis)





- Procedures


Procedures: 


 Procedures











Procedure Code Date


 


KEV MUSC/FASCIA 20 SQ CM/< 95744 18


 


EXCISION OF RIGHT HIP MUSCLE, OPEN APPROACH [id number] 18


 


INTRODUCTION OF SERUM/TOX/VACCINE INTO MUSCLE, PERC APPROACH [id number] 18














Assessment/Plan





- Assessment


Assessment: 





S/P BRYANT


S/P electrolyte imbalance


Acute decomp of Psychosis


Type 2 DM


MRSA, Yeast UTI








- Plan


Plan: 


Lab - Result Diagrams





 18 06:48 





Current Medications





Acetaminophen (Tylenol)  650 mg PO Q6H PRN


   PRN Reason: mild to moderate pain


   Stop: 05/15/18 23:24


Acetaminophen/Hydrocodone Bitart (Norco 5mg/325mg)  1 tab PO Q6H PRN


   PRN Reason: Pain (Moderate)


   Stop: 05/15/18 23:24


Allopurinol (Zyloprim)  100 mg PO DAILY [facility]


   Stop: 05/15/18 23:24


   Last Admin: 18 10:54 Dose:  100 mg


Castor Oil/Slovak Balsam/Trypsin (Venelex)  1 appl TP DAILY [facility]


   Stop: 05/15/18 23:24


   Last Admin: 18 11:02 Dose:  1 appl


Clonazepam (Klonopin)  1 mg PO BID [facility]


   PRN Reason: Protocol


   Stop: 18 09:59


Famotidine (Pepcid)  20 mg PO BID [facility]


   Stop: 05/15/18 23:24


   Last Admin: 18 10:54 Dose:  20 mg


Fluconazole (Diflucan)  100 mg PO DAILY [facility]


   Stop: 05/15/18 23:24


   Last Admin: 18 11:02 Dose:  100 mg


Heparin Sodium (Porcine) (Heparin)  5,000 units SUBQ Q12HR [facility]


   PRN Reason: Protocol


   Stop: 05/15/18 23:24


   Last Admin: 18 10:59 Dose:  5,000 units


Levofloxacin (Levaquin Pb)  500 mg in 100 mls @ 100 mls/hr IV Q24H [facility]


   Stop: 18 11:59


Insulin Aspart (Novolog Insulin Sliding Scale)  0 units SUBQ ACHS [facility]


   PRN Reason: Protocol


   Stop: 05/15/18 23:24


   Last Admin: 18 10:45 Dose:  2 units


Insulin Aspart (Novolog Insulin Sliding Scale)  0 units SUBQ ACHS [facility]


   PRN Reason: Protocol


   Stop: 18 07:29


Insulin Detemir (Levemir Insulin)  18 units SUBQ DAILY [facility]


   PRN Reason: Protocol


   Stop: 05/15/18 23:24


   Last Admin: 18 10:56 Dose:  18 units


Lorazepam (Ativan)  0.5 mg PO Q6HR PRN; Protocol


   PRN Reason: agitation 


   Stop: 05/15/18 23:24


Magnesium Hydroxide (Milk Of Magnesia)  30 ml PO HS PRN


   PRN Reason: Constipation


   Stop: 05/15/18 23:24


Metformin HCl (Glucophage)  1,000 mg PO DAILY [facility]


   Stop: 18 09:59


Metoprolol Succinate (Toprol Xl)  25 mg PO DAILY [facility]


   Stop: 05/15/18 23:24


   Last Admin: 18 10:55 Dose:  25 mg


Miscellaneous (Vte Chemical Prophylaxis Screen/ Admission)  1 ea [facility] PRN PRN


   PRN Reason: PROTOCOL


   Stop: 18 10:29


Olanzapine (Zyprexa Zydis)  5 mg PO DAILY [facility]


   PRN Reason: Protocol


   Stop: 18 09:59


Zolpidem Tartrate (Ambien)  5 mg PO HS PRN


   PRN Reason: insomnia


   Stop: 05/15/18 23:24





Lab - Result Diagrams





 18 05:00 





 





kidney fnc remain stable


encourage po intake


psych meds


start Vanco W/ Diflucan








Nutritional Asmnt/Malnutr-PDOC





- Dietary Evaluation


Malnutrition Findings (Please click <Entered> for more info): 








Nutritional Asmnt/Malnutrition                             Start:  18 16:

25


Text:                                                      Status: Complete    

  


Freq:                                                                          

  


 Document     18 16:25  LCHENG  (Rec: 18 16:31  LCHENG  [facility])


 Nutritional Asmnt/Malnutrition


     Patient General Information


      Nutritional Screening                      Moderate Risk


      Diagnosis                                  dehydration


      Pertinent Medical Hx/Surgical Hx           DM, dementia, depression,


                                                 psychosis, schizophrenia,


                                                 chronic renal insuff


      Subjective Information                     Pt is agitated, on 1:1 sitter.


                                                 Per EMR, PO intake 25-75%,


                                                 avg 50%.


      Current Diet Order/ Nutrition Support      pureed, CCHO 60gm


      Pertinent Medications                      novolov, levemir, glucophage


      Pertinent Labs                             3/19- -316


     Nutritional Hx/Data


      Height                                     1.8 m


      Height (Calculated Centimeters)            180.3


      Current Weight (lbs)                       77.111 kg


      Weight (Calculated Kilograms)              77.1


      Weight (Calculated Grams)                  47366.7


      Ideal Body Weight                          172


      Body Mass Index (BMI)                      23.7


      Weight Status                              Approriate


     GI Symptoms


      GI Symptoms                                None


      Last BM                                    3/19


      Difficult in:                              None


      Skin Integrity/Comment:                    pressure ulcer, decubitus


      Current %PO                                Fair (50-74%)


     Estimated Nutritional Goals


      BEE in Kcals:                              Using Current wt


      Calories/Kcals/Kg                          25-30


      Kcals Calculated                           9452-4271


      Protein:                                   Using Current wt


      Protein g/k-1.2


      Protein Calculated                         77-92


      Fluid: ml                                  1925-2310ml (1ml/kcal)


     Nutritional Problem


      1. Problem


       Problem                                   altered nutrition related lab


                                                 values


       Etiology                                  hx of DM


       Signs/Symptoms:                           glucose 169, -316


     Malnutrition Alert


      Protein-Calorie Malnutrition               N/A


      Is there a minimum of two criteria         No


       selected?                                 


       Query Text:Check all the applicable       


       criteria. A minimum of two criteria are   


       recommended for diagnosis of either       


       severe or non-severe malnutrition.        


     Intervention/Recommendation


      Comments                                   1. Continue with current diet


                                                 as ordered.


                                                 2. Monitor PO intake, wt, labs


                                                 and skin integrity


                                                 3. F/U as moderate risk in 3-5


                                                 days, 3/24-3/26


     Expected Outcomes/Goals


      Expected Outcomes/Goals                    1. PO intake to meet at least


                                                 75% of nutritional needs.


                                                 2. Wt stability, skin to


                                                 remain intact, labs to


                                                 approach WNL.

## 2018-03-27 NOTE — GENERAL PROGRESS NOTE
Subjective





- Review of Systems


Service Date: 18


Subjective: 





Patient resting comfortably in bed. no acute distress. In good spirits today. 

pleasant. cooperative today. eating well





Objective





- Results


Result Diagrams: 


 18 05:00


Recent Labs: 


 Laboratory Last Values











PT  9.5 SECONDS (9.5-11.5)   18  21:35    


 


INR  0.91  (0.5-1.4)   18  21:35    


 


Sodium  134 mEq/L (136-145)  L  18  05:00    


 


Potassium  4.4 mEq/L (3.5-5.1)   18  05:00    


 


Chloride  99 mEq/L ()   18  05:00    


 


Carbon Dioxide  25.3 mEq/L (21.0-31.0)   18  05:00    


 


Anion Gap  14.1  (7.0-16.0)   18  05:00    


 


BUN  9 mg/dL (7-25)   18  05:00    


 


Creatinine  0.9 mg/dL (0.7-1.3)   18  05:00    


 


Est GFR ( Amer)  TNP   18  05:00    


 


Est GFR (Non-Af Amer)  TNP   18  05:00    


 


BUN/Creatinine Ratio  10.0   18  05:00    


 


Glucose  240 mg/dL ()  H  18  05:00    


 


POC Glucose  149 MG/DL (70 - 105)  H  18  16:25    


 


Calcium  9.3 mg/dL (8.6-10.3)   18  05:00    


 


Vancomycin Trough  13.4 ug/mL (10-20)   18  13:30    














- Physical Exam


Vitals and I&O: 


 Vital Signs











Temp  97.7 F   18 04:00


 


Pulse  73   18 04:00


 


Resp  18   18 04:00


 


BP  148/69   18 04:00


 


Pulse Ox  98   18 04:00








 Intake & Output











 18





 18:59 06:59 18:59


 


Intake Total 900  


 


Output Total 450  


 


Balance 450  


 


Weight (lbs) 77.111 kg  


 


Intake:   


 


  Intake, IV Amount 250  


 


    Vancomycin HCl 1 gm In 250  





    Dextrose 5% 250 ml @ 165   





    mls/hr IV Q12H [facility]   





    [id number]   


 


  Oral 650  


 


Output:   


 


  Urine 450  


 


Other:   


 


  # Bowel Movements 1  


 


  Weight Source Bedscale  











Active Medications: 


Current Medications





Acetaminophen (Tylenol)  650 mg PO Q6H PRN


   PRN Reason: mild to moderate pain


   Stop: 05/15/18 23:24


Acetaminophen/Hydrocodone Bitart (Norco 5mg/325mg)  1 tab PO Q6H PRN


   PRN Reason: Pain (Moderate)


   Stop: 05/15/18 23:24


Allopurinol (Zyloprim)  100 mg PO DAILY [facility]


   Stop: 05/15/18 23:24


   Last Admin: 18 08:25 Dose:  100 mg


Castor Oil/[place] Balsam/Trypsin (Venelex)  1 appl TP DAILY [facility]


   Stop: 05/15/18 23:24


   Last Admin: 18 08:26 Dose:  1 appl


Clonazepam (Klonopin)  1 mg PO BID [facility]


   PRN Reason: Protocol


   Stop: 18 09:59


   Last Admin: 18 17:00 Dose:  Not Given


Donepezil HCl (Aricept)  5 mg PO DAILY [facility]


   Stop: 18 08:59


   Last Admin: 18 08:26 Dose:  5 mg


Famotidine (Pepcid)  20 mg PO BID [facility]


   Stop: 05/15/18 23:24


   Last Admin: 18 17:00 Dose:  Not Given


Fluconazole (Diflucan)  100 mg PO DAILY [facility]


   Stop: 05/15/18 23:24


   Last Admin: 18 08:25 Dose:  100 mg


Heparin Sodium (Porcine) (Heparin)  5,000 units SUBQ Q12HR [facility]


   PRN Reason: Protocol


   Stop: 05/15/18 23:24


   Last Admin: 18 21:13 Dose:  Not Given


Vancomycin HCl 1 gm/ Dextrose  250 mls @ 165 mls/hr IV Q12H [facility]


   Stop: 18 13:59


   Last Admin: 18 01:50 Dose:  165 mls/hr


Insulin Aspart (Novolog Insulin Sliding Scale)  0 units SUBQ ACHS [facility]


   PRN Reason: Protocol


   Stop: 18 07:29


   Last Admin: 18 21:13 Dose:  Not Given


Insulin Detemir (Levemir Insulin)  18 units SUBQ DAILY [facility]


   PRN Reason: Protocol


   Stop: 05/15/18 23:24


   Last Admin: 18 08:28 Dose:  18 units


Magnesium Hydroxide (Milk Of Magnesia)  30 ml PO HS PRN


   PRN Reason: Constipation


   Stop: 05/15/18 23:24


Metformin HCl (Glucophage)  1,000 mg PO DAILY [facility]


   Stop: 18 09:59


   Last Admin: 18 08:25 Dose:  1,000 mg


Metoprolol Succinate (Toprol Xl)  25 mg PO DAILY [facility]


   Stop: 05/15/18 23:24


   Last Admin: 18 08:26 Dose:  25 mg


Miscellaneous (Vte Chemical Prophylaxis Screen/ Admission)  1 ea  PRN PRN


   PRN Reason: PROTOCOL


   Stop: 18 10:29


Miscellaneous (Vancomycin Iv Per Pharmacy)  1 [facility] PRN PRN


   PRN Reason: PROTOCOL


   Stop: 18 16:10


Olanzapine (Zyprexa Zydis)  10 mg PO BID [facility]


   PRN Reason: Protocol


   Stop: 18 07:53


   Last Admin: 18 17:00 Dose:  Not Given


Zolpidem Tartrate (Ambien)  5 mg PO HS PRN


   PRN Reason: insomnia


   Stop: 05/15/18 23:24


   Last Admin: 18 19:55 Dose:  5 mg








General: Alert, No acute distress


HEENT: Atraumatic, PERRLA, EOMI


Neck: Supple, JVD, +2 carotid pulse wo bruit


Cardiovascular: Regular rate, Normal S1, Normal S2


Lungs: Clear to auscultation


Abdomen: Bowel sounds, Soft


Extremities: no Clubbing, no Cyanosis, no Edema


Neurological: Sensation intact


Skin: no Rash


Psych/Mental Status: Other (psychosis)





- Procedures


Procedures: 


 Procedures











Procedure Code Date


 


KEV MUSC/FASCIA 20 SQ CM/< 08635 18


 


EXCISION OF RIGHT HIP MUSCLE, OPEN APPROACH [id number] 18


 


INTRODUCTION OF SERUM/TOX/VACCINE INTO MUSCLE, PERC APPROACH [id number] 18














Assessment/Plan





- Assessment


Assessment: 





psychosis


dementia


Alzheimer's dementia


diabetes mellitus not controlled. 


s/p wound debridement of sacral decubiti


UTI


hyperglycemia








- Plan


Plan: 





continue current medications.


for possible discharge today





Nutritional Asmnt/Malnutr-PDOC





- Dietary Evaluation


Malnutrition Findings (Please click <Entered> for more info): 








Nutritional Asmnt/Malnutrition                             Start:  18 16:

25


Text:                                                      Status: Complete    

  


Freq:                                                                          

  


 Document     18 16:25  MAGEN  (Rec: 18 16:31  LCHENG  [email or web address])


 Nutritional Asmnt/Malnutrition


     Patient General Information


      Nutritional Screening                      Moderate Risk


      Diagnosis                                  dehydration


      Pertinent Medical Hx/Surgical Hx           DM, dementia, depression,


                                                 psychosis, schizophrenia,


                                                 chronic renal insuff


      Subjective Information                     Pt is agitated, on 1:1 sitter.


                                                 Per EMR, PO intake 25-75%,


                                                 avg 50%.


      Current Diet Order/ Nutrition Support      pureed, CCHO 60gm


      Pertinent Medications                      novolov, levemir, glucophage


      Pertinent Labs                             3/19- -316


     Nutritional Hx/Data


      Height                                     1.8 m


      Height (Calculated Centimeters)            180.3


      Current Weight (lbs)                       77.111 kg


      Weight (Calculated Kilograms)              77.1


      Weight (Calculated Grams)                  90239.7


      Ideal Body Weight                          172


      Body Mass Index (BMI)                      23.7


      Weight Status                              Approriate


     GI Symptoms


      GI Symptoms                                None


      Last BM                                    3/19


      Difficult in:                              None


      Skin Integrity/Comment:                    pressure ulcer, decubitus


      Current %PO                                Fair (50-74%)


     Estimated Nutritional Goals


      BEE in Kcals:                              Using Current wt


      Calories/Kcals/Kg                          25-30


      Kcals Calculated                           2552-5074


      Protein:                                   Using Current wt


      Protein g/k-1.2


      Protein Calculated                         77-92


      Fluid: ml                                  1925-2310ml (1ml/kcal)


     Nutritional Problem


      1. Problem


       Problem                                   altered nutrition related lab


                                                 values


       Etiology                                  hx of DM


       Signs/Symptoms:                           glucose 169, -316


     Malnutrition Alert


      Protein-Calorie Malnutrition               N/A


      Is there a minimum of two criteria         No


       selected?                                 


       Query Text:Check all the applicable       


       criteria. A minimum of two criteria are   


       recommended for diagnosis of either       


       severe or non-severe malnutrition.        


     Intervention/Recommendation


      Comments                                   1. Continue with current diet


                                                 as ordered.


                                                 2. Monitor PO intake, wt, labs


                                                 and skin integrity


                                                 3. F/U as moderate risk in 3-5


                                                 days, 3/24-3/26


     Expected Outcomes/Goals


      Expected Outcomes/Goals                    1. PO intake to meet at least


                                                 75% of nutritional needs.


                                                 2. Wt stability, skin to


                                                 remain intact, labs to


                                                 approach WNL.

## 2018-03-28 RX ADMIN — INSULIN ASPART SCH: 100 INJECTION, SOLUTION INTRAVENOUS; SUBCUTANEOUS at 19:31

## 2018-03-28 RX ADMIN — INSULIN DETEMIR SCH: 100 INJECTION, SOLUTION SUBCUTANEOUS at 10:59

## 2018-03-28 RX ADMIN — INSULIN ASPART SCH UNITS: 100 INJECTION, SOLUTION INTRAVENOUS; SUBCUTANEOUS at 21:47

## 2018-03-28 RX ADMIN — OLANZAPINE SCH MG: 5 TABLET, ORALLY DISINTEGRATING ORAL at 16:33

## 2018-03-28 RX ADMIN — INSULIN ASPART SCH: 100 INJECTION, SOLUTION INTRAVENOUS; SUBCUTANEOUS at 10:59

## 2018-03-28 RX ADMIN — INSULIN ASPART SCH: 100 INJECTION, SOLUTION INTRAVENOUS; SUBCUTANEOUS at 13:51

## 2018-03-28 RX ADMIN — OLANZAPINE SCH MG: 5 TABLET, ORALLY DISINTEGRATING ORAL at 08:57

## 2018-03-28 RX ADMIN — CASTOR OIL AND BALSAM, PERU SCH: 788; 87 OINTMENT TOPICAL at 10:59

## 2018-03-28 NOTE — PROGRESS NOTES
DATE:  



Chart reviewed and the patient interviewed.  Also discussed the patient's

condition with the staff and reviewed records and labs.  The patient is still

agitated and restless.  The patient also is still having periods of

irritability, but slightly easier to redirect him.  Also, continued to comply

with taking his medications with no side effects of medications.



ASSESSMENT:  The patient is still agitated and irritable.



TREATMENT PLAN:  Continue monitoring his behavior and his condition and continue

to work on his agitation and adjusting psychotropic medications.





DD: 03/28/2018 19:53

DT: 03/28/2018 20:15

JOB# [id number]  [id number]

## 2018-03-28 NOTE — GENERAL PROGRESS NOTE
Subjective





- Review of Systems


Service Date: 18


Subjective: 





lying in bed, more friendly





Objective





- Results


Result Diagrams: 


 18 05:00


Recent Labs: 


 Laboratory Last Values











PT  9.5 SECONDS (9.5-11.5)   18  21:35    


 


INR  0.91  (0.5-1.4)   18  21:35    


 


Sodium  134 mEq/L (136-145)  L  18  05:00    


 


Potassium  4.4 mEq/L (3.5-5.1)   18  05:00    


 


Chloride  99 mEq/L ()   18  05:00    


 


Carbon Dioxide  25.3 mEq/L (21.0-31.0)   18  05:00    


 


Anion Gap  14.1  (7.0-16.0)   18  05:00    


 


BUN  9 mg/dL (7-25)   18  05:00    


 


Creatinine  0.9 mg/dL (0.7-1.3)   18  05:00    


 


Est GFR ( Amer)  TNP   18  05:00    


 


Est GFR (Non-Af Amer)  TNP   18  05:00    


 


BUN/Creatinine Ratio  10.0   18  05:00    


 


Glucose  240 mg/dL ()  H  18  05:00    


 


POC Glucose  333 MG/DL (70 - 105)  H  18  21:45    


 


Calcium  9.3 mg/dL (8.6-10.3)   18  05:00    


 


Urine Source  RAVI PORT   18  21:30    


 


Urine Color  YELLOW   18  21:30    


 


Urine Clarity  HAZY  (CLEAR)   18  21:30    


 


Urine pH  5.5  (4.6 - 8.0)   18  21:30    


 


Ur Specific Gravity  1.010  (1.005-1.030)   18  21:30    


 


Urine Protein  NEGATIVE mg/dL (NEGATIVE)   18  21:30    


 


Urine Glucose (UA)  >=1000 mg/dL (NEGATIVE)  H  18  21:30    


 


Urine Ketones  NEGATIVE mg/dL (NEGATIVE)   18  21:30    


 


Urine Blood  LARGE  (NEGATIVE)  H  18  21:30    


 


Urine Nitrate  NEGATIVE  (NEGATIVE)   18  21:30    


 


Urine Bilirubin  NEGATIVE  (NEGATIVE)   18  21:30    


 


Urine Urobilinogen  0.2 E.U./dL (0.2 - 1.0)   18  21:30    


 


Ur Leukocyte Esterase  SMALL  (NEGATIVE)  H  18  21:30    


 


Urine RBC  10-25 /hpf (0-5)  H  18  21:30    


 


Urine WBC  2-5 /hpf (0-5)   18  21:30    


 


Ur Epithelial Cells  OCCASIONAL /lpf (FEW)   18  21:30    


 


Urine Bacteria  FEW /hpf (NONE SEEN)   18  21:30    


 


Urine Yeast  FEW /hpf (NONE SEEN)  H  18  21:30    


 


Vancomycin Trough  13.4 ug/mL (10-20)   18  13:30    














- Physical Exam


Vitals and I&O: 


 Vital Signs











Temp  98.0 F   18 08:00


 


Pulse  84   18 08:58


 


Resp  18   18 08:00


 


BP  141/84   18 08:58


 


Pulse Ox  98   18 08:00








 Intake & Output











 18





 18:59 06:59 18:59


 


Intake Total 750 100 


 


Output Total 900 1150 


 


Balance -150 -1050 


 


Weight (lbs) 77.111 kg 77.111 kg 


 


Intake:   


 


  Intake, IV Amount 250  


 


    Vancomycin HCl 1 gm In 250  





    Sodium Chloride 0.9% 250   





    ml @ 165 mls/hr IV Q12H   





    [facility] Rx#:[id number]   


 


  Oral 500 100 


 


Output:   


 


  Urine 900 1150 


 


Other:   


 


  # Bowel Movements 0  


 


  Stool Characteristics  Soft 





  Formed 


 


  Weight Source Bedscale Bedscale 











Active Medications: 


Current Medications





Acetaminophen (Tylenol)  650 mg PO Q6H PRN


   PRN Reason: mild to moderate pain


   Stop: 05/15/18 23:24


Acetaminophen/Hydrocodone Bitart (Norco 5mg/325mg)  1 tab PO Q6H PRN


   PRN Reason: Pain (Moderate)


   Stop: 05/15/18 23:24


   Last Admin: 18 16:56 Dose:  1 tab


Allopurinol (Zyloprim)  100 mg PO DAILY [facility]


   Stop: 05/15/18 23:24


   Last Admin: 18 08:58 Dose:  100 mg


Castor Oil/[place] Balsam/Trypsin (Venelex)  1 appl TP DAILY [facility]


   Stop: 05/15/18 23:24


   Last Admin: 18 10:59 Dose:  Not Given


Clonazepam (Klonopin)  1 mg PO BID [facility]


   PRN Reason: Protocol


   Stop: 18 09:59


   Last Admin: 18 08:58 Dose:  1 mg


Donepezil HCl (Aricept)  5 mg PO DAILY [facility]


   Stop: 18 08:59


   Last Admin: 18 08:59 Dose:  5 mg


Doxycycline Hyclate (Vibramycin)  100 mg PO Q12HR [facility]


   Stop: 18 08:59


   Last Admin: 18 08:58 Dose:  100 mg


Famotidine (Pepcid)  20 mg PO BID [facility]


   Stop: 05/15/18 23:24


   Last Admin: 18 08:57 Dose:  20 mg


Fluconazole (Diflucan)  100 mg PO DAILY [facility]


   Stop: 05/15/18 23:24


   Last Admin: 18 08:58 Dose:  100 mg


Heparin Sodium (Porcine) (Heparin)  5,000 units SUBQ Q12HR [facility]


   PRN Reason: Protocol


   Stop: 05/15/18 23:24


   Last Admin: 18 10:59 Dose:  Not Given


Vancomycin HCl 1 gm/ Sodium (Chloride)  250 mls @ 165 mls/hr IV Q12H [facility]


   Stop: 18 13:59


   Last Admin: 18 03:33 Dose:  165 mls/hr


Insulin Aspart (Novolog Insulin Sliding Scale)  0 units SUBQ ACHS [facility]


   PRN Reason: Protocol


   Stop: 18 07:29


   Last Admin: 18 10:59 Dose:  Not Given


Insulin Detemir (Levemir Insulin)  18 units SUBQ DAILY [facility]


   PRN Reason: Protocol


   Stop: 05/15/18 23:24


   Last Admin: 18 10:59 Dose:  Not Given


Magnesium Hydroxide (Milk Of Magnesia)  30 ml PO HS PRN


   PRN Reason: Constipation


   Stop: 05/15/18 23:24


Metformin HCl (Glucophage)  1,000 mg PO DAILY [facility]


   Stop: 18 09:59


   Last Admin: 18 08:58 Dose:  1,000 mg


Metoprolol Succinate (Toprol Xl)  25 mg PO DAILY [facility]


   Stop: 05/15/18 23:24


   Last Admin: 18 08:58 Dose:  25 mg


Miscellaneous (Vte Chemical Prophylaxis Screen/ Admission)  1 ea  PRN PRN


   PRN Reason: PROTOCOL


   Stop: 18 10:29


Miscellaneous (Vancomycin Iv Per Pharmacy)  1 ea  PRN PRN


   PRN Reason: PROTOCOL


   Stop: 18 16:10


Olanzapine (Zyprexa Zydis)  10 mg PO BID [facility]


   PRN Reason: Protocol


   Stop: 18 07:53


   Last Admin: 18 08:57 Dose:  10 mg


Zolpidem Tartrate (Ambien)  5 mg PO HS PRN


   PRN Reason: insomnia


   Stop: 05/15/18 23:24


   Last Admin: 18 19:55 Dose:  5 mg








General: Alert, No acute distress


HEENT: Atraumatic, PERRLA, EOMI


Neck: Supple, JVD, +2 carotid pulse wo bruit


Cardiovascular: Regular rate, Normal S1, Normal S2


Lungs: Clear to auscultation


Abdomen: Bowel sounds, Soft


Extremities: no Clubbing, no Cyanosis, no Edema


Neurological: Sensation intact


Skin: no Rash


Psych/Mental Status: Other (psychosis)





- Procedures


Procedures: 


 Procedures











Procedure Code Date


 


KEV MUSC/FASCIA 20 SQ CM/< 18662 18


 


EXCISION OF RIGHT HIP MUSCLE, OPEN APPROACH [id number] 18


 


INTRODUCTION OF SERUM/TOX/VACCINE INTO MUSCLE, PERC APPROACH [id number] 18














Assessment/Plan





- Assessment


Assessment: 





S/P BRYANT


S/P electrolyte imbalance


Acute decomp of Psychosis


Type 2 DM


MRSA, Yeast UTI








- Plan


Plan: 


Lab - Result Diagrams





 18 06:48 





Current Medications





Acetaminophen (Tylenol)  650 mg PO Q6H PRN


   PRN Reason: mild to moderate pain


   Stop: 05/15/18 23:24


Acetaminophen/Hydrocodone Bitart (Norco 5mg/325mg)  1 tab PO Q6H PRN


   PRN Reason: Pain (Moderate)


   Stop: 05/15/18 23:24


Allopurinol (Zyloprim)  100 mg PO DAILY [facility]


   Stop: 05/15/18 23:24


   Last Admin: 18 10:54 Dose:  100 mg


Castor Oil/[place] Balsam/Trypsin (Venelex)  1 appl TP DAILY [facility]


   Stop: 05/15/18 23:24


   Last Admin: 18 11:02 Dose:  1 appl


Clonazepam (Klonopin)  1 mg PO BID [facility]


   PRN Reason: Protocol


   Stop: 18 09:59


Famotidine (Pepcid)  20 mg PO BID [facility]


   Stop: 05/15/18 23:24


   Last Admin: 18 10:54 Dose:  20 mg


Fluconazole (Diflucan)  100 mg PO DAILY [facility]


   Stop: 05/15/18 23:24


   Last Admin: 18 11:02 Dose:  100 mg


Heparin Sodium (Porcine) (Heparin)  5,000 units SUBQ Q12HR [facility]


   PRN Reason: Protocol


   Stop: 05/15/18 23:24


   Last Admin: 18 10:59 Dose:  5,000 units


Levofloxacin (Levaquin Pb)  500 mg in 100 mls @ 100 mls/hr IV Q24H [facility]


   Stop: 18 11:59


Insulin Aspart (Novolog Insulin Sliding Scale)  0 units SUBQ ACHS [facility]


   PRN Reason: Protocol


   Stop: 05/15/18 23:24


   Last Admin: 18 10:45 Dose:  2 units


Insulin Aspart (Novolog Insulin Sliding Scale)  0 units SUBQ ACHS [facility]


   PRN Reason: Protocol


   Stop: 18 07:29


Insulin Detemir (Levemir Insulin)  18 units SUBQ DAILY [facility]


   PRN Reason: Protocol


   Stop: 05/15/18 23:24


   Last Admin: 18 10:56 Dose:  18 units


Lorazepam (Ativan)  0.5 mg PO Q6HR PRN; Protocol


   PRN Reason: agitation 


   Stop: 05/15/18 23:24


Magnesium Hydroxide (Milk Of Magnesia)  30 ml PO HS PRN


   PRN Reason: Constipation


   Stop: 05/15/18 23:24


Metformin HCl (Glucophage)  1,000 mg PO DAILY [facility]


   Stop: 18 09:59


Metoprolol Succinate (Toprol Xl)  25 mg PO DAILY [facility]


   Stop: 05/15/18 23:24


   Last Admin: 18 10:55 Dose:  25 mg


Miscellaneous (Vte Chemical Prophylaxis Screen/ Admission)  1 ea [facility] PRN PRN


   PRN Reason: PROTOCOL


   Stop: 18 10:29


Olanzapine (Zyprexa Zydis)  5 mg PO DAILY [facility]


   PRN Reason: Protocol


   Stop: 18 09:59


Zolpidem Tartrate (Ambien)  5 mg PO HS PRN


   PRN Reason: insomnia


   Stop: 05/15/18 23:24





Lab - Result Diagrams





 18 05:00 





 





kidney fnc remain stable


encourage po intake


psych meds


start Vanco W/ Diflucan


awaiting placement








Nutritional Asmnt/Malnutr-PDOC





- Dietary Evaluation


Malnutrition Findings (Please click <Entered> for more info): 








Nutritional Asmnt/Malnutrition                             Start:  18 16:

25


Text:                                                      Status: Complete    

  


Freq:                                                                          

  


 Document     18 16:25  MAGEN  (Rec: 18 16:31  MAGEN  NAPOLEON-[email or web address])


 Nutritional Asmnt/Malnutrition


     Patient General Information


      Nutritional Screening                      Moderate Risk


      Diagnosis                                  dehydration


      Pertinent Medical Hx/Surgical Hx           DM, dementia, depression,


                                                 psychosis, schizophrenia,


                                                 chronic renal insuff


      Subjective Information                     Pt is agitated, on 1:1 sitter.


                                                 Per EMR, PO intake 25-75%,


                                                 avg 50%.


      Current Diet Order/ Nutrition Support      pureed, CCHO 60gm


      Pertinent Medications                      novolov, levemir, glucophage


      Pertinent Labs                             3/19- -316


     Nutritional Hx/Data


      Height                                     1.8 m


      Height (Calculated Centimeters)            180.3


      Current Weight (lbs)                       77.111 kg


      Weight (Calculated Kilograms)              77.1


      Weight (Calculated Grams)                  27456.7


      Ideal Body Weight                          172


      Body Mass Index (BMI)                      23.7


      Weight Status                              Approriate


     GI Symptoms


      GI Symptoms                                None


      Last BM                                    3/19


      Difficult in:                              None


      Skin Integrity/Comment:                    pressure ulcer, decubitus


      Current %PO                                Fair (50-74%)


     Estimated Nutritional Goals


      BEE in Kcals:                              Using Current wt


      Calories/Kcals/Kg                          25-30


      Kcals Calculated                           0344-5448


      Protein:                                   Using Current wt


      Protein g/k-1.2


      Protein Calculated                         77-92


      Fluid: ml                                  1925-2310ml (1ml/kcal)


     Nutritional Problem


      1. Problem


       Problem                                   altered nutrition related lab


                                                 values


       Etiology                                  hx of DM


       Signs/Symptoms:                           glucose 169, -316


     Malnutrition Alert


      Protein-Calorie Malnutrition               N/A


      Is there a minimum of two criteria         No


       selected?                                 


       Query Text:Check all the applicable       


       criteria. A minimum of two criteria are   


       recommended for diagnosis of either       


       severe or non-severe malnutrition.        


     Intervention/Recommendation


      Comments                                   1. Continue with current diet


                                                 as ordered.


                                                 2. Monitor PO intake, wt, labs


                                                 and skin integrity


                                                 3. F/U as moderate risk in 3-5


                                                 days, 3/24-3/26


     Expected Outcomes/Goals


      Expected Outcomes/Goals                    1. PO intake to meet at least


                                                 75% of nutritional needs.


                                                 2. Wt stability, skin to


                                                 remain intact, labs to


                                                 approach WNL.

## 2018-03-28 NOTE — GENERAL PROGRESS NOTE
Subjective





- Review of Systems


Service Date: 18


Subjective: 





Patient resting comfortably in bed. no acute distress. In good spirits today. 

pleasant. cooperative today. eating well





Objective





- Results


Result Diagrams: 


 18 05:00


Recent Labs: 


 Laboratory Last Values











PT  9.5 SECONDS (9.5-11.5)   18  21:35    


 


INR  0.91  (0.5-1.4)   18  21:35    


 


Sodium  134 mEq/L (136-145)  L  18  05:00    


 


Potassium  4.4 mEq/L (3.5-5.1)   18  05:00    


 


Chloride  99 mEq/L ()   18  05:00    


 


Carbon Dioxide  25.3 mEq/L (21.0-31.0)   18  05:00    


 


Anion Gap  14.1  (7.0-16.0)   18  05:00    


 


BUN  9 mg/dL (7-25)   18  05:00    


 


Creatinine  0.9 mg/dL (0.7-1.3)   18  05:00    


 


Est GFR ( Amer)  TNP   18  05:00    


 


Est GFR (Non-Af Amer)  TNP   18  05:00    


 


BUN/Creatinine Ratio  10.0   18  05:00    


 


Glucose  240 mg/dL ()  H  18  05:00    


 


POC Glucose  333 MG/DL (70 - 105)  H  18  21:45    


 


Calcium  9.3 mg/dL (8.6-10.3)   18  05:00    


 


Urine Source  RAVI PORT   18  21:30    


 


Urine Color  YELLOW   18  21:30    


 


Urine Clarity  HAZY  (CLEAR)   18  21:30    


 


Urine pH  5.5  (4.6 - 8.0)   18  21:30    


 


Ur Specific Gravity  1.010  (1.005-1.030)   18  21:30    


 


Urine Protein  NEGATIVE mg/dL (NEGATIVE)   18  21:30    


 


Urine Glucose (UA)  >=1000 mg/dL (NEGATIVE)  H  18  21:30    


 


Urine Ketones  NEGATIVE mg/dL (NEGATIVE)   18  21:30    


 


Urine Blood  LARGE  (NEGATIVE)  H  18  21:30    


 


Urine Nitrate  NEGATIVE  (NEGATIVE)   18  21:30    


 


Urine Bilirubin  NEGATIVE  (NEGATIVE)   18  21:30    


 


Urine Urobilinogen  0.2 E.U./dL (0.2 - 1.0)   18  21:30    


 


Ur Leukocyte Esterase  SMALL  (NEGATIVE)  H  18  21:30    


 


Urine RBC  10-25 /hpf (0-5)  H  18  21:30    


 


Urine WBC  2-5 /hpf (0-5)   18  21:30    


 


Ur Epithelial Cells  OCCASIONAL /lpf (FEW)   18  21:30    


 


Urine Bacteria  FEW /hpf (NONE SEEN)   18  21:30    


 


Urine Yeast  FEW /hpf (NONE SEEN)  H  18  21:30    


 


Vancomycin Trough  13.4 ug/mL (10-20)   18  13:30    














- Physical Exam


Vitals and I&O: 


 Vital Signs











Temp  98 F   18 04:00


 


Pulse  88   18 04:00


 


Resp  18   18 04:00


 


BP  112/80   18 04:00


 


Pulse Ox  96   18 04:00








 Intake & Output











 18





 18:59 06:59 18:59


 


Intake Total 750 100 


 


Output Total 900 1150 


 


Balance -150 -1050 


 


Weight (lbs) 77.111 kg 77.111 kg 


 


Intake:   


 


  Intake, IV Amount 250  


 


    Vancomycin HCl 1 gm In 250  





    Sodium Chloride 0.9% 250   





    ml @ 165 mls/hr IV Q12H   





    [facility] Rx#:[id number]   


 


  Oral 500 100 


 


Output:   


 


  Urine 900 1150 


 


Other:   


 


  # Bowel Movements 0  


 


  Stool Characteristics  Soft 





  Formed 


 


  Weight Source Bedscale Bedscale 











Active Medications: 


Current Medications





Acetaminophen (Tylenol)  650 mg PO Q6H PRN


   PRN Reason: mild to moderate pain


   Stop: 05/15/18 23:24


Acetaminophen/Hydrocodone Bitart (Norco 5mg/325mg)  1 tab PO Q6H PRN


   PRN Reason: Pain (Moderate)


   Stop: 05/15/18 23:24


   Last Admin: 18 16:56 Dose:  1 tab


Allopurinol (Zyloprim)  100 mg PO DAILY [facility]


   Stop: 05/15/18 23:24


   Last Admin: 18 08:54 Dose:  100 mg


Castor Oil/Brazilian Balsam/Trypsin (Venelex)  1 appl TP DAILY [facility]


   Stop: 05/15/18 23:24


   Last Admin: 18 08:56 Dose:  1 appl


Clonazepam (Klonopin)  1 mg PO BID [facility]


   PRN Reason: Protocol


   Stop: 18 09:59


   Last Admin: 18 16:57 Dose:  1 mg


Donepezil HCl (Aricept)  5 mg PO DAILY [facility]


   Stop: 18 08:59


   Last Admin: 18 08:55 Dose:  5 mg


Doxycycline Hyclate (Vibramycin)  100 mg PO Q12HR [facility]


   Stop: 18 08:59


Famotidine (Pepcid)  20 mg PO BID [facility]


   Stop: 05/15/18 23:24


   Last Admin: 18 16:56 Dose:  20 mg


Fluconazole (Diflucan)  100 mg PO DAILY [facility]


   Stop: 05/15/18 23:24


   Last Admin: 18 08:54 Dose:  100 mg


Heparin Sodium (Porcine) (Heparin)  5,000 units SUBQ Q12HR [facility]


   PRN Reason: Protocol


   Stop: 05/15/18 23:24


   Last Admin: 18 21:41 Dose:  5,000 units


Vancomycin HCl 1 gm/ Sodium (Chloride)  250 mls @ 165 mls/hr IV Q12H [facility]


   Stop: 18 13:59


   Last Admin: 18 03:33 Dose:  165 mls/hr


Insulin Aspart (Novolog Insulin Sliding Scale)  0 units SUBQ ACHS [facility]


   PRN Reason: Protocol


   Stop: 18 07:29


   Last Admin: 18 21:57 Dose:  8 units


Insulin Detemir (Levemir Insulin)  18 units SUBQ DAILY [facility]


   PRN Reason: Protocol


   Stop: 05/15/18 23:24


   Last Admin: 18 08:57 Dose:  Not Given


Magnesium Hydroxide (Milk Of Magnesia)  30 ml PO  PRN


   PRN Reason: Constipation


   Stop: 05/15/18 23:24


Metformin HCl (Glucophage)  1,000 mg PO DAILY [facility]


   Stop: 18 09:59


   Last Admin: 18 09:06 Dose:  1,000 mg


Metoprolol Succinate (Toprol Xl)  25 mg PO DAILY [facility]


   Stop: 05/15/18 23:24


   Last Admin: 18 09:05 Dose:  25 mg


Miscellaneous (Vte Chemical Prophylaxis Screen/ Admission)  1 ea  PRN PRN


   PRN Reason: PROTOCOL


   Stop: 18 10:29


Miscellaneous (Vancomycin Iv Per Pharmacy)  1 ea  PRN PRN


   PRN Reason: PROTOCOL


   Stop: 18 16:10


Olanzapine (Zyprexa Zydis)  10 mg PO BID [facility]


   PRN Reason: Protocol


   Stop: 18 07:53


   Last Admin: 18 16:56 Dose:  10 mg


Zolpidem Tartrate (Ambien)  5 mg PO HS PRN


   PRN Reason: insomnia


   Stop: 05/15/18 23:24


   Last Admin: 18 19:55 Dose:  5 mg








General: Alert, No acute distress


HEENT: Atraumatic, PERRLA, EOMI


Neck: Supple, JVD, +2 carotid pulse wo bruit


Cardiovascular: Regular rate, Normal S1, Normal S2


Lungs: Clear to auscultation


Abdomen: Bowel sounds, Soft


Extremities: no Clubbing, no Cyanosis, no Edema


Neurological: Sensation intact


Skin: no Rash


Psych/Mental Status: Other (psychosis)





- Procedures


Procedures: 


 Procedures











Procedure Code Date


 


KEV MUSC/FASCIA 20 SQ CM/< 99671 18


 


EXCISION OF RIGHT HIP MUSCLE, OPEN APPROACH [id number] 18


 


INTRODUCTION OF SERUM/TOX/VACCINE INTO MUSCLE, PERC APPROACH [id number] 18














Assessment/Plan





- Assessment


Assessment: 





psychosis


dementia


Alzheimer's dementia


diabetes mellitus not controlled. 


s/p wound debridement of sacral decubiti


UTI


hyperglycemia








- Plan


Plan: 





continue current medications.


will add doxycyline PO


for hospice eval





Nutritional Asmnt/Malnutr-PDOC





- Dietary Evaluation


Malnutrition Findings (Please click <Entered> for more info): 








Nutritional Asmnt/Malnutrition                             Start:  18 16:

25


Text:                                                      Status: Complete    

  


Freq:                                                                          

  


 Document     18 16:25  LCPAT  (Rec: 18 16:31  LCH[facility]  [facility])


 Nutritional Asmnt/Malnutrition


     Patient General Information


      Nutritional Screening                      Moderate Risk


      Diagnosis                                  dehydration


      Pertinent Medical Hx/Surgical Hx           DM, dementia, depression,


                                                 psychosis, schizophrenia,


                                                 chronic renal insuff


      Subjective Information                     Pt is agitated, on 1:1 sitter.


                                                 Per EMR, PO intake 25-75%,


                                                 avg 50%.


      Current Diet Order/ Nutrition Support      pureed, CCHO 60gm


      Pertinent Medications                      novolov, levemir, glucophage


      Pertinent Labs                             3/19- -316


     Nutritional Hx/Data


      Height                                     1.8 m


      Height (Calculated Centimeters)            180.3


      Current Weight (lbs)                       77.111 kg


      Weight (Calculated Kilograms)              77.1


      Weight (Calculated Grams)                  79293.7


      Ideal Body Weight                          172


      Body Mass Index (BMI)                      23.7


      Weight Status                              Approriate


     GI Symptoms


      GI Symptoms                                None


      Last BM                                    3/19


      Difficult in:                              None


      Skin Integrity/Comment:                    pressure ulcer, decubitus


      Current %PO                                Fair (50-74%)


     Estimated Nutritional Goals


      BEE in Kcals:                              Using Current wt


      Calories/Kcals/Kg                          25-30


      Kcals Calculated                           9081-4049


      Protein:                                   Using Current wt


      Protein g/k-1.2


      Protein Calculated                         77-92


      Fluid: ml                                  1925-2310ml (1ml/kcal)


     Nutritional Problem


      1. Problem


       Problem                                   altered nutrition related lab


                                                 values


       Etiology                                  hx of DM


       Signs/Symptoms:                           glucose 169, -316


     Malnutrition Alert


      Protein-Calorie Malnutrition               N/A


      Is there a minimum of two criteria         No


       selected?                                 


       Query Text:Check all the applicable       


       criteria. A minimum of two criteria are   


       recommended for diagnosis of either       


       severe or non-severe malnutrition.        


     Intervention/Recommendation


      Comments                                   1. Continue with current diet


                                                 as ordered.


                                                 2. Monitor PO intake, wt, labs


                                                 and skin integrity


                                                 3. F/U as moderate risk in 3-5


                                                 days, 3/24-3/26


     Expected Outcomes/Goals


      Expected Outcomes/Goals                    1. PO intake to meet at least


                                                 75% of nutritional needs.


                                                 2. Wt stability, skin to


                                                 remain intact, labs to


                                                 approach WNL.

## 2018-03-29 LAB
ANION GAP SERPL CALC-SCNC: 13.7 MMOL/L (ref 7–16)
BASOPHILS # BLD AUTO: 0 TH/CUMM (ref 0–0.2)
BASOPHILS NFR BLD AUTO: 0.4 % (ref 0–2)
BUN SERPL-MCNC: 10 MG/DL (ref 7–25)
CALCIUM SERPL-MCNC: 8.7 MG/DL (ref 8.6–10.3)
CHLORIDE SERPL-SCNC: 99 MEQ/L (ref 98–107)
CO2 SERPL-SCNC: 23.2 MEQ/L (ref 21–31)
CREAT SERPL-MCNC: 0.9 MG/DL (ref 0.7–1.3)
EOSINOPHIL # BLD AUTO: 0.3 TH/CMM (ref 0.1–0.4)
EOSINOPHIL NFR BLD AUTO: 2.8 % (ref 0–5)
ERYTHROCYTE [DISTWIDTH] IN BLOOD BY AUTOMATED COUNT: 16.5 % (ref 11.5–20)
GLUCOSE SERPL-MCNC: 256 MG/DL (ref 70–105)
HBA1C MFR BLD: 8.8 % (ref 4–6)
HCT VFR BLD CALC: 37.9 % (ref 41–60)
HGB BLD-MCNC: 12 G/DL (ref 4–35)
HGB BLD-MCNC: 12.5 GM/DL (ref 12–16)
LYMPHOCYTE AB SER FC-ACNC: 2.7 TH/CMM (ref 1.5–3)
LYMPHOCYTES NFR BLD AUTO: 26.9 % (ref 20–50)
MCH RBC QN AUTO: 29.9 PG (ref 27–31)
MCHC RBC AUTO-ENTMCNC: 33 PG (ref 28–36)
MCV RBC AUTO: 90.6 FL (ref 80–99)
MONOCYTES # BLD AUTO: 0.5 TH/CMM (ref 0.3–1)
MONOCYTES NFR BLD AUTO: 5.3 % (ref 2–10)
NEUTROPHILS # BLD: 6.7 TH/CMM (ref 1.8–8)
NEUTROPHILS NFR BLD AUTO: 64.6 % (ref 40–80)
PLATELET # BLD: 283 TH/CMM (ref 150–400)
PMV BLD AUTO: 8 FL
POTASSIUM SERPL-SCNC: 3.9 MEQ/L (ref 3.5–5.1)
RBC # BLD AUTO: 4.18 MIL/CMM (ref 3.8–5.8)
SODIUM SERPL-SCNC: 132 MEQ/L (ref 136–145)
WBC # BLD AUTO: 10.2 TH/CMM (ref 4.8–10.8)

## 2018-03-29 RX ADMIN — INSULIN ASPART SCH: 100 INJECTION, SOLUTION INTRAVENOUS; SUBCUTANEOUS at 12:00

## 2018-03-29 RX ADMIN — CASTOR OIL AND BALSAM, PERU SCH: 788; 87 OINTMENT TOPICAL at 11:00

## 2018-03-29 RX ADMIN — OLANZAPINE SCH: 5 TABLET, ORALLY DISINTEGRATING ORAL at 11:00

## 2018-03-29 RX ADMIN — INSULIN ASPART SCH: 100 INJECTION, SOLUTION INTRAVENOUS; SUBCUTANEOUS at 21:36

## 2018-03-29 RX ADMIN — INSULIN ASPART SCH UNITS: 100 INJECTION, SOLUTION INTRAVENOUS; SUBCUTANEOUS at 08:47

## 2018-03-29 RX ADMIN — INSULIN ASPART SCH: 100 INJECTION, SOLUTION INTRAVENOUS; SUBCUTANEOUS at 18:27

## 2018-03-29 RX ADMIN — INSULIN DETEMIR SCH UNITS: 100 INJECTION, SOLUTION SUBCUTANEOUS at 08:47

## 2018-03-29 RX ADMIN — INSULIN ASPART SCH UNITS: 100 INJECTION, SOLUTION INTRAVENOUS; SUBCUTANEOUS at 08:30

## 2018-03-29 RX ADMIN — OLANZAPINE SCH MG: 5 TABLET, ORALLY DISINTEGRATING ORAL at 18:00

## 2018-03-29 NOTE — PROGRESS NOTES
DATE:  



SUBJECTIVE:  Chart reviewed and the patient interviewed.  Also discussed the

patient's condition with the staff and reviewed records and labs.  The patient

still has episodes of agitation and irritability.  Also, still needs lots of

redirections.  The patient also still was at times trying to get off bed. 

Otherwise, the patient seems to be more cooperative and more compliant with

taking his medications with no side effects of medications.



ASSESSMENT:  The patient is still confused and agitated.



TREATMENT PLAN:  We will increase Aricept to 10 mg every day with.  Also,

continue to monitor his psychotropic medications.  Also, we will continue to

work on discharge plans and placement issue.  The patient also continued to take

Klonopin and Aricept increased to 10 mg every day.  Also, Zyprexa continued to

be 10 mg twice a day.





DD: 03/29/2018 08:02

DT: 03/29/2018 10:52

JOB# [id number]  [id number]

## 2018-03-29 NOTE — INFECTIOUS DISEASE PROG NOTE
Infectious Disease Subjective





- Review of Systems


Service Date: 18


Subjective: 





No fever





Infectious Disease Objective





- Results


Result Diagrams: 


 18 07:00





 18 07:00


Recent Labs: 


 Laboratory Last Values











WBC  10.2 Th/cmm (4.8-10.8)   18  07:00    


 


RBC  4.18 Mil/cmm (3.80-5.80)   18  07:00    


 


Hgb  12.5 gm/dL (12-16)   18  07:00    


 


Hct  37.9 % (41.0-60)  L  18  07:00    


 


MCV  90.6 fl (80-99)   18  07:00    


 


MCH  29.9 pg (27.0-31.0)   18  07:00    


 


MCHC Differential  33.0 pg (28.0-36.0)   18  07:00    


 


RDW  16.5 % (11.5-20.0)   18  07:00    


 


Plt Count  283 Th/cmm (150-400)   18  07:00    


 


MPV  8.0 fl  18  07:00    


 


Neutrophils %  64.6 % (40.0-80.0)   18  07:00    


 


Lymphocytes %  26.9 % (20.0-50.0)   18  07:00    


 


Monocytes %  5.3 % (2.0-10.0)   18  07:00    


 


Eosinophils %  2.8 % (0.0-5.0)   18  07:00    


 


Basophils %  0.4 % (0.0-2.0)   18  07:00    


 


PT  9.5 SECONDS (9.5-11.5)   18  21:35    


 


INR  0.91  (0.5-1.4)   18  21:35    


 


Sodium  132 mEq/L (136-145)  L  18  07:00    


 


Potassium  3.9 mEq/L (3.5-5.1)   18  07:00    


 


Chloride  99 mEq/L ()   18  07:00    


 


Carbon Dioxide  23.2 mEq/L (21.0-31.0)   18  07:00    


 


Anion Gap  13.7  (7.0-16.0)   18  07:00    


 


BUN  10 mg/dL (7-25)   18  07:00    


 


Creatinine  0.9 mg/dL (0.7-1.3)   18  07:00    


 


Est GFR ( Amer)  TNP   18  07:00    


 


Est GFR (Non-Af Amer)  TNP   18  07:00    


 


BUN/Creatinine Ratio  11.1   18  07:00    


 


Glucose  256 mg/dL ()  H  18  07:00    


 


POC Glucose  265 MG/DL (70 - 105)  H  18  06:18    


 


Calcium  8.7 mg/dL (8.6-10.3)   18  07:00    


 


Urine Source  RAVI PORT   18  21:30    


 


Urine Color  YELLOW   18  21:30    


 


Urine Clarity  HAZY  (CLEAR)   18  21:30    


 


Urine pH  5.5  (4.6 - 8.0)   18  21:30    


 


Ur Specific Gravity  1.010  (1.005-1.030)   18  21:30    


 


Urine Protein  NEGATIVE mg/dL (NEGATIVE)   18  21:30    


 


Urine Glucose (UA)  >=1000 mg/dL (NEGATIVE)  H  18  21:30    


 


Urine Ketones  NEGATIVE mg/dL (NEGATIVE)   18  21:30    


 


Urine Blood  LARGE  (NEGATIVE)  H  18  21:30    


 


Urine Nitrate  NEGATIVE  (NEGATIVE)   18  21:30    


 


Urine Bilirubin  NEGATIVE  (NEGATIVE)   18  21:30    


 


Urine Urobilinogen  0.2 E.U./dL (0.2 - 1.0)   18  21:30    


 


Ur Leukocyte Esterase  SMALL  (NEGATIVE)  H  18  21:30    


 


Urine RBC  10-25 /hpf (0-5)  H  18  21:30    


 


Urine WBC  2-5 /hpf (0-5)   18  21:30    


 


Ur Epithelial Cells  OCCASIONAL /lpf (FEW)   18  21:30    


 


Urine Bacteria  FEW /hpf (NONE SEEN)   18  21:30    


 


Urine Yeast  FEW /hpf (NONE SEEN)  H  18  21:30    


 


Vancomycin Trough  13.4 ug/mL (10-20)   18  13:30    














- Physical Exam


Vitals and I&O: 


 Vital Signs











Temp  96.4 F   18 04:00


 


Pulse  70   18 04:00


 


Resp  18   18 08:00


 


BP  107/67   18 04:00


 


Pulse Ox  96   18 04:00








 Intake & Output











 18





 18:59 06:59 18:59


 


Intake Total 250 1150 


 


Output Total  2600 


 


Balance 250 -1450 


 


Weight (lbs)  77.111 kg 77.111 kg


 


Intake:   


 


  Intake, IV Amount 250  


 


    Vancomycin HCl 1 gm In 250  





    Sodium Chloride 0.9% 250   





    ml @ 165 mls/hr IV Q12H   





    [facility] Rx#:[id number]   


 


  Oral  900 


 


  Tube Feeding  250 


 


Output:   


 


  Urine  2600 


 


  Stool  0 


 


Other:   


 


  # Bowel Movements  0 


 


  Weight Source  Bedscale Bedscale











Active Medications: 


Current Medications





Acetaminophen (Tylenol)  650 mg PO Q6H PRN


   PRN Reason: mild to moderate pain


   Stop: 05/15/18 23:24


Acetaminophen/Hydrocodone Bitart (Norco 5mg/325mg)  1 tab PO Q6H PRN


   PRN Reason: Pain (Moderate)


   Stop: 05/15/18 23:24


   Last Admin: 18 16:56 Dose:  1 tab


Allopurinol (Zyloprim)  100 mg PO DAILY [facility]


   Stop: 05/15/18 23:24


   Last Admin: 18 08:58 Dose:  100 mg


Castor Oil/Iraqi Balsam/Trypsin (Venelex)  1 appl TP DAILY [facility]


   Stop: 05/15/18 23:24


   Last Admin: 18 10:59 Dose:  Not Given


Clonazepam (Klonopin)  1 mg PO BID [facility]


   PRN Reason: Protocol


   Stop: 18 09:59


   Last Admin: 18 16:33 Dose:  1 mg


Donepezil HCl (Aricept)  10 mg PO DAILY [facility]


   Stop: 18 08:00


Doxycycline Hyclate (Vibramycin)  100 mg PO Q12HR [facility]


   Stop: 18 08:59


   Last Admin: 18 21:47 Dose:  100 mg


Famotidine (Pepcid)  20 mg PO BID [facility]


   Stop: 05/15/18 23:24


   Last Admin: 18 16:34 Dose:  20 mg


Fluconazole (Diflucan)  100 mg PO DAILY [facility]


   Stop: 05/15/18 23:24


   Last Admin: 18 08:58 Dose:  100 mg


Heparin Sodium (Porcine) (Heparin)  5,000 units SUBQ Q12HR [facility]


   PRN Reason: Protocol


   Stop: 05/15/18 23:24


   Last Admin: 18 21:46 Dose:  5,000 units


Vancomycin HCl 1 gm/ Sodium (Chloride)  250 mls @ 165 mls/hr IV Q12H [facility]


   Stop: 18 13:59


   Last Admin: 18 03:06 Dose:  165 mls/hr


Insulin Aspart (Novolog Insulin Sliding Scale)  0 units SUBQ ACHS [facility]


   PRN Reason: Protocol


   Stop: 18 07:29


   Last Admin: 18 08:47 Dose:  6 units


Insulin Detemir (Levemir Insulin)  20 units SUBQ DAILY [facility]


   PRN Reason: Protocol


   Stop: 18 06:20


   Last Admin: 18 08:47 Dose:  20 units


Magnesium Hydroxide (Milk Of Magnesia)  30 ml PO HS PRN


   PRN Reason: Constipation


   Stop: 05/15/18 23:24


Metformin HCl (Glucophage)  1,000 mg PO DAILY [facility]


   Stop: 18 09:59


   Last Admin: 18 08:58 Dose:  1,000 mg


Metoprolol Succinate (Toprol Xl)  25 mg PO DAILY [facility]


   Stop: 05/15/18 23:24


   Last Admin: 18 08:58 Dose:  25 mg


Miscellaneous (Vte Chemical Prophylaxis Screen/ Admission)  1 ea [facility] PRN PRN


   PRN Reason: PROTOCOL


   Stop: 18 10:29


Miscellaneous (Vancomycin Iv Per Pharmacy)  1 ea [facility] PRN PRN


   PRN Reason: PROTOCOL


   Stop: 18 16:10


Olanzapine (Zyprexa Zydis)  10 mg PO BID [facility]


   PRN Reason: Protocol


   Stop: 18 07:53


   Last Admin: 18 16:33 Dose:  10 mg


Zolpidem Tartrate (Ambien)  5 mg PO HS PRN


   PRN Reason: insomnia


   Stop: 05/15/18 23:24


   Last Admin: 18 19:55 Dose:  5 mg








General: no acute distress, well developed, well nourished


HEENT: atraumatic, normocephalic, PERRLA, EOMI


Neck: supple, no thyromegaly


Cardiovascular: S1S2, no regular


Lungs: clear to auscultation bilaterally, clear to percussion


Abdomen: soft, no tender


Extremities: no cyanosis, no clubbing


Neurological: awake, alert, oriented


Skin: other (sacral wound.)





- Procedures


Procedures: 


 Procedures











Procedure Code Date


 


KEV MUSC/FASCIA 20 SQ CM/< 05138 18


 


EXCISION OF RIGHT HIP MUSCLE, OPEN APPROACH [id number] 18


 


INTRODUCTION OF SERUM/TOX/VACCINE INTO MUSCLE, PERC APPROACH [id number] 18














Infectious Disease Assmt/Plan





- Assessment


Assessment: 





1.  Sacral decubitus ulcer.


2.  UTI.  Treated


3.  Dementia.





- Plan


Plan: 





wound care.





Nutritional Asmnt/Malnutr-PDOC





- Dietary Evaluation


Malnutrition Findings (Please click <Entered> for more info): 








Nutritional Asmnt/Malnutrition                             Start:  18 16:

25


Text:                                                      Status: Complete    

  


Freq:                                                                          

  


 Document     18 16:25  [id number]  (Rec: 18 16:31  [facility]


 Nutritional Asmnt/Malnutrition


     Patient General Information


      Nutritional Screening                      Moderate Risk


      Diagnosis                                  dehydration


      Pertinent Medical Hx/Surgical Hx           DM, dementia, depression,


                                                 psychosis, schizophrenia,


                                                 chronic renal insuff


      Subjective Information                     Pt is agitated, on 1:1 sitter.


                                                 Per EMR, PO intake 25-75%,


                                                 avg 50%.


      Current Diet Order/ Nutrition Support      pureed, CCHO 60gm


      Pertinent Medications                      novolov, levemir, glucophage


      Pertinent Labs                             3/19- -316


     Nutritional Hx/Data


      Height                                     1.8 m


      Height (Calculated Centimeters)            180.3


      Current Weight (lbs)                       77.111 kg


      Weight (Calculated Kilograms)              77.1


      Weight (Calculated Grams)                  06451.7


      Ideal Body Weight                          172


      Body Mass Index (BMI)                      23.7


      Weight Status                              Approriate


     GI Symptoms


      GI Symptoms                                None


      Last BM                                    3/19


      Difficult in:                              None


      Skin Integrity/Comment:                    pressure ulcer, decubitus


      Current %PO                                Fair (50-74%)


     Estimated Nutritional Goals


      BEE in Kcals:                              Using Current wt


      Calories/Kcals/Kg                          25-30


      Kcals Calculated                           7736-2984


      Protein:                                   Using Current wt


      Protein g/k-1.2


      Protein Calculated                         77-92


      Fluid: ml                                  1925-2310ml (1ml/kcal)


     Nutritional Problem


      1. Problem


       Problem                                   altered nutrition related lab


                                                 values


       Etiology                                  hx of DM


       Signs/Symptoms:                           glucose 169, -316


     Malnutrition Alert


      Protein-Calorie Malnutrition               N/A


      Is there a minimum of two criteria         No


       selected?                                 


       Query Text:Check all the applicable       


       criteria. A minimum of two criteria are   


       recommended for diagnosis of either       


       severe or non-severe malnutrition.        


     Intervention/Recommendation


      Comments                                   1. Continue with current diet


                                                 as ordered.


                                                 2. Monitor PO intake, wt, labs


                                                 and skin integrity


                                                 3. F/U as moderate risk in 3-5


                                                 days, 3/24-3/26


     Expected Outcomes/Goals


      Expected Outcomes/Goals                    1. PO intake to meet at least


                                                 75% of nutritional needs.


                                                 2. Wt stability, skin to


                                                 remain intact, labs to


                                                 approach WNL.

## 2018-03-29 NOTE — GENERAL PROGRESS NOTE
Subjective





- Review of Systems


Service Date: 18


Subjective: 





lying in bed, more friendly, cooperative





Objective





- Results


Result Diagrams: 


 18 07:00





 18 07:00


Recent Labs: 


 Laboratory Last Values











WBC  10.2 Th/cmm (4.8-10.8)   18  07:00    


 


RBC  4.18 Mil/cmm (3.80-5.80)   18  07:00    


 


Hgb  12.5 gm/dL (12-16)   18  07:00    


 


Hct  37.9 % (41.0-60)  L  18  07:00    


 


MCV  90.6 fl (80-99)   18  07:00    


 


MCH  29.9 pg (27.0-31.0)   18  07:00    


 


MCHC Differential  33.0 pg (28.0-36.0)   18  07:00    


 


RDW  16.5 % (11.5-20.0)   18  07:00    


 


Plt Count  283 Th/cmm (150-400)   18  07:00    


 


MPV  8.0 fl  18  07:00    


 


Neutrophils %  64.6 % (40.0-80.0)   18  07:00    


 


Lymphocytes %  26.9 % (20.0-50.0)   18  07:00    


 


Monocytes %  5.3 % (2.0-10.0)   18  07:00    


 


Eosinophils %  2.8 % (0.0-5.0)   18  07:00    


 


Basophils %  0.4 % (0.0-2.0)   18  07:00    


 


PT  9.5 SECONDS (9.5-11.5)   18  21:35    


 


INR  0.91  (0.5-1.4)   18  21:35    


 


Sodium  132 mEq/L (136-145)  L  18  07:00    


 


Potassium  3.9 mEq/L (3.5-5.1)   18  07:00    


 


Chloride  99 mEq/L ()   18  07:00    


 


Carbon Dioxide  23.2 mEq/L (21.0-31.0)   18  07:00    


 


Anion Gap  13.7  (7.0-16.0)   18  07:00    


 


BUN  10 mg/dL (7-25)   18  07:00    


 


Creatinine  0.9 mg/dL (0.7-1.3)   18  07:00    


 


Est GFR ( Amer)  TNP   18  07:00    


 


Est GFR (Non-Af Amer)  TNP   18  07:00    


 


BUN/Creatinine Ratio  11.1   18  07:00    


 


Glucose  256 mg/dL ()  H  18  07:00    


 


POC Glucose  265 MG/DL (70 - 105)  H  18  06:18    


 


Calcium  8.7 mg/dL (8.6-10.3)   18  07:00    


 


Urine Source  RAVI PORT   18  21:30    


 


Urine Color  YELLOW   18  21:30    


 


Urine Clarity  HAZY  (CLEAR)   18  21:30    


 


Urine pH  5.5  (4.6 - 8.0)   18  21:30    


 


Ur Specific Gravity  1.010  (1.005-1.030)   18  21:30    


 


Urine Protein  NEGATIVE mg/dL (NEGATIVE)   18  21:30    


 


Urine Glucose (UA)  >=1000 mg/dL (NEGATIVE)  H  18  21:30    


 


Urine Ketones  NEGATIVE mg/dL (NEGATIVE)   18  21:30    


 


Urine Blood  LARGE  (NEGATIVE)  H  18  21:30    


 


Urine Nitrate  NEGATIVE  (NEGATIVE)   18  21:30    


 


Urine Bilirubin  NEGATIVE  (NEGATIVE)   18  21:30    


 


Urine Urobilinogen  0.2 E.U./dL (0.2 - 1.0)   18  21:30    


 


Ur Leukocyte Esterase  SMALL  (NEGATIVE)  H  18  21:30    


 


Urine RBC  10-25 /hpf (0-5)  H  18  21:30    


 


Urine WBC  2-5 /hpf (0-5)   18  21:30    


 


Ur Epithelial Cells  OCCASIONAL /lpf (FEW)   18  21:30    


 


Urine Bacteria  FEW /hpf (NONE SEEN)   18  21:30    


 


Urine Yeast  FEW /hpf (NONE SEEN)  H  18  21:30    


 


Vancomycin Trough  13.4 ug/mL (10-20)   18  13:30    














- Physical Exam


Vitals and I&O: 


 Vital Signs











Temp  96.4 F   18 04:00


 


Pulse  70   18 04:00


 


Resp  18   18 08:00


 


BP  107/67   18 04:00


 


Pulse Ox  96   18 04:00








 Intake & Output











 18





 18:59 06:59 18:59


 


Intake Total 250 1150 


 


Output Total  2600 


 


Balance 250 -1450 


 


Weight (lbs)  77.111 kg 77.111 kg


 


Intake:   


 


  Intake, IV Amount 250  


 


    Vancomycin HCl 1 gm In 250  





    Sodium Chloride 0.9% 250   





    ml @ 165 mls/hr IV Q12H   





    [facility] Rx#:[id number]   


 


  Oral  900 


 


  Tube Feeding  250 


 


Output:   


 


  Urine  2600 


 


  Stool  0 


 


Other:   


 


  # Bowel Movements  0 


 


  Weight Source  Bedscale Bedscale











Active Medications: 


Current Medications





Acetaminophen (Tylenol)  650 mg PO Q6H PRN


   PRN Reason: mild to moderate pain


   Stop: 05/15/18 23:24


Acetaminophen/Hydrocodone Bitart (Norco 5mg/325mg)  1 tab PO Q6H PRN


   PRN Reason: Pain (Moderate)


   Stop: 05/15/18 23:24


   Last Admin: 18 16:56 Dose:  1 tab


Allopurinol (Zyloprim)  100 mg PO DAILY [facility]


   Stop: 05/15/18 23:24


   Last Admin: 18 08:58 Dose:  100 mg


Castor Oil/Tongan Balsam/Trypsin (Venelex)  1 appl TP DAILY [facility]


   Stop: 05/15/18 23:24


   Last Admin: 18 10:59 Dose:  Not Given


Clonazepam (Klonopin)  1 mg PO BID [facility]


   PRN Reason: Protocol


   Stop: 18 09:59


   Last Admin: 18 16:33 Dose:  1 mg


Donepezil HCl (Aricept)  10 mg PO DAILY [facility]


   Stop: 18 08:00


Doxycycline Hyclate (Vibramycin)  100 mg PO Q12HR [facility]


   Stop: 18 08:59


   Last Admin: 18 21:47 Dose:  100 mg


Famotidine (Pepcid)  20 mg PO BID [facility]


   Stop: 05/15/18 23:24


   Last Admin: 18 16:34 Dose:  20 mg


Fluconazole (Diflucan)  100 mg PO DAILY [facility]


   Stop: 05/15/18 23:24


   Last Admin: 18 08:58 Dose:  100 mg


Heparin Sodium (Porcine) (Heparin)  5,000 units SUBQ Q12HR [facility]


   PRN Reason: Protocol


   Stop: 05/15/18 23:24


   Last Admin: 18 21:46 Dose:  5,000 units


Vancomycin HCl 1 gm/ Sodium (Chloride)  250 mls @ 165 mls/hr IV Q12H [facility]


   Stop: 18 13:59


   Last Admin: 18 03:06 Dose:  165 mls/hr


Insulin Aspart (Novolog Insulin Sliding Scale)  0 units SUBQ ACHS [facility]


   PRN Reason: Protocol


   Stop: 18 07:29


   Last Admin: 18 08:47 Dose:  6 units


Insulin Detemir (Levemir Insulin)  20 units SUBQ DAILY [facility]


   PRN Reason: Protocol


   Stop: 18 06:20


   Last Admin: 18 08:47 Dose:  20 units


Magnesium Hydroxide (Milk Of Magnesia)  30 ml PO HS PRN


   PRN Reason: Constipation


   Stop: 05/15/18 23:24


Metformin HCl (Glucophage)  1,000 mg PO DAILY [facility]


   Stop: 18 09:59


   Last Admin: 18 08:58 Dose:  1,000 mg


Metoprolol Succinate (Toprol Xl)  25 mg PO DAILY [facility]


   Stop: 05/15/18 23:24


   Last Admin: 18 08:58 Dose:  25 mg


Miscellaneous (Vte Chemical Prophylaxis Screen/ Admission)  1 ea MC PRN PRN


   PRN Reason: PROTOCOL


   Stop: 18 10:29


Miscellaneous (Vancomycin Iv Per Pharmacy)  1 ea MC PRN PRN


   PRN Reason: PROTOCOL


   Stop: 18 16:10


Olanzapine (Zyprexa Zydis)  10 mg PO BID [facility]


   PRN Reason: Protocol


   Stop: 18 07:53


   Last Admin: 18 16:33 Dose:  10 mg


Zolpidem Tartrate (Ambien)  5 mg PO HS PRN


   PRN Reason: insomnia


   Stop: 05/15/18 23:24


   Last Admin: 18 19:55 Dose:  5 mg








General: Alert, No acute distress


HEENT: Atraumatic, PERRLA, EOMI


Neck: Supple, JVD, +2 carotid pulse wo bruit


Cardiovascular: Regular rate, Normal S1, Normal S2


Lungs: Clear to auscultation


Abdomen: Bowel sounds, Soft


Extremities: no Clubbing, no Cyanosis, no Edema


Neurological: Sensation intact


Skin: no Rash


Psych/Mental Status: Other (psychosis)





- Procedures


Procedures: 


 Procedures











Procedure Code Date


 


KEV MUSC/FASCIA 20 SQ CM/< 33249 18


 


EXCISION OF RIGHT HIP MUSCLE, OPEN APPROACH [id number] 18


 


INTRODUCTION OF SERUM/TOX/VACCINE INTO MUSCLE, PERC APPROACH [id number] 18














Assessment/Plan





- Assessment


Assessment: 





S/P BRYANT


S/P electrolyte imbalance


Acute decomp of Psychosis


Type 2 DM


MRSA, Yeast UTI








- Plan


Plan: 


Lab - Result Diagrams





 18 06:48 





Current Medications





Acetaminophen (Tylenol)  650 mg PO Q6H PRN


   PRN Reason: mild to moderate pain


   Stop: 05/15/18 23:24


Acetaminophen/Hydrocodone Bitart (Norco 5mg/325mg)  1 tab PO Q6H PRN


   PRN Reason: Pain (Moderate)


   Stop: 05/15/18 23:24


Allopurinol (Zyloprim)  100 mg PO DAILY [facility]


   Stop: 05/15/18 23:24


   Last Admin: 18 10:54 Dose:  100 mg


Castor Oil/Tongan Balsam/Trypsin (Venelex)  1 appl TP DAILY [facility]


   Stop: 05/15/18 23:24


   Last Admin: 18 11:02 Dose:  1 appl


Clonazepam (Klonopin)  1 mg PO BID [facility]


   PRN Reason: Protocol


   Stop: 18 09:59


Famotidine (Pepcid)  20 mg PO BID [facility]


   Stop: 05/15/18 23:24


   Last Admin: 18 10:54 Dose:  20 mg


Fluconazole (Diflucan)  100 mg PO DAILY [facility]


   Stop: 05/15/18 23:24


   Last Admin: 18 11:02 Dose:  100 mg


Heparin Sodium (Porcine) (Heparin)  5,000 units SUBQ Q12HR [facility]


   PRN Reason: Protocol


   Stop: 05/15/18 23:24


   Last Admin: 18 10:59 Dose:  5,000 units


Levofloxacin (Levaquin Pb)  500 mg in 100 mls @ 100 mls/hr IV Q24H [facility]


   Stop: 18 11:59


Insulin Aspart (Novolog Insulin Sliding Scale)  0 units SUBQ ACHS [facility]


   PRN Reason: Protocol


   Stop: 05/15/18 23:24


   Last Admin: 18 10:45 Dose:  2 units


Insulin Aspart (Novolog Insulin Sliding Scale)  0 units SUBQ ACHS [facility]


   PRN Reason: Protocol


   Stop: 18 07:29


Insulin Detemir (Levemir Insulin)  18 units SUBQ DAILY [facility]


   PRN Reason: Protocol


   Stop: 05/15/18 23:24


   Last Admin: 18 10:56 Dose:  18 units


Lorazepam (Ativan)  0.5 mg PO Q6HR PRN; Protocol


   PRN Reason: agitation 


   Stop: 05/15/18 23:24


Magnesium Hydroxide (Milk Of Magnesia)  30 ml PO HS PRN


   PRN Reason: Constipation


   Stop: 05/15/18 23:24


Metformin HCl (Glucophage)  1,000 mg PO DAILY [facility]


   Stop: 18 09:59


Metoprolol Succinate (Toprol Xl)  25 mg PO DAILY [facility]


   Stop: 05/15/18 23:24


   Last Admin: 18 10:55 Dose:  25 mg


Miscellaneous (Vte Chemical Prophylaxis Screen/ Admission)  1 ea MC PRN PRN


   PRN Reason: PROTOCOL


   Stop: 18 10:29


Olanzapine (Zyprexa Zydis)  5 mg PO DAILY [facility]


   PRN Reason: Protocol


   Stop: 18 09:59


Zolpidem Tartrate (Ambien)  5 mg PO HS PRN


   PRN Reason: insomnia


   Stop: 05/15/18 23:24





Lab - Result Diagrams





 18 05:00 





 





kidney fnc remain stable


encourage po intake


psych meds


start Vanco W/ Diflucan


awaiting placement








Nutritional Asmnt/Malnutr-PDOC





- Dietary Evaluation


Malnutrition Findings (Please click <Entered> for more info): 








Nutritional Asmnt/Malnutrition                             Start:  18 16:

25


Text:                                                      Status: Complete    

  


Freq:                                                                          

  


 Document     18 16:25  [id number]  (Rec: 18 16:31  [email or web address]  [id number])


 Nutritional Asmnt/Malnutrition


     Patient General Information


      Nutritional Screening                      Moderate Risk


      Diagnosis                                  dehydration


      Pertinent Medical Hx/Surgical Hx           DM, dementia, depression,


                                                 psychosis, schizophrenia,


                                                 chronic renal insuff


      Subjective Information                     Pt is agitated, on 1:1 sitter.


                                                 Per EMR, PO intake 25-75%,


                                                 avg 50%.


      Current Diet Order/ Nutrition Support      pureed, CCHO 60gm


      Pertinent Medications                      novolov, levemir, glucophage


      Pertinent Labs                             3/19- -316


     Nutritional Hx/Data


      Height                                     1.8 m


      Height (Calculated Centimeters)            180.3


      Current Weight (lbs)                       77.111 kg


      Weight (Calculated Kilograms)              77.1


      Weight (Calculated Grams)                  01697.7


      Ideal Body Weight                          172


      Body Mass Index (BMI)                      23.7


      Weight Status                              Approriate


     GI Symptoms


      GI Symptoms                                None


      Last BM                                    3/19


      Difficult in:                              None


      Skin Integrity/Comment:                    pressure ulcer, decubitus


      Current %PO                                Fair (50-74%)


     Estimated Nutritional Goals


      BEE in Kcals:                              Using Current wt


      Calories/Kcals/Kg                          25-30


      Kcals Calculated                           7230-2120


      Protein:                                   Using Current wt


      Protein g/k-1.2


      Protein Calculated                         77-92


      Fluid: ml                                  1925-2310ml (1ml/kcal)


     Nutritional Problem


      1. Problem


       Problem                                   altered nutrition related lab


                                                 values


       Etiology                                  hx of DM


       Signs/Symptoms:                           glucose 169, -316


     Malnutrition Alert


      Protein-Calorie Malnutrition               N/A


      Is there a minimum of two criteria         No


       selected?                                 


       Query Text:Check all the applicable       


       criteria. A minimum of two criteria are   


       recommended for diagnosis of either       


       severe or non-severe malnutrition.        


     Intervention/Recommendation


      Comments                                   1. Continue with current diet


                                                 as ordered.


                                                 2. Monitor PO intake, wt, labs


                                                 and skin integrity


                                                 3. F/U as moderate risk in 3-5


                                                 days, 3/24-3/26


     Expected Outcomes/Goals


      Expected Outcomes/Goals                    1. PO intake to meet at least


                                                 75% of nutritional needs.


                                                 2. Wt stability, skin to


                                                 remain intact, labs to


                                                 approach WNL.

## 2018-03-29 NOTE — GENERAL PROGRESS NOTE
Subjective





- Review of Systems


Service Date: 18


Subjective: 





Patient resting comfortably in bed. no acute distress. In good spirits today. 

pleasant. cooperative today. eating well.





Blood Sugars improving. Will adjust dosage.





Objective





- Results


Result Diagrams: 


 18 07:00





 18 05:00


Recent Labs: 


 Laboratory Last Values











WBC  10.2 Th/cmm (4.8-10.8)   18  07:00    


 


RBC  4.18 Mil/cmm (3.80-5.80)   18  07:00    


 


Hgb  12.5 gm/dL (12-16)   18  07:00    


 


Hct  37.9 % (41.0-60)  L  18  07:00    


 


MCV  90.6 fl (80-99)   18  07:00    


 


MCH  29.9 pg (27.0-31.0)   18  07:00    


 


MCHC Differential  33.0 pg (28.0-36.0)   18  07:00    


 


RDW  16.5 % (11.5-20.0)   18  07:00    


 


Plt Count  283 Th/cmm (150-400)   18  07:00    


 


MPV  8.0 fl  18  07:00    


 


Neutrophils %  64.6 % (40.0-80.0)   18  07:00    


 


Lymphocytes %  26.9 % (20.0-50.0)   18  07:00    


 


Monocytes %  5.3 % (2.0-10.0)   18  07:00    


 


Eosinophils %  2.8 % (0.0-5.0)   18  07:00    


 


Basophils %  0.4 % (0.0-2.0)   18  07:00    


 


PT  9.5 SECONDS (9.5-11.5)   18  21:35    


 


INR  0.91  (0.5-1.4)   18  21:35    


 


Sodium  134 mEq/L (136-145)  L  18  05:00    


 


Potassium  4.4 mEq/L (3.5-5.1)   18  05:00    


 


Chloride  99 mEq/L ()   18  05:00    


 


Carbon Dioxide  25.3 mEq/L (21.0-31.0)   18  05:00    


 


Anion Gap  14.1  (7.0-16.0)   18  05:00    


 


BUN  9 mg/dL (7-25)   18  05:00    


 


Creatinine  0.9 mg/dL (0.7-1.3)   18  05:00    


 


Est GFR ( Amer)  TNP   18  05:00    


 


Est GFR (Non-Af Amer)  TNP   18  05:00    


 


BUN/Creatinine Ratio  10.0   18  05:00    


 


Glucose  240 mg/dL ()  H  18  05:00    


 


POC Glucose  265 MG/DL (70 - 105)  H  18  06:18    


 


Calcium  9.3 mg/dL (8.6-10.3)   18  05:00    


 


Urine Source  RAVI PORT   18  21:30    


 


Urine Color  YELLOW   18  21:30    


 


Urine Clarity  HAZY  (CLEAR)   18  21:30    


 


Urine pH  5.5  (4.6 - 8.0)   18  21:30    


 


Ur Specific Gravity  1.010  (1.005-1.030)   18  21:30    


 


Urine Protein  NEGATIVE mg/dL (NEGATIVE)   18  21:30    


 


Urine Glucose (UA)  >=1000 mg/dL (NEGATIVE)  H  18  21:30    


 


Urine Ketones  NEGATIVE mg/dL (NEGATIVE)   18  21:30    


 


Urine Blood  LARGE  (NEGATIVE)  H  18  21:30    


 


Urine Nitrate  NEGATIVE  (NEGATIVE)   18  21:30    


 


Urine Bilirubin  NEGATIVE  (NEGATIVE)   18  21:30    


 


Urine Urobilinogen  0.2 E.U./dL (0.2 - 1.0)   18  21:30    


 


Ur Leukocyte Esterase  SMALL  (NEGATIVE)  H  18  21:30    


 


Urine RBC  10-25 /hpf (0-5)  H  18  21:30    


 


Urine WBC  2-5 /hpf (0-5)   18  21:30    


 


Ur Epithelial Cells  OCCASIONAL /lpf (FEW)   18  21:30    


 


Urine Bacteria  FEW /hpf (NONE SEEN)   18  21:30    


 


Urine Yeast  FEW /hpf (NONE SEEN)  H  18  21:30    


 


Vancomycin Trough  13.4 ug/mL (10-20)   18  13:30    














- Physical Exam


Vitals and I&O: 


 Vital Signs











Temp  96.4 F   18 04:00


 


Pulse  70   18 04:00


 


Resp  20   18 04:00


 


BP  107/67   18 04:00


 


Pulse Ox  96   18 04:00








 Intake & Output











 18





 18:59 06:59 18:59


 


Intake Total 250 1150 


 


Output Total  2600 


 


Balance 250 -1450 


 


Weight (lbs)  77.111 kg 


 


Intake:   


 


  Intake, IV Amount 250  


 


    Vancomycin HCl 1 gm In 250  





    Sodium Chloride 0.9% 250   





    ml @ 165 mls/hr IV Q12H   





    [facility] Rx#:[id number]   


 


  Oral  900 


 


  Tube Feeding  250 


 


Output:   


 


  Urine  2600 


 


  Stool  0 


 


Other:   


 


  # Bowel Movements  0 


 


  Weight Source  Bedscale 











Active Medications: 


Current Medications





Acetaminophen (Tylenol)  650 mg PO Q6H PRN


   PRN Reason: mild to moderate pain


   Stop: 05/15/18 23:24


Acetaminophen/Hydrocodone Bitart (Norco 5mg/325mg)  1 tab PO Q6H PRN


   PRN Reason: Pain (Moderate)


   Stop: 05/15/18 23:24


   Last Admin: 18 16:56 Dose:  1 tab


Allopurinol (Zyloprim)  100 mg PO DAILY [facility]


   Stop: 05/15/18 23:24


   Last Admin: 18 08:58 Dose:  100 mg


Castor Oil/Spanish Balsam/Trypsin (Venelex)  1 appl TP DAILY [facility]


   Stop: 05/15/18 23:24


   Last Admin: 18 10:59 Dose:  Not Given


Clonazepam (Klonopin)  1 mg PO BID [facility]


   PRN Reason: Protocol


   Stop: 18 09:59


   Last Admin: 18 16:33 Dose:  1 mg


Donepezil HCl (Aricept)  5 mg PO DAILY [facility]


   Stop: 18 08:59


   Last Admin: 18 08:59 Dose:  5 mg


Doxycycline Hyclate (Vibramycin)  100 mg PO Q12HR [facility]


   Stop: 18 08:59


   Last Admin: 18 21:47 Dose:  100 mg


Famotidine (Pepcid)  20 mg PO BID [facility]


   Stop: 05/15/18 23:24


   Last Admin: 18 16:34 Dose:  20 mg


Fluconazole (Diflucan)  100 mg PO DAILY [facility]


   Stop: 05/15/18 23:24


   Last Admin: 18 08:58 Dose:  100 mg


Heparin Sodium (Porcine) (Heparin)  5,000 units SUBQ Q12HR [facility]


   PRN Reason: Protocol


   Stop: 05/15/18 23:24


   Last Admin: 18 21:46 Dose:  5,000 units


Vancomycin HCl 1 gm/ Sodium (Chloride)  250 mls @ 165 mls/hr IV Q12H [facility]


   Stop: 18 13:59


   Last Admin: 18 03:06 Dose:  165 mls/hr


Insulin Aspart (Novolog Insulin Sliding Scale)  0 units SUBQ ACHS [facility]


   PRN Reason: Protocol


   Stop: 18 07:29


   Last Admin: 18 21:47 Dose:  12 units


Insulin Detemir (Levemir Insulin)  20 units SUBQ DAILY [facility]


   PRN Reason: Protocol


   Stop: 18 06:20


Magnesium Hydroxide (Milk Of Magnesia)  30 ml PO HS PRN


   PRN Reason: Constipation


   Stop: 05/15/18 23:24


Metformin HCl (Glucophage)  1,000 mg PO DAILY [facility]


   Stop: 18 09:59


   Last Admin: 18 08:58 Dose:  1,000 mg


Metoprolol Succinate (Toprol Xl)  25 mg PO DAILY [facility]


   Stop: 05/15/18 23:24


   Last Admin: 18 08:58 Dose:  25 mg


Miscellaneous (Vte Chemical Prophylaxis Screen/ Admission)  1 ea MC PRN PRN


   PRN Reason: PROTOCOL


   Stop: 18 10:29


Miscellaneous (Vancomycin Iv Per Pharmacy)  1 ea MC PRN PRN


   PRN Reason: PROTOCOL


   Stop: 18 16:10


Olanzapine (Zyprexa Zydis)  10 mg PO BID [facility]


   PRN Reason: Protocol


   Stop: 18 07:53


   Last Admin: 18 16:33 Dose:  10 mg


Zolpidem Tartrate (Ambien)  5 mg PO HS PRN


   PRN Reason: insomnia


   Stop: 05/15/18 23:24


   Last Admin: 18 19:55 Dose:  5 mg








General: Alert, No acute distress


HEENT: Atraumatic, PERRLA, EOMI


Neck: Supple, JVD, +2 carotid pulse wo bruit


Cardiovascular: Regular rate, Normal S1, Normal S2


Lungs: Clear to auscultation


Abdomen: Bowel sounds, Soft


Extremities: no Clubbing, no Cyanosis, no Edema


Neurological: Sensation intact


Skin: no Rash


Psych/Mental Status: Other (psychosis)





- Procedures


Procedures: 


 Procedures











Procedure Code Date


 


KEV MUSC/FASCIA 20 SQ CM/< 49953 18


 


EXCISION OF RIGHT HIP MUSCLE, OPEN APPROACH [id number] 18


 


INTRODUCTION OF SERUM/TOX/VACCINE INTO MUSCLE, PERC APPROACH [id number] 18














Assessment/Plan





- Assessment


Assessment: 





psychosis


dementia


Alzheimer's dementia


diabetes mellitus not controlled. 


s/p wound debridement of sacral decubiti


UTI


hyperglycemia








- Plan


Plan: 





continue current medications.


will add doxycyline PO


for hospice eval





Nutritional Asmnt/Malnutr-PDOC





- Dietary Evaluation


Malnutrition Findings (Please click <Entered> for more info): 








Nutritional Asmnt/Malnutrition                             Start:  18 16:

25


Text:                                                      Status: Complete    

  


Freq:                                                                          

  


 Document     18 16:25  LCHENG  (Rec: 18 16:31  LCHENG  [id number])


 Nutritional Asmnt/Malnutrition


     Patient General Information


      Nutritional Screening                      Moderate Risk


      Diagnosis                                  dehydration


      Pertinent Medical Hx/Surgical Hx           DM, dementia, depression,


                                                 psychosis, schizophrenia,


                                                 chronic renal insuff


      Subjective Information                     Pt is agitated, on 1:1 sitter.


                                                 Per EMR, PO intake 25-75%,


                                                 avg 50%.


      Current Diet Order/ Nutrition Support      pureed, CCHO 60gm


      Pertinent Medications                      novolov, levemir, glucophage


      Pertinent Labs                             3/19- -316


     Nutritional Hx/Data


      Height                                     1.8 m


      Height (Calculated Centimeters)            180.3


      Current Weight (lbs)                       77.111 kg


      Weight (Calculated Kilograms)              77.1


      Weight (Calculated Grams)                  88614.7


      Ideal Body Weight                          172


      Body Mass Index (BMI)                      23.7


      Weight Status                              Approriate


     GI Symptoms


      GI Symptoms                                None


      Last BM                                    3/19


      Difficult in:                              None


      Skin Integrity/Comment:                    pressure ulcer, decubitus


      Current %PO                                Fair (50-74%)


     Estimated Nutritional Goals


      BEE in Kcals:                              Using Current wt


      Calories/Kcals/Kg                          25-30


      Kcals Calculated                           3435-4988


      Protein:                                   Using Current wt


      Protein g/k-1.2


      Protein Calculated                         77-92


      Fluid: ml                                  1925-2310ml (1ml/kcal)


     Nutritional Problem


      1. Problem


       Problem                                   altered nutrition related lab


                                                 values


       Etiology                                  hx of DM


       Signs/Symptoms:                           glucose 169, -316


     Malnutrition Alert


      Protein-Calorie Malnutrition               N/A


      Is there a minimum of two criteria         No


       selected?                                 


       Query Text:Check all the applicable       


       criteria. A minimum of two criteria are   


       recommended for diagnosis of either       


       severe or non-severe malnutrition.        


     Intervention/Recommendation


      Comments                                   1. Continue with current diet


                                                 as ordered.


                                                 2. Monitor PO intake, wt, labs


                                                 and skin integrity


                                                 3. F/U as moderate risk in 3-5


                                                 days, 3/24-3/26


     Expected Outcomes/Goals


      Expected Outcomes/Goals                    1. PO intake to meet at least


                                                 75% of nutritional needs.


                                                 2. Wt stability, skin to


                                                 remain intact, labs to


                                                 approach WNL.

## 2018-03-30 RX ADMIN — OLANZAPINE SCH MG: 5 TABLET, ORALLY DISINTEGRATING ORAL at 16:35

## 2018-03-30 RX ADMIN — INSULIN ASPART SCH UNITS: 100 INJECTION, SOLUTION INTRAVENOUS; SUBCUTANEOUS at 12:12

## 2018-03-30 RX ADMIN — HYDROCODONE BITARTRATE AND ACETAMINOPHEN PRN TAB: 5; 325 TABLET ORAL at 05:34

## 2018-03-30 RX ADMIN — CASTOR OIL AND BALSAM, PERU SCH: 788; 87 OINTMENT TOPICAL at 09:12

## 2018-03-30 RX ADMIN — INSULIN ASPART SCH: 100 INJECTION, SOLUTION INTRAVENOUS; SUBCUTANEOUS at 16:36

## 2018-03-30 RX ADMIN — INSULIN DETEMIR SCH: 100 INJECTION, SOLUTION SUBCUTANEOUS at 09:48

## 2018-03-30 RX ADMIN — OLANZAPINE SCH MG: 5 TABLET, ORALLY DISINTEGRATING ORAL at 09:10

## 2018-03-30 RX ADMIN — INSULIN ASPART SCH: 100 INJECTION, SOLUTION INTRAVENOUS; SUBCUTANEOUS at 07:58

## 2018-03-30 RX ADMIN — INSULIN ASPART SCH: 100 INJECTION, SOLUTION INTRAVENOUS; SUBCUTANEOUS at 20:12

## 2018-03-30 NOTE — GENERAL PROGRESS NOTE
Subjective





- Review of Systems


Service Date: 18


Subjective: 





lying in bed, more friendly, cooperative, eating lunch





Objective





- Results


Result Diagrams: 


 18 07:00





 18 07:00


Recent Labs: 


 Laboratory Last Values











WBC  10.2 Th/cmm (4.8-10.8)   18  07:00    


 


RBC  4.18 Mil/cmm (3.80-5.80)   18  07:00    


 


Hgb  12.5 gm/dL (12-16)   18  07:00    


 


Hct  37.9 % (41.0-60)  L  18  07:00    


 


MCV  90.6 fl (80-99)   18  07:00    


 


MCH  29.9 pg (27.0-31.0)   18  07:00    


 


MCHC Differential  33.0 pg (28.0-36.0)   18  07:00    


 


RDW  16.5 % (11.5-20.0)   18  07:00    


 


Plt Count  283 Th/cmm (150-400)   18  07:00    


 


MPV  8.0 fl  18  07:00    


 


Neutrophils %  64.6 % (40.0-80.0)   18  07:00    


 


Lymphocytes %  26.9 % (20.0-50.0)   18  07:00    


 


Monocytes %  5.3 % (2.0-10.0)   18  07:00    


 


Eosinophils %  2.8 % (0.0-5.0)   18  07:00    


 


Basophils %  0.4 % (0.0-2.0)   18  07:00    


 


PT  9.5 SECONDS (9.5-11.5)   18  21:35    


 


INR  0.91  (0.5-1.4)   18  21:35    


 


Sodium  132 mEq/L (136-145)  L  18  07:00    


 


Potassium  3.9 mEq/L (3.5-5.1)   18  07:00    


 


Chloride  99 mEq/L ()   18  07:00    


 


Carbon Dioxide  23.2 mEq/L (21.0-31.0)   18  07:00    


 


Anion Gap  13.7  (7.0-16.0)   18  07:00    


 


BUN  10 mg/dL (7-25)   18  07:00    


 


Creatinine  0.9 mg/dL (0.7-1.3)   18  07:00    


 


Est GFR ( Amer)  TNP   18  07:00    


 


Est GFR (Non-Af Amer)  TNP   18  07:00    


 


BUN/Creatinine Ratio  11.1   18  07:00    


 


Glucose  256 mg/dL ()  H  18  07:00    


 


POC Glucose  305 MG/DL (70 - 105)  H  18  12:04    


 


Hemoglobin A1c %  8.8 % (4.0-6.0)  H  18  07:00    


 


Calcium  8.7 mg/dL (8.6-10.3)   18  07:00    


 


Urine Source  RAVI PORT   18  21:30    


 


Urine Color  YELLOW   18  21:30    


 


Urine Clarity  HAZY  (CLEAR)   18  21:30    


 


Urine pH  5.5  (4.6 - 8.0)   18  21:30    


 


Ur Specific Gravity  1.010  (1.005-1.030)   18  21:30    


 


Urine Protein  NEGATIVE mg/dL (NEGATIVE)   18  21:30    


 


Urine Glucose (UA)  >=1000 mg/dL (NEGATIVE)  H  18  21:30    


 


Urine Ketones  NEGATIVE mg/dL (NEGATIVE)   18  21:30    


 


Urine Blood  LARGE  (NEGATIVE)  H  18  21:30    


 


Urine Nitrate  NEGATIVE  (NEGATIVE)   18  21:30    


 


Urine Bilirubin  NEGATIVE  (NEGATIVE)   18  21:30    


 


Urine Urobilinogen  0.2 E.U./dL (0.2 - 1.0)   18  21:30    


 


Ur Leukocyte Esterase  SMALL  (NEGATIVE)  H  18  21:30    


 


Urine RBC  10-25 /hpf (0-5)  H  18  21:30    


 


Urine WBC  2-5 /hpf (0-5)   18  21:30    


 


Ur Epithelial Cells  OCCASIONAL /lpf (FEW)   18  21:30    


 


Urine Bacteria  FEW /hpf (NONE SEEN)   18  21:30    


 


Urine Yeast  FEW /hpf (NONE SEEN)  H  18  21:30    


 


Vancomycin Trough  13.4 ug/mL (10-20)   18  13:30    














- Physical Exam


Vitals and I&O: 


 Vital Signs











Temp  98.7 F   18 20:00


 


Pulse  91   18 09:09


 


Resp  18   18 20:00


 


BP  136/79   18 09:09


 


Pulse Ox  97   18 20:00








 Intake & Output











 18





 18:59 06:59 18:59


 


Intake Total 250 250 200


 


Balance 250 250 200


 


Weight (lbs) 77.111 kg  77.111 kg


 


Intake:   


 


  Intake, IV Amount 250 250 


 


    Vancomycin HCl 1 gm In 250 250 





    Sodium Chloride 0.9% 250   





    ml @ 165 mls/hr IV Q12H   





    [facility] Rx#:[id number]   


 


  Oral   200


 


Other:   


 


  # Voids   1


 


  # Bowel Movements   0


 


  Weight Source Bedscale  Bedscale











Active Medications: 


Current Medications





Acetaminophen (Tylenol)  650 mg PO Q6H PRN


   PRN Reason: mild to moderate pain


   Stop: 05/15/18 23:24


Acetaminophen/Hydrocodone Bitart (Norco 5mg/325mg)  1 tab PO Q6H PRN


   PRN Reason: Pain (Moderate)


   Stop: 05/15/18 23:24


   Last Admin: 18 05:34 Dose:  1 tab


Allopurinol (Zyloprim)  100 mg PO DAILY [facility]


   Stop: 05/15/18 23:24


   Last Admin: 18 09:05 Dose:  100 mg


Castor Oil/[place] Balsam/Trypsin (Venelex)  1 appl TP DAILY [facility]


   Stop: 05/15/18 23:24


   Last Admin: 18 09:12 Dose:  Not Given


Clonazepam (Klonopin)  1 mg PO BID [facility]


   PRN Reason: Protocol


   Stop: 18 09:59


   Last Admin: 18 09:05 Dose:  1 mg


Donepezil HCl (Aricept)  10 mg PO DAILY [facility]


   Stop: 18 08:00


   Last Admin: 18 09:05 Dose:  10 mg


Doxycycline Hyclate (Vibramycin)  100 mg PO Q12HR [facility]


   Stop: 18 08:59


   Last Admin: 18 09:05 Dose:  100 mg


Famotidine (Pepcid)  20 mg PO BID [facility]


   Stop: 05/15/18 23:24


   Last Admin: 18 09:05 Dose:  20 mg


Fluconazole (Diflucan)  100 mg PO DAILY [facility]


   Stop: 05/15/18 23:24


   Last Admin: 18 09:05 Dose:  100 mg


Heparin Sodium (Porcine) (Heparin)  5,000 units SUBQ Q12HR [facility]


   PRN Reason: Protocol


   Stop: 05/15/18 23:24


   Last Admin: 18 09:12 Dose:  Not Given


Vancomycin HCl 1 gm/ Sodium (Chloride)  250 mls @ 165 mls/hr IV Q12H [facility]


   Stop: 18 13:59


   Last Admin: 18 01:20 Dose:  165 mls/hr


Insulin Aspart (Novolog Insulin Sliding Scale)  0 units SUBQ ACHS [facility]


   PRN Reason: Protocol


   Stop: 18 07:29


   Last Admin: 18 12:12 Dose:  8 units


Insulin Detemir (Levemir Insulin)  20 units SUBQ DAILY [facility]


   PRN Reason: Protocol


   Stop: 18 06:20


   Last Admin: 18 09:48 Dose:  Not Given


Magnesium Hydroxide (Milk Of Magnesia)  30 ml PO HS PRN


   PRN Reason: Constipation


   Stop: 05/15/18 23:24


Metformin HCl (Glucophage)  1,000 mg PO DAILY [facility]


   Stop: 18 09:59


   Last Admin: 18 09:09 Dose:  1,000 mg


Metoprolol Succinate (Toprol Xl)  25 mg PO DAILY [facility]


   Stop: 05/15/18 23:24


   Last Admin: 18 09:09 Dose:  25 mg


Miscellaneous (Vte Chemical Prophylaxis Screen/ Admission)  1 ea MC PRN PRN


   PRN Reason: PROTOCOL


   Stop: 18 10:29


Miscellaneous (Vancomycin Iv Per Pharmacy)  1 ea MC PRN PRN


   PRN Reason: PROTOCOL


   Stop: 18 16:10


Olanzapine (Zyprexa Zydis)  10 mg PO BID [facility]


   PRN Reason: Protocol


   Stop: 18 07:53


   Last Admin: 18 09:10 Dose:  10 mg


Zolpidem Tartrate (Ambien)  5 mg PO HS PRN


   PRN Reason: insomnia


   Stop: 05/15/18 23:24


   Last Admin: 18 19:55 Dose:  5 mg








General: Alert, No acute distress


HEENT: Atraumatic, PERRLA, EOMI


Neck: Supple, JVD, +2 carotid pulse wo bruit


Cardiovascular: Regular rate, Normal S1, Normal S2


Lungs: Clear to auscultation


Abdomen: Bowel sounds, Soft


Extremities: no Clubbing, no Cyanosis, no Edema


Neurological: Sensation intact


Skin: no Rash


Psych/Mental Status: Other (psychosis)





- Procedures


Procedures: 


 Procedures











Procedure Code Date


 


KEV MUSC/FASCIA 20 SQ CM/< 19374 18


 


EXCISION OF RIGHT HIP MUSCLE, OPEN APPROACH [id number] 18


 


INTRODUCTION OF SERUM/TOX/VACCINE INTO MUSCLE, PERC APPROACH [id number] 18














Assessment/Plan





- Assessment


Assessment: 





S/P BRYANT


S/P electrolyte imbalance


Acute decomp of Psychosis


Type 2 DM


MRSA, Yeast UTI








- Plan


Plan: 


Lab - Result Diagrams





 18 06:48 





Current Medications





Acetaminophen (Tylenol)  650 mg PO Q6H PRN


   PRN Reason: mild to moderate pain


   Stop: 05/15/18 23:24


Acetaminophen/Hydrocodone Bitart (Norco 5mg/325mg)  1 tab PO Q6H PRN


   PRN Reason: Pain (Moderate)


   Stop: 05/15/18 23:24


Allopurinol (Zyloprim)  100 mg PO DAILY [facility]


   Stop: 05/15/18 23:24


   Last Admin: 18 10:54 Dose:  100 mg


Castor Oil/[place] Balsam/Trypsin (Venelex)  1 appl TP DAILY [facility]


   Stop: 05/15/18 23:24


   Last Admin: 18 11:02 Dose:  1 appl


Clonazepam (Klonopin)  1 mg PO BID [facility]


   PRN Reason: Protocol


   Stop: 18 09:59


Famotidine (Pepcid)  20 mg PO BID [facility]


   Stop: 05/15/18 23:24


   Last Admin: 18 10:54 Dose:  20 mg


Fluconazole (Diflucan)  100 mg PO DAILY [facility]


   Stop: 05/15/18 23:24


   Last Admin: 18 11:02 Dose:  100 mg


Heparin Sodium (Porcine) (Heparin)  5,000 units SUBQ Q12HR [facility]


   PRN Reason: Protocol


   Stop: 05/15/18 23:24


   Last Admin: 18 10:59 Dose:  5,000 units


Levofloxacin (Levaquin Pb)  500 mg in 100 mls @ 100 mls/hr IV Q24H [facility]


   Stop: 18 11:59


Insulin Aspart (Novolog Insulin Sliding Scale)  0 units SUBQ ACHS [facility]


   PRN Reason: Protocol


   Stop: 05/15/18 23:24


   Last Admin: 18 10:45 Dose:  2 units


Insulin Aspart (Novolog Insulin Sliding Scale)  0 units SUBQ ACHS [facility]


   PRN Reason: Protocol


   Stop: 18 07:29


Insulin Detemir (Levemir Insulin)  18 units SUBQ DAILY [facility]


   PRN Reason: Protocol


   Stop: 05/15/18 23:24


   Last Admin: 18 10:56 Dose:  18 units


Lorazepam (Ativan)  0.5 mg PO Q6HR PRN; Protocol


   PRN Reason: agitation 


   Stop: 05/15/18 23:24


Magnesium Hydroxide (Milk Of Magnesia)  30 ml PO HS PRN


   PRN Reason: Constipation


   Stop: 05/15/18 23:24


Metformin HCl (Glucophage)  1,000 mg PO DAILY [facility]


   Stop: 18 09:59


Metoprolol Succinate (Toprol Xl)  25 mg PO DAILY [facility]


   Stop: 05/15/18 23:24


   Last Admin: 18 10:55 Dose:  25 mg


Miscellaneous (Vte Chemical Prophylaxis Screen/ Admission)  1 ea MC PRN PRN


   PRN Reason: PROTOCOL


   Stop: 18 10:29


Olanzapine (Zyprexa Zydis)  5 mg PO DAILY [facility]


   PRN Reason: Protocol


   Stop: 18 09:59


Zolpidem Tartrate (Ambien)  5 mg PO HS PRN


   PRN Reason: insomnia


   Stop: 05/15/18 23:24





Lab - Result Diagrams





 18 05:00 





 





kidney fnc remain stable


encourage po intake


psych meds


continueVanco W/ Diflucan


awaiting placement








Nutritional Asmnt/Malnutr-PDOC





- Dietary Evaluation


Malnutrition Findings (Please click <Entered> for more info): 








Nutritional Asmnt/Malnutrition                             Start:  18 16:

25


Text:                                                      Status: Complete    

  


Freq:                                                                          

  


 Document     18 16:25  MAGEN  (Rec: 18 16:31  MAGEN  [id number])


 Nutritional Asmnt/Malnutrition


     Patient General Information


      Nutritional Screening                      Moderate Risk


      Diagnosis                                  dehydration


      Pertinent Medical Hx/Surgical Hx           DM, dementia, depression,


                                                 psychosis, schizophrenia,


                                                 chronic renal insuff


      Subjective Information                     Pt is agitated, on 1:1 sitter.


                                                 Per EMR, PO intake 25-75%,


                                                 avg 50%.


      Current Diet Order/ Nutrition Support      pureed, CCHO 60gm


      Pertinent Medications                      novolov, levemir, glucophage


      Pertinent Labs                             3/19- -316


     Nutritional Hx/Data


      Height                                     1.8 m


      Height (Calculated Centimeters)            180.3


      Current Weight (lbs)                       77.111 kg


      Weight (Calculated Kilograms)              77.1


      Weight (Calculated Grams)                  21759.7


      Ideal Body Weight                          172


      Body Mass Index (BMI)                      23.7


      Weight Status                              Approriate


     GI Symptoms


      GI Symptoms                                None


      Last BM                                    3/19


      Difficult in:                              None


      Skin Integrity/Comment:                    pressure ulcer, decubitus


      Current %PO                                Fair (50-74%)


     Estimated Nutritional Goals


      BEE in Kcals:                              Using Current wt


      Calories/Kcals/Kg                          25-30


      Kcals Calculated                           7233-6381


      Protein:                                   Using Current wt


      Protein g/k-1.2


      Protein Calculated                         77-92


      Fluid: ml                                  1925-2310ml (1ml/kcal)


     Nutritional Problem


      1. Problem


       Problem                                   altered nutrition related lab


                                                 values


       Etiology                                  hx of DM


       Signs/Symptoms:                           glucose 169, -316


     Malnutrition Alert


      Protein-Calorie Malnutrition               N/A


      Is there a minimum of two criteria         No


       selected?                                 


       Query Text:Check all the applicable       


       criteria. A minimum of two criteria are   


       recommended for diagnosis of either       


       severe or non-severe malnutrition.        


     Intervention/Recommendation


      Comments                                   1. Continue with current diet


                                                 as ordered.


                                                 2. Monitor PO intake, wt, labs


                                                 and skin integrity


                                                 3. F/U as moderate risk in 3-5


                                                 days, 3/24-3/26


     Expected Outcomes/Goals


      Expected Outcomes/Goals                    1. PO intake to meet at least


                                                 75% of nutritional needs.


                                                 2. Wt stability, skin to


                                                 remain intact, labs to


                                                 approach WNL.

## 2018-03-30 NOTE — INFECTIOUS DISEASE PROG NOTE
Infectious Disease Subjective





- Review of Systems


Service Date: 18


Subjective: 





No fever





Infectious Disease Objective





- Results


Result Diagrams: 


 18 07:00





 18 07:00


Recent Labs: 


 Laboratory Last Values











WBC  10.2 Th/cmm (4.8-10.8)   18  07:00    


 


RBC  4.18 Mil/cmm (3.80-5.80)   18  07:00    


 


Hgb  12.5 gm/dL (12-16)   18  07:00    


 


Hct  37.9 % (41.0-60)  L  18  07:00    


 


MCV  90.6 fl (80-99)   18  07:00    


 


MCH  29.9 pg (27.0-31.0)   18  07:00    


 


MCHC Differential  33.0 pg (28.0-36.0)   18  07:00    


 


RDW  16.5 % (11.5-20.0)   18  07:00    


 


Plt Count  283 Th/cmm (150-400)   18  07:00    


 


MPV  8.0 fl  18  07:00    


 


Neutrophils %  64.6 % (40.0-80.0)   18  07:00    


 


Lymphocytes %  26.9 % (20.0-50.0)   18  07:00    


 


Monocytes %  5.3 % (2.0-10.0)   18  07:00    


 


Eosinophils %  2.8 % (0.0-5.0)   18  07:00    


 


Basophils %  0.4 % (0.0-2.0)   18  07:00    


 


PT  9.5 SECONDS (9.5-11.5)   18  21:35    


 


INR  0.91  (0.5-1.4)   18  21:35    


 


Sodium  132 mEq/L (136-145)  L  18  07:00    


 


Potassium  3.9 mEq/L (3.5-5.1)   18  07:00    


 


Chloride  99 mEq/L ()   18  07:00    


 


Carbon Dioxide  23.2 mEq/L (21.0-31.0)   18  07:00    


 


Anion Gap  13.7  (7.0-16.0)   18  07:00    


 


BUN  10 mg/dL (7-25)   18  07:00    


 


Creatinine  0.9 mg/dL (0.7-1.3)   18  07:00    


 


Est GFR ( Amer)  TNP   18  07:00    


 


Est GFR (Non-Af Amer)  TNP   18  07:00    


 


BUN/Creatinine Ratio  11.1   18  07:00    


 


Glucose  256 mg/dL ()  H  18  07:00    


 


POC Glucose  305 MG/DL (70 - 105)  H  18  12:04    


 


Hemoglobin A1c %  8.8 % (4.0-6.0)  H  18  07:00    


 


Calcium  8.7 mg/dL (8.6-10.3)   18  07:00    


 


Urine Source  RAVI PORT   18  21:30    


 


Urine Color  YELLOW   18  21:30    


 


Urine Clarity  HAZY  (CLEAR)   18  21:30    


 


Urine pH  5.5  (4.6 - 8.0)   18  21:30    


 


Ur Specific Gravity  1.010  (1.005-1.030)   18  21:30    


 


Urine Protein  NEGATIVE mg/dL (NEGATIVE)   18  21:30    


 


Urine Glucose (UA)  >=1000 mg/dL (NEGATIVE)  H  18  21:30    


 


Urine Ketones  NEGATIVE mg/dL (NEGATIVE)   18  21:30    


 


Urine Blood  LARGE  (NEGATIVE)  H  18  21:30    


 


Urine Nitrate  NEGATIVE  (NEGATIVE)   18  21:30    


 


Urine Bilirubin  NEGATIVE  (NEGATIVE)   18  21:30    


 


Urine Urobilinogen  0.2 E.U./dL (0.2 - 1.0)   18  21:30    


 


Ur Leukocyte Esterase  SMALL  (NEGATIVE)  H  18  21:30    


 


Urine RBC  10-25 /hpf (0-5)  H  18  21:30    


 


Urine WBC  2-5 /hpf (0-5)   18  21:30    


 


Ur Epithelial Cells  OCCASIONAL /lpf (FEW)   18  21:30    


 


Urine Bacteria  FEW /hpf (NONE SEEN)   18  21:30    


 


Urine Yeast  FEW /hpf (NONE SEEN)  H  18  21:30    


 


Vancomycin Trough  13.4 ug/mL (10-20)   18  13:30    














- Physical Exam


Vitals and I&O: 


 Vital Signs











Temp  98.7 F   18 20:00


 


Pulse  91   18 09:09


 


Resp  18   18 20:00


 


BP  136/79   18 09:09


 


Pulse Ox  97   18 20:00








 Intake & Output











 18





 18:59 06:59 18:59


 


Intake Total 250 500 200


 


Balance 250 500 200


 


Weight (lbs) 77.111 kg  77.111 kg


 


Intake:   


 


  Intake, IV Amount 250 500 


 


    Vancomycin HCl 1 gm In 250 500 





    Sodium Chloride 0.9% 250   





    ml @ 165 mls/hr IV Q12H   





    [facility] Rx#:[id number]   


 


  Oral   200


 


Other:   


 


  # Voids   1


 


  # Bowel Movements   0


 


  Weight Source Bedscale  Bedscale











Active Medications: 


Current Medications





Acetaminophen (Tylenol)  650 mg PO Q6H PRN


   PRN Reason: mild to moderate pain


   Stop: 05/15/18 23:24


Acetaminophen/Hydrocodone Bitart (Norco 5mg/325mg)  1 tab PO Q6H PRN


   PRN Reason: Pain (Moderate)


   Stop: 05/15/18 23:24


   Last Admin: 18 05:34 Dose:  1 tab


Allopurinol (Zyloprim)  100 mg PO DAILY [facility]


   Stop: 05/15/18 23:24


   Last Admin: 18 09:05 Dose:  100 mg


Castor Oil/[place] Balsam/Trypsin (Venelex)  1 appl TP DAILY [facility]


   Stop: 05/15/18 23:24


   Last Admin: 18 09:12 Dose:  Not Given


Clonazepam (Klonopin)  1 mg PO BID [facility]


   PRN Reason: Protocol


   Stop: 18 09:59


   Last Admin: 18 09:05 Dose:  1 mg


Donepezil HCl (Aricept)  10 mg PO DAILY [facility]


   Stop: 18 08:00


   Last Admin: 18 09:05 Dose:  10 mg


Doxycycline Hyclate (Vibramycin)  100 mg PO Q12HR [facility]


   Stop: 18 08:59


   Last Admin: 18 09:05 Dose:  100 mg


Famotidine (Pepcid)  20 mg PO BID [facility]


   Stop: 05/15/18 23:24


   Last Admin: 18 09:05 Dose:  20 mg


Fluconazole (Diflucan)  100 mg PO DAILY [facility]


   Stop: 05/15/18 23:24


   Last Admin: 18 09:05 Dose:  100 mg


Heparin Sodium (Porcine) (Heparin)  5,000 units SUBQ Q12HR [facility]


   PRN Reason: Protocol


   Stop: 05/15/18 23:24


   Last Admin: 18 09:12 Dose:  Not Given


Vancomycin HCl 1 gm/ Sodium (Chloride)  250 mls @ 165 mls/hr IV Q12H [facility]


   Stop: 18 13:59


   Last Admin: 18 13:08 Dose:  165 mls/hr


Insulin Aspart (Novolog Insulin Sliding Scale)  0 units SUBQ ACHS [facility]


   PRN Reason: Protocol


   Stop: 18 07:29


   Last Admin: 18 12:12 Dose:  8 units


Insulin Detemir (Levemir Insulin)  20 units SUBQ DAILY [facility]


   PRN Reason: Protocol


   Stop: 18 06:20


   Last Admin: 18 09:48 Dose:  Not Given


Magnesium Hydroxide (Milk Of Magnesia)  30 ml PO HS PRN


   PRN Reason: Constipation


   Stop: 05/15/18 23:24


Metformin HCl (Glucophage)  1,000 mg PO DAILY [facility]


   Stop: 18 09:59


   Last Admin: 18 09:09 Dose:  1,000 mg


Metoprolol Succinate (Toprol Xl)  25 mg PO DAILY [facility]


   Stop: 05/15/18 23:24


   Last Admin: 18 09:09 Dose:  25 mg


Miscellaneous (Vte Chemical Prophylaxis Screen/ Admission)  1 ea MC PRN PRN


   PRN Reason: PROTOCOL


   Stop: 18 10:29


Miscellaneous (Vancomycin Iv Per Pharmacy)  1 ea MC PRN PRN


   PRN Reason: PROTOCOL


   Stop: 18 16:10


Olanzapine (Zyprexa Zydis)  10 mg PO BID [facility]


   PRN Reason: Protocol


   Stop: 18 07:53


   Last Admin: 18 09:10 Dose:  10 mg


Zolpidem Tartrate (Ambien)  5 mg PO HS PRN


   PRN Reason: insomnia


   Stop: 05/15/18 23:24


   Last Admin: 18 19:55 Dose:  5 mg








General: no acute distress, well developed, well nourished


HEENT: atraumatic, normocephalic, PERRLA


Neck: supple, no thyromegaly, no lymphadenopathy


Cardiovascular: S1S2, regular


Lungs: clear to auscultation bilaterally, clear to percussion


Abdomen: soft, no tender, no distended, no mass


Extremities: no cyanosis, no clubbing, no edema


Neurological: awake, alert, oriented


Skin: other (sacral wound.)





- Procedures


Procedures: 


 Procedures











Procedure Code Date


 


KEV MUSC/FASCIA 20 SQ CM/< 66142 18


 


EXCISION OF RIGHT HIP MUSCLE, OPEN APPROACH [id number] 18


 


INTRODUCTION OF SERUM/TOX/VACCINE INTO MUSCLE, PERC APPROACH [id number] 18














Infectious Disease Assmt/Plan





- Assessment


Assessment: 





1.  Sacral decubitus ulcer.


2.  UTI.  Treated


3.  Dementia.





- Plan


Plan: 





wound care.





Nutritional Asmnt/Malnutr-PDOC





- Dietary Evaluation


Malnutrition Findings (Please click <Entered> for more info): 








Nutritional Asmnt/Malnutrition                             Start:  18 16:

25


Text:                                                      Status: Complete    

  


Freq:                                                                          

  


 Document     18 16:25  LCHENG  (Rec: 18 16:31  LCHENG  [id number])


 Nutritional Asmnt/Malnutrition


     Patient General Information


      Nutritional Screening                      Moderate Risk


      Diagnosis                                  dehydration


      Pertinent Medical Hx/Surgical Hx           DM, dementia, depression,


                                                 psychosis, schizophrenia,


                                                 chronic renal insuff


      Subjective Information                     Pt is agitated, on 1:1 sitter.


                                                 Per EMR, PO intake 25-75%,


                                                 avg 50%.


      Current Diet Order/ Nutrition Support      pureed, CCHO 60gm


      Pertinent Medications                      novolov, levemir, glucophage


      Pertinent Labs                             3/19-21 -316


     Nutritional Hx/Data


      Height                                     1.8 m


      Height (Calculated Centimeters)            180.3


      Current Weight (lbs)                       77.111 kg


      Weight (Calculated Kilograms)              77.1


      Weight (Calculated Grams)                  27096.7


      Ideal Body Weight                          172


      Body Mass Index (BMI)                      23.7


      Weight Status                              Approriate


     GI Symptoms


      GI Symptoms                                None


      Last BM                                    3/19


      Difficult in:                              None


      Skin Integrity/Comment:                    pressure ulcer, decubitus


      Current %PO                                Fair (50-74%)


     Estimated Nutritional Goals


      BEE in Kcals:                              Using Current wt


      Calories/Kcals/Kg                          25-30


      Kcals Calculated                           4818-2848


      Protein:                                   Using Current wt


      Protein g/k-1.2


      Protein Calculated                         77-92


      Fluid: ml                                  1925-2310ml (1ml/kcal)


     Nutritional Problem


      1. Problem


       Problem                                   altered nutrition related lab


                                                 values


       Etiology                                  hx of DM


       Signs/Symptoms:                           glucose 169, -316


     Malnutrition Alert


      Protein-Calorie Malnutrition               N/A


      Is there a minimum of two criteria         No


       selected?                                 


       Query Text:Check all the applicable       


       criteria. A minimum of two criteria are   


       recommended for diagnosis of either       


       severe or non-severe malnutrition.        


     Intervention/Recommendation


      Comments                                   1. Continue with current diet


                                                 as ordered.


                                                 2. Monitor PO intake, wt, labs


                                                 and skin integrity


                                                 3. F/U as moderate risk in 3-5


                                                 days, 3/24-3/26


     Expected Outcomes/Goals


      Expected Outcomes/Goals                    1. PO intake to meet at least


                                                 75% of nutritional needs.


                                                 2. Wt stability, skin to


                                                 remain intact, labs to


                                                 approach WNL.

## 2018-03-30 NOTE — GENERAL PROGRESS NOTE
Subjective





- Review of Systems


Service Date: 18


Subjective: 





Patient resting comfortably in bed. no acute distress. In good spirits today. 

pleasant. cooperative today. eating well.





Blood Sugars improving. Will adjust dosage.





Objective





- Results


Result Diagrams: 


 18 07:00





 18 07:00


Recent Labs: 


 Laboratory Last Values











WBC  10.2 Th/cmm (4.8-10.8)   18  07:00    


 


RBC  4.18 Mil/cmm (3.80-5.80)   18  07:00    


 


Hgb  12.5 gm/dL (12-16)   18  07:00    


 


Hct  37.9 % (41.0-60)  L  18  07:00    


 


MCV  90.6 fl (80-99)   18  07:00    


 


MCH  29.9 pg (27.0-31.0)   18  07:00    


 


MCHC Differential  33.0 pg (28.0-36.0)   18  07:00    


 


RDW  16.5 % (11.5-20.0)   18  07:00    


 


Plt Count  283 Th/cmm (150-400)   18  07:00    


 


MPV  8.0 fl  18  07:00    


 


Neutrophils %  64.6 % (40.0-80.0)   18  07:00    


 


Lymphocytes %  26.9 % (20.0-50.0)   18  07:00    


 


Monocytes %  5.3 % (2.0-10.0)   18  07:00    


 


Eosinophils %  2.8 % (0.0-5.0)   18  07:00    


 


Basophils %  0.4 % (0.0-2.0)   18  07:00    


 


PT  9.5 SECONDS (9.5-11.5)   18  21:35    


 


INR  0.91  (0.5-1.4)   18  21:35    


 


Sodium  132 mEq/L (136-145)  L  18  07:00    


 


Potassium  3.9 mEq/L (3.5-5.1)   18  07:00    


 


Chloride  99 mEq/L ()   18  07:00    


 


Carbon Dioxide  23.2 mEq/L (21.0-31.0)   18  07:00    


 


Anion Gap  13.7  (7.0-16.0)   18  07:00    


 


BUN  10 mg/dL (7-25)   18  07:00    


 


Creatinine  0.9 mg/dL (0.7-1.3)   18  07:00    


 


Est GFR ( Amer)  TNP   18  07:00    


 


Est GFR (Non-Af Amer)  TNP   18  07:00    


 


BUN/Creatinine Ratio  11.1   18  07:00    


 


Glucose  256 mg/dL ()  H  18  07:00    


 


POC Glucose  192 MG/DL (70 - 105)  H  18  17:58    


 


Hemoglobin A1c %  8.8 % (4.0-6.0)  H  18  07:00    


 


Calcium  8.7 mg/dL (8.6-10.3)   18  07:00    


 


Urine Source  RAVI PORT   18  21:30    


 


Urine Color  YELLOW   18  21:30    


 


Urine Clarity  HAZY  (CLEAR)   18  21:30    


 


Urine pH  5.5  (4.6 - 8.0)   18  21:30    


 


Ur Specific Gravity  1.010  (1.005-1.030)   18  21:30    


 


Urine Protein  NEGATIVE mg/dL (NEGATIVE)   18  21:30    


 


Urine Glucose (UA)  >=1000 mg/dL (NEGATIVE)  H  18  21:30    


 


Urine Ketones  NEGATIVE mg/dL (NEGATIVE)   18  21:30    


 


Urine Blood  LARGE  (NEGATIVE)  H  18  21:30    


 


Urine Nitrate  NEGATIVE  (NEGATIVE)   18  21:30    


 


Urine Bilirubin  NEGATIVE  (NEGATIVE)   18  21:30    


 


Urine Urobilinogen  0.2 E.U./dL (0.2 - 1.0)   18  21:30    


 


Ur Leukocyte Esterase  SMALL  (NEGATIVE)  H  18  21:30    


 


Urine RBC  10-25 /hpf (0-5)  H  18  21:30    


 


Urine WBC  2-5 /hpf (0-5)   18  21:30    


 


Ur Epithelial Cells  OCCASIONAL /lpf (FEW)   18  21:30    


 


Urine Bacteria  FEW /hpf (NONE SEEN)   18  21:30    


 


Urine Yeast  FEW /hpf (NONE SEEN)  H  18  21:30    


 


Vancomycin Trough  13.4 ug/mL (10-20)   18  13:30    














- Physical Exam


Vitals and I&O: 


 Vital Signs











Temp  98.7 F   18 20:00


 


Pulse  70   18 20:00


 


Resp  18   18 20:00


 


BP  107/59   18 20:00


 


Pulse Ox  97   18 20:00








 Intake & Output











 18





 18:59 06:59 18:59


 


Intake Total 250 250 200


 


Balance 250 250 200


 


Weight (lbs) 77.111 kg  77.111 kg


 


Intake:   


 


  Intake, IV Amount 250 250 


 


    Vancomycin HCl 1 gm In 250 250 





    Sodium Chloride 0.9% 250   





    ml @ 165 mls/hr IV Q12H   





    [facility] Rx#:[id number]   


 


  Oral   200


 


Other:   


 


  # Voids   1


 


  # Bowel Movements   0


 


  Weight Source Bedscale  Bedscale











Active Medications: 


Current Medications





Acetaminophen (Tylenol)  650 mg PO Q6H PRN


   PRN Reason: mild to moderate pain


   Stop: 05/15/18 23:24


Acetaminophen/Hydrocodone Bitart (Norco 5mg/325mg)  1 tab PO Q6H PRN


   PRN Reason: Pain (Moderate)


   Stop: 05/15/18 23:24


   Last Admin: 18 05:34 Dose:  1 tab


Allopurinol (Zyloprim)  100 mg PO DAILY [facility]


   Stop: 05/15/18 23:24


   Last Admin: 18 11:00 Dose:  Not Given


Castor Oil/[place]ian Balsam/Trypsin (Venelex)  1 appl TP DAILY [facility]


   Stop: 05/15/18 23:24


   Last Admin: 18 11:00 Dose:  Not Given


Clonazepam (Klonopin)  1 mg PO BID [facility]


   PRN Reason: Protocol


   Stop: 18 09:59


   Last Admin: 18 18:00 Dose:  1 mg


Donepezil HCl (Aricept)  10 mg PO DAILY [facility]


   Stop: 18 08:00


   Last Admin: 18 11:00 Dose:  Not Given


Doxycycline Hyclate (Vibramycin)  100 mg PO Q12HR [facility]


   Stop: 18 08:59


   Last Admin: 18 21:26 Dose:  100 mg


Famotidine (Pepcid)  20 mg PO BID [facility]


   Stop: 05/15/18 23:24


   Last Admin: 18 18:00 Dose:  20 mg


Fluconazole (Diflucan)  100 mg PO DAILY [facility]


   Stop: 05/15/18 23:24


   Last Admin: 18 11:00 Dose:  Not Given


Heparin Sodium (Porcine) (Heparin)  5,000 units SUBQ Q12HR [facility]


   PRN Reason: Protocol


   Stop: 05/15/18 23:24


   Last Admin: 18 21:36 Dose:  Not Given


Vancomycin HCl 1 gm/ Sodium (Chloride)  250 mls @ 165 mls/hr IV Q12H [facility]


   Stop: 18 13:59


   Last Admin: 18 01:20 Dose:  165 mls/hr


Insulin Aspart (Novolog Insulin Sliding Scale)  0 units SUBQ ACHS [facility]


   PRN Reason: Protocol


   Stop: 18 07:29


   Last Admin: 18 21:36 Dose:  Not Given


Insulin Detemir (Levemir Insulin)  20 units SUBQ DAILY [facility]


   PRN Reason: Protocol


   Stop: 18 06:20


   Last Admin: 18 08:47 Dose:  20 units


Magnesium Hydroxide (Milk Of Magnesia)  30 ml PO  PRN


   PRN Reason: Constipation


   Stop: 05/15/18 23:24


Metformin HCl (Glucophage)  1,000 mg PO DAILY [facility]


   Stop: 18 09:59


   Last Admin: 18 11:00 Dose:  Not Given


Metoprolol Succinate (Toprol Xl)  25 mg PO DAILY [facility]


   Stop: 05/15/18 23:24


   Last Admin: 18 11:00 Dose:  Not Given


Miscellaneous (Vte Chemical Prophylaxis Screen/ Admission)  1 ea MC PRN PRN


   PRN Reason: PROTOCOL


   Stop: 18 10:29


Miscellaneous (Vancomycin Iv Per Pharmacy)  1 ea MC PRN PRN


   PRN Reason: PROTOCOL


   Stop: 18 16:10


Olanzapine (Zyprexa Zydis)  10 mg PO BID [facility]


   PRN Reason: Protocol


   Stop: 18 07:53


   Last Admin: 18 18:00 Dose:  10 mg


Zolpidem Tartrate (Ambien)  5 mg PO HS PRN


   PRN Reason: insomnia


   Stop: 05/15/18 23:24


   Last Admin: 18 19:55 Dose:  5 mg








General: Alert, No acute distress


HEENT: Atraumatic, PERRLA, EOMI


Neck: Supple, JVD, +2 carotid pulse wo bruit


Cardiovascular: Regular rate, Normal S1, Normal S2


Lungs: Clear to auscultation


Abdomen: Bowel sounds, Soft


Extremities: no Clubbing, no Cyanosis, no Edema


Neurological: Sensation intact


Skin: no Rash


Psych/Mental Status: Other (psychosis)





- Procedures


Procedures: 


 Procedures











Procedure Code Date


 


KVE MUSC/FASCIA 20 SQ CM/< 36873 18


 


EXCISION OF RIGHT HIP MUSCLE, OPEN APPROACH [id number] 18


 


INTRODUCTION OF SERUM/TOX/VACCINE INTO MUSCLE, PERC APPROACH [id number] 18














Assessment/Plan





- Assessment


Assessment: 





psychosis


dementia


Alzheimer's dementia


diabetes mellitus not controlled. 


s/p wound debridement of sacral decubiti


UTI


hyperglycemia








- Plan


Plan: 





continue current medications.


will add doxycyline PO


for hospice eval





Nutritional Asmnt/Malnutr-PDOC





- Dietary Evaluation


Malnutrition Findings (Please click <Entered> for more info): 








Nutritional Asmnt/Malnutrition                             Start:  18 16:

25


Text:                                                      Status: Complete    

  


Freq:                                                                          

  


 Document     18 16:25  LCHENG  (Rec: 18 16:31  LCHENG  [place] [id number])


 Nutritional Asmnt/Malnutrition


     Patient General Information


      Nutritional Screening                      Moderate Risk


      Diagnosis                                  dehydration


      Pertinent Medical Hx/Surgical Hx           DM, dementia, depression,


                                                 psychosis, schizophrenia,


                                                 chronic renal insuff


      Subjective Information                     Pt is agitated, on 1:1 sitter.


                                                 Per EMR, PO intake 25-75%,


                                                 avg 50%.


      Current Diet Order/ Nutrition Support      pureed, CCHO 60gm


      Pertinent Medications                      novolov, levemir, glucophage


      Pertinent Labs                             3/19-21 -316


     Nutritional Hx/Data


      Height                                     1.8 m


      Height (Calculated Centimeters)            180.3


      Current Weight (lbs)                       77.111 kg


      Weight (Calculated Kilograms)              77.1


      Weight (Calculated Grams)                  76250.7


      Ideal Body Weight                          172


      Body Mass Index (BMI)                      23.7


      Weight Status                              Approriate


     GI Symptoms


      GI Symptoms                                None


      Last BM                                    3/19


      Difficult in:                              None


      Skin Integrity/Comment:                    pressure ulcer, decubitus


      Current %PO                                Fair (50-74%)


     Estimated Nutritional Goals


      BEE in Kcals:                              Using Current wt


      Calories/Kcals/Kg                          25-30


      Kcals Calculated                           5287-1777


      Protein:                                   Using Current wt


      Protein g/k-1.2


      Protein Calculated                         77-92


      Fluid: ml                                  1925-2310ml (1ml/kcal)


     Nutritional Problem


      1. Problem


       Problem                                   altered nutrition related lab


                                                 values


       Etiology                                  hx of DM


       Signs/Symptoms:                           glucose 169, -316


     Malnutrition Alert


      Protein-Calorie Malnutrition               N/A


      Is there a minimum of two criteria         No


       selected?                                 


       Query Text:Check all the applicable       


       criteria. A minimum of two criteria are   


       recommended for diagnosis of either       


       severe or non-severe malnutrition.        


     Intervention/Recommendation


      Comments                                   1. Continue with current diet


                                                 as ordered.


                                                 2. Monitor PO intake, wt, labs


                                                 and skin integrity


                                                 3. F/U as moderate risk in 3-5


                                                 days, 3/24-3/26


     Expected Outcomes/Goals


      Expected Outcomes/Goals                    1. PO intake to meet at least


                                                 75% of nutritional needs.


                                                 2. Wt stability, skin to


                                                 remain intact, labs to


                                                 approach WNL.

## 2018-03-31 RX ADMIN — OLANZAPINE SCH MG: 5 TABLET, ORALLY DISINTEGRATING ORAL at 17:01

## 2018-03-31 RX ADMIN — INSULIN ASPART SCH UNITS: 100 INJECTION, SOLUTION INTRAVENOUS; SUBCUTANEOUS at 12:12

## 2018-03-31 RX ADMIN — INSULIN ASPART SCH: 100 INJECTION, SOLUTION INTRAVENOUS; SUBCUTANEOUS at 16:16

## 2018-03-31 RX ADMIN — CASTOR OIL AND BALSAM, PERU SCH APPL: 788; 87 OINTMENT TOPICAL at 12:11

## 2018-03-31 RX ADMIN — INSULIN ASPART SCH UNITS: 100 INJECTION, SOLUTION INTRAVENOUS; SUBCUTANEOUS at 06:36

## 2018-03-31 RX ADMIN — INSULIN ASPART SCH UNITS: 100 INJECTION, SOLUTION INTRAVENOUS; SUBCUTANEOUS at 20:06

## 2018-03-31 RX ADMIN — INSULIN DETEMIR SCH UNITS: 100 INJECTION, SOLUTION SUBCUTANEOUS at 08:16

## 2018-03-31 RX ADMIN — OLANZAPINE SCH MG: 5 TABLET, ORALLY DISINTEGRATING ORAL at 12:10

## 2018-03-31 NOTE — GENERAL PROGRESS NOTE
Subjective





- Review of Systems


Service Date: 18


Subjective: 





Patient resting comfortably in bed. no acute distress. In good spirits today. 

pleasant. cooperative today. eating well.





Blood Sugars improving. Will adjust dosage.





Objective





- Results


Result Diagrams: 


 18 07:00





 18 07:00


Recent Labs: 


 Laboratory Last Values











WBC  10.2 Th/cmm (4.8-10.8)   18  07:00    


 


RBC  4.18 Mil/cmm (3.80-5.80)   18  07:00    


 


Hgb  12.5 gm/dL (12-16)   18  07:00    


 


Hct  37.9 % (41.0-60)  L  18  07:00    


 


MCV  90.6 fl (80-99)   18  07:00    


 


MCH  29.9 pg (27.0-31.0)   18  07:00    


 


MCHC Differential  33.0 pg (28.0-36.0)   18  07:00    


 


RDW  16.5 % (11.5-20.0)   18  07:00    


 


Plt Count  283 Th/cmm (150-400)   18  07:00    


 


MPV  8.0 fl  18  07:00    


 


Neutrophils %  64.6 % (40.0-80.0)   18  07:00    


 


Lymphocytes %  26.9 % (20.0-50.0)   18  07:00    


 


Monocytes %  5.3 % (2.0-10.0)   18  07:00    


 


Eosinophils %  2.8 % (0.0-5.0)   18  07:00    


 


Basophils %  0.4 % (0.0-2.0)   18  07:00    


 


PT  9.5 SECONDS (9.5-11.5)   18  21:35    


 


INR  0.91  (0.5-1.4)   18  21:35    


 


Sodium  132 mEq/L (136-145)  L  18  07:00    


 


Potassium  3.9 mEq/L (3.5-5.1)   18  07:00    


 


Chloride  99 mEq/L ()   18  07:00    


 


Carbon Dioxide  23.2 mEq/L (21.0-31.0)   18  07:00    


 


Anion Gap  13.7  (7.0-16.0)   18  07:00    


 


BUN  10 mg/dL (7-25)   18  07:00    


 


Creatinine  0.9 mg/dL (0.7-1.3)   18  07:00    


 


Est GFR ( Amer)  TNP   18  07:00    


 


Est GFR (Non-Af Amer)  TNP   18  07:00    


 


BUN/Creatinine Ratio  11.1   18  07:00    


 


Glucose  256 mg/dL ()  H  18  07:00    


 


POC Glucose  305 MG/DL (70 - 105)  H  18  12:04    


 


Hemoglobin A1c %  8.8 % (4.0-6.0)  H  18  07:00    


 


Calcium  8.7 mg/dL (8.6-10.3)   18  07:00    


 


Urine Source  RAVI PORT   18  21:30    


 


Urine Color  YELLOW   18  21:30    


 


Urine Clarity  HAZY  (CLEAR)   18  21:30    


 


Urine pH  5.5  (4.6 - 8.0)   18  21:30    


 


Ur Specific Gravity  1.010  (1.005-1.030)   18  21:30    


 


Urine Protein  NEGATIVE mg/dL (NEGATIVE)   18  21:30    


 


Urine Glucose (UA)  >=1000 mg/dL (NEGATIVE)  H  18  21:30    


 


Urine Ketones  NEGATIVE mg/dL (NEGATIVE)   18  21:30    


 


Urine Blood  LARGE  (NEGATIVE)  H  18  21:30    


 


Urine Nitrate  NEGATIVE  (NEGATIVE)   18  21:30    


 


Urine Bilirubin  NEGATIVE  (NEGATIVE)   18  21:30    


 


Urine Urobilinogen  0.2 E.U./dL (0.2 - 1.0)   18  21:30    


 


Ur Leukocyte Esterase  SMALL  (NEGATIVE)  H  18  21:30    


 


Urine RBC  10-25 /hpf (0-5)  H  18  21:30    


 


Urine WBC  2-5 /hpf (0-5)   18  21:30    


 


Ur Epithelial Cells  OCCASIONAL /lpf (FEW)   18  21:30    


 


Urine Bacteria  FEW /hpf (NONE SEEN)   18  21:30    


 


Urine Yeast  FEW /hpf (NONE SEEN)  H  18  21:30    


 


Vancomycin Trough  13.4 ug/mL (10-20)   18  13:30    














- Physical Exam


Vitals and I&O: 


 Vital Signs











Temp  98.2 F   18 04:00


 


Pulse  72   18 04:00


 


Resp  19   18 04:00


 


BP  135/69   18 04:00


 


Pulse Ox  99   18 04:00








 Intake & Output











 18





 06:59 18:59 06:59


 


Intake Total 500 900 250


 


Balance 500 900 250


 


Weight (lbs)  77.02 kg 


 


Intake:   


 


  Intake, IV Amount 500 250 250


 


    Vancomycin HCl 1 gm In 500 250 250





    Sodium Chloride 0.9% 250   





    ml @ 165 mls/hr IV Q12H   





    [facility] Rx#:[id number]   


 


  Oral  650 


 


Other:   


 


  # Voids  3 


 


  # Bowel Movements  0 


 


  Weight Source  Bedscale 











Active Medications: 


Current Medications





Acetaminophen (Tylenol)  650 mg PO Q6H PRN


   PRN Reason: mild to moderate pain


   Stop: 05/15/18 23:24


   Last Admin: 18 02:33 Dose:  650 mg


Acetaminophen/Hydrocodone Bitart (Norco 5mg/325mg)  1 tab PO Q6H PRN


   PRN Reason: Pain (Moderate)


   Stop: 05/15/18 23:24


   Last Admin: 18 05:34 Dose:  1 tab


Allopurinol (Zyloprim)  100 mg PO DAILY [facility]


   Stop: 05/15/18 23:24


   Last Admin: 18 09:05 Dose:  100 mg


Castor Oil/[place] Balsam/Trypsin (Venelex)  1 appl TP DAILY [facility]


   Stop: 05/15/18 23:24


   Last Admin: 18 09:12 Dose:  Not Given


Clonazepam (Klonopin)  1 mg PO BID [facility]


   PRN Reason: Protocol


   Stop: 18 09:59


   Last Admin: 18 16:36 Dose:  1 mg


Donepezil HCl (Aricept)  10 mg PO DAILY [facility]


   Stop: 18 08:00


   Last Admin: 18 09:05 Dose:  10 mg


Doxycycline Hyclate (Vibramycin)  100 mg PO Q12HR [facility]


   Stop: 18 08:59


   Last Admin: 18 20:12 Dose:  100 mg


Famotidine (Pepcid)  20 mg PO BID [facility]


   Stop: 05/15/18 23:24


   Last Admin: 18 16:36 Dose:  20 mg


Fluconazole (Diflucan)  100 mg PO DAILY [facility]


   Stop: 05/15/18 23:24


   Last Admin: 18 09:05 Dose:  100 mg


Heparin Sodium (Porcine) (Heparin)  5,000 units SUBQ Q12HR [facility]


   PRN Reason: Protocol


   Stop: 05/15/18 23:24


   Last Admin: 18 20:12 Dose:  Not Given


Vancomycin HCl 1 gm/ Sodium (Chloride)  250 mls @ 165 mls/hr IV Q12H [facility]


   Stop: 18 13:59


   Last Infusion: 18 02:55 Dose:  Infused


Insulin Aspart (Novolog Insulin Sliding Scale)  0 units SUBQ ACHS [facility]


   PRN Reason: Protocol


   Stop: 18 07:29


   Last Admin: 18 20:12 Dose:  Not Given


Insulin Detemir (Levemir Insulin)  20 units SUBQ DAILY [facility]


   PRN Reason: Protocol


   Stop: 18 06:20


   Last Admin: 18 09:48 Dose:  Not Given


Magnesium Hydroxide (Milk Of Magnesia)  30 ml PO  PRN


   PRN Reason: Constipation


   Stop: 05/15/18 23:24


Metformin HCl (Glucophage)  1,000 mg PO DAILY [facility]


   Stop: 18 09:59


   Last Admin: 18 09:09 Dose:  1,000 mg


Metoprolol Succinate (Toprol Xl)  25 mg PO DAILY [facility]


   Stop: 05/15/18 23:24


   Last Admin: 18 09:09 Dose:  25 mg


Miscellaneous (Vte Chemical Prophylaxis Screen/ Admission)  1 ea MC PRN PRN


   PRN Reason: PROTOCOL


   Stop: 18 10:29


Miscellaneous (Vancomycin Iv Per Pharmacy)  1 ea MC PRN PRN


   PRN Reason: PROTOCOL


   Stop: 18 16:10


Olanzapine (Zyprexa Zydis)  10 mg PO BID [facility]


   PRN Reason: Protocol


   Stop: 18 07:53


   Last Admin: 18 16:35 Dose:  10 mg


Zolpidem Tartrate (Ambien)  5 mg PO HS PRN


   PRN Reason: insomnia


   Stop: 05/15/18 23:24


   Last Admin: 18 19:55 Dose:  5 mg








General: Alert, No acute distress


HEENT: Atraumatic, PERRLA, EOMI


Neck: Supple, JVD, +2 carotid pulse wo bruit


Cardiovascular: Regular rate, Normal S1, Normal S2


Lungs: Clear to auscultation


Abdomen: Bowel sounds, Soft


Extremities: no Clubbing, no Cyanosis, no Edema


Neurological: Sensation intact


Skin: no Rash


Psych/Mental Status: Other (psychosis)





- Procedures


Procedures: 


 Procedures











Procedure Code Date


 


KEV MUSC/FASCIA 20 SQ CM/< 22270 18


 


EXCISION OF RIGHT HIP MUSCLE, OPEN APPROACH [id number] 18


 


INTRODUCTION OF SERUM/TOX/VACCINE INTO MUSCLE, PERC APPROACH 9Z7289G 18














Assessment/Plan





- Assessment


Assessment: 





psychosis


dementia


Alzheimer's dementia


diabetes mellitus not controlled. 


s/p wound debridement of sacral decubiti


UTI


hyperglycemia








- Plan


Plan: 





continue current medications.


will add doxycyline PO


for hospice eval


continue IV antibiotics





Nutritional Asmnt/Malnutr-PDOC





- Dietary Evaluation


Malnutrition Findings (Please click <Entered> for more info): 








Nutritional Asmnt/Malnutrition                             Start:  18 16:

25


Text:                                                      Status: Complete    

  


Freq:                                                                          

  


 Document     18 16:25  HEN  (Rec: 18 16:31  LCHENG  [id number])


 Nutritional Asmnt/Malnutrition


     Patient General Information


      Nutritional Screening                      Moderate Risk


      Diagnosis                                  dehydration


      Pertinent Medical Hx/Surgical Hx           DM, dementia, depression,


                                                 psychosis, schizophrenia,


                                                 chronic renal insuff


      Subjective Information                     Pt is agitated, on 1:1 sitter.


                                                 Per EMR, PO intake 25-75%,


                                                 avg 50%.


      Current Diet Order/ Nutrition Support      pureed, CCHO 60gm


      Pertinent Medications                      novolov, levemir, glucophage


      Pertinent Labs                             3/19- -316


     Nutritional Hx/Data


      Height                                     1.8 m


      Height (Calculated Centimeters)            180.3


      Current Weight (lbs)                       77.111 kg


      Weight (Calculated Kilograms)              77.1


      Weight (Calculated Grams)                  97410.7


      Ideal Body Weight                          172


      Body Mass Index (BMI)                      23.7


      Weight Status                              Approriate


     GI Symptoms


      GI Symptoms                                None


      Last BM                                    3/19


      Difficult in:                              None


      Skin Integrity/Comment:                    pressure ulcer, decubitus


      Current %PO                                Fair (50-74%)


     Estimated Nutritional Goals


      BEE in Kcals:                              Using Current wt


      Calories/Kcals/Kg                          25-30


      Kcals Calculated                           7650-0881


      Protein:                                   Using Current wt


      Protein g/k-1.2


      Protein Calculated                         77-92


      Fluid: ml                                  1925-2310ml (1ml/kcal)


     Nutritional Problem


      1. Problem


       Problem                                   altered nutrition related lab


                                                 values


       Etiology                                  hx of DM


       Signs/Symptoms:                           glucose 169, -316


     Malnutrition Alert


      Protein-Calorie Malnutrition               N/A


      Is there a minimum of two criteria         No


       selected?                                 


       Query Text:Check all the applicable       


       criteria. A minimum of two criteria are   


       recommended for diagnosis of either       


       severe or non-severe malnutrition.        


     Intervention/Recommendation


      Comments                                   1. Continue with current diet


                                                 as ordered.


                                                 2. Monitor PO intake, wt, labs


                                                 and skin integrity


                                                 3. F/U as moderate risk in 3-5


                                                 days, 3/24-3/26


     Expected Outcomes/Goals


      Expected Outcomes/Goals                    1. PO intake to meet at least


                                                 75% of nutritional needs.


                                                 2. Wt stability, skin to


                                                 remain intact, labs to


                                                 approach WNL.

## 2018-03-31 NOTE — INFECTIOUS DISEASE PROG NOTE
Infectious Disease Subjective





- Review of Systems


Service Date: 18


Subjective: 





No fever





Infectious Disease Objective





- Results


Result Diagrams: 


 18 07:00





 18 07:00


Recent Labs: 


 Laboratory Last Values











WBC  10.2 Th/cmm (4.8-10.8)   18  07:00    


 


RBC  4.18 Mil/cmm (3.80-5.80)   18  07:00    


 


Hgb  12.5 gm/dL (12-16)   18  07:00    


 


Hct  37.9 % (41.0-60)  L  18  07:00    


 


MCV  90.6 fl (80-99)   18  07:00    


 


MCH  29.9 pg (27.0-31.0)   18  07:00    


 


MCHC Differential  33.0 pg (28.0-36.0)   18  07:00    


 


RDW  16.5 % (11.5-20.0)   18  07:00    


 


Plt Count  283 Th/cmm (150-400)   18  07:00    


 


MPV  8.0 fl  18  07:00    


 


Neutrophils %  64.6 % (40.0-80.0)   18  07:00    


 


Lymphocytes %  26.9 % (20.0-50.0)   18  07:00    


 


Monocytes %  5.3 % (2.0-10.0)   18  07:00    


 


Eosinophils %  2.8 % (0.0-5.0)   18  07:00    


 


Basophils %  0.4 % (0.0-2.0)   18  07:00    


 


PT  9.5 SECONDS (9.5-11.5)   18  21:35    


 


INR  0.91  (0.5-1.4)   18  21:35    


 


Sodium  132 mEq/L (136-145)  L  18  07:00    


 


Potassium  3.9 mEq/L (3.5-5.1)   18  07:00    


 


Chloride  99 mEq/L ()   18  07:00    


 


Carbon Dioxide  23.2 mEq/L (21.0-31.0)   18  07:00    


 


Anion Gap  13.7  (7.0-16.0)   18  07:00    


 


BUN  10 mg/dL (7-25)   18  07:00    


 


Creatinine  0.9 mg/dL (0.7-1.3)   18  07:00    


 


Est GFR ( Amer)  TNP   18  07:00    


 


Est GFR (Non-Af Amer)  TNP   18  07:00    


 


BUN/Creatinine Ratio  11.1   18  07:00    


 


Glucose  256 mg/dL ()  H  18  07:00    


 


POC Glucose  208 MG/DL (70 - 105)  H  18  20:04    


 


Hemoglobin A1c %  8.8 % (4.0-6.0)  H  18  07:00    


 


Calcium  8.7 mg/dL (8.6-10.3)   18  07:00    


 


Urine Source  RAVI PORT   18  21:30    


 


Urine Color  YELLOW   18  21:30    


 


Urine Clarity  HAZY  (CLEAR)   18  21:30    


 


Urine pH  5.5  (4.6 - 8.0)   18  21:30    


 


Ur Specific Gravity  1.010  (1.005-1.030)   18  21:30    


 


Urine Protein  NEGATIVE mg/dL (NEGATIVE)   18  21:30    


 


Urine Glucose (UA)  >=1000 mg/dL (NEGATIVE)  H  18  21:30    


 


Urine Ketones  NEGATIVE mg/dL (NEGATIVE)   18  21:30    


 


Urine Blood  LARGE  (NEGATIVE)  H  18  21:30    


 


Urine Nitrate  NEGATIVE  (NEGATIVE)   18  21:30    


 


Urine Bilirubin  NEGATIVE  (NEGATIVE)   18  21:30    


 


Urine Urobilinogen  0.2 E.U./dL (0.2 - 1.0)   18  21:30    


 


Ur Leukocyte Esterase  SMALL  (NEGATIVE)  H  18  21:30    


 


Urine RBC  10-25 /hpf (0-5)  H  18  21:30    


 


Urine WBC  2-5 /hpf (0-5)   18  21:30    


 


Ur Epithelial Cells  OCCASIONAL /lpf (FEW)   18  21:30    


 


Urine Bacteria  FEW /hpf (NONE SEEN)   18  21:30    


 


Urine Yeast  FEW /hpf (NONE SEEN)  H  18  21:30    


 


Vancomycin Trough  21.0 ug/mL (10-20)  H  18  13:00    














- Physical Exam


Vitals and I&O: 


 Vital Signs











Temp  98.5 F   18 20:00


 


Pulse  71   18 20:00


 


Resp  18   18 20:00


 


BP  130/64   18 20:00


 


Pulse Ox  95   18 20:00








 Intake & Output











 18





 06:59 18:59 06:59


 


Intake Total 730 1750 


 


Output Total 2600 2200 


 


Balance -1870 -450 


 


Weight (lbs) 76.204 kg 76.204 kg 


 


Intake:   


 


  Intake, IV Amount 250 250 


 


    Vancomycin HCl 1 gm In 250 250 





    Sodium Chloride 0.9% 250   





    ml @ 165 mls/hr IV Q12H   





    [facility] Rx#:[id number]   


 


  Oral 480 1500 


 


Output:   


 


  Urine 2600 2200 


 


Other:   


 


  # Bowel Movements  1 


 


  Weight Source Bedscale Bedscale 











Active Medications: 


Current Medications





Acetaminophen (Tylenol)  650 mg PO Q6H PRN


   PRN Reason: mild to moderate pain


   Stop: 05/15/18 23:24


   Last Admin: 18 02:33 Dose:  650 mg


Acetaminophen/Hydrocodone Bitart (Norco 5mg/325mg)  1 tab PO Q6H PRN


   PRN Reason: Pain (Moderate)


   Stop: 05/15/18 23:24


   Last Admin: 18 05:34 Dose:  1 tab


Allopurinol (Zyloprim)  100 mg PO DAILY [facility]


   Stop: 05/15/18 23:24


   Last Admin: 18 08:15 Dose:  100 mg


Castor Oil/[place]ian Balsam/Trypsin (Venelex)  1 appl TP DAILY [facility]


   Stop: 05/15/18 23:24


   Last Admin: 18 12:11 Dose:  1 appl


Clonazepam (Klonopin)  1 mg PO BID [facility]


   PRN Reason: Protocol


   Stop: 18 09:59


   Last Admin: 18 17:01 Dose:  1 mg


Donepezil HCl (Aricept)  10 mg PO DAILY [facility]


   Stop: 18 08:00


   Last Admin: 18 08:15 Dose:  10 mg


Doxycycline Hyclate (Vibramycin)  100 mg PO Q12HR [facility]


   Stop: 18 08:59


   Last Admin: 18 20:06 Dose:  100 mg


Famotidine (Pepcid)  20 mg PO BID [facility]


   Stop: 05/15/18 23:24


   Last Admin: 18 17:01 Dose:  20 mg


Fluconazole (Diflucan)  100 mg PO DAILY [facility]


   Stop: 05/15/18 23:24


   Last Admin: 18 08:15 Dose:  100 mg


Heparin Sodium (Porcine) (Heparin)  5,000 units SUBQ Q12HR [facility]


   PRN Reason: Protocol


   Stop: 05/15/18 23:24


   Last Admin: 18 20:06 Dose:  5,000 units


Vancomycin HCl 1 gm/ Sodium (Chloride)  250 mls @ 165 mls/hr IV Q12H [facility]


   Stop: 18 13:59


   Last Infusion: 18 18:15 Dose:  Infused


Insulin Aspart (Novolog Insulin Sliding Scale)  0 units SUBQ ACHS [facility]


   PRN Reason: Protocol


   Stop: 18 07:29


   Last Admin: 18 20:06 Dose:  4 units


Insulin Detemir (Levemir Insulin)  20 units SUBQ DAILY [facility]


   PRN Reason: Protocol


   Stop: 18 06:20


   Last Admin: 18 08:16 Dose:  20 units


Magnesium Hydroxide (Milk Of Magnesia)  30 ml PO  PRN


   PRN Reason: Constipation


   Stop: 05/15/18 23:24


Metformin HCl (Glucophage)  1,000 mg PO DAILY [facility]


   Stop: 18 09:59


   Last Admin: 18 12:11 Dose:  1,000 mg


Metoprolol Succinate (Toprol Xl)  25 mg PO DAILY [facility]


   Stop: 05/15/18 23:24


   Last Admin: 18 12:10 Dose:  25 mg


Miscellaneous (Vte Chemical Prophylaxis Screen/ Admission)  1 ea MC PRN PRN


   PRN Reason: PROTOCOL


   Stop: 18 10:29


Miscellaneous (Vancomycin Iv Per Pharmacy)  1 ea MC PRN PRN


   PRN Reason: PROTOCOL


   Stop: 18 16:10


Olanzapine (Zyprexa Zydis)  10 mg PO BID [facility]


   PRN Reason: Protocol


   Stop: 18 07:53


   Last Admin: 18 17:01 Dose:  10 mg


Zolpidem Tartrate (Ambien)  5 mg PO HS PRN


   PRN Reason: insomnia


   Stop: 05/15/18 23:24


   Last Admin: 18 19:55 Dose:  5 mg








General: no acute distress, well developed, well nourished


HEENT: atraumatic, normocephalic, PERRLA


Neck: supple, no thyromegaly


Cardiovascular: S1S2, regular


Lungs: clear to auscultation bilaterally, clear to percussion


Abdomen: soft, no tender, no distended


Extremities: no cyanosis, no clubbing, no edema


Neurological: awake, alert, oriented


Skin: other (sacral decubitus)





- Procedures


Procedures: 


 Procedures











Procedure Code Date


 


KEV MUSC/FASCIA 20 SQ CM/< 70896 18


 


EXCISION OF RIGHT HIP MUSCLE, OPEN APPROACH [id number] 18


 


INTRODUCTION OF SERUM/TOX/VACCINE INTO MUSCLE, PERC APPROACH 4K2901H 18














Infectious Disease Assmt/Plan





- Assessment


Assessment: 





1.  Sacral decubitus ulcer.


2.  UTI.  Treated


3.  Dementia.





- Plan


Plan: 





wound care.





Nutritional Asmnt/Malnutr-PDOC





- Dietary Evaluation


Malnutrition Findings (Please click <Entered> for more info): 








Nutritional Asmnt/Malnutrition                             Start:  18 16:

25


Text:                                                      Status: Complete    

  


Freq:                                                                          

  


 Document     18 16:25  LCHENG  (Rec: 18 16:31  LCHENG  [place] [id number])


 Nutritional Asmnt/Malnutrition


     Patient General Information


      Nutritional Screening                      Moderate Risk


      Diagnosis                                  dehydration


      Pertinent Medical Hx/Surgical Hx           DM, dementia, depression,


                                                 psychosis, schizophrenia,


                                                 chronic renal insuff


      Subjective Information                     Pt is agitated, on 1:1 sitter.


                                                 Per EMR, PO intake 25-75%,


                                                 avg 50%.


      Current Diet Order/ Nutrition Support      pureed, CCHO 60gm


      Pertinent Medications                      novolov, levemir, glucophage


      Pertinent Labs                             3/19-21 -316


     Nutritional Hx/Data


      Height                                     1.8 m


      Height (Calculated Centimeters)            180.3


      Current Weight (lbs)                       77.111 kg


      Weight (Calculated Kilograms)              77.1


      Weight (Calculated Grams)                  44726.7


      Ideal Body Weight                          172


      Body Mass Index (BMI)                      23.7


      Weight Status                              Approriate


     GI Symptoms


      GI Symptoms                                None


      Last BM                                    3/19


      Difficult in:                              None


      Skin Integrity/Comment:                    pressure ulcer, decubitus


      Current %PO                                Fair (50-74%)


     Estimated Nutritional Goals


      BEE in Kcals:                              Using Current wt


      Calories/Kcals/Kg                          25-30


      Kcals Calculated                           7459-7340


      Protein:                                   Using Current wt


      Protein g/k-1.2


      Protein Calculated                         77-92


      Fluid: ml                                  1925-2310ml (1ml/kcal)


     Nutritional Problem


      1. Problem


       Problem                                   altered nutrition related lab


                                                 values


       Etiology                                  hx of DM


       Signs/Symptoms:                           glucose 169, -316


     Malnutrition Alert


      Protein-Calorie Malnutrition               N/A


      Is there a minimum of two criteria         No


       selected?                                 


       Query Text:Check all the applicable       


       criteria. A minimum of two criteria are   


       recommended for diagnosis of either       


       severe or non-severe malnutrition.        


     Intervention/Recommendation


      Comments                                   1. Continue with current diet


                                                 as ordered.


                                                 2. Monitor PO intake, wt, labs


                                                 and skin integrity


                                                 3. F/U as moderate risk in 3-5


                                                 days, 3/24-3/26


     Expected Outcomes/Goals


      Expected Outcomes/Goals                    1. PO intake to meet at least


                                                 75% of nutritional needs.


                                                 2. Wt stability, skin to


                                                 remain intact, labs to


                                                 approach WNL.

## 2018-03-31 NOTE — GENERAL PROGRESS NOTE
Subjective





- Review of Systems


Service Date: 18


Subjective: 





lying in bed, more friendly, cooperative, eating lunch





Objective





- Results


Result Diagrams: 


 18 07:00





 18 07:00


Recent Labs: 


 Laboratory Last Values











WBC  10.2 Th/cmm (4.8-10.8)   18  07:00    


 


RBC  4.18 Mil/cmm (3.80-5.80)   18  07:00    


 


Hgb  12.5 gm/dL (12-16)   18  07:00    


 


Hct  37.9 % (41.0-60)  L  18  07:00    


 


MCV  90.6 fl (80-99)   18  07:00    


 


MCH  29.9 pg (27.0-31.0)   18  07:00    


 


MCHC Differential  33.0 pg (28.0-36.0)   18  07:00    


 


RDW  16.5 % (11.5-20.0)   18  07:00    


 


Plt Count  283 Th/cmm (150-400)   18  07:00    


 


MPV  8.0 fl  18  07:00    


 


Neutrophils %  64.6 % (40.0-80.0)   18  07:00    


 


Lymphocytes %  26.9 % (20.0-50.0)   18  07:00    


 


Monocytes %  5.3 % (2.0-10.0)   18  07:00    


 


Eosinophils %  2.8 % (0.0-5.0)   18  07:00    


 


Basophils %  0.4 % (0.0-2.0)   18  07:00    


 


PT  9.5 SECONDS (9.5-11.5)   18  21:35    


 


INR  0.91  (0.5-1.4)   18  21:35    


 


Sodium  132 mEq/L (136-145)  L  18  07:00    


 


Potassium  3.9 mEq/L (3.5-5.1)   18  07:00    


 


Chloride  99 mEq/L ()   18  07:00    


 


Carbon Dioxide  23.2 mEq/L (21.0-31.0)   18  07:00    


 


Anion Gap  13.7  (7.0-16.0)   18  07:00    


 


BUN  10 mg/dL (7-25)   18  07:00    


 


Creatinine  0.9 mg/dL (0.7-1.3)   18  07:00    


 


Est GFR ( Amer)  TNP   18  07:00    


 


Est GFR (Non-Af Amer)  TNP   18  07:00    


 


BUN/Creatinine Ratio  11.1   18  07:00    


 


Glucose  256 mg/dL ()  H  18  07:00    


 


POC Glucose  319 MG/DL (70 - 105)  H  18  06:09    


 


Hemoglobin A1c %  8.8 % (4.0-6.0)  H  18  07:00    


 


Calcium  8.7 mg/dL (8.6-10.3)   18  07:00    


 


Urine Source  RAVI PORT   18  21:30    


 


Urine Color  YELLOW   18  21:30    


 


Urine Clarity  HAZY  (CLEAR)   18  21:30    


 


Urine pH  5.5  (4.6 - 8.0)   18  21:30    


 


Ur Specific Gravity  1.010  (1.005-1.030)   18  21:30    


 


Urine Protein  NEGATIVE mg/dL (NEGATIVE)   18  21:30    


 


Urine Glucose (UA)  >=1000 mg/dL (NEGATIVE)  H  18  21:30    


 


Urine Ketones  NEGATIVE mg/dL (NEGATIVE)   18  21:30    


 


Urine Blood  LARGE  (NEGATIVE)  H  18  21:30    


 


Urine Nitrate  NEGATIVE  (NEGATIVE)   18  21:30    


 


Urine Bilirubin  NEGATIVE  (NEGATIVE)   18  21:30    


 


Urine Urobilinogen  0.2 E.U./dL (0.2 - 1.0)   18  21:30    


 


Ur Leukocyte Esterase  SMALL  (NEGATIVE)  H  18  21:30    


 


Urine RBC  10-25 /hpf (0-5)  H  18  21:30    


 


Urine WBC  2-5 /hpf (0-5)   18  21:30    


 


Ur Epithelial Cells  OCCASIONAL /lpf (FEW)   18  21:30    


 


Urine Bacteria  FEW /hpf (NONE SEEN)   18  21:30    


 


Urine Yeast  FEW /hpf (NONE SEEN)  H  18  21:30    


 


Vancomycin Trough  13.4 ug/mL (10-20)   18  13:30    














- Physical Exam


Vitals and I&O: 


 Vital Signs











Temp  98.2 F   18 04:00


 


Pulse  84   18 12:10


 


Resp  19   18 04:00


 


BP  112/77   18 12:10


 


Pulse Ox  99   18 04:00








 Intake & Output











 18





 18:59 06:59 18:59


 


Intake Total 900 730 


 


Output Total  2600 


 


Balance 900 -1870 


 


Weight (lbs) 77.02 kg 76.204 kg 


 


Intake:   


 


  Intake, IV Amount 250 250 


 


    Vancomycin HCl 1 gm In 250 250 





    Sodium Chloride 0.9% 250   





    ml @ 165 mls/hr IV Q12H   





    Formerly [facility] Rx#:[id number]   


 


  Oral 650 480 


 


Output:   


 


  Urine  2600 


 


Other:   


 


  # Voids 3  


 


  # Bowel Movements 0  


 


  Weight Source Bedscale Bedscale 











Active Medications: 


Current Medications





Acetaminophen (Tylenol)  650 mg PO Q6H PRN


   PRN Reason: mild to moderate pain


   Stop: 05/15/18 23:24


   Last Admin: 18 02:33 Dose:  650 mg


Acetaminophen/Hydrocodone Bitart (Norco 5mg/325mg)  1 tab PO Q6H PRN


   PRN Reason: Pain (Moderate)


   Stop: 05/15/18 23:24


   Last Admin: 18 05:34 Dose:  1 tab


Allopurinol (Zyloprim)  100 mg PO DAILY RENETTA


   Stop: 05/15/18 23:24


   Last Admin: 18 08:15 Dose:  100 mg


Castor Oil/Nigerian Balsam/Trypsin (Venelex)  1 appl TP DAILY RENETTA


   Stop: 05/15/18 23:24


   Last Admin: 18 12:11 Dose:  1 appl


Clonazepam (Klonopin)  1 mg PO BID RENETTA


   PRN Reason: Protocol


   Stop: 18 09:59


   Last Admin: 18 08:15 Dose:  1 mg


Donepezil HCl (Aricept)  10 mg PO DAILY [facility]


   Stop: 18 08:00


   Last Admin: 18 08:15 Dose:  10 mg


Doxycycline Hyclate (Vibramycin)  100 mg PO Q12HR RENETTA


   Stop: 18 08:59


   Last Admin: 18 08:15 Dose:  100 mg


Famotidine (Pepcid)  20 mg PO BID RENETTA


   Stop: 05/15/18 23:24


   Last Admin: 18 08:15 Dose:  20 mg


Fluconazole (Diflucan)  100 mg PO DAILY RENETTA


   Stop: 05/15/18 23:24


   Last Admin: 18 08:15 Dose:  100 mg


Heparin Sodium (Porcine) (Heparin)  5,000 units SUBQ Q12HR RENETTA


   PRN Reason: Protocol


   Stop: 05/15/18 23:24


   Last Admin: 18 08:16 Dose:  5,000 units


Vancomycin HCl 1 gm/ Sodium (Chloride)  250 mls @ 165 mls/hr IV Q12H Former[facility]


   Stop: 18 13:59


   Last Infusion: 18 02:55 Dose:  Infused


Insulin Aspart (Novolog Insulin Sliding Scale)  0 units SUBQ ACHS [facility]


   PRN Reason: Protocol


   Stop: 18 07:29


   Last Admin: 18 12:12 Dose:  6 units


Insulin Detemir (Levemir Insulin)  20 units SUBQ DAILY [facility]


   PRN Reason: Protocol


   Stop: 18 06:20


   Last Admin: 18 08:16 Dose:  20 units


Magnesium Hydroxide (Milk Of Magnesia)  30 ml PO  PRN


   PRN Reason: Constipation


   Stop: 05/15/18 23:24


Metformin HCl (Glucophage)  1,000 mg PO DAILY RENETTA


   Stop: 18 09:59


   Last Admin: 18 12:11 Dose:  1,000 mg


Metoprolol Succinate (Toprol Xl)  25 mg PO DAILY [facility]


   Stop: 05/15/18 23:24


   Last Admin: 18 12:10 Dose:  25 mg


Miscellaneous (Vte Chemical Prophylaxis Screen/ Admission)  1 ea  PRN PRN


   PRN Reason: PROTOCOL


   Stop: 18 10:29


Miscellaneous (Vancomycin Iv Per Pharmacy)  1 ea  PRN PRN


   PRN Reason: PROTOCOL


   Stop: 18 16:10


Olanzapine (Zyprexa Zydis)  10 mg PO BID RENETTA


   PRN Reason: Protocol


   Stop: 18 07:53


   Last Admin: 18 12:10 Dose:  10 mg


Zolpidem Tartrate (Ambien)  5 mg PO HS PRN


   PRN Reason: insomnia


   Stop: 05/15/18 23:24


   Last Admin: 18 19:55 Dose:  5 mg








General: Alert, No acute distress


HEENT: Atraumatic, PERRLA, EOMI


Neck: Supple, JVD, +2 carotid pulse wo bruit


Cardiovascular: Regular rate, Normal S1, Normal S2


Lungs: Clear to auscultation


Abdomen: Bowel sounds, Soft


Extremities: no Clubbing, no Cyanosis, no Edema


Neurological: Sensation intact


Skin: no Rash


Psych/Mental Status: Other (psychosis)





- Procedures


Procedures: 


 Procedures











Procedure Code Date


 


KEV MUSC/FASCIA 20 SQ CM/< 27674 18


 


EXCISION OF RIGHT HIP MUSCLE, OPEN APPROACH [id number] 18


 


INTRODUCTION OF SERUM/TOX/VACCINE INTO MUSCLE, PERC APPROACH [id number] 18














Assessment/Plan





- Assessment


Assessment: 





S/P BRYANT


S/P electrolyte imbalance


Acute decomp of Psychosis


Type 2 DM


MRSA, Yeast UTI








- Plan


Plan: 


Lab - Result Diagrams





 18 06:48 





Current Medications





Acetaminophen (Tylenol)  650 mg PO Q6H PRN


   PRN Reason: mild to moderate pain


   Stop: 05/15/18 23:24


Acetaminophen/Hydrocodone Bitart (Norco 5mg/325mg)  1 tab PO Q6H PRN


   PRN Reason: Pain (Moderate)


   Stop: 05/15/18 23:24


Allopurinol (Zyloprim)  100 mg PO DAILY RENETTA


   Stop: 05/15/18 23:24


   Last Admin: 18 10:54 Dose:  100 mg


Castor Oil/Nigerian Balsam/Trypsin (Venelex)  1 appl TP DAILY RENETTA


   Stop: 05/15/18 23:24


   Last Admin: 18 11:02 Dose:  1 appl


Clonazepam (Klonopin)  1 mg PO BID RENETTA


   PRN Reason: Protocol


   Stop: 18 09:59


Famotidine (Pepcid)  20 mg PO BID RENETTA


   Stop: 05/15/18 23:24


   Last Admin: 18 10:54 Dose:  20 mg


Fluconazole (Diflucan)  100 mg PO DAILY RENETTA


   Stop: 05/15/18 23:24


   Last Admin: 18 11:02 Dose:  100 mg


Heparin Sodium (Porcine) (Heparin)  5,000 units SUBQ Q12HR RENETTA


   PRN Reason: Protocol


   Stop: 05/15/18 23:24


   Last Admin: 18 10:59 Dose:  5,000 units


Levofloxacin (Levaquin Pb)  500 mg in 100 mls @ 100 mls/hr IV Q24H RENETTA


   Stop: 18 11:59


Insulin Aspart (Novolog Insulin Sliding Scale)  0 units SUBQ ACHS RENETTA


   PRN Reason: Protocol


   Stop: 05/15/18 23:24


   Last Admin: 18 10:45 Dose:  2 units


Insulin Aspart (Novolog Insulin Sliding Scale)  0 units SUBQ ACHS RENETTA


   PRN Reason: Protocol


   Stop: 18 07:29


Insulin Detemir (Levemir Insulin)  18 units SUBQ DAILY RENETTA


   PRN Reason: Protocol


   Stop: 05/15/18 23:24


   Last Admin: 18 10:56 Dose:  18 units


Lorazepam (Ativan)  0.5 mg PO Q6HR PRN; Protocol


   PRN Reason: agitation 


   Stop: 05/15/18 23:24


Magnesium Hydroxide (Milk Of Magnesia)  30 ml PO HS PRN


   PRN Reason: Constipation


   Stop: 05/15/18 23:24


Metformin HCl (Glucophage)  1,000 mg PO DAILY RENETTA


   Stop: 18 09:59


Metoprolol Succinate (Toprol Xl)  25 mg PO DAILY RENETTA


   Stop: 05/15/18 23:24


   Last Admin: 18 10:55 Dose:  25 mg


Miscellaneous (Vte Chemical Prophylaxis Screen/ Admission)  1 ea MC PRN PRN


   PRN Reason: PROTOCOL


   Stop: 18 10:29


Olanzapine (Zyprexa Zydis)  5 mg PO DAILY RENETTA


   PRN Reason: Protocol


   Stop: 18 09:59


Zolpidem Tartrate (Ambien)  5 mg PO HS PRN


   PRN Reason: insomnia


   Stop: 05/15/18 23:24





Lab - Result Diagrams





 18 05:00 





 





kidney fnc remain stable


encourage po intake


psych meds


continueVanco W/ Diflucan


awaiting placement








Nutritional Asmnt/Malnutr-PDOC





- Dietary Evaluation


Malnutrition Findings (Please click <Entered> for more info): 








Nutritional Asmnt/Malnutrition                             Start:  18 16:

25


Text:                                                      Status: Complete    

  


Freq:                                                                          

  


 Document     18 16:25  MAGEN  (Rec: 18 16:31  MELISSAPAT [id number])


 Nutritional Asmnt/Malnutrition


     Patient General Information


      Nutritional Screening                      Moderate Risk


      Diagnosis                                  dehydration


      Pertinent Medical Hx/Surgical Hx           DM, dementia, depression,


                                                 psychosis, schizophrenia,


                                                 chronic renal insuff


      Subjective Information                     Pt is agitated, on 1:1 sitter.


                                                 Per EMR, PO intake 25-75%,


                                                 avg 50%.


      Current Diet Order/ Nutrition Support      pureed, CCHO 60gm


      Pertinent Medications                      novolov, levemir, glucophage


      Pertinent Labs                             3/19- -316


     Nutritional Hx/Data


      Height                                     1.8 m


      Height (Calculated Centimeters)            180.3


      Current Weight (lbs)                       77.111 kg


      Weight (Calculated Kilograms)              77.1


      Weight (Calculated Grams)                  32005.7


      Ideal Body Weight                          172


      Body Mass Index (BMI)                      23.7


      Weight Status                              Approriate


     GI Symptoms


      GI Symptoms                                None


      Last BM                                    3/19


      Difficult in:                              None


      Skin Integrity/Comment:                    pressure ulcer, decubitus


      Current %PO                                Fair (50-74%)


     Estimated Nutritional Goals


      BEE in Kcals:                              Using Current wt


      Calories/Kcals/Kg                          25-30


      Kcals Calculated                           2229-8167


      Protein:                                   Using Current wt


      Protein g/k-1.2


      Protein Calculated                         77-92


      Fluid: ml                                  1925-2310ml (1ml/kcal)


     Nutritional Problem


      1. Problem


       Problem                                   altered nutrition related lab


                                                 values


       Etiology                                  hx of DM


       Signs/Symptoms:                           glucose 169, -316


     Malnutrition Alert


      Protein-Calorie Malnutrition               N/A


      Is there a minimum of two criteria         No


       selected?                                 


       Query Text:Check all the applicable       


       criteria. A minimum of two criteria are   


       recommended for diagnosis of either       


       severe or non-severe malnutrition.        


     Intervention/Recommendation


      Comments                                   1. Continue with current diet


                                                 as ordered.


                                                 2. Monitor PO intake, wt, labs


                                                 and skin integrity


                                                 3. F/U as moderate risk in 3-5


                                                 days, 3/24-3/26


     Expected Outcomes/Goals


      Expected Outcomes/Goals                    1. PO intake to meet at least


                                                 75% of nutritional needs.


                                                 2. Wt stability, skin to


                                                 remain intact, labs to


                                                 approach WNL.

## 2018-04-01 RX ADMIN — OLANZAPINE SCH MG: 5 TABLET, ORALLY DISINTEGRATING ORAL at 17:20

## 2018-04-01 RX ADMIN — INSULIN ASPART SCH UNITS: 100 INJECTION, SOLUTION INTRAVENOUS; SUBCUTANEOUS at 08:39

## 2018-04-01 RX ADMIN — CASTOR OIL AND BALSAM, PERU SCH APPL: 788; 87 OINTMENT TOPICAL at 08:44

## 2018-04-01 RX ADMIN — INSULIN ASPART SCH: 100 INJECTION, SOLUTION INTRAVENOUS; SUBCUTANEOUS at 17:09

## 2018-04-01 RX ADMIN — INSULIN DETEMIR SCH UNITS: 100 INJECTION, SOLUTION SUBCUTANEOUS at 08:38

## 2018-04-01 RX ADMIN — INSULIN ASPART SCH UNITS: 100 INJECTION, SOLUTION INTRAVENOUS; SUBCUTANEOUS at 12:26

## 2018-04-01 RX ADMIN — INSULIN ASPART SCH UNITS: 100 INJECTION, SOLUTION INTRAVENOUS; SUBCUTANEOUS at 20:38

## 2018-04-01 RX ADMIN — OLANZAPINE SCH MG: 5 TABLET, ORALLY DISINTEGRATING ORAL at 08:49

## 2018-04-01 NOTE — GENERAL PROGRESS NOTE
Subjective





- Review of Systems


Service Date: 18


Subjective: 





lying in bed, more friendly, cooperative, eating lunch





Objective





- Results


Result Diagrams: 


 18 07:00





 18 07:00


Recent Labs: 


 Laboratory Last Values











WBC  10.2 Th/cmm (4.8-10.8)   18  07:00    


 


RBC  4.18 Mil/cmm (3.80-5.80)   18  07:00    


 


Hgb  12.5 gm/dL (12-16)   18  07:00    


 


Hct  37.9 % (41.0-60)  L  18  07:00    


 


MCV  90.6 fl (80-99)   18  07:00    


 


MCH  29.9 pg (27.0-31.0)   18  07:00    


 


MCHC Differential  33.0 pg (28.0-36.0)   18  07:00    


 


RDW  16.5 % (11.5-20.0)   18  07:00    


 


Plt Count  283 Th/cmm (150-400)   18  07:00    


 


MPV  8.0 fl  18  07:00    


 


Neutrophils %  64.6 % (40.0-80.0)   18  07:00    


 


Lymphocytes %  26.9 % (20.0-50.0)   18  07:00    


 


Monocytes %  5.3 % (2.0-10.0)   18  07:00    


 


Eosinophils %  2.8 % (0.0-5.0)   18  07:00    


 


Basophils %  0.4 % (0.0-2.0)   18  07:00    


 


PT  9.5 SECONDS (9.5-11.5)   18  21:35    


 


INR  0.91  (0.5-1.4)   18  21:35    


 


Sodium  132 mEq/L (136-145)  L  18  07:00    


 


Potassium  3.9 mEq/L (3.5-5.1)   18  07:00    


 


Chloride  99 mEq/L ()   18  07:00    


 


Carbon Dioxide  23.2 mEq/L (21.0-31.0)   18  07:00    


 


Anion Gap  13.7  (7.0-16.0)   18  07:00    


 


BUN  10 mg/dL (7-25)   18  07:00    


 


Creatinine  0.9 mg/dL (0.7-1.3)   18  07:00    


 


Est GFR ( Amer)  TNP   18  07:00    


 


Est GFR (Non-Af Amer)  TNP   18  07:00    


 


BUN/Creatinine Ratio  11.1   18  07:00    


 


Glucose  256 mg/dL ()  H  18  07:00    


 


POC Glucose  144 MG/DL (70 - 105)  H  18  16:22    


 


Hemoglobin A1c %  8.8 % (4.0-6.0)  H  18  07:00    


 


Calcium  8.7 mg/dL (8.6-10.3)   18  07:00    


 


Urine Source  RAVI PORT   18  21:30    


 


Urine Color  YELLOW   18  21:30    


 


Urine Clarity  HAZY  (CLEAR)   18  21:30    


 


Urine pH  5.5  (4.6 - 8.0)   18  21:30    


 


Ur Specific Gravity  1.010  (1.005-1.030)   18  21:30    


 


Urine Protein  NEGATIVE mg/dL (NEGATIVE)   18  21:30    


 


Urine Glucose (UA)  >=1000 mg/dL (NEGATIVE)  H  18  21:30    


 


Urine Ketones  NEGATIVE mg/dL (NEGATIVE)   18  21:30    


 


Urine Blood  LARGE  (NEGATIVE)  H  18  21:30    


 


Urine Nitrate  NEGATIVE  (NEGATIVE)   18  21:30    


 


Urine Bilirubin  NEGATIVE  (NEGATIVE)   18  21:30    


 


Urine Urobilinogen  0.2 E.U./dL (0.2 - 1.0)   18  21:30    


 


Ur Leukocyte Esterase  SMALL  (NEGATIVE)  H  18  21:30    


 


Urine RBC  10-25 /hpf (0-5)  H  18  21:30    


 


Urine WBC  2-5 /hpf (0-5)   18  21:30    


 


Ur Epithelial Cells  OCCASIONAL /lpf (FEW)   18  21:30    


 


Urine Bacteria  FEW /hpf (NONE SEEN)   18  21:30    


 


Urine Yeast  FEW /hpf (NONE SEEN)  H  18  21:30    


 


Vancomycin Trough  21.0 ug/mL (10-20)  H  18  13:00    














- Physical Exam


Vitals and I&O: 


 Vital Signs











Temp  97.0 F   18 08:00


 


Pulse  86   18 08:35


 


Resp  18   18 08:00


 


BP  173/97   18 08:35


 


Pulse Ox  97   18 08:00








 Intake & Output











 18





 18:59 06:59 18:59


 


Intake Total 1750 1100 


 


Output Total 2200 900 


 


Balance -450 200 


 


Weight (lbs) 76.204 kg 76.204 kg 


 


Intake:   


 


  Intake, IV Amount 250 250 


 


    Vancomycin HCl 1 gm In 250 250 





    Sodium Chloride 0.9% 250   





    ml @ 165 mls/hr IV Q12H   





    [facility] Rx#:[id number]   


 


  Oral 1500 850 


 


Output:   


 


  Urine 2200 900 


 


Other:   


 


  # Bowel Movements 1  


 


  Weight Source Bedscale Bedscale 











Active Medications: 


Current Medications





Acetaminophen (Tylenol)  650 mg PO Q6H PRN


   PRN Reason: mild to moderate pain


   Stop: 05/15/18 23:24


   Last Admin: 18 02:33 Dose:  650 mg


Acetaminophen/Hydrocodone Bitart (Norco 5mg/325mg)  1 tab PO Q6H PRN


   PRN Reason: Pain (Moderate)


   Stop: 05/15/18 23:24


   Last Admin: 18 05:34 Dose:  1 tab


Allopurinol (Zyloprim)  100 mg PO DAILY [facility]


   Stop: 05/15/18 23:24


   Last Admin: 18 08:35 Dose:  100 mg


Castor Oil/[place] Balsam/Trypsin (Venelex)  1 appl TP DAILY [facility]


   Stop: 05/15/18 23:24


   Last Admin: 18 08:44 Dose:  1 appl


Clonazepam (Klonopin)  1 mg PO BID [facility]


   PRN Reason: Protocol


   Stop: 18 09:59


   Last Admin: 18 08:36 Dose:  1 mg


Docusate Sodium (Colace)  100 mg PO DAILY [facility]


   Stop: 18 08:59


   Last Admin: 18 08:36 Dose:  100 mg


Donepezil HCl (Aricept)  10 mg PO DAILY [facility]


   Stop: 18 08:00


   Last Admin: 18 08:35 Dose:  10 mg


Doxycycline Hyclate (Vibramycin)  100 mg PO Q12HR [facility]


   Stop: 18 08:59


   Last Admin: 18 08:35 Dose:  100 mg


Famotidine (Pepcid)  20 mg PO BID [facility]


   Stop: 05/15/18 23:24


   Last Admin: 18 08:35 Dose:  20 mg


Fluconazole (Diflucan)  100 mg PO DAILY [facility]


   Stop: 05/15/18 23:24


   Last Admin: 18 08:35 Dose:  100 mg


Heparin Sodium (Porcine) (Heparin)  5,000 units SUBQ Q12HR [facility]


   PRN Reason: Protocol


   Stop: 05/15/18 23:24


   Last Admin: 18 08:38 Dose:  5,000 units


Vancomycin HCl 1 gm/ Sodium (Chloride)  250 mls @ 165 mls/hr IV Q12H [facility]


   Stop: 18 13:59


   Last Admin: 18 13:24 Dose:  165 mls/hr


Insulin Aspart (Novolog Insulin Sliding Scale)  0 units SUBQ ACHS [facility]


   PRN Reason: Protocol


   Stop: 18 07:29


   Last Admin: 18 17:09 Dose:  Not Given


Insulin Detemir (Levemir Insulin)  20 units SUBQ DAILY [facility]


   PRN Reason: Protocol


   Stop: 18 06:20


   Last Admin: 18 08:38 Dose:  20 units


Magnesium Hydroxide (Milk Of Magnesia)  30 ml PO HS PRN


   PRN Reason: Constipation


   Stop: 05/15/18 23:24


Metformin HCl (Glucophage)  1,000 mg PO DAILY [facility]


   Stop: 18 09:59


   Last Admin: 18 08:35 Dose:  1,000 mg


Metoprolol Succinate (Toprol Xl)  25 mg PO DAILY [facility]


   Stop: 05/15/18 23:24


   Last Admin: 18 08:35 Dose:  25 mg


Miscellaneous (Vte Chemical Prophylaxis Screen/ Admission)  1 ea MC PRN PRN


   PRN Reason: PROTOCOL


   Stop: 18 10:29


Olanzapine (Zyprexa Zydis)  10 mg PO BID [facility]


   PRN Reason: Protocol


   Stop: 18 07:53


   Last Admin: 18 08:49 Dose:  10 mg


Zolpidem Tartrate (Ambien)  5 mg PO HS PRN


   PRN Reason: insomnia


   Stop: 05/15/18 23:24


   Last Admin: 18 19:55 Dose:  5 mg








General: Alert, No acute distress


HEENT: Atraumatic, PERRLA, EOMI


Neck: Supple, JVD, +2 carotid pulse wo bruit


Cardiovascular: Regular rate, Normal S1, Normal S2


Lungs: Clear to auscultation


Abdomen: Bowel sounds, Soft


Extremities: no Clubbing, no Cyanosis, no Edema


Neurological: Sensation intact


Skin: no Rash


Psych/Mental Status: Other (psychosis)





- Procedures


Procedures: 


 Procedures











Procedure Code Date


 


KEV MUSC/FASCIA 20 SQ CM/< 24882 18


 


EXCISION OF RIGHT HIP MUSCLE, OPEN APPROACH [id number] 18


 


INTRODUCTION OF SERUM/TOX/VACCINE INTO MUSCLE, PERC APPROACH [id number] 18














Assessment/Plan





- Assessment


Assessment: 





S/P BRYANT


S/P electrolyte imbalance


Acute decomp of Psychosis


Type 2 DM


MRSA, Yeast UTI








- Plan


Plan: 


Lab - Result Diagrams





 18 06:48 





Current Medications





Acetaminophen (Tylenol)  650 mg PO Q6H PRN


   PRN Reason: mild to moderate pain


   Stop: 05/15/18 23:24


Acetaminophen/Hydrocodone Bitart (Norco 5mg/325mg)  1 tab PO Q6H PRN


   PRN Reason: Pain (Moderate)


   Stop: 05/15/18 23:24


Allopurinol (Zyloprim)  100 mg PO DAILY [facility]


   Stop: 05/15/18 23:24


   Last Admin: 18 10:54 Dose:  100 mg


Castor Oil/[place] Balsam/Trypsin (Venelex)  1 appl TP DAILY [facility]


   Stop: 05/15/18 23:24


   Last Admin: 18 11:02 Dose:  1 appl


Clonazepam (Klonopin)  1 mg PO BID [facility]


   PRN Reason: Protocol


   Stop: 18 09:59


Famotidine (Pepcid)  20 mg PO BID [facility]


   Stop: 05/15/18 23:24


   Last Admin: 18 10:54 Dose:  20 mg


Fluconazole (Diflucan)  100 mg PO DAILY [facility]


   Stop: 05/15/18 23:24


   Last Admin: 18 11:02 Dose:  100 mg


Heparin Sodium (Porcine) (Heparin)  5,000 units SUBQ Q12HR [facility]


   PRN Reason: Protocol


   Stop: 05/15/18 23:24


   Last Admin: 18 10:59 Dose:  5,000 units


Levofloxacin (Levaquin Pb)  500 mg in 100 mls @ 100 mls/hr IV Q24H [facility]


   Stop: 18 11:59


Insulin Aspart (Novolog Insulin Sliding Scale)  0 units SUBQ ACHS [facility]


   PRN Reason: Protocol


   Stop: 05/15/18 23:24


   Last Admin: 18 10:45 Dose:  2 units


Insulin Aspart (Novolog Insulin Sliding Scale)  0 units SUBQ ACHS [facility]


   PRN Reason: Protocol


   Stop: 18 07:29


Insulin Detemir (Levemir Insulin)  18 units SUBQ DAILY [facility]


   PRN Reason: Protocol


   Stop: 05/15/18 23:24


   Last Admin: 18 10:56 Dose:  18 units


Lorazepam (Ativan)  0.5 mg PO Q6HR PRN; Protocol


   PRN Reason: agitation 


   Stop: 05/15/18 23:24


Magnesium Hydroxide (Milk Of Magnesia)  30 ml PO HS PRN


   PRN Reason: Constipation


   Stop: 05/15/18 23:24


Metformin HCl (Glucophage)  1,000 mg PO DAILY [facility]


   Stop: 18 09:59


Metoprolol Succinate (Toprol Xl)  25 mg PO DAILY [facility]


   Stop: 05/15/18 23:24


   Last Admin: 18 10:55 Dose:  25 mg


Miscellaneous (Vte Chemical Prophylaxis Screen/ Admission)  1 ea MC PRN PRN


   PRN Reason: PROTOCOL


   Stop: 18 10:29


Olanzapine (Zyprexa Zydis)  5 mg PO DAILY [facility]


   PRN Reason: Protocol


   Stop: 18 09:59


Zolpidem Tartrate (Ambien)  5 mg PO HS PRN


   PRN Reason: insomnia


   Stop: 05/15/18 23:24








Lab - Result Diagrams





 18 07:00 





 18 07:00 














 





kidney fnc remain stable


encourage po intake


psych meds


continueVanco W/ Diflucan


awaiting placement


will follow as needed








Nutritional Asmnt/Malnutr-PDOC





- Dietary Evaluation


Malnutrition Findings (Please click <Entered> for more info): 








Nutritional Asmnt/Malnutrition                             Start:  18 16:

25


Text:                                                      Status: Complete    

  


Freq:                                                                          

  


 Document     18 16:25  MAGEN  (Rec: 18 16:31  LCHENG  [id number])


 Nutritional Asmnt/Malnutrition


     Patient General Information


      Nutritional Screening                      Moderate Risk


      Diagnosis                                  dehydration


      Pertinent Medical Hx/Surgical Hx           DM, dementia, depression,


                                                 psychosis, schizophrenia,


                                                 chronic renal insuff


      Subjective Information                     Pt is agitated, on 1:1 sitter.


                                                 Per EMR, PO intake 25-75%,


                                                 avg 50%.


      Current Diet Order/ Nutrition Support      pureed, CCHO 60gm


      Pertinent Medications                      novolov, levemir, glucophage


      Pertinent Labs                             3/19- -316


     Nutritional Hx/Data


      Height                                     1.8 m


      Height (Calculated Centimeters)            180.3


      Current Weight (lbs)                       77.111 kg


      Weight (Calculated Kilograms)              77.1


      Weight (Calculated Grams)                  74283.7


      Ideal Body Weight                          172


      Body Mass Index (BMI)                      23.7


      Weight Status                              Approriate


     GI Symptoms


      GI Symptoms                                None


      Last BM                                    3/19


      Difficult in:                              None


      Skin Integrity/Comment:                    pressure ulcer, decubitus


      Current %PO                                Fair (50-74%)


     Estimated Nutritional Goals


      BEE in Kcals:                              Using Current wt


      Calories/Kcals/Kg                          25-30


      Kcals Calculated                           8779-0537


      Protein:                                   Using Current wt


      Protein g/k-1.2


      Protein Calculated                         77-92


      Fluid: ml                                  1925-2310ml (1ml/kcal)


     Nutritional Problem


      1. Problem


       Problem                                   altered nutrition related lab


                                                 values


       Etiology                                  hx of DM


       Signs/Symptoms:                           glucose 169, -316


     Malnutrition Alert


      Protein-Calorie Malnutrition               N/A


      Is there a minimum of two criteria         No


       selected?                                 


       Query Text:Check all the applicable       


       criteria. A minimum of two criteria are   


       recommended for diagnosis of either       


       severe or non-severe malnutrition.        


     Intervention/Recommendation


      Comments                                   1. Continue with current diet


                                                 as ordered.


                                                 2. Monitor PO intake, wt, labs


                                                 and skin integrity


                                                 3. F/U as moderate risk in 3-5


                                                 days, 3/24-3/26


     Expected Outcomes/Goals


      Expected Outcomes/Goals                    1. PO intake to meet at least


                                                 75% of nutritional needs.


                                                 2. Wt stability, skin to


                                                 remain intact, labs to


                                                 approach WNL.

## 2018-04-01 NOTE — GENERAL PROGRESS NOTE
Subjective





- Review of Systems


Service Date: 18


Subjective: 





Patient resting comfortably in bed. no acute distress. In good spirits today. 

pleasant. cooperative today. eating well.





Blood Sugars improving. Will adjust dosage. Constipated this AM. Would like a 

stool softener





Objective





- Results


Result Diagrams: 


 18 07:00





 18 07:00


Recent Labs: 


 Laboratory Last Values











WBC  10.2 Th/cmm (4.8-10.8)   18  07:00    


 


RBC  4.18 Mil/cmm (3.80-5.80)   18  07:00    


 


Hgb  12.5 gm/dL (12-16)   18  07:00    


 


Hct  37.9 % (41.0-60)  L  18  07:00    


 


MCV  90.6 fl (80-99)   18  07:00    


 


MCH  29.9 pg (27.0-31.0)   18  07:00    


 


MCHC Differential  33.0 pg (28.0-36.0)   18  07:00    


 


RDW  16.5 % (11.5-20.0)   18  07:00    


 


Plt Count  283 Th/cmm (150-400)   18  07:00    


 


MPV  8.0 fl  18  07:00    


 


Neutrophils %  64.6 % (40.0-80.0)   18  07:00    


 


Lymphocytes %  26.9 % (20.0-50.0)   18  07:00    


 


Monocytes %  5.3 % (2.0-10.0)   18  07:00    


 


Eosinophils %  2.8 % (0.0-5.0)   18  07:00    


 


Basophils %  0.4 % (0.0-2.0)   18  07:00    


 


PT  9.5 SECONDS (9.5-11.5)   18  21:35    


 


INR  0.91  (0.5-1.4)   18  21:35    


 


Sodium  132 mEq/L (136-145)  L  18  07:00    


 


Potassium  3.9 mEq/L (3.5-5.1)   18  07:00    


 


Chloride  99 mEq/L ()   18  07:00    


 


Carbon Dioxide  23.2 mEq/L (21.0-31.0)   18  07:00    


 


Anion Gap  13.7  (7.0-16.0)   18  07:00    


 


BUN  10 mg/dL (7-25)   18  07:00    


 


Creatinine  0.9 mg/dL (0.7-1.3)   18  07:00    


 


Est GFR ( Amer)  TNP   18  07:00    


 


Est GFR (Non-Af Amer)  TNP   18  07:00    


 


BUN/Creatinine Ratio  11.1   18  07:00    


 


Glucose  256 mg/dL ()  H  18  07:00    


 


POC Glucose  175 MG/DL (70 - 105)  H  18  05:47    


 


Hemoglobin A1c %  8.8 % (4.0-6.0)  H  18  07:00    


 


Calcium  8.7 mg/dL (8.6-10.3)   18  07:00    


 


Urine Source  RAVI PORT   18  21:30    


 


Urine Color  YELLOW   18  21:30    


 


Urine Clarity  HAZY  (CLEAR)   18  21:30    


 


Urine pH  5.5  (4.6 - 8.0)   18  21:30    


 


Ur Specific Gravity  1.010  (1.005-1.030)   18  21:30    


 


Urine Protein  NEGATIVE mg/dL (NEGATIVE)   18  21:30    


 


Urine Glucose (UA)  >=1000 mg/dL (NEGATIVE)  H  18  21:30    


 


Urine Ketones  NEGATIVE mg/dL (NEGATIVE)   18  21:30    


 


Urine Blood  LARGE  (NEGATIVE)  H  18  21:30    


 


Urine Nitrate  NEGATIVE  (NEGATIVE)   18  21:30    


 


Urine Bilirubin  NEGATIVE  (NEGATIVE)   18  21:30    


 


Urine Urobilinogen  0.2 E.U./dL (0.2 - 1.0)   18  21:30    


 


Ur Leukocyte Esterase  SMALL  (NEGATIVE)  H  18  21:30    


 


Urine RBC  10-25 /hpf (0-5)  H  18  21:30    


 


Urine WBC  2-5 /hpf (0-5)   18  21:30    


 


Ur Epithelial Cells  OCCASIONAL /lpf (FEW)   18  21:30    


 


Urine Bacteria  FEW /hpf (NONE SEEN)   18  21:30    


 


Urine Yeast  FEW /hpf (NONE SEEN)  H  18  21:30    


 


Vancomycin Trough  21.0 ug/mL (10-20)  H  18  13:00    














- Physical Exam


Vitals and I&O: 


 Vital Signs











Temp  98.0 F   18 04:00


 


Pulse  69   18 04:00


 


Resp  18   18 04:00


 


BP  156/79   18 04:00


 


Pulse Ox  100   18 04:00








 Intake & Output











 18





 06:59 18:59 06:59


 


Intake Total 730 1750 1100


 


Output Total 2600 2200 900


 


Balance -1870 -450 200


 


Weight (lbs) 76.204 kg 76.204 kg 76.204 kg


 


Intake:   


 


  Intake, IV Amount 250 250 250


 


    Vancomycin HCl 1 gm In 250 250 250





    Sodium Chloride 0.9% 250   





    ml @ 165 mls/hr IV Q12H   





    [facility] Rx#:[id number]   


 


  Oral 480 1500 850


 


Output:   


 


  Urine 2600 2200 900


 


Other:   


 


  # Bowel Movements  1 


 


  Weight Source Bedscale Bedscale Bedscale











Active Medications: 


Current Medications





Acetaminophen (Tylenol)  650 mg PO Q6H PRN


   PRN Reason: mild to moderate pain


   Stop: 05/15/18 23:24


   Last Admin: 18 02:33 Dose:  650 mg


Acetaminophen/Hydrocodone Bitart (Norco 5mg/325mg)  1 tab PO Q6H PRN


   PRN Reason: Pain (Moderate)


   Stop: 05/15/18 23:24


   Last Admin: 18 05:34 Dose:  1 tab


Allopurinol (Zyloprim)  100 mg PO DAILY [facility]


   Stop: 05/15/18 23:24


   Last Admin: 18 08:15 Dose:  100 mg


Castor Oil/[place] Balsam/Trypsin (Venelex)  1 appl TP DAILY [facility]


   Stop: 05/15/18 23:24


   Last Admin: 18 12:11 Dose:  1 appl


Clonazepam (Klonopin)  1 mg PO BID [facility]


   PRN Reason: Protocol


   Stop: 18 09:59


   Last Admin: 18 17:01 Dose:  1 mg


Docusate Sodium (Colace)  100 mg PO DAILY [facility]


   Stop: 18 08:59


Donepezil HCl (Aricept)  10 mg PO DAILY [facility]


   Stop: 18 08:00


   Last Admin: 18 08:15 Dose:  10 mg


Doxycycline Hyclate (Vibramycin)  100 mg PO Q12HR [facility]


   Stop: 18 08:59


   Last Admin: 18 20:06 Dose:  100 mg


Famotidine (Pepcid)  20 mg PO BID [facility]


   Stop: 05/15/18 23:24


   Last Admin: 18 17:01 Dose:  20 mg


Fluconazole (Diflucan)  100 mg PO DAILY [facility]


   Stop: 05/15/18 23:24


   Last Admin: 18 08:15 Dose:  100 mg


Heparin Sodium (Porcine) (Heparin)  5,000 units SUBQ Q12HR [facility]


   PRN Reason: Protocol


   Stop: 05/15/18 23:24


   Last Admin: 18 20:06 Dose:  5,000 units


Vancomycin HCl 1 gm/ Sodium (Chloride)  250 mls @ 165 mls/hr IV Q12H [facility]


   Stop: 18 13:59


   Last Infusion: 18 02:36 Dose:  Infused


Insulin Aspart (Novolog Insulin Sliding Scale)  0 units SUBQ ACHS [facility]


   PRN Reason: Protocol


   Stop: 18 07:29


   Last Admin: 18 20:06 Dose:  4 units


Insulin Detemir (Levemir Insulin)  20 units SUBQ DAILY [facility]


   PRN Reason: Protocol


   Stop: 18 06:20


   Last Admin: 18 08:16 Dose:  20 units


Magnesium Hydroxide (Milk Of Magnesia)  30 ml PO  PRN


   PRN Reason: Constipation


   Stop: 05/15/18 23:24


Metformin HCl (Glucophage)  1,000 mg PO DAILY [facility]


   Stop: 18 09:59


   Last Admin: 18 12:11 Dose:  1,000 mg


Metoprolol Succinate (Toprol Xl)  25 mg PO DAILY [facility]


   Stop: 05/15/18 23:24


   Last Admin: 18 12:10 Dose:  25 mg


Miscellaneous (Vte Chemical Prophylaxis Screen/ Admission)  1 ea [facility] PRN PRN


   PRN Reason: PROTOCOL


   Stop: 18 10:29


Olanzapine (Zyprexa Zydis)  10 mg PO BID [facility]


   PRN Reason: Protocol


   Stop: 18 07:53


   Last Admin: 18 17:01 Dose:  10 mg


Zolpidem Tartrate (Ambien)  5 mg PO HS PRN


   PRN Reason: insomnia


   Stop: 05/15/18 23:24


   Last Admin: 18 19:55 Dose:  5 mg








General: Alert, No acute distress


HEENT: Atraumatic, PERRLA, EOMI


Neck: Supple, JVD, +2 carotid pulse wo bruit


Cardiovascular: Regular rate, Normal S1, Normal S2


Lungs: Clear to auscultation


Abdomen: Bowel sounds, Soft


Extremities: no Clubbing, no Cyanosis, no Edema


Neurological: Sensation intact


Skin: no Rash


Psych/Mental Status: Other (psychosis)





- Procedures


Procedures: 


 Procedures











Procedure Code Date


 


KEV MUSC/FASCIA 20 SQ CM/< 31726 18


 


EXCISION OF RIGHT HIP MUSCLE, OPEN APPROACH [id number] 18


 


INTRODUCTION OF SERUM/TOX/VACCINE INTO MUSCLE, PERC APPROACH [id number] 18














Assessment/Plan





- Assessment


Assessment: 





psychosis


dementia


Alzheimer's dementia


diabetes mellitus not controlled. 


s/p wound debridement of sacral decubiti


UTI


hyperglycemia


constipation





- Plan


Plan: 





continue current medications.


will add doxycyline PO


for hospice eval


continue IV antibiotics


stool softener





Nutritional Asmnt/Malnutr-PDOC





- Dietary Evaluation


Malnutrition Findings (Please click <Entered> for more info): 








Nutritional Asmnt/Malnutrition                             Start:  18 16:

25


Text:                                                      Status: Complete    

  


Freq:                                                                          

  


 Document     18 16:25  LCHENG  (Rec: 18 16:31  LCHENG  [email or web address]-[email or web address])


 Nutritional Asmnt/Malnutrition


     Patient General Information


      Nutritional Screening                      Moderate Risk


      Diagnosis                                  dehydration


      Pertinent Medical Hx/Surgical Hx           DM, dementia, depression,


                                                 psychosis, schizophrenia,


                                                 chronic renal insuff


      Subjective Information                     Pt is agitated, on 1:1 sitter.


                                                 Per EMR, PO intake 25-75%,


                                                 avg 50%.


      Current Diet Order/ Nutrition Support      pureed, CCHO 60gm


      Pertinent Medications                      novolov, levemir, glucophage


      Pertinent Labs                             3/19-21 -316


     Nutritional Hx/Data


      Height                                     1.8 m


      Height (Calculated Centimeters)            180.3


      Current Weight (lbs)                       77.111 kg


      Weight (Calculated Kilograms)              77.1


      Weight (Calculated Grams)                  80767.7


      Ideal Body Weight                          172


      Body Mass Index (BMI)                      23.7


      Weight Status                              Approriate


     GI Symptoms


      GI Symptoms                                None


      Last BM                                    3/19


      Difficult in:                              None


      Skin Integrity/Comment:                    pressure ulcer, decubitus


      Current %PO                                Fair (50-74%)


     Estimated Nutritional Goals


      BEE in Kcals:                              Using Current wt


      Calories/Kcals/Kg                          25-30


      Kcals Calculated                           9994-3544


      Protein:                                   Using Current wt


      Protein g/k-1.2


      Protein Calculated                         77-92


      Fluid: ml                                  1925-2310ml (1ml/kcal)


     Nutritional Problem


      1. Problem


       Problem                                   altered nutrition related lab


                                                 values


       Etiology                                  hx of DM


       Signs/Symptoms:                           glucose 169, -316


     Malnutrition Alert


      Protein-Calorie Malnutrition               N/A


      Is there a minimum of two criteria         No


       selected?                                 


       Query Text:Check all the applicable       


       criteria. A minimum of two criteria are   


       recommended for diagnosis of either       


       severe or non-severe malnutrition.        


     Intervention/Recommendation


      Comments                                   1. Continue with current diet


                                                 as ordered.


                                                 2. Monitor PO intake, wt, labs


                                                 and skin integrity


                                                 3. F/U as moderate risk in 3-5


                                                 days, 3/24-3/26


     Expected Outcomes/Goals


      Expected Outcomes/Goals                    1. PO intake to meet at least


                                                 75% of nutritional needs.


                                                 2. Wt stability, skin to


                                                 remain intact, labs to


                                                 approach WNL.

## 2018-04-01 NOTE — INFECTIOUS DISEASE PROG NOTE
Infectious Disease Subjective





- Review of Systems


Service Date: 18


Subjective: 





No fever





Infectious Disease Objective





- Results


Result Diagrams: 


 18 07:00





 18 07:00


Recent Labs: 


 Laboratory Last Values











WBC  10.2 Th/cmm (4.8-10.8)   18  07:00    


 


RBC  4.18 Mil/cmm (3.80-5.80)   18  07:00    


 


Hgb  12.5 gm/dL (12-16)   18  07:00    


 


Hct  37.9 % (41.0-60)  L  18  07:00    


 


MCV  90.6 fl (80-99)   18  07:00    


 


MCH  29.9 pg (27.0-31.0)   18  07:00    


 


MCHC Differential  33.0 pg (28.0-36.0)   18  07:00    


 


RDW  16.5 % (11.5-20.0)   18  07:00    


 


Plt Count  283 Th/cmm (150-400)   18  07:00    


 


MPV  8.0 fl  18  07:00    


 


Neutrophils %  64.6 % (40.0-80.0)   18  07:00    


 


Lymphocytes %  26.9 % (20.0-50.0)   18  07:00    


 


Monocytes %  5.3 % (2.0-10.0)   18  07:00    


 


Eosinophils %  2.8 % (0.0-5.0)   18  07:00    


 


Basophils %  0.4 % (0.0-2.0)   18  07:00    


 


PT  9.5 SECONDS (9.5-11.5)   18  21:35    


 


INR  0.91  (0.5-1.4)   18  21:35    


 


Sodium  132 mEq/L (136-145)  L  18  07:00    


 


Potassium  3.9 mEq/L (3.5-5.1)   18  07:00    


 


Chloride  99 mEq/L ()   18  07:00    


 


Carbon Dioxide  23.2 mEq/L (21.0-31.0)   18  07:00    


 


Anion Gap  13.7  (7.0-16.0)   18  07:00    


 


BUN  10 mg/dL (7-25)   18  07:00    


 


Creatinine  0.9 mg/dL (0.7-1.3)   18  07:00    


 


Est GFR ( Amer)  TNP   18  07:00    


 


Est GFR (Non-Af Amer)  TNP   18  07:00    


 


BUN/Creatinine Ratio  11.1   18  07:00    


 


Glucose  256 mg/dL ()  H  18  07:00    


 


POC Glucose  144 MG/DL (70 - 105)  H  18  16:22    


 


Hemoglobin A1c %  8.8 % (4.0-6.0)  H  18  07:00    


 


Calcium  8.7 mg/dL (8.6-10.3)   18  07:00    


 


Urine Source  RAVI PORT   18  21:30    


 


Urine Color  YELLOW   18  21:30    


 


Urine Clarity  HAZY  (CLEAR)   18  21:30    


 


Urine pH  5.5  (4.6 - 8.0)   18  21:30    


 


Ur Specific Gravity  1.010  (1.005-1.030)   18  21:30    


 


Urine Protein  NEGATIVE mg/dL (NEGATIVE)   18  21:30    


 


Urine Glucose (UA)  >=1000 mg/dL (NEGATIVE)  H  18  21:30    


 


Urine Ketones  NEGATIVE mg/dL (NEGATIVE)   18  21:30    


 


Urine Blood  LARGE  (NEGATIVE)  H  18  21:30    


 


Urine Nitrate  NEGATIVE  (NEGATIVE)   18  21:30    


 


Urine Bilirubin  NEGATIVE  (NEGATIVE)   18  21:30    


 


Urine Urobilinogen  0.2 E.U./dL (0.2 - 1.0)   18  21:30    


 


Ur Leukocyte Esterase  SMALL  (NEGATIVE)  H  18  21:30    


 


Urine RBC  10-25 /hpf (0-5)  H  18  21:30    


 


Urine WBC  2-5 /hpf (0-5)   18  21:30    


 


Ur Epithelial Cells  OCCASIONAL /lpf (FEW)   18  21:30    


 


Urine Bacteria  FEW /hpf (NONE SEEN)   18  21:30    


 


Urine Yeast  FEW /hpf (NONE SEEN)  H  18  21:30    


 


Vancomycin Trough  21.0 ug/mL (10-20)  H  18  13:00    














- Physical Exam


Vitals and I&O: 


 Vital Signs











Temp  98.4 F   18 16:00


 


Pulse  84   18 16:00


 


Resp  18   18 16:00


 


BP  173/97   18 08:35


 


Pulse Ox  98   18 16:00








 Intake & Output











 18





 18:59 06:59 18:59


 


Intake Total 1750 1100 1350


 


Output Total 2200 900 2000


 


Balance -450 200 -650


 


Weight (lbs) 76.204 kg 76.204 kg 76.204 kg


 


Intake:   


 


  Intake, IV Amount 250 250 250


 


    Vancomycin HCl 1 gm In 250 250 250





    Sodium Chloride 0.9% 250   





    ml @ 165 mls/hr IV Q12H   





    [facility] Rx#:[id number]   


 


  Oral [id number]


 


Output:   


 


  Urine 2200 900 2000


 


Other:   


 


  # Bowel Movements 1  0


 


  Weight Source Bedscale Bedscale Bedscale











Active Medications: 


Current Medications





Acetaminophen (Tylenol)  650 mg PO Q6H PRN


   PRN Reason: mild to moderate pain


   Stop: 05/15/18 23:24


   Last Admin: 18 02:33 Dose:  650 mg


Acetaminophen/Hydrocodone Bitart (Norco 5mg/325mg)  1 tab PO Q6H PRN


   PRN Reason: Pain (Moderate)


   Stop: 05/15/18 23:24


   Last Admin: 18 05:34 Dose:  1 tab


Allopurinol (Zyloprim)  100 mg PO DAILY [facility]


   Stop: 05/15/18 23:24


   Last Admin: 18 08:35 Dose:  100 mg


Castor Oil/[place] Balsam/Trypsin (Venelex)  1 appl TP DAILY [facility]


   Stop: 05/15/18 23:24


   Last Admin: 18 08:44 Dose:  1 appl


Clonazepam (Klonopin)  1 mg PO BID [facility]


   PRN Reason: Protocol


   Stop: 18 09:59


   Last Admin: 18 17:20 Dose:  1 mg


Docusate Sodium (Colace)  100 mg PO DAILY [facility]


   Stop: 18 08:59


   Last Admin: 18 08:36 Dose:  100 mg


Donepezil HCl (Aricept)  10 mg PO DAILY [facility]


   Stop: 18 08:00


   Last Admin: 18 08:35 Dose:  10 mg


Doxycycline Hyclate (Vibramycin)  100 mg PO Q12HR [facility]


   Stop: 18 08:59


   Last Admin: 18 08:35 Dose:  100 mg


Famotidine (Pepcid)  20 mg PO BID [facility]


   Stop: 05/15/18 23:24


   Last Admin: 18 17:20 Dose:  20 mg


Fluconazole (Diflucan)  100 mg PO DAILY [facility]


   Stop: 05/15/18 23:24


   Last Admin: 18 08:35 Dose:  100 mg


Heparin Sodium (Porcine) (Heparin)  5,000 units SUBQ Q12HR [facility]


   PRN Reason: Protocol


   Stop: 05/15/18 23:24


   Last Admin: 18 08:38 Dose:  5,000 units


Vancomycin HCl 1 gm/ Sodium (Chloride)  250 mls @ 165 mls/hr IV Q12H [facility]


   Stop: 18 13:59


   Last Infusion: 18 18:23 Dose:  Infused


Insulin Aspart (Novolog Insulin Sliding Scale)  0 units SUBQ ACHS [facility]


   PRN Reason: Protocol


   Stop: 18 07:29


   Last Admin: 18 17:09 Dose:  Not Given


Insulin Detemir (Levemir Insulin)  20 units SUBQ DAILY [facility]


   PRN Reason: Protocol


   Stop: 18 06:20


   Last Admin: 18 08:38 Dose:  20 units


Magnesium Hydroxide (Milk Of Magnesia)  30 ml PO HS PRN


   PRN Reason: Constipation


   Stop: 05/15/18 23:24


Metformin HCl (Glucophage)  1,000 mg PO DAILY [facility]


   Stop: 18 09:59


   Last Admin: 18 08:35 Dose:  1,000 mg


Metoprolol Succinate (Toprol Xl)  25 mg PO DAILY [facility]


   Stop: 05/15/18 23:24


   Last Admin: 18 08:35 Dose:  25 mg


Miscellaneous (Vte Chemical Prophylaxis Screen/ Admission)  1 ea [facility] PRN PRN


   PRN Reason: PROTOCOL


   Stop: 18 10:29


Olanzapine (Zyprexa Zydis)  10 mg PO BID [facility]


   PRN Reason: Protocol


   Stop: 18 07:53


   Last Admin: 18 17:20 Dose:  10 mg


Zolpidem Tartrate (Ambien)  5 mg PO HS PRN


   PRN Reason: insomnia


   Stop: 05/15/18 23:24


   Last Admin: 18 19:55 Dose:  5 mg








General: no acute distress, well developed, well nourished


HEENT: atraumatic, normocephalic, PERRLA, EOMI


Neck: supple, no thyromegaly


Cardiovascular: S1S2, regular


Lungs: clear to auscultation bilaterally, clear to percussion


Abdomen: soft, no tender, no distended


Extremities: no cyanosis, no clubbing


Neurological: awake, alert


Skin: other (sacral wound)





- Procedures


Procedures: 


 Procedures











Procedure Code Date


 


KEV MUSC/FASCIA 20 SQ CM/< 89077 18


 


EXCISION OF RIGHT HIP MUSCLE, OPEN APPROACH [id number] 18


 


INTRODUCTION OF SERUM/TOX/VACCINE INTO MUSCLE, PERC APPROACH 4E0970S 18














Infectious Disease Assmt/Plan





- Assessment


Assessment: 





1.  Sacral decubitus ulcer.


2.  UTI.  Treated


3.  Dementia.





- Plan


Plan: 





wound care.





Nutritional Asmnt/Malnutr-PDOC





- Dietary Evaluation


Malnutrition Findings (Please click <Entered> for more info): 








Nutritional Asmnt/Malnutrition                             Start:  18 16:

25


Text:                                                      Status: Complete    

  


Freq:                                                                          

  


 Document     18 16:25  LCSAÚL  (Rec: 18 16:31  [email or web address]


 Nutritional Asmnt/Malnutrition


     Patient General Information


      Nutritional Screening                      Moderate Risk


      Diagnosis                                  dehydration


      Pertinent Medical Hx/Surgical Hx           DM, dementia, depression,


                                                 psychosis, schizophrenia,


                                                 chronic renal insuff


      Subjective Information                     Pt is agitated, on 1:1 sitter.


                                                 Per EMR, PO intake 25-75%,


                                                 avg 50%.


      Current Diet Order/ Nutrition Support      pureed, CCHO 60gm


      Pertinent Medications                      novolov, levemir, glucophage


      Pertinent Labs                             3/19-21 -316


     Nutritional Hx/Data


      Height                                     1.8 m


      Height (Calculated Centimeters)            180.3


      Current Weight (lbs)                       77.111 kg


      Weight (Calculated Kilograms)              77.1


      Weight (Calculated Grams)                  81693.7


      Ideal Body Weight                          172


      Body Mass Index (BMI)                      23.7


      Weight Status                              Approriate


     GI Symptoms


      GI Symptoms                                None


      Last BM                                    3/19


      Difficult in:                              None


      Skin Integrity/Comment:                    pressure ulcer, decubitus


      Current %PO                                Fair (50-74%)


     Estimated Nutritional Goals


      BEE in Kcals:                              Using Current wt


      Calories/Kcals/Kg                          25-30


      Kcals Calculated                           4255-6186


      Protein:                                   Using Current wt


      Protein g/k-1.2


      Protein Calculated                         77-92


      Fluid: ml                                  1925-2310ml (1ml/kcal)


     Nutritional Problem


      1. Problem


       Problem                                   altered nutrition related lab


                                                 values


       Etiology                                  hx of DM


       Signs/Symptoms:                           glucose 169, -316


     Malnutrition Alert


      Protein-Calorie Malnutrition               N/A


      Is there a minimum of two criteria         No


       selected?                                 


       Query Text:Check all the applicable       


       criteria. A minimum of two criteria are   


       recommended for diagnosis of either       


       severe or non-severe malnutrition.        


     Intervention/Recommendation


      Comments                                   1. Continue with current diet


                                                 as ordered.


                                                 2. Monitor PO intake, wt, labs


                                                 and skin integrity


                                                 3. F/U as moderate risk in 3-5


                                                 days, 3/24-3/26


     Expected Outcomes/Goals


      Expected Outcomes/Goals                    1. PO intake to meet at least


                                                 75% of nutritional needs.


                                                 2. Wt stability, skin to


                                                 remain intact, labs to


                                                 approach WNL.

## 2018-04-02 RX ADMIN — INSULIN ASPART SCH: 100 INJECTION, SOLUTION INTRAVENOUS; SUBCUTANEOUS at 16:58

## 2018-04-02 RX ADMIN — INSULIN ASPART SCH UNITS: 100 INJECTION, SOLUTION INTRAVENOUS; SUBCUTANEOUS at 12:18

## 2018-04-02 RX ADMIN — CASTOR OIL AND BALSAM, PERU SCH APPL: 788; 87 OINTMENT TOPICAL at 12:15

## 2018-04-02 RX ADMIN — OLANZAPINE SCH MG: 5 TABLET, ORALLY DISINTEGRATING ORAL at 08:30

## 2018-04-02 RX ADMIN — INSULIN ASPART SCH UNITS: 100 INJECTION, SOLUTION INTRAVENOUS; SUBCUTANEOUS at 20:25

## 2018-04-02 RX ADMIN — INSULIN DETEMIR SCH: 100 INJECTION, SOLUTION SUBCUTANEOUS at 08:31

## 2018-04-02 RX ADMIN — OLANZAPINE SCH MG: 5 TABLET, ORALLY DISINTEGRATING ORAL at 16:40

## 2018-04-02 RX ADMIN — INSULIN ASPART SCH: 100 INJECTION, SOLUTION INTRAVENOUS; SUBCUTANEOUS at 08:24

## 2018-04-02 NOTE — INFECTIOUS DISEASE PROG NOTE
Infectious Disease Subjective





- Review of Systems


Service Date: 18


Subjective: 





No fever





Infectious Disease Objective





- Results


Result Diagrams: 


 18 07:00





 18 13:00


Recent Labs: 


 Laboratory Last Values











WBC  10.2 Th/cmm (4.8-10.8)   18  07:00    


 


RBC  4.18 Mil/cmm (3.80-5.80)   18  07:00    


 


Hgb  12.5 gm/dL (12-16)   18  07:00    


 


Hct  37.9 % (41.0-60)  L  18  07:00    


 


MCV  90.6 fl (80-99)   18  07:00    


 


MCH  29.9 pg (27.0-31.0)   18  07:00    


 


MCHC Differential  33.0 pg (28.0-36.0)   18  07:00    


 


RDW  16.5 % (11.5-20.0)   18  07:00    


 


Plt Count  283 Th/cmm (150-400)   18  07:00    


 


MPV  8.0 fl  18  07:00    


 


Neutrophils %  64.6 % (40.0-80.0)   18  07:00    


 


Lymphocytes %  26.9 % (20.0-50.0)   18  07:00    


 


Monocytes %  5.3 % (2.0-10.0)   18  07:00    


 


Eosinophils %  2.8 % (0.0-5.0)   18  07:00    


 


Basophils %  0.4 % (0.0-2.0)   18  07:00    


 


PT  9.5 SECONDS (9.5-11.5)   18  21:35    


 


INR  0.91  (0.5-1.4)   18  21:35    


 


Sodium  132 mEq/L (136-145)  L  18  07:00    


 


Potassium  3.9 mEq/L (3.5-5.1)   18  07:00    


 


Chloride  99 mEq/L ()   18  07:00    


 


Carbon Dioxide  23.2 mEq/L (21.0-31.0)   18  07:00    


 


Anion Gap  13.7  (7.0-16.0)   18  07:00    


 


BUN  15 mg/dL (7-25)   18  13:00    


 


Creatinine  0.8 mg/dL (0.7-1.3)   18  13:00    


 


Est GFR ( Amer)  TNP   18  07:00    


 


Est GFR (Non-Af Amer)  TNP   18  07:00    


 


BUN/Creatinine Ratio  11.1   18  07:00    


 


Glucose  256 mg/dL ()  H  18  07:00    


 


POC Glucose  284 MG/DL (70 - 105)  H  18  12:13    


 


Hemoglobin A1c %  8.8 % (4.0-6.0)  H  18  07:00    


 


Calcium  8.7 mg/dL (8.6-10.3)   18  07:00    


 


Urine Source  RAVI PORT   18  21:30    


 


Urine Color  YELLOW   18  21:30    


 


Urine Clarity  HAZY  (CLEAR)   18  21:30    


 


Urine pH  5.5  (4.6 - 8.0)   18  21:30    


 


Ur Specific Gravity  1.010  (1.005-1.030)   18  21:30    


 


Urine Protein  NEGATIVE mg/dL (NEGATIVE)   18  21:30    


 


Urine Glucose (UA)  >=1000 mg/dL (NEGATIVE)  H  18  21:30    


 


Urine Ketones  NEGATIVE mg/dL (NEGATIVE)   18  21:30    


 


Urine Blood  LARGE  (NEGATIVE)  H  18  21:30    


 


Urine Nitrate  NEGATIVE  (NEGATIVE)   18  21:30    


 


Urine Bilirubin  NEGATIVE  (NEGATIVE)   18  21:30    


 


Urine Urobilinogen  0.2 E.U./dL (0.2 - 1.0)   18  21:30    


 


Ur Leukocyte Esterase  SMALL  (NEGATIVE)  H  18  21:30    


 


Urine RBC  10-25 /hpf (0-5)  H  18  21:30    


 


Urine WBC  2-5 /hpf (0-5)   18  21:30    


 


Ur Epithelial Cells  OCCASIONAL /lpf (FEW)   18  21:30    


 


Urine Bacteria  FEW /hpf (NONE SEEN)   18  21:30    


 


Urine Yeast  FEW /hpf (NONE SEEN)  H  18  21:30    


 


Vancomycin Trough  21.0 ug/mL (10-20)  H  18  13:00    














- Physical Exam


Vitals and I&O: 


 Vital Signs











Temp  97.5 F   18 12:01


 


Pulse  79   18 12:01


 


Resp  17   18 12:01


 


BP  142/76   18 12:01


 


Pulse Ox  98   18 12:01








 Intake & Output











 18





 18:59 06:59 18:59


 


Intake Total 1350 200 200


 


Output Total 2000 500 


 


Balance -650 -300 200


 


Weight (lbs) 76.204 kg 76.204 kg 76.204 kg


 


Intake:   


 


  Intake, IV Amount 250  


 


    Vancomycin HCl 1 gm In 250  





    Sodium Chloride 0.9% 250   





    ml @ 165 mls/hr IV Q12H   





    [facility] Rx#:[id number]   


 


  Oral 1100 200 200


 


Output:   


 


  Urine 2000 500 


 


Other:   


 


  # Bowel Movements 0 1 


 


  Weight Source Bedscale Bedscale Bedscale











Active Medications: 


Current Medications





Acetaminophen (Tylenol)  650 mg PO Q6H PRN


   PRN Reason: mild to moderate pain


   Stop: 05/15/18 23:24


   Last Admin: 18 02:33 Dose:  650 mg


Acetaminophen/Hydrocodone Bitart (Norco 5mg/325mg)  1 tab PO Q6H PRN


   PRN Reason: Pain (Moderate)


   Stop: 05/15/18 23:24


   Last Admin: 18 05:34 Dose:  1 tab


Allopurinol (Zyloprim)  100 mg PO DAILY [facility]


   Stop: 05/15/18 23:24


   Last Admin: 18 08:31 Dose:  100 mg


Castor Oil/[place] Balsam/Trypsin (Venelex)  1 appl TP DAILY [facility]


   Stop: 05/15/18 23:24


   Last Admin: 18 12:15 Dose:  1 appl


Clonazepam (Klonopin)  1 mg PO BID [facility]


   PRN Reason: Protocol


   Stop: 18 09:59


   Last Admin: 18 08:31 Dose:  1 mg


Docusate Sodium (Colace)  100 mg PO DAILY [facility]


   Stop: 05/31/18 08:59


   Last Admin: 18 08:31 Dose:  100 mg


Donepezil HCl (Aricept)  10 mg PO DAILY [facility]


   Stop: 18 08:00


   Last Admin: 18 08:30 Dose:  10 mg


Doxycycline Hyclate (Vibramycin)  100 mg PO Q12HR [facility]


   Stop: 18 08:59


   Last Admin: 18 08:31 Dose:  100 mg


Famotidine (Pepcid)  20 mg PO BID [facility]


   Stop: 05/15/18 23:24


   Last Admin: 18 08:31 Dose:  20 mg


Fluconazole (Diflucan)  100 mg PO DAILY [facility]


   Stop: 05/15/18 23:24


   Last Admin: 18 08:30 Dose:  100 mg


Heparin Sodium (Porcine) (Heparin)  5,000 units SUBQ Q12HR [facility]


   PRN Reason: Protocol


   Stop: 05/15/18 23:24


   Last Admin: 18 08:31 Dose:  Not Given


Vancomycin HCl 1.5 gm/ Sodium (Chloride)  500 mls @ 250 mls/hr IV Q24H [facility]


   Stop: 18 08:59


Insulin Aspart (Novolog Insulin Sliding Scale)  0 units SUBQ ACHS [facility]


   PRN Reason: Protocol


   Stop: 18 07:29


   Last Admin: 18 12:18 Dose:  6 units


Insulin Detemir (Levemir Insulin)  20 units SUBQ DAILY [facility]


   PRN Reason: Protocol


   Stop: 18 06:20


   Last Admin: 18 08:31 Dose:  Not Given


Magnesium Hydroxide (Milk Of Magnesia)  30 ml PO  PRN


   PRN Reason: Constipation


   Stop: 05/15/18 23:24


Metformin HCl (Glucophage)  1,000 mg PO DAILY [facility]


   Stop: 18 09:59


   Last Admin: 18 08:30 Dose:  1,000 mg


Metoprolol Succinate (Toprol Xl)  25 mg PO DAILY [facility]


   Stop: 05/15/18 23:24


   Last Admin: 18 08:25 Dose:  25 mg


Miscellaneous (Vte Chemical Prophylaxis Screen/ Admission)  1 ea  PRN PRN


   PRN Reason: PROTOCOL


   Stop: 18 10:29


Miscellaneous (Vancomycin Iv Per Pharmacy)  1 ea  DAILY [facility]


   Stop: 18 08:59


Olanzapine (Zyprexa Zydis)  10 mg PO BID [facility]


   PRN Reason: Protocol


   Stop: 18 07:53


   Last Admin: 18 08:30 Dose:  10 mg


Zolpidem Tartrate (Ambien)  5 mg PO HS PRN


   PRN Reason: insomnia


   Stop: 05/15/18 23:24


   Last Admin: 18 19:55 Dose:  5 mg








General: no acute distress, well developed, well nourished


HEENT: atraumatic, normocephalic, PERRLA, EOMI, moist mucous membrane


Neck: supple, no thyromegaly


Cardiovascular: S1S2, regular


Lungs: clear to auscultation bilaterally, clear to percussion


Abdomen: soft, no tender, no distended, no rebound


Extremities: no cyanosis, no clubbing, no edema


Neurological: awake, alert


Skin: other (sacral wound.)





- Procedures


Procedures: 


 Procedures











Procedure Code Date


 


KEV MUSC/FASCIA 20 SQ CM/< 91159 18


 


EXCISION OF RIGHT HIP MUSCLE, OPEN APPROACH [id number] 18


 


INTRODUCTION OF SERUM/TOX/VACCINE INTO MUSCLE, PERC APPROACH [id number] 18














Infectious Disease Assmt/Plan





- Assessment


Assessment: 





1.  Sacral decubitus ulcer.


2.  UTI.  Treated


3.  Dementia.





- Plan


Plan: 





wound care.





Nutritional Asmnt/Malnutr-PDOC





- Dietary Evaluation


Malnutrition Findings (Please click <Entered> for more info): 








Nutritional Asmnt/Malnutrition                             Start:  18 16:

25


Text:                                                      Status: Complete    

  


Freq:                                                                          

  


 Document     18 16:25  SAÚL  (Rec: 18 16:31  HEN  NAPOLEON-[email or web address])


 Nutritional Asmnt/Malnutrition


     Patient General Information


      Nutritional Screening                      Moderate Risk


      Diagnosis                                  dehydration


      Pertinent Medical Hx/Surgical Hx           DM, dementia, depression,


                                                 psychosis, schizophrenia,


                                                 chronic renal insuff


      Subjective Information                     Pt is agitated, on 1:1 sitter.


                                                 Per EMR, PO intake 25-75%,


                                                 avg 50%.


      Current Diet Order/ Nutrition Support      pureed, CCHO 60gm


      Pertinent Medications                      novolov, levemir, glucophage


      Pertinent Labs                             3/19-21 -316


     Nutritional Hx/Data


      Height                                     1.8 m


      Height (Calculated Centimeters)            180.3


      Current Weight (lbs)                       77.111 kg


      Weight (Calculated Kilograms)              77.1


      Weight (Calculated Grams)                  02997.7


      Ideal Body Weight                          172


      Body Mass Index (BMI)                      23.7


      Weight Status                              Approriate


     GI Symptoms


      GI Symptoms                                None


      Last BM                                    3/19


      Difficult in:                              None


      Skin Integrity/Comment:                    pressure ulcer, decubitus


      Current %PO                                Fair (50-74%)


     Estimated Nutritional Goals


      BEE in Kcals:                              Using Current wt


      Calories/Kcals/Kg                          25-30


      Kcals Calculated                           5681-4642


      Protein:                                   Using Current wt


      Protein g/k-1.2


      Protein Calculated                         77-92


      Fluid: ml                                  1925-2310ml (1ml/kcal)


     Nutritional Problem


      1. Problem


       Problem                                   altered nutrition related lab


                                                 values


       Etiology                                  hx of DM


       Signs/Symptoms:                           glucose 169, -316


     Malnutrition Alert


      Protein-Calorie Malnutrition               N/A


      Is there a minimum of two criteria         No


       selected?                                 


       Query Text:Check all the applicable       


       criteria. A minimum of two criteria are   


       recommended for diagnosis of either       


       severe or non-severe malnutrition.        


     Intervention/Recommendation


      Comments                                   1. Continue with current diet


                                                 as ordered.


                                                 2. Monitor PO intake, wt, labs


                                                 and skin integrity


                                                 3. F/U as moderate risk in 3-5


                                                 days, 3/24-3/26


     Expected Outcomes/Goals


      Expected Outcomes/Goals                    1. PO intake to meet at least


                                                 75% of nutritional needs.


                                                 2. Wt stability, skin to


                                                 remain intact, labs to


                                                 approach WNL.

## 2018-04-02 NOTE — GENERAL PROGRESS NOTE
Subjective





- Review of Systems


Service Date: 18


Subjective: 





Patient resting comfortably in bed. no acute distress. In good spirits today. 

pleasant. cooperative today. eating well.





Blood Sugars improving. Will adjust dosage. Constipated this AM. Would like a 

stool softener....





Objective





- Results


Result Diagrams: 


 18 07:00





 18 07:00


Recent Labs: 


 Laboratory Last Values











WBC  10.2 Th/cmm (4.8-10.8)   18  07:00    


 


RBC  4.18 Mil/cmm (3.80-5.80)   18  07:00    


 


Hgb  12.5 gm/dL (12-16)   18  07:00    


 


Hct  37.9 % (41.0-60)  L  18  07:00    


 


MCV  90.6 fl (80-99)   18  07:00    


 


MCH  29.9 pg (27.0-31.0)   18  07:00    


 


MCHC Differential  33.0 pg (28.0-36.0)   18  07:00    


 


RDW  16.5 % (11.5-20.0)   18  07:00    


 


Plt Count  283 Th/cmm (150-400)   18  07:00    


 


MPV  8.0 fl  18  07:00    


 


Neutrophils %  64.6 % (40.0-80.0)   18  07:00    


 


Lymphocytes %  26.9 % (20.0-50.0)   18  07:00    


 


Monocytes %  5.3 % (2.0-10.0)   18  07:00    


 


Eosinophils %  2.8 % (0.0-5.0)   18  07:00    


 


Basophils %  0.4 % (0.0-2.0)   18  07:00    


 


PT  9.5 SECONDS (9.5-11.5)   18  21:35    


 


INR  0.91  (0.5-1.4)   18  21:35    


 


Sodium  132 mEq/L (136-145)  L  18  07:00    


 


Potassium  3.9 mEq/L (3.5-5.1)   18  07:00    


 


Chloride  99 mEq/L ()   18  07:00    


 


Carbon Dioxide  23.2 mEq/L (21.0-31.0)   18  07:00    


 


Anion Gap  13.7  (7.0-16.0)   18  07:00    


 


BUN  10 mg/dL (7-25)   18  07:00    


 


Creatinine  0.9 mg/dL (0.7-1.3)   18  07:00    


 


Est GFR ( Amer)  TNP   18  07:00    


 


Est GFR (Non-Af Amer)  TNP   18  07:00    


 


BUN/Creatinine Ratio  11.1   18  07:00    


 


Glucose  256 mg/dL ()  H  18  07:00    


 


POC Glucose  231 MG/DL (70 - 105)  H  18  06:52    


 


Hemoglobin A1c %  8.8 % (4.0-6.0)  H  18  07:00    


 


Calcium  8.7 mg/dL (8.6-10.3)   18  07:00    


 


Urine Source  RAVI PORT   18  21:30    


 


Urine Color  YELLOW   18  21:30    


 


Urine Clarity  HAZY  (CLEAR)   18  21:30    


 


Urine pH  5.5  (4.6 - 8.0)   18  21:30    


 


Ur Specific Gravity  1.010  (1.005-1.030)   18  21:30    


 


Urine Protein  NEGATIVE mg/dL (NEGATIVE)   18  21:30    


 


Urine Glucose (UA)  >=1000 mg/dL (NEGATIVE)  H  18  21:30    


 


Urine Ketones  NEGATIVE mg/dL (NEGATIVE)   18  21:30    


 


Urine Blood  LARGE  (NEGATIVE)  H  18  21:30    


 


Urine Nitrate  NEGATIVE  (NEGATIVE)   18  21:30    


 


Urine Bilirubin  NEGATIVE  (NEGATIVE)   18  21:30    


 


Urine Urobilinogen  0.2 E.U./dL (0.2 - 1.0)   18  21:30    


 


Ur Leukocyte Esterase  SMALL  (NEGATIVE)  H  18  21:30    


 


Urine RBC  10-25 /hpf (0-5)  H  18  21:30    


 


Urine WBC  2-5 /hpf (0-5)   18  21:30    


 


Ur Epithelial Cells  OCCASIONAL /lpf (FEW)   18  21:30    


 


Urine Bacteria  FEW /hpf (NONE SEEN)   18  21:30    


 


Urine Yeast  FEW /hpf (NONE SEEN)  H  18  21:30    


 


Vancomycin Trough  21.0 ug/mL (10-20)  H  18  13:00    














- Physical Exam


Vitals and I&O: 


 Vital Signs











Temp  97.8 F   18 04:00


 


Pulse  82   18 04:00


 


Resp  16   18 04:00


 


BP  134/75   18 00:00


 


Pulse Ox  98   18 04:00








 Intake & Output











 18





 18:59 06:59 18:59


 


Intake Total 1350 200 


 


Output Total 2000 500 


 


Balance -650 -300 


 


Weight (lbs) 76.204 kg 76.204 kg 


 


Intake:   


 


  Intake, IV Amount 250  


 


    Vancomycin HCl 1 gm In 250  





    Sodium Chloride 0.9% 250   





    ml @ 165 mls/hr IV Q12H   





    [facility] Rx#:[id number]   


 


  Oral 1100 200 


 


Output:   


 


  Urine 2000 500 


 


Other:   


 


  # Bowel Movements 0 1 


 


  Weight Source Bedscale Bedscale 











Active Medications: 


Current Medications





Acetaminophen (Tylenol)  650 mg PO Q6H PRN


   PRN Reason: mild to moderate pain


   Stop: 05/15/18 23:24


   Last Admin: 18 02:33 Dose:  650 mg


Acetaminophen/Hydrocodone Bitart (Norco 5mg/325mg)  1 tab PO Q6H PRN


   PRN Reason: Pain (Moderate)


   Stop: 05/15/18 23:24


   Last Admin: 18 05:34 Dose:  1 tab


Allopurinol (Zyloprim)  100 mg PO DAILY [facility]


   Stop: 05/15/18 23:24


   Last Admin: 18 08:35 Dose:  100 mg


Castor Oil/[place]auritian Balsam/Trypsin (Venelex)  1 appl TP DAILY [facility]


   Stop: 05/15/18 23:24


   Last Admin: 18 08:44 Dose:  1 appl


Clonazepam (Klonopin)  1 mg PO BID [facility]


   PRN Reason: Protocol


   Stop: 18 09:59


   Last Admin: 18 17:20 Dose:  1 mg


Docusate Sodium (Colace)  100 mg PO DAILY [facility]


   Stop: 18 08:59


   Last Admin: 18 08:36 Dose:  100 mg


Donepezil HCl (Aricept)  10 mg PO DAILY [facility]


   Stop: 18 08:00


   Last Admin: 18 08:35 Dose:  10 mg


Doxycycline Hyclate (Vibramycin)  100 mg PO Q12HR [facility]


   Stop: 18 08:59


   Last Admin: 18 20:31 Dose:  100 mg


Famotidine (Pepcid)  20 mg PO BID [facility]


   Stop: 05/15/18 23:24


   Last Admin: 18 17:20 Dose:  20 mg


Fluconazole (Diflucan)  100 mg PO DAILY [facility]


   Stop: 05/15/18 23:24


   Last Admin: 18 08:35 Dose:  100 mg


Heparin Sodium (Porcine) (Heparin)  5,000 units SUBQ Q12HR [facility]


   PRN Reason: Protocol


   Stop: 05/15/18 23:24


   Last Admin: 18 20:37 Dose:  5,000 units


Vancomycin HCl 1 gm/ Sodium (Chloride)  250 mls @ 165 mls/hr IV Q12H [facility]


   Stop: 18 13:59


   Last Admin: 18 01:03 Dose:  165 mls/hr


Insulin Aspart (Novolog Insulin Sliding Scale)  0 units SUBQ ACHS [facility]


   PRN Reason: Protocol


   Stop: 18 07:29


   Last Admin: 18 20:38 Dose:  4 units


Insulin Detemir (Levemir Insulin)  20 units SUBQ DAILY [facility]


   PRN Reason: Protocol


   Stop: 18 06:20


   Last Admin: 18 08:38 Dose:  20 units


Magnesium Hydroxide (Milk Of Magnesia)  30 ml PO  PRN


   PRN Reason: Constipation


   Stop: 05/15/18 23:24


Metformin HCl (Glucophage)  1,000 mg PO DAILY [facility]


   Stop: 18 09:59


   Last Admin: 18 08:35 Dose:  1,000 mg


Metoprolol Succinate (Toprol Xl)  25 mg PO DAILY [facility]


   Stop: 05/15/18 23:24


   Last Admin: 18 08:35 Dose:  25 mg


Miscellaneous (Vte Chemical Prophylaxis Screen/ Admission)  1 ea [facility] PRN PRN


   PRN Reason: PROTOCOL


   Stop: 18 10:29


Olanzapine (Zyprexa Zydis)  10 mg PO BID [facility]


   PRN Reason: Protocol


   Stop: 18 07:53


   Last Admin: 18 17:20 Dose:  10 mg


Zolpidem Tartrate (Ambien)  5 mg PO HS PRN


   PRN Reason: insomnia


   Stop: 05/15/18 23:24


   Last Admin: 18 19:55 Dose:  5 mg








General: Alert, No acute distress


HEENT: Atraumatic, PERRLA, EOMI


Neck: Supple, JVD, +2 carotid pulse wo bruit


Cardiovascular: Regular rate, Normal S1, Normal S2


Lungs: Clear to auscultation


Abdomen: Bowel sounds, Soft


Extremities: no Clubbing, no Cyanosis, no Edema


Neurological: Sensation intact


Skin: no Rash


Psych/Mental Status: Other (psychosis)





- Procedures


Procedures: 


 Procedures











Procedure Code Date


 


KEV MUSC/FASCIA 20 SQ CM/< 20832 18


 


EXCISION OF RIGHT HIP MUSCLE, OPEN APPROACH [id number] 18


 


INTRODUCTION OF SERUM/TOX/VACCINE INTO MUSCLE, PERC APPROACH [id number] 18














Assessment/Plan





- Assessment


Assessment: 





psychosis


dementia


Alzheimer's dementia


diabetes mellitus not controlled. 


s/p wound debridement of sacral decubiti


UTI


hyperglycemia


constipation





- Plan


Plan: 





continue current medications.


will add doxycyline PO


for hospice eval


continue IV antibiotics


stool softener





Nutritional Asmnt/Malnutr-PDOC





- Dietary Evaluation


Malnutrition Findings (Please click <Entered> for more info): 








Nutritional Asmnt/Malnutrition                             Start:  18 16:

25


Text:                                                      Status: Complete    

  


Freq:                                                                          

  


 Document     18 16:25  LCHENG  (Rec: 18 16:31  LCHENG  NAPOLEON-FNS1)


 Nutritional Asmnt/Malnutrition


     Patient General Information


      Nutritional Screening                      Moderate Risk


      Diagnosis                                  dehydration


      Pertinent Medical Hx/Surgical Hx           DM, dementia, depression,


                                                 psychosis, schizophrenia,


                                                 chronic renal insuff


      Subjective Information                     Pt is agitated, on 1:1 sitter.


                                                 Per EMR, PO intake 25-75%,


                                                 avg 50%.


      Current Diet Order/ Nutrition Support      pureed, CCHO 60gm


      Pertinent Medications                      novolov, levemir, glucophage


      Pertinent Labs                             3/19- -316


     Nutritional Hx/Data


      Height                                     1.8 m


      Height (Calculated Centimeters)            180.3


      Current Weight (lbs)                       77.111 kg


      Weight (Calculated Kilograms)              77.1


      Weight (Calculated Grams)                  15637.7


      Ideal Body Weight                          172


      Body Mass Index (BMI)                      23.7


      Weight Status                              Approriate


     GI Symptoms


      GI Symptoms                                None


      Last BM                                    3/19


      Difficult in:                              None


      Skin Integrity/Comment:                    pressure ulcer, decubitus


      Current %PO                                Fair (50-74%)


     Estimated Nutritional Goals


      BEE in Kcals:                              Using Current wt


      Calories/Kcals/Kg                          25-30


      Kcals Calculated                           4858-4267


      Protein:                                   Using Current wt


      Protein g/k-1.2


      Protein Calculated                         77-92


      Fluid: ml                                  1925-2310ml (1ml/kcal)


     Nutritional Problem


      1. Problem


       Problem                                   altered nutrition related lab


                                                 values


       Etiology                                  hx of DM


       Signs/Symptoms:                           glucose 169, -316


     Malnutrition Alert


      Protein-Calorie Malnutrition               N/A


      Is there a minimum of two criteria         No


       selected?                                 


       Query Text:Check all the applicable       


       criteria. A minimum of two criteria are   


       recommended for diagnosis of either       


       severe or non-severe malnutrition.        


     Intervention/Recommendation


      Comments                                   1. Continue with current diet


                                                 as ordered.


                                                 2. Monitor PO intake, wt, labs


                                                 and skin integrity


                                                 3. F/U as moderate risk in 3-5


                                                 days, 3/24-3/26


     Expected Outcomes/Goals


      Expected Outcomes/Goals                    1. PO intake to meet at least


                                                 75% of nutritional needs.


                                                 2. Wt stability, skin to


                                                 remain intact, labs to


                                                 approach WNL.

## 2018-04-03 RX ADMIN — OLANZAPINE SCH MG: 5 TABLET, ORALLY DISINTEGRATING ORAL at 08:09

## 2018-04-03 RX ADMIN — INSULIN ASPART SCH UNITS: 100 INJECTION, SOLUTION INTRAVENOUS; SUBCUTANEOUS at 11:56

## 2018-04-03 RX ADMIN — CASTOR OIL AND BALSAM, PERU SCH APPL: 788; 87 OINTMENT TOPICAL at 11:50

## 2018-04-03 RX ADMIN — OLANZAPINE SCH MG: 5 TABLET, ORALLY DISINTEGRATING ORAL at 16:46

## 2018-04-03 RX ADMIN — INSULIN ASPART SCH UNITS: 100 INJECTION, SOLUTION INTRAVENOUS; SUBCUTANEOUS at 21:55

## 2018-04-03 RX ADMIN — Medication SCH EACH: at 09:56

## 2018-04-03 RX ADMIN — INSULIN ASPART SCH UNITS: 100 INJECTION, SOLUTION INTRAVENOUS; SUBCUTANEOUS at 16:50

## 2018-04-03 RX ADMIN — INSULIN DETEMIR SCH: 100 INJECTION, SOLUTION SUBCUTANEOUS at 09:57

## 2018-04-03 RX ADMIN — INSULIN ASPART SCH UNITS: 100 INJECTION, SOLUTION INTRAVENOUS; SUBCUTANEOUS at 08:11

## 2018-04-03 NOTE — INFECTIOUS DISEASE PROG NOTE
Infectious Disease Subjective





- Review of Systems


Service Date: 18


Subjective: 





No fever





Infectious Disease Objective





- Results


Result Diagrams: 


 18 07:00





 18 13:00


Recent Labs: 


 Laboratory Last Values











WBC  10.2 Th/cmm (4.8-10.8)   18  07:00    


 


RBC  4.18 Mil/cmm (3.80-5.80)   18  07:00    


 


Hgb  12.5 gm/dL (12-16)   18  07:00    


 


Hct  37.9 % (41.0-60)  L  18  07:00    


 


MCV  90.6 fl (80-99)   18  07:00    


 


MCH  29.9 pg (27.0-31.0)   18  07:00    


 


MCHC Differential  33.0 pg (28.0-36.0)   18  07:00    


 


RDW  16.5 % (11.5-20.0)   18  07:00    


 


Plt Count  283 Th/cmm (150-400)   18  07:00    


 


MPV  8.0 fl  18  07:00    


 


Neutrophils %  64.6 % (40.0-80.0)   18  07:00    


 


Lymphocytes %  26.9 % (20.0-50.0)   18  07:00    


 


Monocytes %  5.3 % (2.0-10.0)   18  07:00    


 


Eosinophils %  2.8 % (0.0-5.0)   18  07:00    


 


Basophils %  0.4 % (0.0-2.0)   18  07:00    


 


PT  9.5 SECONDS (9.5-11.5)   18  21:35    


 


INR  0.91  (0.5-1.4)   18  21:35    


 


Sodium  132 mEq/L (136-145)  L  18  07:00    


 


Potassium  3.9 mEq/L (3.5-5.1)   18  07:00    


 


Chloride  99 mEq/L ()   18  07:00    


 


Carbon Dioxide  23.2 mEq/L (21.0-31.0)   18  07:00    


 


Anion Gap  13.7  (7.0-16.0)   18  07:00    


 


BUN  15 mg/dL (7-25)   18  13:00    


 


Creatinine  0.8 mg/dL (0.7-1.3)   18  13:00    


 


Est GFR ( Amer)  TNP   18  07:00    


 


Est GFR (Non-Af Amer)  TNP   18  07:00    


 


BUN/Creatinine Ratio  11.1   18  07:00    


 


Glucose  256 mg/dL ()  H  18  07:00    


 


POC Glucose  258 MG/DL (70 - 105)  H  18  11:42    


 


Hemoglobin A1c %  8.8 % (4.0-6.0)  H  18  07:00    


 


Calcium  8.7 mg/dL (8.6-10.3)   18  07:00    


 


Urine Source  RAVI PORT   18  21:30    


 


Urine Color  YELLOW   18  21:30    


 


Urine Clarity  HAZY  (CLEAR)   18  21:30    


 


Urine pH  5.5  (4.6 - 8.0)   18  21:30    


 


Ur Specific Gravity  1.010  (1.005-1.030)   18  21:30    


 


Urine Protein  NEGATIVE mg/dL (NEGATIVE)   18  21:30    


 


Urine Glucose (UA)  >=1000 mg/dL (NEGATIVE)  H  18  21:30    


 


Urine Ketones  NEGATIVE mg/dL (NEGATIVE)   18  21:30    


 


Urine Blood  LARGE  (NEGATIVE)  H  18  21:30    


 


Urine Nitrate  NEGATIVE  (NEGATIVE)   18  21:30    


 


Urine Bilirubin  NEGATIVE  (NEGATIVE)   18  21:30    


 


Urine Urobilinogen  0.2 E.U./dL (0.2 - 1.0)   18  21:30    


 


Ur Leukocyte Esterase  SMALL  (NEGATIVE)  H  18  21:30    


 


Urine RBC  10-25 /hpf (0-5)  H  18  21:30    


 


Urine WBC  2-5 /hpf (0-5)   18  21:30    


 


Ur Epithelial Cells  OCCASIONAL /lpf (FEW)   18  21:30    


 


Urine Bacteria  FEW /hpf (NONE SEEN)   18  21:30    


 


Urine Yeast  FEW /hpf (NONE SEEN)  H  18  21:30    


 


Vancomycin Trough  21.0 ug/mL (10-20)  H  18  13:00    


 


Random Vancomycin  23.1 ug/mL (5.0-40.0)   18  13:00    














- Physical Exam


Vitals and I&O: 


 Vital Signs











Temp  97.4 F   18 11:18


 


Pulse  67   18 11:18


 


Resp  17   18 11:18


 


BP  120/75   18 11:18


 


Pulse Ox  97   18 11:18








 Intake & Output











 18





 18:59 06:59 18:59


 


Intake Total 600  200


 


Output Total 600 750 


 


Balance 0 -750 200


 


Weight (lbs) 76.204 kg 75.841 kg 75.841 kg


 


Intake:   


 


  Oral 600  200


 


Output:   


 


  Urine 600 750 


 


Other:   


 


  # Bowel Movements 2  


 


  Weight Source Bedscale Bedscale Bedscale











Active Medications: 


Current Medications





Acetaminophen (Tylenol)  650 mg PO Q6H PRN


   PRN Reason: mild to moderate pain


   Stop: 05/15/18 23:24


   Last Admin: 18 02:33 Dose:  650 mg


Acetaminophen/Hydrocodone Bitart (Norco 5mg/325mg)  1 tab PO Q6H PRN


   PRN Reason: Pain (Moderate)


   Stop: 05/15/18 23:24


   Last Admin: 18 05:34 Dose:  1 tab


Allopurinol (Zyloprim)  100 mg PO DAILY [facility]


   Stop: 05/15/18 23:24


   Last Admin: 18 08:10 Dose:  100 mg


Castor Oil/English Balsam/Trypsin (Venelex)  1 appl TP DAILY [facility]


   Stop: 05/15/18 23:24


   Last Admin: 18 11:50 Dose:  1 appl


Clonazepam (Klonopin)  1 mg PO BID [facility]


   PRN Reason: Protocol


   Stop: 18 09:59


   Last Admin: 18 08:10 Dose:  1 mg


Docusate Sodium (Colace)  100 mg PO DAILY [facility]


   Stop: 18 08:59


   Last Admin: 18 08:10 Dose:  100 mg


Donepezil HCl (Aricept)  10 mg PO DAILY [facility]


   Stop: 18 08:00


   Last Admin: 18 08:09 Dose:  10 mg


Doxycycline Hyclate (Vibramycin)  100 mg PO Q12HR [facility]


   Stop: 18 08:59


   Last Admin: 18 08:10 Dose:  100 mg


Famotidine (Pepcid)  20 mg PO BID [facility]


   Stop: 05/15/18 23:24


   Last Admin: 18 08:10 Dose:  20 mg


Fluconazole (Diflucan)  100 mg PO DAILY [facility]


   Stop: 05/15/18 23:24


   Last Admin: 18 08:09 Dose:  100 mg


Heparin Sodium (Porcine) (Heparin)  5,000 units SUBQ Q12HR [facility]


   PRN Reason: Protocol


   Stop: 05/15/18 23:24


   Last Admin: 18 08:11 Dose:  5,000 units


Vancomycin HCl 1.5 gm/ Sodium (Chloride)  500 mls @ 250 mls/hr IV Q24H [facility]


   Stop: 18 08:59


   Last Admin: 18 11:48 Dose:  250 mls/hr


Insulin Aspart (Novolog Insulin Sliding Scale)  0 units SUBQ ACHS [facility]


   PRN Reason: Protocol


   Stop: 18 07:29


   Last Admin: 18 11:56 Dose:  6 units


Insulin Detemir (Levemir Insulin)  20 units SUBQ DAILY [facility]


   PRN Reason: Protocol


   Stop: 18 06:20


   Last Admin: 18 09:57 Dose:  Not Given


Lactobacillus Rhamnosus (Culturelle 15b)  1 each PO DAILY [facility]


   Stop: 18 08:59


   Last Admin: 18 09:56 Dose:  1 each


Magnesium Hydroxide (Milk Of Magnesia)  30 ml PO HS PRN


   PRN Reason: Constipation


   Stop: 05/15/18 23:24


Metformin HCl (Glucophage)  1,000 mg PO DAILY [facility]


   Stop: 18 09:59


   Last Admin: 18 08:10 Dose:  1,000 mg


Metoprolol Succinate (Toprol Xl)  25 mg PO DAILY [facility]


   Stop: 05/15/18 23:24


   Last Admin: 18 08:09 Dose:  25 mg


Miscellaneous (Vte Chemical Prophylaxis Screen/ Admission)  1 ea [facility] PRN PRN


   PRN Reason: PROTOCOL


   Stop: 18 10:29


Miscellaneous (Vancomycin Iv Per Pharmacy)  1 ea MC DAILY [facility]


   Stop: 18 08:59


Miscellaneous (Probiotic Screen)  1 ea [facility] PRN PRN


   PRN Reason: PROTOCOL


   Stop: 18 09:07


Olanzapine (Zyprexa Zydis)  10 mg PO BID [facility]


   PRN Reason: Protocol


   Stop: 18 07:53


   Last Admin: 18 08:09 Dose:  10 mg


Zolpidem Tartrate (Ambien)  5 mg PO HS PRN


   PRN Reason: insomnia


   Stop: 05/15/18 23:24


   Last Admin: 18 19:55 Dose:  5 mg








General: no acute distress, well developed, well nourished


HEENT: atraumatic, normocephalic, PERRLA, EOMI, moist mucous membrane


Neck: supple, no thyromegaly


Cardiovascular: S1S2, regular


Lungs: clear to auscultation bilaterally, clear to percussion


Abdomen: soft, no tender, no distended, no hepatomegaly, no bowel sounds


Extremities: no cyanosis, no clubbing, no edema


Neurological: awake, alert


Skin: other (sacral stage 3 wound)





- Procedures


Procedures: 


 Procedures











Procedure Code Date


 


KEV MUSC/FASCIA 20 SQ CM/< 31869 18


 


EXCISION OF RIGHT HIP MUSCLE, OPEN APPROACH [id number] 18


 


INTRODUCTION OF SERUM/TOX/VACCINE INTO MUSCLE, PERC APPROACH [id number] 18














Infectious Disease Assmt/Plan





- Assessment


Assessment: 





1.  Sacral decubitus ulcer.


2.  UTI.  Treated


3.  Dementia.





- Plan


Plan: 





wound care.





Nutritional Asmnt/Malnutr-PDOC





- Dietary Evaluation


Malnutrition Findings (Please click <Entered> for more info): 








Nutritional Asmnt/Malnutrition                             Start:  18 16:

25


Text:                                                      Status: Complete    

  


Freq:                                                                          

  


 Document     18 16:25  MAGEN  (Rec: 18 16:31  MAGEN  [email or web address])


 Nutritional Asmnt/Malnutrition


     Patient General Information


      Nutritional Screening                      Moderate Risk


      Diagnosis                                  dehydration


      Pertinent Medical Hx/Surgical Hx           DM, dementia, depression,


                                                 psychosis, schizophrenia,


                                                 chronic renal insuff


      Subjective Information                     Pt is agitated, on 1:1 sitter.


                                                 Per EMR, PO intake 25-75%,


                                                 avg 50%.


      Current Diet Order/ Nutrition Support      pureed, CCHO 60gm


      Pertinent Medications                      novolov, levemir, glucophage


      Pertinent Labs                             3/19- -316


     Nutritional Hx/Data


      Height                                     1.8 m


      Height (Calculated Centimeters)            180.3


      Current Weight (lbs)                       77.111 kg


      Weight (Calculated Kilograms)              77.1


      Weight (Calculated Grams)                  75338.7


      Ideal Body Weight                          172


      Body Mass Index (BMI)                      23.7


      Weight Status                              Approriate


     GI Symptoms


      GI Symptoms                                None


      Last BM                                    3/19


      Difficult in:                              None


      Skin Integrity/Comment:                    pressure ulcer, decubitus


      Current %PO                                Fair (50-74%)


     Estimated Nutritional Goals


      BEE in Kcals:                              Using Current wt


      Calories/Kcals/Kg                          25-30


      Kcals Calculated                           6657-2178


      Protein:                                   Using Current wt


      Protein g/k-1.2


      Protein Calculated                         77-92


      Fluid: ml                                  1925-2310ml (1ml/kcal)


     Nutritional Problem


      1. Problem


       Problem                                   altered nutrition related lab


                                                 values


       Etiology                                  hx of DM


       Signs/Symptoms:                           glucose 169, -316


     Malnutrition Alert


      Protein-Calorie Malnutrition               N/A


      Is there a minimum of two criteria         No


       selected?                                 


       Query Text:Check all the applicable       


       criteria. A minimum of two criteria are   


       recommended for diagnosis of either       


       severe or non-severe malnutrition.        


     Intervention/Recommendation


      Comments                                   1. Continue with current diet


                                                 as ordered.


                                                 2. Monitor PO intake, wt, labs


                                                 and skin integrity


                                                 3. F/U as moderate risk in 3-5


                                                 days, 3/24-3/26


     Expected Outcomes/Goals


      Expected Outcomes/Goals                    1. PO intake to meet at least


                                                 75% of nutritional needs.


                                                 2. Wt stability, skin to


                                                 remain intact, labs to


                                                 approach WNL.

## 2018-04-03 NOTE — GENERAL PROGRESS NOTE
Subjective





- Review of Systems


Service Date: 18


Subjective: 





Patient resting comfortably in bed. no acute distress. In good spirits today. 

pleasant. cooperative today. eating well.





Blood Sugars improving. Will adjust dosage. Constipated this AM. Would like a 

stool softener....





Objective





- Results


Result Diagrams: 


 18 07:00





 18 13:00


Recent Labs: 


 Laboratory Last Values











WBC  10.2 Th/cmm (4.8-10.8)   18  07:00    


 


RBC  4.18 Mil/cmm (3.80-5.80)   18  07:00    


 


Hgb  12.5 gm/dL (12-16)   18  07:00    


 


Hct  37.9 % (41.0-60)  L  18  07:00    


 


MCV  90.6 fl (80-99)   18  07:00    


 


MCH  29.9 pg (27.0-31.0)   18  07:00    


 


MCHC Differential  33.0 pg (28.0-36.0)   18  07:00    


 


RDW  16.5 % (11.5-20.0)   18  07:00    


 


Plt Count  283 Th/cmm (150-400)   18  07:00    


 


MPV  8.0 fl  18  07:00    


 


Neutrophils %  64.6 % (40.0-80.0)   18  07:00    


 


Lymphocytes %  26.9 % (20.0-50.0)   18  07:00    


 


Monocytes %  5.3 % (2.0-10.0)   18  07:00    


 


Eosinophils %  2.8 % (0.0-5.0)   18  07:00    


 


Basophils %  0.4 % (0.0-2.0)   18  07:00    


 


PT  9.5 SECONDS (9.5-11.5)   18  21:35    


 


INR  0.91  (0.5-1.4)   18  21:35    


 


Sodium  132 mEq/L (136-145)  L  18  07:00    


 


Potassium  3.9 mEq/L (3.5-5.1)   18  07:00    


 


Chloride  99 mEq/L ()   18  07:00    


 


Carbon Dioxide  23.2 mEq/L (21.0-31.0)   18  07:00    


 


Anion Gap  13.7  (7.0-16.0)   18  07:00    


 


BUN  15 mg/dL (7-25)   18  13:00    


 


Creatinine  0.8 mg/dL (0.7-1.3)   18  13:00    


 


Est GFR ( Amer)  TNP   18  07:00    


 


Est GFR (Non-Af Amer)  TNP   18  07:00    


 


BUN/Creatinine Ratio  11.1   18  07:00    


 


Glucose  256 mg/dL ()  H  18  07:00    


 


POC Glucose  256 MG/DL (70 - 105)  H  18  06:41    


 


Hemoglobin A1c %  8.8 % (4.0-6.0)  H  18  07:00    


 


Calcium  8.7 mg/dL (8.6-10.3)   18  07:00    


 


Urine Source  RAVI PORT   18  21:30    


 


Urine Color  YELLOW   18  21:30    


 


Urine Clarity  HAZY  (CLEAR)   18  21:30    


 


Urine pH  5.5  (4.6 - 8.0)   18  21:30    


 


Ur Specific Gravity  1.010  (1.005-1.030)   18  21:30    


 


Urine Protein  NEGATIVE mg/dL (NEGATIVE)   18  21:30    


 


Urine Glucose (UA)  >=1000 mg/dL (NEGATIVE)  H  18  21:30    


 


Urine Ketones  NEGATIVE mg/dL (NEGATIVE)   18  21:30    


 


Urine Blood  LARGE  (NEGATIVE)  H  18  21:30    


 


Urine Nitrate  NEGATIVE  (NEGATIVE)   18  21:30    


 


Urine Bilirubin  NEGATIVE  (NEGATIVE)   18  21:30    


 


Urine Urobilinogen  0.2 E.U./dL (0.2 - 1.0)   18  21:30    


 


Ur Leukocyte Esterase  SMALL  (NEGATIVE)  H  18  21:30    


 


Urine RBC  10-25 /hpf (0-5)  H  18  21:30    


 


Urine WBC  2-5 /hpf (0-5)   18  21:30    


 


Ur Epithelial Cells  OCCASIONAL /lpf (FEW)   18  21:30    


 


Urine Bacteria  FEW /hpf (NONE SEEN)   18  21:30    


 


Urine Yeast  FEW /hpf (NONE SEEN)  H  18  21:30    


 


Vancomycin Trough  21.0 ug/mL (10-20)  H  18  13:00    


 


Random Vancomycin  23.1 ug/mL (5.0-40.0)   18  13:00    














- Physical Exam


Vitals and I&O: 


 Vital Signs











Temp  98.4 F   18 04:07


 


Pulse  85   18 04:07


 


Resp  17   18 04:07


 


BP  141/67   18 04:07


 


Pulse Ox  99   18 04:07








 Intake & Output











 18





 18:59 06:59 18:59


 


Intake Total 600  


 


Output Total 600 750 


 


Balance 0 -750 


 


Weight (lbs) 76.204 kg 75.841 kg 


 


Intake:   


 


  Oral 600  


 


Output:   


 


  Urine 600 750 


 


Other:   


 


  # Bowel Movements 2  


 


  Weight Source Bedscale Bedscale 











Active Medications: 


Current Medications





Acetaminophen (Tylenol)  650 mg PO Q6H PRN


   PRN Reason: mild to moderate pain


   Stop: 05/15/18 23:24


   Last Admin: 18 02:33 Dose:  650 mg


Acetaminophen/Hydrocodone Bitart (Norco 5mg/325mg)  1 tab PO Q6H PRN


   PRN Reason: Pain (Moderate)


   Stop: 05/15/18 23:24


   Last Admin: 18 05:34 Dose:  1 tab


Allopurinol (Zyloprim)  100 mg PO DAILY [facility]


   Stop: 05/15/18 23:24


   Last Admin: 18 08:31 Dose:  100 mg


Castor Oil/Tunisian Balsam/Trypsin (Venelex)  1 appl TP DAILY [facility]


   Stop: 05/15/18 23:24


   Last Admin: 18 12:15 Dose:  1 appl


Clonazepam (Klonopin)  1 mg PO BID [facility]


   PRN Reason: Protocol


   Stop: 18 09:59


   Last Admin: 18 16:40 Dose:  1 mg


Docusate Sodium (Colace)  100 mg PO DAILY [facility]


   Stop: 18 08:59


   Last Admin: 18 08:31 Dose:  100 mg


Donepezil HCl (Aricept)  10 mg PO DAILY [facility]


   Stop: 18 08:00


   Last Admin: 18 08:30 Dose:  10 mg


Doxycycline Hyclate (Vibramycin)  100 mg PO Q12HR [facility]


   Stop: 18 08:59


   Last Admin: 18 20:25 Dose:  100 mg


Famotidine (Pepcid)  20 mg PO BID [facility]


   Stop: 05/15/18 23:24


   Last Admin: 18 16:40 Dose:  20 mg


Fluconazole (Diflucan)  100 mg PO DAILY [facility]


   Stop: 05/15/18 23:24


   Last Admin: 18 08:30 Dose:  100 mg


Heparin Sodium (Porcine) (Heparin)  5,000 units SUBQ Q12HR [facility]


   PRN Reason: Protocol


   Stop: 05/15/18 23:24


   Last Admin: 18 20:25 Dose:  5,000 units


Vancomycin HCl 1.5 gm/ Sodium (Chloride)  500 mls @ 250 mls/hr IV Q24H [facility]


   Stop: 18 08:59


Insulin Aspart (Novolog Insulin Sliding Scale)  0 units SUBQ ACHS [facility]


   PRN Reason: Protocol


   Stop: 18 07:29


   Last Admin: 18 20:25 Dose:  8 units


Insulin Detemir (Levemir Insulin)  20 units SUBQ DAILY [facility]


   PRN Reason: Protocol


   Stop: 18 06:20


   Last Admin: 18 08:31 Dose:  Not Given


Magnesium Hydroxide (Milk Of Magnesia)  30 ml PO HS PRN


   PRN Reason: Constipation


   Stop: 05/15/18 23:24


Metformin HCl (Glucophage)  1,000 mg PO DAILY [facility]


   Stop: 18 09:59


   Last Admin: 18 08:30 Dose:  1,000 mg


Metoprolol Succinate (Toprol Xl)  25 mg PO DAILY [facility]


   Stop: 05/15/18 23:24


   Last Admin: 18 08:25 Dose:  25 mg


Miscellaneous (Vte Chemical Prophylaxis Screen/ Admission)  1 ea [facility] PRN PRN


   PRN Reason: PROTOCOL


   Stop: 18 10:29


Miscellaneous (Vancomycin Iv Per Pharmacy)  1 ea MC DAILY [facility]


   Stop: 18 08:59


Olanzapine (Zyprexa Zydis)  10 mg PO BID [facility]


   PRN Reason: Protocol


   Stop: 18 07:53


   Last Admin: 18 16:40 Dose:  10 mg


Zolpidem Tartrate (Ambien)  5 mg PO HS PRN


   PRN Reason: insomnia


   Stop: 05/15/18 23:24


   Last Admin: 18 19:55 Dose:  5 mg








General: Alert, No acute distress


HEENT: Atraumatic, PERRLA, EOMI


Neck: Supple, JVD, +2 carotid pulse wo bruit


Cardiovascular: Regular rate, Normal S1, Normal S2


Lungs: Clear to auscultation


Abdomen: Bowel sounds, Soft


Extremities: no Clubbing, no Cyanosis, no Edema


Neurological: Sensation intact


Skin: no Rash


Psych/Mental Status: Other (psychosis)





- Procedures


Procedures: 


 Procedures











Procedure Code Date


 


KEV MUSC/FASCIA 20 SQ CM/< 85109 18


 


EXCISION OF RIGHT HIP MUSCLE, OPEN APPROACH [id number] 18


 


INTRODUCTION OF SERUM/TOX/VACCINE INTO MUSCLE, PERC APPROACH [id number] 18














Assessment/Plan





- Assessment


Assessment: 





psychosis


dementia


Alzheimer's dementia


diabetes mellitus not controlled. 


s/p wound debridement of sacral decubiti


UTI


hyperglycemia


constipation





- Plan


Plan: 





continue current medications.


will add doxycyline PO


for hospice eval


continue IV antibiotics


stool softener





Nutritional Asmnt/Malnutr-PDOC





- Dietary Evaluation


Malnutrition Findings (Please click <Entered> for more info): 








Nutritional Asmnt/Malnutrition                             Start:  18 16:

25


Text:                                                      Status: Complete    

  


Freq:                                                                          

  


 Document     18 16:25  HEN  (Rec: 18 16:31  LCHENG  NAPOLEON-[email or web address])


 Nutritional Asmnt/Malnutrition


     Patient General Information


      Nutritional Screening                      Moderate Risk


      Diagnosis                                  dehydration


      Pertinent Medical Hx/Surgical Hx           DM, dementia, depression,


                                                 psychosis, schizophrenia,


                                                 chronic renal insuff


      Subjective Information                     Pt is agitated, on 1:1 sitter.


                                                 Per EMR, PO intake 25-75%,


                                                 avg 50%.


      Current Diet Order/ Nutrition Support      pureed, CCHO 60gm


      Pertinent Medications                      novolov, levemir, glucophage


      Pertinent Labs                             3/19-21 -316


     Nutritional Hx/Data


      Height                                     1.8 m


      Height (Calculated Centimeters)            180.3


      Current Weight (lbs)                       77.111 kg


      Weight (Calculated Kilograms)              77.1


      Weight (Calculated Grams)                  81619.7


      Ideal Body Weight                          172


      Body Mass Index (BMI)                      23.7


      Weight Status                              Approriate


     GI Symptoms


      GI Symptoms                                None


      Last BM                                    3/19


      Difficult in:                              None


      Skin Integrity/Comment:                    pressure ulcer, decubitus


      Current %PO                                Fair (50-74%)


     Estimated Nutritional Goals


      BEE in Kcals:                              Using Current wt


      Calories/Kcals/Kg                          25-30


      Kcals Calculated                           8466-3385


      Protein:                                   Using Current wt


      Protein g/k-1.2


      Protein Calculated                         77-92


      Fluid: ml                                  1925-2310ml (1ml/kcal)


     Nutritional Problem


      1. Problem


       Problem                                   altered nutrition related lab


                                                 values


       Etiology                                  hx of DM


       Signs/Symptoms:                           glucose 169, -316


     Malnutrition Alert


      Protein-Calorie Malnutrition               N/A


      Is there a minimum of two criteria         No


       selected?                                 


       Query Text:Check all the applicable       


       criteria. A minimum of two criteria are   


       recommended for diagnosis of either       


       severe or non-severe malnutrition.        


     Intervention/Recommendation


      Comments                                   1. Continue with current diet


                                                 as ordered.


                                                 2. Monitor PO intake, wt, labs


                                                 and skin integrity


                                                 3. F/U as moderate risk in 3-5


                                                 days, 3/24-3/26


     Expected Outcomes/Goals


      Expected Outcomes/Goals                    1. PO intake to meet at least


                                                 75% of nutritional needs.


                                                 2. Wt stability, skin to


                                                 remain intact, labs to


                                                 approach WNL.

## 2018-04-04 LAB
BASOPHILS # BLD AUTO: 0 TH/CUMM (ref 0–0.2)
BASOPHILS NFR BLD AUTO: 0.5 % (ref 0–2)
EOSINOPHIL # BLD AUTO: 0.4 TH/CMM (ref 0.1–0.4)
EOSINOPHIL NFR BLD AUTO: 4.4 % (ref 0–5)
ERYTHROCYTE [DISTWIDTH] IN BLOOD BY AUTOMATED COUNT: 15.7 % (ref 11.5–20)
HCT VFR BLD CALC: 35.7 % (ref 41–60)
HGB BLD-MCNC: 11.6 GM/DL (ref 12–16)
LYMPHOCYTE AB SER FC-ACNC: 2.4 TH/CMM (ref 1.5–3)
LYMPHOCYTES NFR BLD AUTO: 27 % (ref 20–50)
MCH RBC QN AUTO: 29.7 PG (ref 27–31)
MCHC RBC AUTO-ENTMCNC: 32.6 PG (ref 28–36)
MCV RBC AUTO: 91 FL (ref 80–99)
MONOCYTES # BLD AUTO: 0.5 TH/CMM (ref 0.3–1)
MONOCYTES NFR BLD AUTO: 6.1 % (ref 2–10)
NEUTROPHILS # BLD: 5.5 TH/CMM (ref 1.8–8)
NEUTROPHILS NFR BLD AUTO: 62 % (ref 40–80)
PLATELET # BLD: 306 TH/CMM (ref 150–400)
PMV BLD AUTO: 7.8 FL
RBC # BLD AUTO: 3.92 MIL/CMM (ref 3.8–5.8)
WBC # BLD AUTO: 8.8 TH/CMM (ref 4.8–10.8)

## 2018-04-04 RX ADMIN — INSULIN DETEMIR SCH UNITS: 100 INJECTION, SOLUTION SUBCUTANEOUS at 08:09

## 2018-04-04 RX ADMIN — INSULIN ASPART SCH UNITS: 100 INJECTION, SOLUTION INTRAVENOUS; SUBCUTANEOUS at 08:08

## 2018-04-04 RX ADMIN — INSULIN ASPART SCH UNITS: 100 INJECTION, SOLUTION INTRAVENOUS; SUBCUTANEOUS at 16:45

## 2018-04-04 RX ADMIN — INSULIN ASPART SCH UNITS: 100 INJECTION, SOLUTION INTRAVENOUS; SUBCUTANEOUS at 21:45

## 2018-04-04 RX ADMIN — Medication SCH EACH: at 08:28

## 2018-04-04 RX ADMIN — INSULIN ASPART SCH UNITS: 100 INJECTION, SOLUTION INTRAVENOUS; SUBCUTANEOUS at 12:29

## 2018-04-04 RX ADMIN — CASTOR OIL AND BALSAM, PERU SCH APPL: 788; 87 OINTMENT TOPICAL at 08:33

## 2018-04-04 RX ADMIN — OLANZAPINE SCH MG: 5 TABLET, ORALLY DISINTEGRATING ORAL at 08:29

## 2018-04-04 RX ADMIN — OLANZAPINE SCH MG: 5 TABLET, ORALLY DISINTEGRATING ORAL at 16:03

## 2018-04-04 NOTE — INFECTIOUS DISEASE PROG NOTE
Infectious Disease Subjective





- Review of Systems


Service Date: 18


Subjective: 





No fever





Infectious Disease Objective





- Results


Result Diagrams: 


 18 08:06





 18 13:00


Recent Labs: 


 Laboratory Last Values











WBC  8.8 Th/cmm (4.8-10.8)   18  08:06    


 


RBC  3.92 Mil/cmm (3.80-5.80)   18  08:06    


 


Hgb  11.6 gm/dL (12-16)  L  18  08:06    


 


Hct  35.7 % (41.0-60)  L  18  08:06    


 


MCV  91.0 fl (80-99)   18  08:06    


 


MCH  29.7 pg (27.0-31.0)   18  08:06    


 


MCHC Differential  32.6 pg (28.0-36.0)   18  08:06    


 


RDW  15.7 % (11.5-20.0)   18  08:06    


 


Plt Count  306 Th/cmm (150-400)   18  08:06    


 


MPV  7.8 fl  18  08:06    


 


Neutrophils %  62.0 % (40.0-80.0)   18  08:06    


 


Lymphocytes %  27.0 % (20.0-50.0)   18  08:06    


 


Monocytes %  6.1 % (2.0-10.0)   18  08:06    


 


Eosinophils %  4.4 % (0.0-5.0)   18  08:06    


 


Basophils %  0.5 % (0.0-2.0)   18  08:06    


 


PT  9.5 SECONDS (9.5-11.5)   18  21:35    


 


INR  0.91  (0.5-1.4)   18  21:35    


 


Sodium  132 mEq/L (136-145)  L  18  07:00    


 


Potassium  3.9 mEq/L (3.5-5.1)   18  07:00    


 


Chloride  99 mEq/L ()   18  07:00    


 


Carbon Dioxide  23.2 mEq/L (21.0-31.0)   18  07:00    


 


Anion Gap  13.7  (7.0-16.0)   18  07:00    


 


BUN  15 mg/dL (7-25)   18  13:00    


 


Creatinine  0.8 mg/dL (0.7-1.3)   18  13:00    


 


Est GFR ( Amer)  TNP   18  07:00    


 


Est GFR (Non-Af Amer)  TNP   18  07:00    


 


BUN/Creatinine Ratio  11.1   18  07:00    


 


Glucose  256 mg/dL ()  H  18  07:00    


 


POC Glucose  207 MG/DL (70 - 105)  H  18  20:23    


 


Hemoglobin A1c %  8.8 % (4.0-6.0)  H  18  07:00    


 


Calcium  8.7 mg/dL (8.6-10.3)   18  07:00    


 


Urine Source  RAVI PORT   18  21:30    


 


Urine Color  YELLOW   18  21:30    


 


Urine Clarity  HAZY  (CLEAR)   18  21:30    


 


Urine pH  5.5  (4.6 - 8.0)   18  21:30    


 


Ur Specific Gravity  1.010  (1.005-1.030)   18  21:30    


 


Urine Protein  NEGATIVE mg/dL (NEGATIVE)   18  21:30    


 


Urine Glucose (UA)  >=1000 mg/dL (NEGATIVE)  H  18  21:30    


 


Urine Ketones  NEGATIVE mg/dL (NEGATIVE)   18  21:30    


 


Urine Blood  LARGE  (NEGATIVE)  H  18  21:30    


 


Urine Nitrate  NEGATIVE  (NEGATIVE)   18  21:30    


 


Urine Bilirubin  NEGATIVE  (NEGATIVE)   18  21:30    


 


Urine Urobilinogen  0.2 E.U./dL (0.2 - 1.0)   18  21:30    


 


Ur Leukocyte Esterase  SMALL  (NEGATIVE)  H  18  21:30    


 


Urine RBC  10-25 /hpf (0-5)  H  18  21:30    


 


Urine WBC  2-5 /hpf (0-5)   18  21:30    


 


Ur Epithelial Cells  OCCASIONAL /lpf (FEW)   18  21:30    


 


Urine Bacteria  FEW /hpf (NONE SEEN)   18  21:30    


 


Urine Yeast  FEW /hpf (NONE SEEN)  H  18  21:30    


 


Vancomycin Trough  21.0 ug/mL (10-20)  H  18  13:00    


 


Random Vancomycin  23.1 ug/mL (5.0-40.0)   18  13:00    














- Physical Exam


Vitals and I&O: 


 Vital Signs











Temp  97.5 F   18 20:00


 


Pulse  70   18 20:00


 


Resp  18   18 20:00


 


BP  125/65   18 20:00


 


Pulse Ox  99   18 20:00








 Intake & Output











 18





 06:59 18:59 06:59


 


Intake Total 340 800 


 


Output Total 1500 1300 


 


Balance -1160 -500 


 


Weight (lbs) 75.523 kg 75.523 kg 


 


Intake:   


 


  Oral 340 800 


 


Output:   


 


  Urine 1500 1300 


 


Other:   


 


  # Bowel Movements 0 0 


 


  Weight Source Bedscale Bedscale 











Active Medications: 


Current Medications





Acetaminophen (Tylenol)  650 mg PO Q6H PRN


   PRN Reason: mild to moderate pain


   Stop: 05/15/18 23:24


   Last Admin: 18 02:33 Dose:  650 mg


Acetaminophen/Hydrocodone Bitart (Norco 5mg/325mg)  1 tab PO Q6H PRN


   PRN Reason: Pain (Moderate)


   Stop: 05/15/18 23:24


   Last Admin: 18 05:34 Dose:  1 tab


Allopurinol (Zyloprim)  100 mg PO DAILY [facility]


   Stop: 05/15/18 23:24


   Last Admin: 18 08:29 Dose:  100 mg


Castor Oil/Brazilian Balsam/Trypsin (Venelex)  1 appl TP DAILY [facility]


   Stop: 05/15/18 23:24


   Last Admin: 18 08:33 Dose:  1 appl


Clonazepam (Klonopin)  1 mg PO BID [facility]


   PRN Reason: Protocol


   Stop: 18 09:59


   Last Admin: 18 16:03 Dose:  1 mg


Docusate Sodium (Colace)  100 mg PO DAILY [facility]


   Stop: 18 08:59


   Last Admin: 18 08:29 Dose:  100 mg


Donepezil HCl (Aricept)  10 mg PO DAILY [facility]


   Stop: 18 08:00


   Last Admin: 18 08:29 Dose:  10 mg


Famotidine (Pepcid)  20 mg PO BID [facility]


   Stop: 05/15/18 23:24


   Last Admin: 18 16:03 Dose:  20 mg


Fluconazole (Diflucan)  100 mg PO DAILY [facility]


   Stop: 05/15/18 23:24


   Last Admin: 18 08:29 Dose:  100 mg


Heparin Sodium (Porcine) (Heparin)  5,000 units SUBQ Q12HR [facility]


   PRN Reason: Protocol


   Stop: 05/15/18 23:24


   Last Admin: 18 21:44 Dose:  5,000 units


Vancomycin HCl 1.5 gm/ Sodium (Chloride)  500 mls @ 250 mls/hr IV Q24H [facility]


   Stop: 18 08:59


   Last Admin: 18 09:19 Dose:  250 mls/hr


Insulin Aspart (Novolog Insulin Sliding Scale)  0 units SUBQ ACHS [facility]


   PRN Reason: Protocol


   Stop: 18 07:29


   Last Admin: 18 21:45 Dose:  4 units


Insulin Detemir (Levemir Insulin)  20 units SUBQ DAILY [facility]


   PRN Reason: Protocol


   Stop: 18 06:20


   Last Admin: 18 08:09 Dose:  20 units


Lactobacillus Rhamnosus (Culturelle 15b)  1 each PO DAILY [facility]


   Stop: 18 08:59


   Last Admin: 18 08:28 Dose:  1 each


Magnesium Hydroxide (Milk Of Magnesia)  30 ml PO HS PRN


   PRN Reason: Constipation


   Stop: 05/15/18 23:24


Metformin HCl (Glucophage)  1,000 mg PO DAILY [facility]


   Stop: 18 09:59


   Last Admin: 18 08:28 Dose:  1,000 mg


Metoprolol Succinate (Toprol Xl)  25 mg PO DAILY [facility]


   Stop: 05/15/18 23:24


   Last Admin: 18 08:29 Dose:  25 mg


Miscellaneous (Vte Chemical Prophylaxis Screen/ Admission)  1 ea  PRN PRN


   PRN Reason: PROTOCOL


   Stop: 18 10:29


Miscellaneous (Vancomycin Iv Per Pharmacy)  1 ea MC DAILY [facility]


   Stop: 18 08:59


Miscellaneous (Probiotic Screen)  1 ea MC PRN PRN


   PRN Reason: PROTOCOL


   Stop: 18 09:07


Olanzapine (Zyprexa Zydis)  10 mg PO BID [facility]


   PRN Reason: Protocol


   Stop: 18 07:53


   Last Admin: 18 16:03 Dose:  10 mg


Zolpidem Tartrate (Ambien)  5 mg PO HS PRN


   PRN Reason: insomnia


   Stop: 05/15/18 23:24


   Last Admin: 18 19:55 Dose:  5 mg











- Procedures


Procedures: 


 Procedures











Procedure Code Date


 


KEV MUSC/FASCIA 20 SQ CM/< 65389 18


 


EXCISION OF RIGHT HIP MUSCLE, OPEN APPROACH [id number] 18


 


INTRODUCTION OF SERUM/TOX/VACCINE INTO MUSCLE, PERC APPROACH [id number] 18














Infectious Disease Assmt/Plan





- Assessment


Assessment: 





1.  Sacral decubitus ulcer.


2.  UTI.  Treated


3.  Dementia.





- Plan


Plan: 


UA urine cs


wound care.


CPM.








Nutritional Asmnt/Malnutr-PDOC





- Dietary Evaluation


Malnutrition Findings (Please click <Entered> for more info): 








Nutritional Asmnt/Malnutrition                             Start:  18 16:

25


Text:                                                      Status: Complete    

  


Freq:                                                                          

  


 Document     18 16:25  [email or web address]  (Rec: 18 16:31  LCHENG  NAPOLEON-[email or web address])


 Nutritional Asmnt/Malnutrition


     Patient General Information


      Nutritional Screening                      Moderate Risk


      Diagnosis                                  dehydration


      Pertinent Medical Hx/Surgical Hx           DM, dementia, depression,


                                                 psychosis, schizophrenia,


                                                 chronic renal insuff


      Subjective Information                     Pt is agitated, on 1:1 sitter.


                                                 Per EMR, PO intake 25-75%,


                                                 avg 50%.


      Current Diet Order/ Nutrition Support      pureed, CCHO 60gm


      Pertinent Medications                      novolov, levemir, glucophage


      Pertinent Labs                             3/19- -316


     Nutritional Hx/Data


      Height                                     1.8 m


      Height (Calculated Centimeters)            180.3


      Current Weight (lbs)                       77.111 kg


      Weight (Calculated Kilograms)              77.1


      Weight (Calculated Grams)                  17376.7


      Ideal Body Weight                          172


      Body Mass Index (BMI)                      23.7


      Weight Status                              Approriate


     GI Symptoms


      GI Symptoms                                None


      Last BM                                    3/19


      Difficult in:                              None


      Skin Integrity/Comment:                    pressure ulcer, decubitus


      Current %PO                                Fair (50-74%)


     Estimated Nutritional Goals


      BEE in Kcals:                              Using Current wt


      Calories/Kcals/Kg                          25-30


      Kcals Calculated                           7157-8793


      Protein:                                   Using Current wt


      Protein g/k-1.2


      Protein Calculated                         77-92


      Fluid: ml                                  1925-2310ml (1ml/kcal)


     Nutritional Problem


      1. Problem


       Problem                                   altered nutrition related lab


                                                 values


       Etiology                                  hx of DM


       Signs/Symptoms:                           glucose 169, -316


     Malnutrition Alert


      Protein-Calorie Malnutrition               N/A


      Is there a minimum of two criteria         No


       selected?                                 


       Query Text:Check all the applicable       


       criteria. A minimum of two criteria are   


       recommended for diagnosis of either       


       severe or non-severe malnutrition.        


     Intervention/Recommendation


      Comments                                   1. Continue with current diet


                                                 as ordered.


                                                 2. Monitor PO intake, wt, labs


                                                 and skin integrity


                                                 3. F/U as moderate risk in 3-5


                                                 days, 3/24-3/26


     Expected Outcomes/Goals


      Expected Outcomes/Goals                    1. PO intake to meet at least


                                                 75% of nutritional needs.


                                                 2. Wt stability, skin to


                                                 remain intact, labs to


                                                 approach WNL.

## 2018-04-04 NOTE — GENERAL PROGRESS NOTE
Subjective





- Review of Systems


Service Date: 18


Subjective: 





Patient resting comfortably in bed. no acute distress. In good spirits today. 

pleasant. cooperative today. eating well.





Blood Sugars improving. Will adjust dosage. Constipated this AM. Would like a 

stool softener....





Objective





- Results


Result Diagrams: 


 18 07:00





 18 13:00


Recent Labs: 


 Laboratory Last Values











WBC  10.2 Th/cmm (4.8-10.8)   18  07:00    


 


RBC  4.18 Mil/cmm (3.80-5.80)   18  07:00    


 


Hgb  12.5 gm/dL (12-16)   18  07:00    


 


Hct  37.9 % (41.0-60)  L  18  07:00    


 


MCV  90.6 fl (80-99)   18  07:00    


 


MCH  29.9 pg (27.0-31.0)   18  07:00    


 


MCHC Differential  33.0 pg (28.0-36.0)   18  07:00    


 


RDW  16.5 % (11.5-20.0)   18  07:00    


 


Plt Count  283 Th/cmm (150-400)   18  07:00    


 


MPV  8.0 fl  18  07:00    


 


Neutrophils %  64.6 % (40.0-80.0)   18  07:00    


 


Lymphocytes %  26.9 % (20.0-50.0)   18  07:00    


 


Monocytes %  5.3 % (2.0-10.0)   18  07:00    


 


Eosinophils %  2.8 % (0.0-5.0)   18  07:00    


 


Basophils %  0.4 % (0.0-2.0)   18  07:00    


 


PT  9.5 SECONDS (9.5-11.5)   18  21:35    


 


INR  0.91  (0.5-1.4)   18  21:35    


 


Sodium  132 mEq/L (136-145)  L  18  07:00    


 


Potassium  3.9 mEq/L (3.5-5.1)   18  07:00    


 


Chloride  99 mEq/L ()   18  07:00    


 


Carbon Dioxide  23.2 mEq/L (21.0-31.0)   18  07:00    


 


Anion Gap  13.7  (7.0-16.0)   18  07:00    


 


BUN  15 mg/dL (7-25)   18  13:00    


 


Creatinine  0.8 mg/dL (0.7-1.3)   18  13:00    


 


Est GFR ( Amer)  TNP   18  07:00    


 


Est GFR (Non-Af Amer)  TNP   18  07:00    


 


BUN/Creatinine Ratio  11.1   18  07:00    


 


Glucose  256 mg/dL ()  H  18  07:00    


 


POC Glucose  278 MG/DL (70 - 105)  H  18  06:19    


 


Hemoglobin A1c %  8.8 % (4.0-6.0)  H  18  07:00    


 


Calcium  8.7 mg/dL (8.6-10.3)   18  07:00    


 


Urine Source  RAVI PORT   18  21:30    


 


Urine Color  YELLOW   18  21:30    


 


Urine Clarity  HAZY  (CLEAR)   18  21:30    


 


Urine pH  5.5  (4.6 - 8.0)   18  21:30    


 


Ur Specific Gravity  1.010  (1.005-1.030)   18  21:30    


 


Urine Protein  NEGATIVE mg/dL (NEGATIVE)   18  21:30    


 


Urine Glucose (UA)  >=1000 mg/dL (NEGATIVE)  H  18  21:30    


 


Urine Ketones  NEGATIVE mg/dL (NEGATIVE)   18  21:30    


 


Urine Blood  LARGE  (NEGATIVE)  H  18  21:30    


 


Urine Nitrate  NEGATIVE  (NEGATIVE)   18  21:30    


 


Urine Bilirubin  NEGATIVE  (NEGATIVE)   18  21:30    


 


Urine Urobilinogen  0.2 E.U./dL (0.2 - 1.0)   18  21:30    


 


Ur Leukocyte Esterase  SMALL  (NEGATIVE)  H  18  21:30    


 


Urine RBC  10-25 /hpf (0-5)  H  18  21:30    


 


Urine WBC  2-5 /hpf (0-5)   18  21:30    


 


Ur Epithelial Cells  OCCASIONAL /lpf (FEW)   18  21:30    


 


Urine Bacteria  FEW /hpf (NONE SEEN)   18  21:30    


 


Urine Yeast  FEW /hpf (NONE SEEN)  H  18  21:30    


 


Vancomycin Trough  21.0 ug/mL (10-20)  H  18  13:00    


 


Random Vancomycin  23.1 ug/mL (5.0-40.0)   18  13:00    














- Physical Exam


Vitals and I&O: 


 Vital Signs











Temp  98.9 F   18 04:00


 


Pulse  74   18 04:00


 


Resp  18   18 04:00


 


BP  116/70   18 04:00


 


Pulse Ox  94   18 04:00








 Intake & Output











 18





 18:59 06:59 18:59


 


Intake Total 700 340 


 


Output Total 600 1500 


 


Balance 100 -1160 


 


Weight (lbs) 75.75 kg 75.523 kg 


 


Intake:   


 


  Oral 700 340 


 


Output:   


 


  Urine 600 1500 


 


Other:   


 


  # Bowel Movements 1 0 


 


  Weight Source Bedscale Bedscale 











Active Medications: 


Current Medications





Acetaminophen (Tylenol)  650 mg PO Q6H PRN


   PRN Reason: mild to moderate pain


   Stop: 05/15/18 23:24


   Last Admin: 18 02:33 Dose:  650 mg


Acetaminophen/Hydrocodone Bitart (Norco 5mg/325mg)  1 tab PO Q6H PRN


   PRN Reason: Pain (Moderate)


   Stop: 05/15/18 23:24


   Last Admin: 18 05:34 Dose:  1 tab


Allopurinol (Zyloprim)  100 mg PO DAILY [facility]


   Stop: 05/15/18 23:24


   Last Admin: 18 08:10 Dose:  100 mg


Castor Oil/Burmese Balsam/Trypsin (Venelex)  1 appl TP DAILY [facility]


   Stop: 05/15/18 23:24


   Last Admin: 18 11:50 Dose:  1 appl


Clonazepam (Klonopin)  1 mg PO BID [facility]TA


   PRN Reason: Protocol


   Stop: 18 09:59


   Last Admin: 18 16:44 Dose:  1 mg


Docusate Sodium (Colace)  100 mg PO DAILY [facility]


   Stop: 18 08:59


   Last Admin: 18 08:10 Dose:  100 mg


Donepezil HCl (Aricept)  10 mg PO DAILY [facility]


   Stop: 18 08:00


   Last Admin: 18 08:09 Dose:  10 mg


Doxycycline Hyclate (Vibramycin)  100 mg PO Q12HR [facility]


   Stop: 18 08:59


   Last Admin: 18 21:54 Dose:  100 mg


Famotidine (Pepcid)  20 mg PO BID [facility]


   Stop: 05/15/18 23:24


   Last Admin: 18 16:44 Dose:  20 mg


Fluconazole (Diflucan)  100 mg PO DAILY [facility]


   Stop: 05/15/18 23:24


   Last Admin: 18 08:09 Dose:  100 mg


Heparin Sodium (Porcine) (Heparin)  5,000 units SUBQ Q12HR R[facility]ETTA


   PRN Reason: Protocol


   Stop: 05/15/18 23:24


   Last Admin: 18 21:54 Dose:  5,000 units


Vancomycin HCl 1.5 gm/ Sodium (Chloride)  500 mls @ 250 mls/hr IV Q24H [facility]


   Stop: 18 08:59


   Last Admin: 18 11:48 Dose:  250 mls/hr


Insulin Aspart (Novolog Insulin Sliding Scale)  0 units SUBQ ACHS [facility]ETTA


   PRN Reason: Protocol


   Stop: 18 07:29


   Last Admin: 18 21:55 Dose:  4 units


Insulin Detemir (Levemir Insulin)  20 units SUBQ DAILY [facility]


   PRN Reason: Protocol


   Stop: 18 06:20


   Last Admin: 18 09:57 Dose:  Not Given


Lactobacillus Rhamnosus (Culturelle 15b)  1 each PO DAILY [facility]


   Stop: 18 08:59


   Last Admin: 18 09:56 Dose:  1 each


Magnesium Hydroxide (Milk Of Magnesia)  30 ml PO HS PRN


   PRN Reason: Constipation


   Stop: 05/15/18 23:24


Metformin HCl (Glucophage)  1,000 mg PO DAILY [facility]


   Stop: 18 09:59


   Last Admin: 18 08:10 Dose:  1,000 mg


Metoprolol Succinate (Toprol Xl)  25 mg PO DAILY [facility]


   Stop: 05/15/18 23:24


   Last Admin: 18 08:09 Dose:  25 mg


Miscellaneous (Vte Chemical Prophylaxis Screen/ Admission)  1 ea MC PRN PRN


   PRN Reason: PROTOCOL


   Stop: 18 10:29


Miscellaneous (Vancomycin Iv Per Pharmacy)  1 ea MC DAILY [facility]


   Stop: 18 08:59


Miscellaneous (Probiotic Screen)  1 ea MC PRN PRN


   PRN Reason: PROTOCOL


   Stop: 18 09:07


Olanzapine (Zyprexa Zydis)  10 mg PO BID RENETTA


   PRN Reason: Protocol


   Stop: 18 07:53


   Last Admin: 18 16:46 Dose:  10 mg


Zolpidem Tartrate (Ambien)  5 mg PO HS PRN


   PRN Reason: insomnia


   Stop: 05/15/18 23:24


   Last Admin: 18 19:55 Dose:  5 mg








General: Alert, No acute distress


HEENT: Atraumatic, PERRLA, EOMI


Neck: Supple, JVD, +2 carotid pulse wo bruit


Cardiovascular: Regular rate, Normal S1, Normal S2


Lungs: Clear to auscultation


Abdomen: Bowel sounds, Soft


Extremities: no Clubbing, no Cyanosis, no Edema


Neurological: Sensation intact


Skin: no Rash


Psych/Mental Status: Other (psychosis)





- Procedures


Procedures: 


 Procedures











Procedure Code Date


 


KEV MUSC/FASCIA 20 SQ CM/< 78990 18


 


EXCISION OF RIGHT HIP MUSCLE, OPEN APPROACH [id number] 18


 


INTRODUCTION OF SERUM/TOX/VACCINE INTO MUSCLE, PERC APPROACH [id number] 18














Assessment/Plan





- Assessment


Assessment: 





psychosis


dementia


Alzheimer's dementia


diabetes mellitus not controlled. 


s/p wound debridement of sacral decubiti


UTI


hyperglycemia


constipation





- Plan


Plan: 





continue current medications.


will add doxycyline PO


for hospice eval


continue IV antibiotics


stool softener





Nutritional Asmnt/Malnutr-PDOC





- Dietary Evaluation


Malnutrition Findings (Please click <Entered> for more info): 








Nutritional Asmnt/Malnutrition                             Start:  18 16:

25


Text:                                                      Status: Complete    

  


Freq:                                                                          

  


 Document     18 16:25  MAGEN  (Rec: 18 16:31  MAGEN  NAPOLEON-FNS1)


 Nutritional Asmnt/Malnutrition


     Patient General Information


      Nutritional Screening                      Moderate Risk


      Diagnosis                                  dehydration


      Pertinent Medical Hx/Surgical Hx           DM, dementia, depression,


                                                 psychosis, schizophrenia,


                                                 chronic renal insuff


      Subjective Information                     Pt is agitated, on 1:1 sitter.


                                                 Per EMR, PO intake 25-75%,


                                                 avg 50%.


      Current Diet Order/ Nutrition Support      pureed, CCHO 60gm


      Pertinent Medications                      novolov, levemir, glucophage


      Pertinent Labs                             3/19- -316


     Nutritional Hx/Data


      Height                                     1.8 m


      Height (Calculated Centimeters)            180.3


      Current Weight (lbs)                       77.111 kg


      Weight (Calculated Kilograms)              77.1


      Weight (Calculated Grams)                  74600.7


      Ideal Body Weight                          172


      Body Mass Index (BMI)                      23.7


      Weight Status                              Approriate


     GI Symptoms


      GI Symptoms                                None


      Last BM                                    3/19


      Difficult in:                              None


      Skin Integrity/Comment:                    pressure ulcer, decubitus


      Current %PO                                Fair (50-74%)


     Estimated Nutritional Goals


      BEE in Kcals:                              Using Current wt


      Calories/Kcals/Kg                          25-30


      Kcals Calculated                           2261-1335


      Protein:                                   Using Current wt


      Protein g/k-1.2


      Protein Calculated                         77-92


      Fluid: ml                                  1925-2310ml (1ml/kcal)


     Nutritional Problem


      1. Problem


       Problem                                   altered nutrition related lab


                                                 values


       Etiology                                  hx of DM


       Signs/Symptoms:                           glucose 169, -316


     Malnutrition Alert


      Protein-Calorie Malnutrition               N/A


      Is there a minimum of two criteria         No


       selected?                                 


       Query Text:Check all the applicable       


       criteria. A minimum of two criteria are   


       recommended for diagnosis of either       


       severe or non-severe malnutrition.        


     Intervention/Recommendation


      Comments                                   1. Continue with current diet


                                                 as ordered.


                                                 2. Monitor PO intake, wt, labs


                                                 and skin integrity


                                                 3. F/U as moderate risk in 3-5


                                                 days, 3/24-3/26


     Expected Outcomes/Goals


      Expected Outcomes/Goals                    1. PO intake to meet at least


                                                 75% of nutritional needs.


                                                 2. Wt stability, skin to


                                                 remain intact, labs to


                                                 approach WNL.

## 2018-04-05 LAB
ALBUMIN SERPL-MCNC: 3.1 GM/DL (ref 4.2–5.5)
ALBUMIN/GLOB SERPL: 0.9 {RATIO} (ref 1–1.8)
ALP SERPL-CCNC: 80 U/L (ref 34–104)
ALT SERPL-CCNC: 13 U/L (ref 7–52)
ANION GAP SERPL CALC-SCNC: 11.9 MMOL/L (ref 7–16)
AST SERPL-CCNC: 14 U/L (ref 13–39)
BASOPHILS # BLD AUTO: 0.1 TH/CUMM (ref 0–0.2)
BASOPHILS NFR BLD AUTO: 0.8 % (ref 0–2)
BILIRUB SERPL-MCNC: 0.3 MG/DL (ref 0.3–1)
BUN SERPL-MCNC: 13 MG/DL (ref 7–25)
CALCIUM SERPL-MCNC: 9 MG/DL (ref 8.6–10.3)
CHLORIDE SERPL-SCNC: 103 MEQ/L (ref 98–107)
CO2 SERPL-SCNC: 27.8 MEQ/L (ref 21–31)
CREAT SERPL-MCNC: 0.9 MG/DL (ref 0.7–1.3)
EOSINOPHIL # BLD AUTO: 0.4 TH/CMM (ref 0.1–0.4)
EOSINOPHIL NFR BLD AUTO: 4.9 % (ref 0–5)
ERYTHROCYTE [DISTWIDTH] IN BLOOD BY AUTOMATED COUNT: 15.9 % (ref 11.5–20)
GLOBULIN SER-MCNC: 3.6 GM/DL
GLUCOSE SERPL-MCNC: 139 MG/DL (ref 70–105)
HCT VFR BLD CALC: 41 % (ref 41–60)
HGB BLD-MCNC: 13.1 GM/DL (ref 12–16)
LYMPHOCYTE AB SER FC-ACNC: 3 TH/CMM (ref 1.5–3)
LYMPHOCYTES NFR BLD AUTO: 37.8 % (ref 20–50)
MCH RBC QN AUTO: 29.1 PG (ref 27–31)
MCHC RBC AUTO-ENTMCNC: 32 PG (ref 28–36)
MCV RBC AUTO: 91.2 FL (ref 80–99)
MONOCYTES # BLD AUTO: 0.5 TH/CMM (ref 0.3–1)
MONOCYTES NFR BLD AUTO: 6.6 % (ref 2–10)
NEUTROPHILS # BLD: 3.9 TH/CMM (ref 1.8–8)
NEUTROPHILS NFR BLD AUTO: 49.9 % (ref 40–80)
PLATELET # BLD: 282 TH/CMM (ref 150–400)
PMV BLD AUTO: 8.5 FL
POTASSIUM SERPL-SCNC: 4.7 MEQ/L (ref 3.5–5.1)
RBC # BLD AUTO: 4.49 MIL/CMM (ref 3.8–5.8)
SODIUM SERPL-SCNC: 138 MEQ/L (ref 136–145)
WBC # BLD AUTO: 7.9 TH/CMM (ref 4.8–10.8)

## 2018-04-05 RX ADMIN — Medication SCH EACH: at 08:32

## 2018-04-05 RX ADMIN — INSULIN ASPART SCH UNITS: 100 INJECTION, SOLUTION INTRAVENOUS; SUBCUTANEOUS at 17:13

## 2018-04-05 RX ADMIN — INSULIN DETEMIR SCH UNITS: 100 INJECTION, SOLUTION SUBCUTANEOUS at 08:39

## 2018-04-05 RX ADMIN — OLANZAPINE SCH MG: 5 TABLET, ORALLY DISINTEGRATING ORAL at 16:06

## 2018-04-05 RX ADMIN — OLANZAPINE SCH MG: 5 TABLET, ORALLY DISINTEGRATING ORAL at 08:33

## 2018-04-05 RX ADMIN — CASTOR OIL AND BALSAM, PERU SCH APPL: 788; 87 OINTMENT TOPICAL at 08:38

## 2018-04-05 RX ADMIN — INSULIN ASPART SCH: 100 INJECTION, SOLUTION INTRAVENOUS; SUBCUTANEOUS at 07:43

## 2018-04-05 RX ADMIN — INSULIN ASPART SCH UNITS: 100 INJECTION, SOLUTION INTRAVENOUS; SUBCUTANEOUS at 12:17

## 2018-04-05 RX ADMIN — INSULIN ASPART SCH: 100 INJECTION, SOLUTION INTRAVENOUS; SUBCUTANEOUS at 21:54

## 2018-04-05 NOTE — INFECTIOUS DISEASE PROG NOTE
Infectious Disease Subjective





- Review of Systems


Service Date: 18


Subjective: 





No fever





Infectious Disease Objective





- Results


Result Diagrams: 


 18 08:00





 18 08:00


Recent Labs: 


 Laboratory Last Values











WBC  7.9 Th/cmm (4.8-10.8)   18  08:00    


 


RBC  4.49 Mil/cmm (3.80-5.80)   18  08:00    


 


Hgb  13.1 gm/dL (12-16)   18  08:00    


 


Hct  41.0 % (41.0-60)  D 18  08:00    


 


MCV  91.2 fl (80-99)   18  08:00    


 


MCH  29.1 pg (27.0-31.0)   18  08:00    


 


MCHC Differential  32.0 pg (28.0-36.0)   18  08:00    


 


RDW  15.9 % (11.5-20.0)   18  08:00    


 


Plt Count  282 Th/cmm (150-400)   18  08:00    


 


MPV  8.5 fl  18  08:00    


 


Neutrophils %  49.9 % (40.0-80.0)   18  08:00    


 


Lymphocytes %  37.8 % (20.0-50.0)   18  08:00    


 


Monocytes %  6.6 % (2.0-10.0)   18  08:00    


 


Eosinophils %  4.9 % (0.0-5.0)   18  08:00    


 


Basophils %  0.8 % (0.0-2.0)   18  08:00    


 


PT  9.5 SECONDS (9.5-11.5)   18  21:35    


 


INR  0.91  (0.5-1.4)   18  21:35    


 


Sodium  138 mEq/L (136-145)   18  08:00    


 


Potassium  4.7 mEq/L (3.5-5.1)   18  08:00    


 


Chloride  103 mEq/L ()   18  08:00    


 


Carbon Dioxide  27.8 mEq/L (21.0-31.0)   18  08:00    


 


Anion Gap  11.9  (7.0-16.0)   18  08:00    


 


BUN  13 mg/dL (7-25)   18  08:00    


 


Creatinine  0.9 mg/dL (0.7-1.3)   18  08:00    


 


Est GFR ( Amer)  TNP   18  08:00    


 


Est GFR (Non-Af Amer)  TNP   18  08:00    


 


BUN/Creatinine Ratio  14.4   18  08:00    


 


Glucose  139 mg/dL ()  H  18  08:00    


 


POC Glucose  136 MG/DL (70 - 105)  H  18  21:30    


 


Hemoglobin A1c %  8.8 % (4.0-6.0)  H  18  07:00    


 


Calcium  9.0 mg/dL (8.6-10.3)   18  08:00    


 


Total Bilirubin  0.3 mg/dL (0.3-1.0)   18  08:00    


 


AST  14 U/L (13-39)   18  08:00    


 


ALT  13 U/L (7-52)   18  08:00    


 


Alkaline Phosphatase  80 U/L ()   18  08:00    


 


Total Protein  6.7 gm/dL (6.0-8.3)   18  08:00    


 


Albumin  3.1 gm/dL (4.2-5.5)  L  18  08:00    


 


Globulin  3.6 gm/dL  18  08:00    


 


Albumin/Globulin Ratio  0.9  (1.0-1.8)  L  18  08:00    


 


Urine Source  RAVI PORT   18  21:30    


 


Urine Color  YELLOW   18  21:30    


 


Urine Clarity  HAZY  (CLEAR)   18  21:30    


 


Urine pH  5.5  (4.6 - 8.0)   18  21:30    


 


Ur Specific Gravity  1.010  (1.005-1.030)   18  21:30    


 


Urine Protein  NEGATIVE mg/dL (NEGATIVE)   18  21:30    


 


Urine Glucose (UA)  >=1000 mg/dL (NEGATIVE)  H  18  21:30    


 


Urine Ketones  NEGATIVE mg/dL (NEGATIVE)   18  21:30    


 


Urine Blood  LARGE  (NEGATIVE)  H  18  21:30    


 


Urine Nitrate  NEGATIVE  (NEGATIVE)   18  21:30    


 


Urine Bilirubin  NEGATIVE  (NEGATIVE)   18  21:30    


 


Urine Urobilinogen  0.2 E.U./dL (0.2 - 1.0)   18  21:30    


 


Ur Leukocyte Esterase  SMALL  (NEGATIVE)  H  18  21:30    


 


Urine RBC  10-25 /hpf (0-5)  H  18  21:30    


 


Urine WBC  2-5 /hpf (0-5)   18  21:30    


 


Ur Epithelial Cells  OCCASIONAL /lpf (FEW)   18  21:30    


 


Urine Bacteria  FEW /hpf (NONE SEEN)   18  21:30    


 


Urine Yeast  FEW /hpf (NONE SEEN)  H  18  21:30    


 


Vancomycin Trough  16.7 ug/mL (5-10)  H  18  08:00    


 


Random Vancomycin  23.1 ug/mL (5.0-40.0)   18  13:00    














- Physical Exam


Vitals and I&O: 


 Vital Signs











Temp  97.2 F   18 15:16


 


Pulse  87   18 15:16


 


Resp  18   18 15:16


 


BP  149/86   18 15:16


 


Pulse Ox  98   18 15:16








 Intake & Output











 18





 06:59 18:59 06:59


 


Intake Total 200 800 


 


Output Total 1600 1200 


 


Balance -1400 -400 


 


Weight (lbs) 75.523 kg 75.523 kg 


 


Intake:   


 


  Oral 200 800 


 


Output:   


 


  Urine 1600 1200 


 


Other:   


 


  # Bowel Movements 0  


 


  Weight Source Bedscale Bedscale 











Active Medications: 


Current Medications





Acetaminophen (Tylenol)  650 mg PO Q6H PRN


   PRN Reason: mild to moderate pain


   Stop: 05/15/18 23:24


   Last Admin: 18 02:33 Dose:  650 mg


Acetaminophen/Hydrocodone Bitart (Norco 5mg/325mg)  1 tab PO Q6H PRN


   PRN Reason: Pain (Moderate)


   Stop: 05/15/18 23:24


   Last Admin: 18 05:34 Dose:  1 tab


Allopurinol (Zyloprim)  100 mg PO DAILY [facility]


   Stop: 05/15/18 23:24


   Last Admin: 18 08:32 Dose:  100 mg


Castor Oil/[place] Balsam/Trypsin (Venelex)  1 appl TP DAILY [facility]


   Stop: 05/15/18 23:24


   Last Admin: 18 08:38 Dose:  1 appl


Clonazepam (Klonopin)  1 mg PO BID [facility]


   PRN Reason: Protocol


   Stop: 18 09:59


   Last Admin: 18 16:06 Dose:  1 mg


Docusate Sodium (Colace)  100 mg PO DAILY [facility]


   Stop: 18 08:59


   Last Admin: 18 08:32 Dose:  100 mg


Donepezil HCl (Aricept)  10 mg PO DAILY [facility]


   Stop: 18 08:00


   Last Admin: 18 08:32 Dose:  10 mg


Famotidine (Pepcid)  20 mg PO BID [facility]


   Stop: 05/15/18 23:24


   Last Admin: 18 16:06 Dose:  20 mg


Fluconazole (Diflucan)  100 mg PO DAILY [facility]


   Stop: 05/15/18 23:24


   Last Admin: 18 08:32 Dose:  100 mg


Heparin Sodium (Porcine) (Heparin)  5,000 units SUBQ Q12HR [facility]


   PRN Reason: Protocol


   Stop: 05/15/18 23:24


   Last Admin: 18 21:52 Dose:  5,000 units


Vancomycin HCl 1.5 gm/ Sodium (Chloride)  500 mls @ 250 mls/hr IV Q24H [facility]


   Stop: 18 08:59


   Last Admin: 18 10:09 Dose:  250 mls/hr


Insulin Aspart (Novolog Insulin Sliding Scale)  0 units SUBQ ACHS [facility]


   PRN Reason: Protocol


   Stop: 18 07:29


   Last Admin: 18 21:54 Dose:  Not Given


Insulin Detemir (Levemir Insulin)  20 units SUBQ DAILY [facility]


   PRN Reason: Protocol


   Stop: 18 06:20


   Last Admin: 18 08:39 Dose:  20 units


Lactobacillus Rhamnosus (Culturelle 15b)  1 each PO DAILY [facility]


   Stop: 18 08:59


   Last Admin: 18 08:32 Dose:  1 each


Magnesium Hydroxide (Milk Of Magnesia)  30 ml PO HS PRN


   PRN Reason: Constipation


   Stop: 05/15/18 23:24


Metformin HCl (Glucophage)  1,000 mg PO DAILY [facility]


   Stop: 18 09:59


   Last Admin: 18 08:32 Dose:  1,000 mg


Metoprolol Succinate (Toprol Xl)  25 mg PO DAILY [facility]


   Stop: 05/15/18 23:24


   Last Admin: 18 08:33 Dose:  25 mg


Miscellaneous (Vte Chemical Prophylaxis Screen/ Admission)  1 ea  PRN PRN


   PRN Reason: PROTOCOL


   Stop: 18 10:29


Miscellaneous (Vancomycin Iv Per Pharmacy)  1 ea MC DAILY [facility]


   Stop: 18 08:59


Miscellaneous (Probiotic Screen)  1 ea  PRN PRN


   PRN Reason: PROTOCOL


   Stop: 18 09:07


Olanzapine (Zyprexa Zydis)  10 mg PO BID [facility]


   PRN Reason: Protocol


   Stop: 18 07:53


   Last Admin: 18 16:06 Dose:  10 mg


Zolpidem Tartrate (Ambien)  5 mg PO HS PRN


   PRN Reason: insomnia


   Stop: 05/15/18 23:24


   Last Admin: 18 19:55 Dose:  5 mg








General: no acute distress, well developed, well nourished


HEENT: atraumatic, normocephalic, PERRLA, EOMI


Neck: supple, no thyromegaly


Cardiovascular: S1S2, regular


Lungs: clear to auscultation bilaterally, clear to percussion


Abdomen: soft, no tender, no distended, no mass


Extremities: no cyanosis, no clubbing


Neurological: awake, alert, oriented


Skin: other (sacral wound)





- Procedures


Procedures: 


 Procedures











Procedure Code Date


 


KEV MUSC/FASCIA 20 SQ CM/< 00789 18


 


EXCISION OF RIGHT HIP MUSCLE, OPEN APPROACH [id number] 18


 


INTRODUCTION OF SERUM/TOX/VACCINE INTO MUSCLE, PERC APPROACH [id number] 18














Infectious Disease Assmt/Plan





- Assessment


Assessment: 





1.  Sacral decubitus ulcer.


2.  UTI.  Treated


3.  Dementia.





- Plan


Plan: 


UA urine cs


wound care.


CPM.








Nutritional Asmnt/Malnutr-PDOC





- Dietary Evaluation


Malnutrition Findings (Please click <Entered> for more info): 








Nutritional Asmnt/Malnutrition                             Start:  18 16:

25


Text:                                                      Status: Complete    

  


Freq:                                                                          

  


 Document     18 16:25  MAGEN  (Rec: 18 16:31  MAGEN  [email or web address])


 Nutritional Asmnt/Malnutrition


     Patient General Information


      Nutritional Screening                      Moderate Risk


      Diagnosis                                  dehydration


      Pertinent Medical Hx/Surgical Hx           DM, dementia, depression,


                                                 psychosis, schizophrenia,


                                                 chronic renal insuff


      Subjective Information                     Pt is agitated, on 1:1 sitter.


                                                 Per EMR, PO intake 25-75%,


                                                 avg 50%.


      Current Diet Order/ Nutrition Support      pureed, CCHO 60gm


      Pertinent Medications                      novolov, levemir, glucophage


      Pertinent Labs                             3/19-21 -316


     Nutritional Hx/Data


      Height                                     1.8 m


      Height (Calculated Centimeters)            180.3


      Current Weight (lbs)                       77.111 kg


      Weight (Calculated Kilograms)              77.1


      Weight (Calculated Grams)                  00217.7


      Ideal Body Weight                          172


      Body Mass Index (BMI)                      23.7


      Weight Status                              Approriate


     GI Symptoms


      GI Symptoms                                None


      Last BM                                    3/19


      Difficult in:                              None


      Skin Integrity/Comment:                    pressure ulcer, decubitus


      Current %PO                                Fair (50-74%)


     Estimated Nutritional Goals


      BEE in Kcals:                              Using Current wt


      Calories/Kcals/Kg                          25-30


      Kcals Calculated                           6835-3558


      Protein:                                   Using Current wt


      Protein g/k-1.2


      Protein Calculated                         77-92


      Fluid: ml                                  1925-2310ml (1ml/kcal)


     Nutritional Problem


      1. Problem


       Problem                                   altered nutrition related lab


                                                 values


       Etiology                                  hx of DM


       Signs/Symptoms:                           glucose 169, -316


     Malnutrition Alert


      Protein-Calorie Malnutrition               N/A


      Is there a minimum of two criteria         No


       selected?                                 


       Query Text:Check all the applicable       


       criteria. A minimum of two criteria are   


       recommended for diagnosis of either       


       severe or non-severe malnutrition.        


     Intervention/Recommendation


      Comments                                   1. Continue with current diet


                                                 as ordered.


                                                 2. Monitor PO intake, wt, labs


                                                 and skin integrity


                                                 3. F/U as moderate risk in 3-5


                                                 days, 3/24-3/26


     Expected Outcomes/Goals


      Expected Outcomes/Goals                    1. PO intake to meet at least


                                                 75% of nutritional needs.


                                                 2. Wt stability, skin to


                                                 remain intact, labs to


                                                 approach WNL.

## 2018-04-06 LAB
APPEARANCE UR: (no result)
BACTERIA #/AREA URNS HPF: (no result) /HPF
BILIRUB UR-MCNC: NEGATIVE MG/DL
COLOR UR: YELLOW
EPI CELLS URNS QL MICRO: (no result) /LPF
GLUCOSE UR STRIP-MCNC: 100 MG/DL
KETONES UR STRIP-MCNC: NEGATIVE MG/DL
LEUKOCYTE ESTERASE UR-ACNC: (no result)
MICRO URNS: YES
NITRITE UR QL STRIP: NEGATIVE
PH UR STRIP: 6 [PH] (ref 4.6–8)
PROT UR STRIP-MCNC: NEGATIVE MG/DL
RBC # UR STRIP: (no result) /UL
RBC #/AREA URNS HPF: (no result) /HPF (ref 0–5)
SP GR UR STRIP: <= 1.005 (ref 1–1.03)
URINALYSIS COMPLETE PNL UR: (no result)
UROBILINOGEN UR STRIP-ACNC: 0.2 E.U./DL (ref 0.2–1)
YEAST URNS QL MICRO: (no result) /HPF

## 2018-04-06 RX ADMIN — INSULIN ASPART SCH UNITS: 100 INJECTION, SOLUTION INTRAVENOUS; SUBCUTANEOUS at 21:10

## 2018-04-06 RX ADMIN — INSULIN ASPART SCH UNITS: 100 INJECTION, SOLUTION INTRAVENOUS; SUBCUTANEOUS at 12:21

## 2018-04-06 RX ADMIN — OLANZAPINE SCH: 5 TABLET, ORALLY DISINTEGRATING ORAL at 09:05

## 2018-04-06 RX ADMIN — CASTOR OIL AND BALSAM, PERU SCH: 788; 87 OINTMENT TOPICAL at 09:02

## 2018-04-06 RX ADMIN — OLANZAPINE SCH MG: 5 TABLET, ORALLY DISINTEGRATING ORAL at 08:52

## 2018-04-06 RX ADMIN — Medication SCH EACH: at 08:51

## 2018-04-06 RX ADMIN — INSULIN ASPART SCH UNITS: 100 INJECTION, SOLUTION INTRAVENOUS; SUBCUTANEOUS at 08:16

## 2018-04-06 RX ADMIN — INSULIN ASPART SCH UNITS: 100 INJECTION, SOLUTION INTRAVENOUS; SUBCUTANEOUS at 16:56

## 2018-04-06 RX ADMIN — OLANZAPINE SCH MG: 5 TABLET, ORALLY DISINTEGRATING ORAL at 16:03

## 2018-04-06 RX ADMIN — INSULIN DETEMIR SCH UNITS: 100 INJECTION, SOLUTION SUBCUTANEOUS at 08:54

## 2018-04-06 RX ADMIN — Medication SCH: at 09:05

## 2018-04-06 NOTE — INFECTIOUS DISEASE PROG NOTE
Infectious Disease Subjective





- Review of Systems


Service Date: 18


Subjective: 





No fever





Infectious Disease Objective





- Results


Result Diagrams: 


 18 08:00





 18 08:00


Recent Labs: 


 Laboratory Last Values











WBC  7.9 Th/cmm (4.8-10.8)   18  08:00    


 


RBC  4.49 Mil/cmm (3.80-5.80)   18  08:00    


 


Hgb  13.1 gm/dL (12-16)   18  08:00    


 


Hct  41.0 % (41.0-60)  D 18  08:00    


 


MCV  91.2 fl (80-99)   18  08:00    


 


MCH  29.1 pg (27.0-31.0)   18  08:00    


 


MCHC Differential  32.0 pg (28.0-36.0)   18  08:00    


 


RDW  15.9 % (11.5-20.0)   18  08:00    


 


Plt Count  282 Th/cmm (150-400)   18  08:00    


 


MPV  8.5 fl  18  08:00    


 


Neutrophils %  49.9 % (40.0-80.0)   18  08:00    


 


Lymphocytes %  37.8 % (20.0-50.0)   18  08:00    


 


Monocytes %  6.6 % (2.0-10.0)   18  08:00    


 


Eosinophils %  4.9 % (0.0-5.0)   18  08:00    


 


Basophils %  0.8 % (0.0-2.0)   18  08:00    


 


PT  9.5 SECONDS (9.5-11.5)   18  21:35    


 


INR  0.91  (0.5-1.4)   18  21:35    


 


Sodium  138 mEq/L (136-145)   18  08:00    


 


Potassium  4.7 mEq/L (3.5-5.1)   18  08:00    


 


Chloride  103 mEq/L ()   18  08:00    


 


Carbon Dioxide  27.8 mEq/L (21.0-31.0)   18  08:00    


 


Anion Gap  11.9  (7.0-16.0)   18  08:00    


 


BUN  13 mg/dL (7-25)   18  08:00    


 


Creatinine  0.9 mg/dL (0.7-1.3)   18  08:00    


 


Est GFR ( Amer)  TNP   18  08:00    


 


Est GFR (Non-Af Amer)  TNP   18  08:00    


 


BUN/Creatinine Ratio  14.4   18  08:00    


 


Glucose  139 mg/dL ()  H  18  08:00    


 


POC Glucose  246 MG/DL (70 - 105)  H  18  11:56    


 


Hemoglobin A1c %  8.8 % (4.0-6.0)  H  18  07:00    


 


Calcium  9.0 mg/dL (8.6-10.3)   18  08:00    


 


Total Bilirubin  0.3 mg/dL (0.3-1.0)   18  08:00    


 


AST  14 U/L (13-39)   18  08:00    


 


ALT  13 U/L (7-52)   18  08:00    


 


Alkaline Phosphatase  80 U/L ()   18  08:00    


 


Total Protein  6.7 gm/dL (6.0-8.3)   18  08:00    


 


Albumin  3.1 gm/dL (4.2-5.5)  L  18  08:00    


 


Globulin  3.6 gm/dL  18  08:00    


 


Albumin/Globulin Ratio  0.9  (1.0-1.8)  L  18  08:00    


 


Urine Source  RAVI PORT   18  14:08    


 


Urine Color  YELLOW   18  14:08    


 


Urine Clarity  CLOUDY  (CLEAR)   18  14:08    


 


Urine pH  6.0  (4.6 - 8.0)   18  14:08    


 


Ur Specific Gravity  <= 1.005  (1.005-1.030)   18  14:08    


 


Urine Protein  NEGATIVE mg/dL (NEGATIVE)   18  14:08    


 


Urine Glucose (UA)  100 mg/dL (NEGATIVE)  H  18  14:08    


 


Urine Ketones  NEGATIVE mg/dL (NEGATIVE)   18  14:08    


 


Urine Blood  MODERATE  (NEGATIVE)  H  18  14:08    


 


Urine Nitrate  NEGATIVE  (NEGATIVE)   18  14:08    


 


Urine Bilirubin  NEGATIVE  (NEGATIVE)   18  14:08    


 


Urine Urobilinogen  0.2 E.U./dL (0.2 - 1.0)   18  14:08    


 


Ur Leukocyte Esterase  LARGE  (NEGATIVE)  H  18  14:08    


 


Urine RBC  5-10 /hpf (0-5)  H  18  14:08    


 


Urine WBC  10-25 /hpf (0-5)  H  18  14:08    


 


Ur Epithelial Cells  RARE /lpf (FEW)   18  14:08    


 


Urine Bacteria  NONE SEEN /hpf (NONE SEEN)   18  14:08    


 


Urine Yeast  MANY /hpf (NONE SEEN)  H  18  14:08    


 


Vancomycin Trough  16.7 ug/mL (5-10)  H  18  08:00    


 


Random Vancomycin  23.1 ug/mL (5.0-40.0)   18  13:00    














- Physical Exam


Vitals and I&O: 


 Vital Signs











Temp  97.6 F   18 12:39


 


Pulse  87   18 12:39


 


Resp  18   18 12:39


 


BP  130/98   18 12:39


 


Pulse Ox  96   18 12:39








 Intake & Output











 18





 18:59 06:59 18:59


 


Intake Total 1300 100 


 


Output Total 1200 1700 


 


Balance 100 -1600 


 


Weight (lbs) 75.523 kg 75.296 kg 


 


Intake:   


 


  Intake, IV Amount 500  


 


    Vancomycin HCl 1.5 gm In 500  





    Sodium Chloride 0.9% 500   





    ml @ 250 mls/hr IV Q24H   





    [facility] Rx#:[id number]   


 


  Oral 800 100 


 


Output:   


 


  Urine 1200 1700 


 


Other:   


 


  Weight Source Bedscale Bedscale 











Active Medications: 


Current Medications





Acetaminophen (Tylenol)  650 mg PO Q6H PRN


   PRN Reason: mild to moderate pain


   Stop: 05/15/18 23:24


   Last Admin: 18 02:33 Dose:  650 mg


Acetaminophen/Hydrocodone Bitart (Norco 5mg/325mg)  1 tab PO Q6H PRN


   PRN Reason: Pain (Moderate)


   Stop: 05/15/18 23:24


   Last Admin: 18 05:34 Dose:  1 tab


Allopurinol (Zyloprim)  100 mg PO DAILY [facility]


   Stop: 05/15/18 23:24


   Last Admin: 18 09:04 Dose:  Not Given


Castor Oil/[place] Balsam/Trypsin (Venelex)  1 appl TP DAILY [facility]


   Stop: 05/15/18 23:24


   Last Admin: 18 09:02 Dose:  Not Given


Clonazepam (Klonopin)  1 mg PO BID [facility]


   PRN Reason: Protocol


   Stop: 18 09:59


   Last Admin: 18 09:04 Dose:  Not Given


Docusate Sodium (Colace)  100 mg PO DAILY [facility]


   Stop: 18 08:59


   Last Admin: 18 09:04 Dose:  Not Given


Donepezil HCl (Aricept)  10 mg PO DAILY [facility]


   Stop: 18 08:00


   Last Admin: 18 09:04 Dose:  Not Given


Famotidine (Pepcid)  20 mg PO BID [facility]


   Stop: 05/15/18 23:24


   Last Admin: 18 09:05 Dose:  Not Given


Fluconazole (Diflucan)  100 mg PO DAILY [facility]


   Stop: 05/15/18 23:24


   Last Admin: 18 09:05 Dose:  Not Given


Heparin Sodium (Porcine) (Heparin)  5,000 units SUBQ Q12HR [facility]


   PRN Reason: Protocol


   Stop: 05/15/18 23:24


   Last Admin: 18 09:01 Dose:  Not Given


Insulin Aspart (Novolog Insulin Sliding Scale)  0 units SUBQ ACHS [facility]


   PRN Reason: Protocol


   Stop: 18 07:29


   Last Admin: 18 12:21 Dose:  4 units


Insulin Detemir (Levemir Insulin)  20 units SUBQ DAILY [facility]


   PRN Reason: Protocol


   Stop: 18 06:20


   Last Admin: 18 08:54 Dose:  20 units


Lactobacillus Rhamnosus (Culturelle 15b)  1 each PO DAILY [facility]


   Stop: 18 08:59


   Last Admin: 18 09:05 Dose:  Not Given


Lorazepam (Ativan)  1 mg IVP X1 PRN; Protocol


   PRN Reason: Agitation


   Stop: 18 14:48


Magnesium Hydroxide (Milk Of Magnesia)  30 ml PO HS PRN


   PRN Reason: Constipation


   Stop: 05/15/18 23:24


Metformin HCl (Glucophage)  1,000 mg PO DAILY [facility]


   Stop: 18 09:59


   Last Admin: 18 09:05 Dose:  Not Given


Metoprolol Succinate (Toprol Xl)  25 mg PO DAILY [facility]


   Stop: 05/15/18 23:24


   Last Admin: 18 09:05 Dose:  Not Given


Miscellaneous (Vte Chemical Prophylaxis Screen/ Admission)  1 ea  PRN PRN


   PRN Reason: PROTOCOL


   Stop: 18 10:29


Miscellaneous (Probiotic Screen)  1 ea  PRN PRN


   PRN Reason: PROTOCOL


   Stop: 18 09:07


Olanzapine (Zyprexa Zydis)  10 mg PO BID [facility]


   PRN Reason: Protocol


   Stop: 18 07:53


   Last Admin: 18 09:05 Dose:  Not Given


Zolpidem Tartrate (Ambien)  5 mg PO HS PRN


   PRN Reason: insomnia


   Stop: 05/15/18 23:24


   Last Admin: 18 19:55 Dose:  5 mg








General: no acute distress, well developed, well nourished


HEENT: atraumatic, normocephalic, PERRLA


Neck: supple, no thyromegaly


Cardiovascular: S1S2, regular


Lungs: clear to auscultation bilaterally, clear to percussion


Abdomen: soft, no tender, no distended, no mass


Extremities: no cyanosis, no clubbing


Neurological: awake, alert, oriented


Skin: other (sacral wound.)





- Procedures


Procedures: 


 Procedures











Procedure Code Date


 


KEV MUSC/FASCIA 20 SQ CM/< 03548 18


 


EXCISION OF RIGHT HIP MUSCLE, OPEN APPROACH [id number] 18


 


INTRODUCTION OF SERUM/TOX/VACCINE INTO MUSCLE, PERC APPROACH [id number] 18














Infectious Disease Assmt/Plan





- Assessment


Assessment: 





1.  Sacral decubitus ulcer.


2.  UTI.  Treated


3.  Dementia.





- Plan


Plan: 


 change antibiotics to doxy iv twice a day for 5 days.


wound care.


CPM.








Nutritional Asmnt/Malnutr-PDOC





- Dietary Evaluation


Malnutrition Findings (Please click <Entered> for more info): 








Nutritional Asmnt/Malnutrition                             Start:  18 16:

25


Text:                                                      Status: Complete    

  


Freq:                                                                          

  


 Document     18 16:25  MAGEN  (Rec: 18 16:31  [email or web address])


 Nutritional Asmnt/Malnutrition


     Patient General Information


      Nutritional Screening                      Moderate Risk


      Diagnosis                                  dehydration


      Pertinent Medical Hx/Surgical Hx           DM, dementia, depression,


                                                 psychosis, schizophrenia,


                                                 chronic renal insuff


      Subjective Information                     Pt is agitated, on 1:1 sitter.


                                                 Per EMR, PO intake 25-75%,


                                                 avg 50%.


      Current Diet Order/ Nutrition Support      pureed, CCHO 60gm


      Pertinent Medications                      novolov, levemir, glucophage


      Pertinent Labs                             3/19- -316


     Nutritional Hx/Data


      Height                                     1.8 m


      Height (Calculated Centimeters)            180.3


      Current Weight (lbs)                       77.111 kg


      Weight (Calculated Kilograms)              77.1


      Weight (Calculated Grams)                  63604.7


      Ideal Body Weight                          172


      Body Mass Index (BMI)                      23.7


      Weight Status                              Approriate


     GI Symptoms


      GI Symptoms                                None


      Last BM                                    3/19


      Difficult in:                              None


      Skin Integrity/Comment:                    pressure ulcer, decubitus


      Current %PO                                Fair (50-74%)


     Estimated Nutritional Goals


      BEE in Kcals:                              Using Current wt


      Calories/Kcals/Kg                          25-30


      Kcals Calculated                           7386-1289


      Protein:                                   Using Current wt


      Protein g/k-1.2


      Protein Calculated                         77-92


      Fluid: ml                                  1925-2310ml (1ml/kcal)


     Nutritional Problem


      1. Problem


       Problem                                   altered nutrition related lab


                                                 values


       Etiology                                  hx of DM


       Signs/Symptoms:                           glucose 169, -316


     Malnutrition Alert


      Protein-Calorie Malnutrition               N/A


      Is there a minimum of two criteria         No


       selected?                                 


       Query Text:Check all the applicable       


       criteria. A minimum of two criteria are   


       recommended for diagnosis of either       


       severe or non-severe malnutrition.        


     Intervention/Recommendation


      Comments                                   1. Continue with current diet


                                                 as ordered.


                                                 2. Monitor PO intake, wt, labs


                                                 and skin integrity


                                                 3. F/U as moderate risk in 3-5


                                                 days, 3/24-3/26


     Expected Outcomes/Goals


      Expected Outcomes/Goals                    1. PO intake to meet at least


                                                 75% of nutritional needs.


                                                 2. Wt stability, skin to


                                                 remain intact, labs to


                                                 approach WNL.